# Patient Record
Sex: MALE | Race: WHITE | NOT HISPANIC OR LATINO
[De-identification: names, ages, dates, MRNs, and addresses within clinical notes are randomized per-mention and may not be internally consistent; named-entity substitution may affect disease eponyms.]

---

## 2017-01-08 ENCOUNTER — RX RENEWAL (OUTPATIENT)
Age: 77
End: 2017-01-08

## 2017-01-23 ENCOUNTER — MEDICATION RENEWAL (OUTPATIENT)
Age: 77
End: 2017-01-23

## 2017-02-18 ENCOUNTER — RX RENEWAL (OUTPATIENT)
Age: 77
End: 2017-02-18

## 2017-02-22 ENCOUNTER — RX RENEWAL (OUTPATIENT)
Age: 77
End: 2017-02-22

## 2017-03-17 ENCOUNTER — RX RENEWAL (OUTPATIENT)
Age: 77
End: 2017-03-17

## 2017-03-19 ENCOUNTER — RX RENEWAL (OUTPATIENT)
Age: 77
End: 2017-03-19

## 2017-03-25 ENCOUNTER — RX RENEWAL (OUTPATIENT)
Age: 77
End: 2017-03-25

## 2017-04-17 ENCOUNTER — RX RENEWAL (OUTPATIENT)
Age: 77
End: 2017-04-17

## 2017-04-24 ENCOUNTER — RX RENEWAL (OUTPATIENT)
Age: 77
End: 2017-04-24

## 2017-05-15 ENCOUNTER — RX RENEWAL (OUTPATIENT)
Age: 77
End: 2017-05-15

## 2017-05-25 ENCOUNTER — RX RENEWAL (OUTPATIENT)
Age: 77
End: 2017-05-25

## 2017-05-30 ENCOUNTER — APPOINTMENT (OUTPATIENT)
Dept: INTERNAL MEDICINE | Facility: CLINIC | Age: 77
End: 2017-05-30

## 2017-05-30 VITALS
DIASTOLIC BLOOD PRESSURE: 78 MMHG | RESPIRATION RATE: 14 BRPM | BODY MASS INDEX: 20.81 KG/M2 | TEMPERATURE: 98 F | OXYGEN SATURATION: 95 % | HEIGHT: 73 IN | WEIGHT: 157 LBS | SYSTOLIC BLOOD PRESSURE: 130 MMHG | HEART RATE: 87 BPM

## 2017-05-30 DIAGNOSIS — S09.90XA UNSPECIFIED INJURY OF HEAD, INITIAL ENCOUNTER: ICD-10-CM

## 2017-05-30 RX ORDER — AZITHROMYCIN 250 MG/1
250 TABLET, FILM COATED ORAL
Qty: 6 | Refills: 0 | Status: DISCONTINUED | COMMUNITY
Start: 2017-01-12

## 2017-06-09 ENCOUNTER — APPOINTMENT (OUTPATIENT)
Dept: UROLOGY | Facility: CLINIC | Age: 77
End: 2017-06-09

## 2017-06-09 VITALS
HEIGHT: 73 IN | SYSTOLIC BLOOD PRESSURE: 129 MMHG | HEART RATE: 84 BPM | WEIGHT: 255 LBS | DIASTOLIC BLOOD PRESSURE: 70 MMHG | BODY MASS INDEX: 33.8 KG/M2 | OXYGEN SATURATION: 91 % | TEMPERATURE: 97.4 F

## 2017-06-13 ENCOUNTER — APPOINTMENT (OUTPATIENT)
Dept: INTERNAL MEDICINE | Facility: CLINIC | Age: 77
End: 2017-06-13

## 2017-06-13 VITALS
BODY MASS INDEX: 34.46 KG/M2 | SYSTOLIC BLOOD PRESSURE: 130 MMHG | WEIGHT: 260 LBS | HEIGHT: 73 IN | OXYGEN SATURATION: 97 % | TEMPERATURE: 97.9 F | RESPIRATION RATE: 14 BRPM | DIASTOLIC BLOOD PRESSURE: 70 MMHG | HEART RATE: 81 BPM

## 2017-06-16 ENCOUNTER — RX RENEWAL (OUTPATIENT)
Age: 77
End: 2017-06-16

## 2017-06-17 LAB
ANION GAP SERPL CALC-SCNC: 20 MMOL/L
APPEARANCE: CLEAR
ASO AB SER LA-ACNC: 144 IU/ML
BACTERIA UR CULT: NORMAL
BACTERIA: NEGATIVE
BILIRUBIN URINE: NEGATIVE
BLOOD URINE: NEGATIVE
BUN SERPL-MCNC: 16 MG/DL
CALCIUM SERPL-MCNC: 9.8 MG/DL
CHLORIDE SERPL-SCNC: 101 MMOL/L
CO2 SERPL-SCNC: 24 MMOL/L
COLOR: YELLOW
CORE LAB FLUID CYTOLOGY: NORMAL
CREAT SERPL-MCNC: 1.23 MG/DL
GLUCOSE QUALITATIVE U: NORMAL MG/DL
GLUCOSE SERPL-MCNC: 156 MG/DL
HYALINE CASTS: 1 /LPF
KETONES URINE: NEGATIVE
LEUKOCYTE ESTERASE URINE: NEGATIVE
MICROSCOPIC-UA: NORMAL
NITRITE URINE: NEGATIVE
PH URINE: 6.5
POTASSIUM SERPL-SCNC: 3.9 MMOL/L
PROTEIN URINE: NEGATIVE MG/DL
PSA SERPL-MCNC: 3.41 NG/ML
RED BLOOD CELLS URINE: 5 /HPF
SODIUM SERPL-SCNC: 145 MMOL/L
SPECIFIC GRAVITY URINE: 1.02
SQUAMOUS EPITHELIAL CELLS: 1 /HPF
UROBILINOGEN URINE: NORMAL MG/DL
WHITE BLOOD CELLS URINE: 2 /HPF

## 2017-06-25 ENCOUNTER — RX RENEWAL (OUTPATIENT)
Age: 77
End: 2017-06-25

## 2017-06-27 ENCOUNTER — APPOINTMENT (OUTPATIENT)
Dept: UROLOGY | Facility: CLINIC | Age: 77
End: 2017-06-27

## 2017-07-14 ENCOUNTER — RX RENEWAL (OUTPATIENT)
Age: 77
End: 2017-07-14

## 2017-07-17 ENCOUNTER — RX RENEWAL (OUTPATIENT)
Age: 77
End: 2017-07-17

## 2017-07-20 ENCOUNTER — OTHER (OUTPATIENT)
Age: 77
End: 2017-07-20

## 2017-07-20 ENCOUNTER — APPOINTMENT (OUTPATIENT)
Dept: UROLOGY | Facility: HOSPITAL | Age: 77
End: 2017-07-20

## 2017-07-24 ENCOUNTER — RX RENEWAL (OUTPATIENT)
Age: 77
End: 2017-07-24

## 2017-07-27 ENCOUNTER — APPOINTMENT (OUTPATIENT)
Dept: INTERNAL MEDICINE | Facility: CLINIC | Age: 77
End: 2017-07-27
Payer: MEDICARE

## 2017-07-27 VITALS
HEART RATE: 76 BPM | DIASTOLIC BLOOD PRESSURE: 78 MMHG | OXYGEN SATURATION: 92 % | HEIGHT: 73 IN | TEMPERATURE: 98 F | BODY MASS INDEX: 33.8 KG/M2 | SYSTOLIC BLOOD PRESSURE: 140 MMHG | RESPIRATION RATE: 14 BRPM | WEIGHT: 255 LBS

## 2017-07-27 VITALS — SYSTOLIC BLOOD PRESSURE: 120 MMHG | DIASTOLIC BLOOD PRESSURE: 60 MMHG

## 2017-07-27 PROCEDURE — 99214 OFFICE O/P EST MOD 30 MIN: CPT | Mod: 25

## 2017-07-28 ENCOUNTER — APPOINTMENT (OUTPATIENT)
Dept: UROLOGY | Facility: CLINIC | Age: 77
End: 2017-07-28

## 2017-07-28 ENCOUNTER — RX RENEWAL (OUTPATIENT)
Age: 77
End: 2017-07-28

## 2017-08-16 ENCOUNTER — RX RENEWAL (OUTPATIENT)
Age: 77
End: 2017-08-16

## 2017-08-26 ENCOUNTER — RX RENEWAL (OUTPATIENT)
Age: 77
End: 2017-08-26

## 2017-08-30 ENCOUNTER — EMERGENCY (EMERGENCY)
Facility: HOSPITAL | Age: 77
LOS: 0 days | Discharge: HOME | End: 2017-08-31
Attending: EMERGENCY MEDICINE | Admitting: EMERGENCY MEDICINE
Payer: MEDICARE

## 2017-08-30 VITALS
TEMPERATURE: 98 F | HEART RATE: 85 BPM | HEIGHT: 73 IN | DIASTOLIC BLOOD PRESSURE: 100 MMHG | WEIGHT: 255.07 LBS | RESPIRATION RATE: 20 BRPM | OXYGEN SATURATION: 94 % | SYSTOLIC BLOOD PRESSURE: 168 MMHG

## 2017-08-30 DIAGNOSIS — Z98.890 OTHER SPECIFIED POSTPROCEDURAL STATES: Chronic | ICD-10-CM

## 2017-08-30 DIAGNOSIS — R31.9 HEMATURIA, UNSPECIFIED: ICD-10-CM

## 2017-08-30 DIAGNOSIS — R33.9 RETENTION OF URINE, UNSPECIFIED: ICD-10-CM

## 2017-08-30 DIAGNOSIS — C67.9 MALIGNANT NEOPLASM OF BLADDER, UNSPECIFIED: ICD-10-CM

## 2017-08-30 LAB
ALBUMIN SERPL ELPH-MCNC: 4.1 G/DL — SIGNIFICANT CHANGE UP (ref 3.3–5)
ALP SERPL-CCNC: 75 U/L — SIGNIFICANT CHANGE UP (ref 40–120)
ALT FLD-CCNC: 41 U/L — SIGNIFICANT CHANGE UP (ref 12–78)
ANION GAP SERPL CALC-SCNC: 11 MMOL/L — SIGNIFICANT CHANGE UP (ref 5–17)
ANISOCYTOSIS BLD QL: SLIGHT — SIGNIFICANT CHANGE UP
APPEARANCE UR: (no result)
AST SERPL-CCNC: 27 U/L — SIGNIFICANT CHANGE UP (ref 15–37)
BASO STIPL BLD QL SMEAR: PRESENT — SIGNIFICANT CHANGE UP
BASOPHILS # BLD AUTO: 0.1 K/UL — SIGNIFICANT CHANGE UP (ref 0–0.2)
BASOPHILS NFR BLD AUTO: 1.1 % — SIGNIFICANT CHANGE UP (ref 0–2)
BILIRUB SERPL-MCNC: 0.9 MG/DL — SIGNIFICANT CHANGE UP (ref 0.2–1.2)
BILIRUB UR-MCNC: NEGATIVE — SIGNIFICANT CHANGE UP
BUN SERPL-MCNC: 18 MG/DL — SIGNIFICANT CHANGE UP (ref 7–23)
CALCIUM SERPL-MCNC: 9.4 MG/DL — SIGNIFICANT CHANGE UP (ref 8.5–10.1)
CHLORIDE SERPL-SCNC: 97 MMOL/L — SIGNIFICANT CHANGE UP (ref 96–108)
CO2 SERPL-SCNC: 26 MMOL/L — SIGNIFICANT CHANGE UP (ref 22–31)
COLOR SPEC: (no result)
CREAT SERPL-MCNC: 1.29 MG/DL — SIGNIFICANT CHANGE UP (ref 0.5–1.3)
DACRYOCYTES BLD QL SMEAR: SLIGHT — SIGNIFICANT CHANGE UP
DIFF PNL FLD: (no result)
ELLIPTOCYTES BLD QL SMEAR: SLIGHT — SIGNIFICANT CHANGE UP
EOSINOPHIL # BLD AUTO: 0.1 K/UL — SIGNIFICANT CHANGE UP (ref 0–0.5)
EOSINOPHIL NFR BLD AUTO: 1.6 % — SIGNIFICANT CHANGE UP (ref 0–6)
GLUCOSE SERPL-MCNC: 161 MG/DL — HIGH (ref 70–99)
GLUCOSE UR QL: NEGATIVE MG/DL — SIGNIFICANT CHANGE UP
HCT VFR BLD CALC: 40.7 % — SIGNIFICANT CHANGE UP (ref 39–50)
HGB BLD-MCNC: 13.2 G/DL — SIGNIFICANT CHANGE UP (ref 13–17)
HYPOCHROMIA BLD QL: SLIGHT — SIGNIFICANT CHANGE UP
INR BLD: 1.08 RATIO — SIGNIFICANT CHANGE UP (ref 0.88–1.16)
KETONES UR-MCNC: NEGATIVE — SIGNIFICANT CHANGE UP
LEUKOCYTE ESTERASE UR-ACNC: NEGATIVE — SIGNIFICANT CHANGE UP
LYMPHOCYTES # BLD AUTO: 2.3 K/UL — SIGNIFICANT CHANGE UP (ref 1–3.3)
LYMPHOCYTES # BLD AUTO: 25.1 % — SIGNIFICANT CHANGE UP (ref 13–44)
MCHC RBC-ENTMCNC: 20.3 PG — LOW (ref 27–34)
MCHC RBC-ENTMCNC: 32.4 GM/DL — SIGNIFICANT CHANGE UP (ref 32–36)
MCV RBC AUTO: 62.7 FL — LOW (ref 80–100)
MICROCYTES BLD QL: SIGNIFICANT CHANGE UP
MONOCYTES # BLD AUTO: 1 K/UL — HIGH (ref 0–0.9)
MONOCYTES NFR BLD AUTO: 10.6 % — SIGNIFICANT CHANGE UP (ref 2–14)
NEUTROPHILS # BLD AUTO: 5.7 K/UL — SIGNIFICANT CHANGE UP (ref 1.8–7.4)
NEUTROPHILS NFR BLD AUTO: 61.7 % — SIGNIFICANT CHANGE UP (ref 43–77)
NITRITE UR-MCNC: NEGATIVE — SIGNIFICANT CHANGE UP
PH UR: 7 — SIGNIFICANT CHANGE UP (ref 5–8)
PLAT MORPH BLD: NORMAL — SIGNIFICANT CHANGE UP
PLATELET # BLD AUTO: 272 K/UL — SIGNIFICANT CHANGE UP (ref 150–400)
POIKILOCYTOSIS BLD QL AUTO: SLIGHT — SIGNIFICANT CHANGE UP
POLYCHROMASIA BLD QL SMEAR: SLIGHT — SIGNIFICANT CHANGE UP
POTASSIUM SERPL-MCNC: 3.2 MMOL/L — LOW (ref 3.5–5.3)
POTASSIUM SERPL-SCNC: 3.2 MMOL/L — LOW (ref 3.5–5.3)
PROT SERPL-MCNC: 7.3 GM/DL — SIGNIFICANT CHANGE UP (ref 6–8.3)
PROT UR-MCNC: 500 MG/DL
PROTHROM AB SERPL-ACNC: 11.7 SEC — SIGNIFICANT CHANGE UP (ref 9.8–12.7)
RBC # BLD: 6.49 M/UL — HIGH (ref 4.2–5.8)
RBC # FLD: 13.6 % — SIGNIFICANT CHANGE UP (ref 10.3–14.5)
RBC BLD AUTO: (no result)
RBC CASTS # UR COMP ASSIST: >50 /HPF (ref 0–4)
SCHISTOCYTES BLD QL AUTO: SLIGHT — SIGNIFICANT CHANGE UP
SODIUM SERPL-SCNC: 134 MMOL/L — LOW (ref 135–145)
SP GR SPEC: 1.01 — SIGNIFICANT CHANGE UP (ref 1.01–1.02)
UROBILINOGEN FLD QL: NEGATIVE MG/DL — SIGNIFICANT CHANGE UP
WBC # BLD: 9.2 K/UL — SIGNIFICANT CHANGE UP (ref 3.8–10.5)
WBC # FLD AUTO: 9.2 K/UL — SIGNIFICANT CHANGE UP (ref 3.8–10.5)
WBC UR QL: SIGNIFICANT CHANGE UP

## 2017-08-30 PROCEDURE — 99285 EMERGENCY DEPT VISIT HI MDM: CPT

## 2017-08-30 NOTE — ED PROVIDER NOTE - OBJECTIVE STATEMENT
75 yo M hx of bladder CA s/p surgery August 3 at [facility], HTN, presents with CC urinary retention.  Pt states he started having gross hematuria today, color or red wine, and some clots.  Tonight he started having difficulty urinating and came to ED for further evaluation. Denies use of blood thinners.  Denies fever, chills, dysuria, or any other symptoms.  No medications taken at home prior to arrival.

## 2017-08-30 NOTE — ED PROVIDER NOTE - PROGRESS NOTE DETAILS
Pt with normal Hgb, normal renal function.  Urine is clearing after 4 bags CBI, from dark red urine, to lighter red urine.  No signs of reaccumulation of clot or retention.  Will clamp, and observe for urinary retention or worsening hematuria. Pt with normal Hgb, normal renal function.  Urine is clearing after 4 bags CBI, from dark red urine, to lighter pink urine.  No signs of reaccumulation of clot or retention.  Will clamp, and observe for urinary retention or worsening hematuria. Following clamping, and observation, pt with darkening of urine back to smiley blood appearance.  Will give another bag CBI, and consulting Dr. Lugo, pt's urologist out of [place]. Spoke with Dr. Hampton covering for Dr. Lugo.  Recommends transfer to [facility] for further urologic evaluation.

## 2017-08-30 NOTE — ED ADULT TRIAGE NOTE - CHIEF COMPLAINT QUOTE
Patient comes to ED for urinary retention and hematuria. Pt had a bladder tumor removed august 3rd at [place].

## 2017-08-31 VITALS
SYSTOLIC BLOOD PRESSURE: 139 MMHG | OXYGEN SATURATION: 99 % | DIASTOLIC BLOOD PRESSURE: 84 MMHG | RESPIRATION RATE: 18 BRPM | TEMPERATURE: 98 F | HEART RATE: 78 BPM

## 2017-08-31 NOTE — ED ADULT NURSE REASSESSMENT NOTE - NS ED NURSE REASSESS COMMENT FT1
MD Llamas re-evaluated pt and pt output after 5 CBI bags were completed, pt output without CBI became dark red again, pt updated as to plan of care at this time and plan for urology consult. Pt verbalized understanding of current plan of care. Pt connected to 6th CBI bag at this time and aware as to rationale for additional bag. Call bell within reach, lights turned off at pt request, pt has no questions or complaints at this time. Will continue to monitor.

## 2017-08-31 NOTE — ED ADULT NURSE REASSESSMENT NOTE - NS ED NURSE REASSESS COMMENT FT1
Pt urine becoming clearer at this time, urine now light pink in color when initially dark red with clots. Pt no longer complains of pain at this time or the need to void. No additional clots noted in the harding bag, CBI continues to irrigate and drain at this time with no issues. Pt aware as to plan of care at this time. Pt has no questions at this time, call bell within reach. Pt given pillow for comfort and repositioned. Will continue to monitor.

## 2017-08-31 NOTE — ED ADULT NURSE REASSESSMENT NOTE - NS ED NURSE REASSESS COMMENT FT1
Pt asleep in stretcher, CBI continues to flow without clogging or issues, slight small clots still noted in urine. Urine pink in color. Awaiting completion of 5th CBI fluid bag at this time top d/c CBI as per MD Llamas to make sure pt harding will continue to drain. Call bell within reach, will continue to monitor.

## 2017-08-31 NOTE — ED ADULT NURSE NOTE - CHIEF COMPLAINT QUOTE
Patient comes to ED for urinary retention and hematuria. Pt had a bladder tumor removed august 3rd at [place].

## 2017-08-31 NOTE — ED ADULT NURSE REASSESSMENT NOTE - NS ED NURSE REASSESS COMMENT FT1
Report given to transfer center in regards to pt status and care. Pt aware as to need for transfer to [place] and has no questions regarding transfer. Pt CBI infusing at this time with no complications. Pt has no complaints at this time, call bell within reach. Awaiting transportation at this time, will continue to monitor.

## 2017-08-31 NOTE — ED ADULT NURSE NOTE - OBJECTIVE STATEMENT
Pt presents to the ED with complaints of severe urinary retention with drainage from the meatus of the penis that is bloody. Pt states he has been unable to urinate for hours. Pt states he has a h/o bladder CA and had tumors removed at [place] on august 3rd. Pt states he had a harding for 4 days after the sx and has had no issues up until this point.

## 2017-09-01 LAB
CULTURE RESULTS: NO GROWTH — SIGNIFICANT CHANGE UP
SPECIMEN SOURCE: SIGNIFICANT CHANGE UP

## 2017-09-19 ENCOUNTER — RX RENEWAL (OUTPATIENT)
Age: 77
End: 2017-09-19

## 2017-09-26 ENCOUNTER — RX RENEWAL (OUTPATIENT)
Age: 77
End: 2017-09-26

## 2017-10-20 ENCOUNTER — RX RENEWAL (OUTPATIENT)
Age: 77
End: 2017-10-20

## 2017-10-29 ENCOUNTER — RX RENEWAL (OUTPATIENT)
Age: 77
End: 2017-10-29

## 2017-11-18 ENCOUNTER — RX RENEWAL (OUTPATIENT)
Age: 77
End: 2017-11-18

## 2017-11-25 ENCOUNTER — RX RENEWAL (OUTPATIENT)
Age: 77
End: 2017-11-25

## 2017-12-18 ENCOUNTER — RX RENEWAL (OUTPATIENT)
Age: 77
End: 2017-12-18

## 2017-12-27 ENCOUNTER — RX RENEWAL (OUTPATIENT)
Age: 77
End: 2017-12-27

## 2018-01-08 ENCOUNTER — RX RENEWAL (OUTPATIENT)
Age: 78
End: 2018-01-08

## 2018-01-18 ENCOUNTER — RX RENEWAL (OUTPATIENT)
Age: 78
End: 2018-01-18

## 2018-01-22 ENCOUNTER — RX RENEWAL (OUTPATIENT)
Age: 78
End: 2018-01-22

## 2018-01-31 ENCOUNTER — MEDICATION RENEWAL (OUTPATIENT)
Age: 78
End: 2018-01-31

## 2018-02-20 ENCOUNTER — MEDICATION RENEWAL (OUTPATIENT)
Age: 78
End: 2018-02-20

## 2018-03-06 ENCOUNTER — LABORATORY RESULT (OUTPATIENT)
Age: 78
End: 2018-03-06

## 2018-03-06 ENCOUNTER — APPOINTMENT (OUTPATIENT)
Dept: INTERNAL MEDICINE | Facility: CLINIC | Age: 78
End: 2018-03-06
Payer: MEDICARE

## 2018-03-06 VITALS — DIASTOLIC BLOOD PRESSURE: 60 MMHG | SYSTOLIC BLOOD PRESSURE: 120 MMHG

## 2018-03-06 VITALS
WEIGHT: 260 LBS | DIASTOLIC BLOOD PRESSURE: 90 MMHG | OXYGEN SATURATION: 96 % | TEMPERATURE: 98 F | SYSTOLIC BLOOD PRESSURE: 140 MMHG | HEIGHT: 73 IN | RESPIRATION RATE: 14 BRPM | HEART RATE: 83 BPM | BODY MASS INDEX: 34.46 KG/M2

## 2018-03-06 DIAGNOSIS — J06.9 ACUTE UPPER RESPIRATORY INFECTION, UNSPECIFIED: ICD-10-CM

## 2018-03-06 PROCEDURE — 99214 OFFICE O/P EST MOD 30 MIN: CPT

## 2018-03-06 RX ORDER — GABAPENTIN 100 MG/1
100 CAPSULE ORAL
Qty: 90 | Refills: 0 | Status: DISCONTINUED | COMMUNITY
Start: 2017-11-01

## 2018-03-08 LAB
ANION GAP SERPL CALC-SCNC: 15 MMOL/L
BASOPHILS NFR BLD AUTO: 1 %
BUN SERPL-MCNC: 19 MG/DL
CALCIUM SERPL-MCNC: 9.8 MG/DL
CHLORIDE SERPL-SCNC: 98 MMOL/L
CHOLEST SERPL-MCNC: 130 MG/DL
CHOLEST/HDLC SERPL: 4.6 RATIO
CO2 SERPL-SCNC: 28 MMOL/L
CREAT SERPL-MCNC: 1.14 MG/DL
EOSINOPHIL NFR BLD AUTO: 3.8 %
GLUCOSE SERPL-MCNC: 108 MG/DL
HBA1C MFR BLD HPLC: 6.7 %
HCT VFR BLD CALC: 38.6 %
HDLC SERPL-MCNC: 28 MG/DL
HGB BLD-MCNC: 12.4 G/DL
LDLC SERPL CALC-MCNC: 61 MG/DL
LYMPHOCYTES # BLD AUTO: 1.13 K/UL
LYMPHOCYTES NFR BLD AUTO: 26 %
MAN DIFF?: NORMAL
MCHC RBC-ENTMCNC: 19.8 PG
MCHC RBC-ENTMCNC: 32.1 GM/DL
MCV RBC AUTO: 61.8 FL
MONOCYTES NFR BLD AUTO: 10.6 %
NEUTROPHILS # BLD AUTO: 2.29 K/UL
NEUTROPHILS NFR BLD AUTO: 51 %
PLATELET # BLD AUTO: 179 K/UL
POTASSIUM SERPL-SCNC: 3.5 MMOL/L
PSA SERPL-MCNC: 4.62 NG/ML
RBC # BLD: 6.25 M/UL
RBC # FLD: 16.5 %
SODIUM SERPL-SCNC: 141 MMOL/L
TRIGL SERPL-MCNC: 205 MG/DL
TSH SERPL-ACNC: 1.35 UIU/ML
TSH SERPL-ACNC: 1.37 UIU/ML
WBC # FLD AUTO: 4.33 K/UL

## 2018-03-18 ENCOUNTER — RX RENEWAL (OUTPATIENT)
Age: 78
End: 2018-03-18

## 2018-03-31 ENCOUNTER — RX RENEWAL (OUTPATIENT)
Age: 78
End: 2018-03-31

## 2018-04-02 ENCOUNTER — MEDICATION RENEWAL (OUTPATIENT)
Age: 78
End: 2018-04-02

## 2018-04-09 ENCOUNTER — APPOINTMENT (OUTPATIENT)
Dept: INTERNAL MEDICINE | Facility: CLINIC | Age: 78
End: 2018-04-09
Payer: MEDICARE

## 2018-04-09 VITALS
RESPIRATION RATE: 14 BRPM | TEMPERATURE: 98.6 F | BODY MASS INDEX: 34.85 KG/M2 | OXYGEN SATURATION: 98 % | DIASTOLIC BLOOD PRESSURE: 78 MMHG | HEART RATE: 84 BPM | HEIGHT: 73 IN | WEIGHT: 263 LBS | SYSTOLIC BLOOD PRESSURE: 132 MMHG

## 2018-04-09 LAB — GLUCOSE BLDC GLUCOMTR-MCNC: 135

## 2018-04-09 PROCEDURE — 99214 OFFICE O/P EST MOD 30 MIN: CPT | Mod: 25

## 2018-04-09 PROCEDURE — 82962 GLUCOSE BLOOD TEST: CPT

## 2018-04-09 RX ORDER — AZITHROMYCIN 250 MG/1
250 TABLET, FILM COATED ORAL
Qty: 1 | Refills: 0 | Status: DISCONTINUED | COMMUNITY
Start: 2018-03-06 | End: 2018-04-09

## 2018-04-09 RX ORDER — BLOOD-GLUCOSE METER
EACH MISCELLANEOUS
Qty: 1 | Refills: 0 | Status: ACTIVE | COMMUNITY
Start: 2018-04-09 | End: 1900-01-01

## 2018-04-20 ENCOUNTER — RX RENEWAL (OUTPATIENT)
Age: 78
End: 2018-04-20

## 2018-05-04 ENCOUNTER — APPOINTMENT (OUTPATIENT)
Dept: INTERNAL MEDICINE | Facility: CLINIC | Age: 78
End: 2018-05-04
Payer: MEDICARE

## 2018-05-04 VITALS
HEART RATE: 88 BPM | HEIGHT: 73 IN | TEMPERATURE: 97.8 F | BODY MASS INDEX: 34.19 KG/M2 | RESPIRATION RATE: 14 BRPM | SYSTOLIC BLOOD PRESSURE: 110 MMHG | WEIGHT: 258 LBS | DIASTOLIC BLOOD PRESSURE: 70 MMHG | OXYGEN SATURATION: 98 %

## 2018-05-04 PROCEDURE — 99214 OFFICE O/P EST MOD 30 MIN: CPT

## 2018-05-04 RX ORDER — BLOOD-GLUCOSE METER
W/DEVICE EACH MISCELLANEOUS
Qty: 1 | Refills: 0 | Status: ACTIVE | COMMUNITY
Start: 2018-04-09

## 2018-05-04 RX ORDER — LANCETS
EACH MISCELLANEOUS
Qty: 30 | Refills: 3 | Status: ACTIVE | COMMUNITY
Start: 2018-05-04 | End: 1900-01-01

## 2018-05-07 ENCOUNTER — RX RENEWAL (OUTPATIENT)
Age: 78
End: 2018-05-07

## 2018-06-18 ENCOUNTER — APPOINTMENT (OUTPATIENT)
Dept: INTERNAL MEDICINE | Facility: CLINIC | Age: 78
End: 2018-06-18
Payer: MEDICARE

## 2018-06-18 VITALS
RESPIRATION RATE: 14 BRPM | DIASTOLIC BLOOD PRESSURE: 80 MMHG | TEMPERATURE: 98 F | SYSTOLIC BLOOD PRESSURE: 120 MMHG | HEIGHT: 73 IN | OXYGEN SATURATION: 95 % | HEART RATE: 85 BPM | WEIGHT: 254 LBS | BODY MASS INDEX: 33.66 KG/M2

## 2018-06-18 DIAGNOSIS — R21 RASH AND OTHER NONSPECIFIC SKIN ERUPTION: ICD-10-CM

## 2018-06-18 PROCEDURE — 99214 OFFICE O/P EST MOD 30 MIN: CPT

## 2018-06-19 LAB
ESTIMATED AVERAGE GLUCOSE: 128 MG/DL
HBA1C MFR BLD HPLC: 6.1 %

## 2018-07-26 ENCOUNTER — RX RENEWAL (OUTPATIENT)
Age: 78
End: 2018-07-26

## 2018-08-09 ENCOUNTER — RX RENEWAL (OUTPATIENT)
Age: 78
End: 2018-08-09

## 2018-08-27 ENCOUNTER — RX RENEWAL (OUTPATIENT)
Age: 78
End: 2018-08-27

## 2018-09-26 ENCOUNTER — RX RENEWAL (OUTPATIENT)
Age: 78
End: 2018-09-26

## 2018-10-04 ENCOUNTER — RX RENEWAL (OUTPATIENT)
Age: 78
End: 2018-10-04

## 2018-10-25 ENCOUNTER — RX RENEWAL (OUTPATIENT)
Age: 78
End: 2018-10-25

## 2018-10-28 ENCOUNTER — RX RENEWAL (OUTPATIENT)
Age: 78
End: 2018-10-28

## 2019-01-22 ENCOUNTER — RX RENEWAL (OUTPATIENT)
Age: 79
End: 2019-01-22

## 2019-01-29 ENCOUNTER — RX RENEWAL (OUTPATIENT)
Age: 79
End: 2019-01-29

## 2019-01-29 ENCOUNTER — MEDICATION RENEWAL (OUTPATIENT)
Age: 79
End: 2019-01-29

## 2019-02-11 ENCOUNTER — LABORATORY RESULT (OUTPATIENT)
Age: 79
End: 2019-02-11

## 2019-02-11 ENCOUNTER — APPOINTMENT (OUTPATIENT)
Dept: INTERNAL MEDICINE | Facility: CLINIC | Age: 79
End: 2019-02-11
Payer: MEDICARE

## 2019-02-11 VITALS
DIASTOLIC BLOOD PRESSURE: 100 MMHG | BODY MASS INDEX: 32.98 KG/M2 | WEIGHT: 250 LBS | OXYGEN SATURATION: 95 % | HEART RATE: 68 BPM | RESPIRATION RATE: 14 BRPM | TEMPERATURE: 98 F | SYSTOLIC BLOOD PRESSURE: 140 MMHG

## 2019-02-11 VITALS — DIASTOLIC BLOOD PRESSURE: 80 MMHG | SYSTOLIC BLOOD PRESSURE: 140 MMHG

## 2019-02-11 PROCEDURE — 99214 OFFICE O/P EST MOD 30 MIN: CPT | Mod: 25

## 2019-02-11 PROCEDURE — 36415 COLL VENOUS BLD VENIPUNCTURE: CPT

## 2019-02-11 RX ORDER — METFORMIN HYDROCHLORIDE 1000 MG/1
1000 TABLET, EXTENDED RELEASE ORAL
Qty: 30 | Refills: 0 | Status: DISCONTINUED | COMMUNITY
Start: 2018-04-18 | End: 2019-02-11

## 2019-02-11 RX ORDER — TOPIRAMATE 50 MG/1
50 TABLET, FILM COATED ORAL TWICE DAILY
Qty: 60 | Refills: 0 | Status: DISCONTINUED | COMMUNITY
Start: 2018-03-27 | End: 2019-02-11

## 2019-02-11 NOTE — HISTORY OF PRESENT ILLNESS
[de-identified] : Here for follow-up regarding HTN, Diabetes, hyperlipidemia.\par 30 day fingerstick average 127

## 2019-02-20 LAB
ALBUMIN SERPL ELPH-MCNC: 4.7 G/DL
ALP BLD-CCNC: 67 U/L
ALT SERPL-CCNC: 27 U/L
ANION GAP SERPL CALC-SCNC: 14 MMOL/L
APPEARANCE: CLEAR
AST SERPL-CCNC: 25 U/L
BASOPHILS # BLD AUTO: 0.04 K/UL
BASOPHILS NFR BLD AUTO: 0.7 %
BILIRUB SERPL-MCNC: 1.1 MG/DL
BILIRUBIN URINE: NEGATIVE
BLOOD URINE: NEGATIVE
BUN SERPL-MCNC: 15 MG/DL
CALCIUM SERPL-MCNC: 9.8 MG/DL
CHLORIDE SERPL-SCNC: 94 MMOL/L
CHOLEST SERPL-MCNC: 157 MG/DL
CHOLEST/HDLC SERPL: 4 RATIO
CO2 SERPL-SCNC: 29 MMOL/L
COLOR: YELLOW
CREAT SERPL-MCNC: 0.96 MG/DL
EOSINOPHIL # BLD AUTO: 0.12 K/UL
EOSINOPHIL NFR BLD AUTO: 2.1 %
ESTIMATED AVERAGE GLUCOSE: 131 MG/DL
FOLATE SERPL-MCNC: >20 NG/ML
GLUCOSE QUALITATIVE U: NEGATIVE MG/DL
GLUCOSE SERPL-MCNC: 91 MG/DL
HBA1C MFR BLD HPLC: 6.2 %
HCT VFR BLD CALC: 40.4 %
HDLC SERPL-MCNC: 39 MG/DL
HGB BLD-MCNC: 12.5 G/DL
IMM GRANULOCYTES NFR BLD AUTO: 0.7 %
KETONES URINE: NEGATIVE
LDLC SERPL CALC-MCNC: 69 MG/DL
LEUKOCYTE ESTERASE URINE: NEGATIVE
LYMPHOCYTES # BLD AUTO: 1.69 K/UL
LYMPHOCYTES NFR BLD AUTO: 29.8 %
MAN DIFF?: NORMAL
MCHC RBC-ENTMCNC: 19.4 PG
MCHC RBC-ENTMCNC: 30.9 GM/DL
MCV RBC AUTO: 62.7 FL
MONOCYTES # BLD AUTO: 0.53 K/UL
MONOCYTES NFR BLD AUTO: 9.3 %
NEUTROPHILS # BLD AUTO: 3.25 K/UL
NEUTROPHILS NFR BLD AUTO: 57.4 %
NITRITE URINE: NEGATIVE
PH URINE: 6.5
PLATELET # BLD AUTO: 197 K/UL
POTASSIUM SERPL-SCNC: 3.5 MMOL/L
PROT SERPL-MCNC: 7.4 G/DL
PROTEIN URINE: NEGATIVE MG/DL
PSA SERPL-MCNC: 1.55 NG/ML
RBC # BLD: 6.44 M/UL
RBC # FLD: 16.6 %
SODIUM SERPL-SCNC: 137 MMOL/L
SPECIFIC GRAVITY URINE: 1.01
TRIGL SERPL-MCNC: 244 MG/DL
TSH SERPL-ACNC: 1.54 UIU/ML
UROBILINOGEN URINE: NEGATIVE MG/DL
VIT B12 SERPL-MCNC: 502 PG/ML
WBC # FLD AUTO: 5.67 K/UL

## 2019-02-27 LAB — HEMOCCULT STL QL IA: NEGATIVE

## 2019-03-19 ENCOUNTER — RX RENEWAL (OUTPATIENT)
Age: 79
End: 2019-03-19

## 2019-05-20 ENCOUNTER — RX RENEWAL (OUTPATIENT)
Age: 79
End: 2019-05-20

## 2019-06-13 ENCOUNTER — RX RENEWAL (OUTPATIENT)
Age: 79
End: 2019-06-13

## 2019-06-25 ENCOUNTER — RX RENEWAL (OUTPATIENT)
Age: 79
End: 2019-06-25

## 2019-08-14 ENCOUNTER — FORM ENCOUNTER (OUTPATIENT)
Age: 79
End: 2019-08-14

## 2019-08-15 ENCOUNTER — OUTPATIENT (OUTPATIENT)
Dept: OUTPATIENT SERVICES | Facility: HOSPITAL | Age: 79
LOS: 1 days | End: 2019-08-15
Payer: MEDICARE

## 2019-08-15 VITALS
HEART RATE: 82 BPM | OXYGEN SATURATION: 98 % | HEIGHT: 73 IN | WEIGHT: 248.02 LBS | SYSTOLIC BLOOD PRESSURE: 125 MMHG | DIASTOLIC BLOOD PRESSURE: 80 MMHG | TEMPERATURE: 98 F | RESPIRATION RATE: 20 BRPM

## 2019-08-15 DIAGNOSIS — Z01.818 ENCOUNTER FOR OTHER PREPROCEDURAL EXAMINATION: ICD-10-CM

## 2019-08-15 DIAGNOSIS — Z98.890 OTHER SPECIFIED POSTPROCEDURAL STATES: Chronic | ICD-10-CM

## 2019-08-15 DIAGNOSIS — Z29.9 ENCOUNTER FOR PROPHYLACTIC MEASURES, UNSPECIFIED: ICD-10-CM

## 2019-08-15 DIAGNOSIS — M48.062 SPINAL STENOSIS, LUMBAR REGION WITH NEUROGENIC CLAUDICATION: ICD-10-CM

## 2019-08-15 DIAGNOSIS — Z90.89 ACQUIRED ABSENCE OF OTHER ORGANS: Chronic | ICD-10-CM

## 2019-08-15 LAB
ALBUMIN SERPL ELPH-MCNC: 4.4 G/DL — SIGNIFICANT CHANGE UP (ref 3.3–5)
ALP SERPL-CCNC: 67 U/L — SIGNIFICANT CHANGE UP (ref 40–120)
ALT FLD-CCNC: 33 U/L — SIGNIFICANT CHANGE UP (ref 12–78)
ANION GAP SERPL CALC-SCNC: 7 MMOL/L — SIGNIFICANT CHANGE UP (ref 5–17)
APPEARANCE UR: CLEAR — SIGNIFICANT CHANGE UP
APTT BLD: 28.2 SEC — SIGNIFICANT CHANGE UP (ref 27.5–36.3)
AST SERPL-CCNC: 22 U/L — SIGNIFICANT CHANGE UP (ref 15–37)
BASOPHILS # BLD AUTO: 0 K/UL — SIGNIFICANT CHANGE UP (ref 0–0.2)
BASOPHILS NFR BLD AUTO: 0 % — SIGNIFICANT CHANGE UP (ref 0–2)
BILIRUB DIRECT SERPL-MCNC: 0.3 MG/DL — HIGH (ref 0–0.2)
BILIRUB SERPL-MCNC: 1.5 MG/DL — HIGH (ref 0.2–1.2)
BILIRUB UR-MCNC: NEGATIVE — SIGNIFICANT CHANGE UP
BUN SERPL-MCNC: 15 MG/DL — SIGNIFICANT CHANGE UP (ref 7–23)
CALCIUM SERPL-MCNC: 9.7 MG/DL — SIGNIFICANT CHANGE UP (ref 8.5–10.1)
CHLORIDE SERPL-SCNC: 100 MMOL/L — SIGNIFICANT CHANGE UP (ref 96–108)
CO2 SERPL-SCNC: 31 MMOL/L — SIGNIFICANT CHANGE UP (ref 22–31)
COLOR SPEC: YELLOW — SIGNIFICANT CHANGE UP
CREAT SERPL-MCNC: 1.25 MG/DL — SIGNIFICANT CHANGE UP (ref 0.5–1.3)
CRP SERPL-MCNC: 0.1 MG/DL — SIGNIFICANT CHANGE UP (ref 0–0.4)
DIFF PNL FLD: NEGATIVE — SIGNIFICANT CHANGE UP
EOSINOPHIL # BLD AUTO: 0.12 K/UL — SIGNIFICANT CHANGE UP (ref 0–0.5)
EOSINOPHIL NFR BLD AUTO: 2 % — SIGNIFICANT CHANGE UP (ref 0–6)
GLUCOSE SERPL-MCNC: 141 MG/DL — HIGH (ref 70–99)
GLUCOSE UR QL: NEGATIVE MG/DL — SIGNIFICANT CHANGE UP
HBA1C BLD-MCNC: 6.2 % — HIGH (ref 4–5.6)
HCT VFR BLD CALC: 42 % — SIGNIFICANT CHANGE UP (ref 39–50)
HGB BLD-MCNC: 13.3 G/DL — SIGNIFICANT CHANGE UP (ref 13–17)
INR BLD: 1.13 RATIO — SIGNIFICANT CHANGE UP (ref 0.88–1.16)
KETONES UR-MCNC: NEGATIVE — SIGNIFICANT CHANGE UP
LEUKOCYTE ESTERASE UR-ACNC: NEGATIVE — SIGNIFICANT CHANGE UP
LYMPHOCYTES # BLD AUTO: 0.86 K/UL — LOW (ref 1–3.3)
LYMPHOCYTES # BLD AUTO: 14 % — SIGNIFICANT CHANGE UP (ref 13–44)
MCHC RBC-ENTMCNC: 20.2 PG — LOW (ref 27–34)
MCHC RBC-ENTMCNC: 31.7 GM/DL — LOW (ref 32–36)
MCV RBC AUTO: 63.9 FL — LOW (ref 80–100)
MONOCYTES # BLD AUTO: 0.49 K/UL — SIGNIFICANT CHANGE UP (ref 0–0.9)
MONOCYTES NFR BLD AUTO: 8 % — SIGNIFICANT CHANGE UP (ref 2–14)
MRSA PCR RESULT.: SIGNIFICANT CHANGE UP
NEUTROPHILS # BLD AUTO: 4.32 K/UL — SIGNIFICANT CHANGE UP (ref 1.8–7.4)
NEUTROPHILS NFR BLD AUTO: 69 % — SIGNIFICANT CHANGE UP (ref 43–77)
NITRITE UR-MCNC: NEGATIVE — SIGNIFICANT CHANGE UP
NRBC # BLD: SIGNIFICANT CHANGE UP /100 WBCS (ref 0–0)
PH UR: 5 — SIGNIFICANT CHANGE UP (ref 5–8)
PLATELET # BLD AUTO: 189 K/UL — SIGNIFICANT CHANGE UP (ref 150–400)
POTASSIUM SERPL-MCNC: 3.2 MMOL/L — LOW (ref 3.5–5.3)
POTASSIUM SERPL-SCNC: 3.2 MMOL/L — LOW (ref 3.5–5.3)
PROT SERPL-MCNC: 7.5 GM/DL — SIGNIFICANT CHANGE UP (ref 6–8.3)
PROT UR-MCNC: NEGATIVE MG/DL — SIGNIFICANT CHANGE UP
PROTHROM AB SERPL-ACNC: 12.6 SEC — SIGNIFICANT CHANGE UP (ref 10–12.9)
RBC # BLD: 6.57 M/UL — HIGH (ref 4.2–5.8)
RBC # FLD: 18.1 % — HIGH (ref 10.3–14.5)
S AUREUS DNA NOSE QL NAA+PROBE: DETECTED
SODIUM SERPL-SCNC: 138 MMOL/L — SIGNIFICANT CHANGE UP (ref 135–145)
SP GR SPEC: 1.01 — SIGNIFICANT CHANGE UP (ref 1.01–1.02)
UROBILINOGEN FLD QL: NEGATIVE MG/DL — SIGNIFICANT CHANGE UP
WBC # BLD: 6.17 K/UL — SIGNIFICANT CHANGE UP (ref 3.8–10.5)
WBC # FLD AUTO: 6.17 K/UL — SIGNIFICANT CHANGE UP (ref 3.8–10.5)

## 2019-08-15 PROCEDURE — 86900 BLOOD TYPING SEROLOGIC ABO: CPT

## 2019-08-15 PROCEDURE — 86901 BLOOD TYPING SEROLOGIC RH(D): CPT

## 2019-08-15 PROCEDURE — 82248 BILIRUBIN DIRECT: CPT

## 2019-08-15 PROCEDURE — 83036 HEMOGLOBIN GLYCOSYLATED A1C: CPT

## 2019-08-15 PROCEDURE — 85730 THROMBOPLASTIN TIME PARTIAL: CPT

## 2019-08-15 PROCEDURE — 87086 URINE CULTURE/COLONY COUNT: CPT

## 2019-08-15 PROCEDURE — 36415 COLL VENOUS BLD VENIPUNCTURE: CPT

## 2019-08-15 PROCEDURE — 81003 URINALYSIS AUTO W/O SCOPE: CPT

## 2019-08-15 PROCEDURE — 71046 X-RAY EXAM CHEST 2 VIEWS: CPT | Mod: 26

## 2019-08-15 PROCEDURE — G0463: CPT | Mod: 25

## 2019-08-15 PROCEDURE — 86140 C-REACTIVE PROTEIN: CPT

## 2019-08-15 PROCEDURE — 85610 PROTHROMBIN TIME: CPT

## 2019-08-15 PROCEDURE — 80053 COMPREHEN METABOLIC PANEL: CPT

## 2019-08-15 PROCEDURE — 85025 COMPLETE CBC W/AUTO DIFF WBC: CPT

## 2019-08-15 PROCEDURE — 86850 RBC ANTIBODY SCREEN: CPT

## 2019-08-15 PROCEDURE — 87641 MR-STAPH DNA AMP PROBE: CPT

## 2019-08-15 PROCEDURE — 93010 ELECTROCARDIOGRAM REPORT: CPT

## 2019-08-15 PROCEDURE — 87640 STAPH A DNA AMP PROBE: CPT

## 2019-08-15 PROCEDURE — 71046 X-RAY EXAM CHEST 2 VIEWS: CPT

## 2019-08-15 PROCEDURE — 93005 ELECTROCARDIOGRAM TRACING: CPT

## 2019-08-15 NOTE — H&P PST ADULT - PAIN CHRONIC, PROFILE
I contacted Mrs. Zuluaga and let her know that I refilled her medications and should be able to pick them up from the [facility] pharmacy at [place].  She appreciated our help.   yes

## 2019-08-15 NOTE — H&P PST ADULT - NSICDXPASTMEDICALHX_GEN_ALL_CORE_FT
PAST MEDICAL HISTORY:  Bladder cancer     CA skin, basal cell     Depression     DM (diabetes mellitus)     Herniated nucleus pulposus, L5-S1     HTN (hypertension)     Hypercholesterolemia     Lumbar spinal stenosis     OA (osteoarthritis)     Uses hearing aid

## 2019-08-15 NOTE — H&P PST ADULT - ASSESSMENT
77 y/o male with lumbar spinal stenosis. Complain of lower back pain that is progressively getting worse, back pain radiates to right lower extremity with numbness and tingling. Scheduled for lumbar laminectomy and fusion.  Plan  1. Stop all NSAIDS, herbal supplements and vitamins for 7 days.  2. NPO at midnight.  3. Take the following medications ( Diltiazem, Losartan ) with small sips of water on the morning of your procedure/surgery.  4. Use EZ sponges as directed  5. Use mupirocin as directed  6. Labs, EKG, CXR as per surgeon  7. PMD/cardiologist visit for optimization prior to surgery as per surgeon.    CAPRINI SCORE [CLOT]  AGE RELATED RISK FACTORS                                                       MOBILITY RELATED FACTORS  [ ] Age 41-60 years                                            (1 Point)                  [ ] Bed rest                                                        (1 Point)  [ ] Age: 61-74 years                                           (2 Points)                 [ ] Plaster cast                                                   (2 Points)  [x ] Age= 75 years                                              (3 Points)                 [ ] Bed bound for more than 72 hours                 (2 Points)    DISEASE RELATED RISK FACTORS                                               GENDER SPECIFIC FACTORS  [ ] Edema in the lower extremities                       (1 Point)                  [ ] Pregnancy                                                     (1 Point)  [ ] Varicose veins                                               (1 Point)                  [ ] Post-partum < 6 weeks                                   (1 Point)             [ x ] BMI > 25 Kg/m2                                            (1 Point)                  [ ] Hormonal therapy  or oral contraception          (1 Point)                 [ ] Sepsis (in the previous month)                        (1 Point)                  [ ] History of pregnancy complications                 (1 point)  [ ] Pneumonia or serious lung disease                                               [ ] Unexplained or recurrent                     (1 Point)           (in the previous month)                               (1 Point)  [ ] Abnormal pulmonary function test                     (1 Point)                 SURGERY RELATED RISK FACTORS  [ ] Acute myocardial infarction                              (1 Point)                 [ ]  Section                                             (1 Point)  [ ] Congestive heart failure (in the previous month)  (1 Point)               [ ] Minor surgery                                                  (1 Point)   [ ] Inflammatory bowel disease                             (1 Point)                 [ ] Arthroscopic surgery                                        (2 Points)  [ ] Central venous access                                      (2 Points)                [ x ] General surgery lasting more than 45 minutes   (2 Points)       [ ] Stroke (in the previous month)                          (5 Points)               [ ] Elective arthroplasty                                         (5 Points)     ( x )  malignancy                                                             (2 points )                                                                                                                                      HEMATOLOGY RELATED FACTORS                                                 TRAUMA RELATED RISK FACTORS  [ ] Prior episodes of VTE                                     (3 Points)                 [ ] Fracture of the hip, pelvis, or leg                       (5 Points)  [ ] Positive family history for VTE                         (3 Points)                 [ ] Acute spinal cord injury (in the previous month)  (5 Points)  [ ] Prothrombin 51890 A                                     (3 Points)                 [ ] Paralysis  (less than 1 month)                             (5 Points)  [ ] Factor V Leiden                                             (3 Points)                  [ ] Multiple Trauma within 1 month                        (5 Points)  [ ] Lupus anticoagulants                                     (3 Points)                                                           [ ] Anticardiolipin antibodies                               (3 Points)                                                       [ ] High homocysteine in the blood                      (3 Points)                                             [ ] Other congenital or acquired thrombophilia      (3 Points)                                                [ ] Heparin induced thrombocytopenia                  (3 Points)    (  ) Malignancy                                        Total Score [      8    ]

## 2019-08-15 NOTE — H&P PST ADULT - NSICDXFAMILYHX_GEN_ALL_CORE_FT
FAMILY HISTORY:  Family history of diabetes mellitus (DM), mother  Family history of heart disease, father

## 2019-08-15 NOTE — H&P PST ADULT - NSANTHOSAYNRD_GEN_A_CORE
No. HUMPHREY screening performed.  STOP BANG Legend: 0-2 = LOW Risk; 3-4 = INTERMEDIATE Risk; 5-8 = HIGH Risk

## 2019-08-15 NOTE — H&P PST ADULT - HISTORY OF PRESENT ILLNESS
77 y/o male with lumbar spinal stenosis. Complain of lower back pain that is progressively getting worse, back pain radiates to right lower extremity with numbness and tingling. Pt takes gabapentin and oxycodone with mild pain relief. Scheduled for lumbar laminectomy and fusion.

## 2019-08-15 NOTE — H&P PST ADULT - NSICDXPASTSURGICALHX_GEN_ALL_CORE_FT
PAST SURGICAL HISTORY:  H/O hernia repair     H/O local excision of skin lesion     History of bladder surgery TURB, cystoscopy    S/P tonsillectomy PAST SURGICAL HISTORY:  H/O hernia repair     H/O local excision of skin lesion     History of bladder surgery TURBT, cystoscopy    S/P tonsillectomy

## 2019-08-16 ENCOUNTER — APPOINTMENT (OUTPATIENT)
Dept: INTERNAL MEDICINE | Facility: CLINIC | Age: 79
End: 2019-08-16
Payer: MEDICARE

## 2019-08-16 VITALS
HEART RATE: 84 BPM | WEIGHT: 250 LBS | DIASTOLIC BLOOD PRESSURE: 82 MMHG | TEMPERATURE: 97.5 F | OXYGEN SATURATION: 95 % | RESPIRATION RATE: 14 BRPM | BODY MASS INDEX: 32.98 KG/M2 | SYSTOLIC BLOOD PRESSURE: 110 MMHG

## 2019-08-16 DIAGNOSIS — M48.062 SPINAL STENOSIS, LUMBAR REGION WITH NEUROGENIC CLAUDICATION: ICD-10-CM

## 2019-08-16 DIAGNOSIS — Z01.818 ENCOUNTER FOR OTHER PREPROCEDURAL EXAMINATION: ICD-10-CM

## 2019-08-16 DIAGNOSIS — M54.16 RADICULOPATHY, LUMBAR REGION: ICD-10-CM

## 2019-08-16 LAB
CULTURE RESULTS: NO GROWTH — SIGNIFICANT CHANGE UP
SPECIMEN SOURCE: SIGNIFICANT CHANGE UP

## 2019-08-16 PROCEDURE — 99214 OFFICE O/P EST MOD 30 MIN: CPT

## 2019-08-16 NOTE — PLAN
[FreeTextEntry1] : I recommend routine perioperative hemodynamic monitoring.\par Recheck potassium level in the hospital.

## 2019-08-16 NOTE — PHYSICAL EXAM
[No Acute Distress] : no acute distress [Well Nourished] : well nourished [Well Developed] : well developed [Well-Appearing] : well-appearing [Normal Sclera/Conjunctiva] : normal sclera/conjunctiva [PERRL] : pupils equal round and reactive to light [EOMI] : extraocular movements intact [Normal Outer Ear/Nose] : the outer ears and nose were normal in appearance [Normal Oropharynx] : the oropharynx was normal [No JVD] : no jugular venous distention [No Lymphadenopathy] : no lymphadenopathy [Supple] : supple [Thyroid Normal, No Nodules] : the thyroid was normal and there were no nodules present [No Respiratory Distress] : no respiratory distress  [No Accessory Muscle Use] : no accessory muscle use [Clear to Auscultation] : lungs were clear to auscultation bilaterally [Normal Rate] : normal rate  [Regular Rhythm] : with a regular rhythm [Normal S1, S2] : normal S1 and S2 [No Murmur] : no murmur heard [No Carotid Bruits] : no carotid bruits [No Abdominal Bruit] : a ~M bruit was not heard ~T in the abdomen [No Varicosities] : no varicosities [Pedal Pulses Present] : the pedal pulses are present [No Edema] : there was no peripheral edema [No Palpable Aorta] : no palpable aorta [No Extremity Clubbing/Cyanosis] : no extremity clubbing/cyanosis [Soft] : abdomen soft [Non Tender] : non-tender [Non-distended] : non-distended [No Masses] : no abdominal mass palpated [No HSM] : no HSM [Normal Bowel Sounds] : normal bowel sounds [Normal Posterior Cervical Nodes] : no posterior cervical lymphadenopathy [Normal Anterior Cervical Nodes] : no anterior cervical lymphadenopathy [No CVA Tenderness] : no CVA  tenderness [No Spinal Tenderness] : no spinal tenderness [No Joint Swelling] : no joint swelling [Grossly Normal Strength/Tone] : grossly normal strength/tone [No Rash] : no rash [Coordination Grossly Intact] : coordination grossly intact [No Focal Deficits] : no focal deficits [Normal Gait] : normal gait [Normal Affect] : the affect was normal [Deep Tendon Reflexes (DTR)] : deep tendon reflexes were 2+ and symmetric [Normal Insight/Judgement] : insight and judgment were intact

## 2019-08-16 NOTE — ASSESSMENT
[FreeTextEntry4] : EKG SR first degree AVB , within acceptable limits.\par Potassium mildly decreased at 3.2. start potassium chloride 10 meq QD.\par Labs otherwise within acceptable limits\par There are no medical contraindications to proceeding with the planned surgery.\par

## 2019-08-16 NOTE — HISTORY OF PRESENT ILLNESS
[No Pertinent Cardiac History] : no history of aortic stenosis, atrial fibrillation, coronary artery disease, recent myocardial infarction, or implantable device/pacemaker [No Pertinent Pulmonary History] : no history of asthma, COPD, sleep apnea, or smoking [No Adverse Anesthesia Reaction] : no adverse anesthesia reaction in self or family member [Diabetes] : diabetes [Chronic Anticoagulation] : no chronic anticoagulation [Chronic Kidney Disease] : no chronic kidney disease [FreeTextEntry1] : Laminectomy L5-S1 with fusion [FreeTextEntry4] : UMA CUI,  44, IS HERE FOR PRESURGICAL EVALUATION PRIOR TO SPINAL SURGERY ON 19.\par Pt is overall feeling well other than his back pain.\par Pt denies chest pain, dyspnea, lightheadedness, palpitations, fever, chills, sweats. [FreeTextEntry3] : Dr. Ogden [FreeTextEntry2] : 8/22/19

## 2019-08-22 ENCOUNTER — RESULT REVIEW (OUTPATIENT)
Age: 79
End: 2019-08-22

## 2019-08-22 ENCOUNTER — INPATIENT (INPATIENT)
Facility: HOSPITAL | Age: 79
LOS: 3 days | Discharge: HOME HEALTH | DRG: 460 | End: 2019-08-26
Attending: ORTHOPAEDIC SURGERY | Admitting: ORTHOPAEDIC SURGERY
Payer: MEDICARE

## 2019-08-22 VITALS
SYSTOLIC BLOOD PRESSURE: 139 MMHG | HEIGHT: 73 IN | TEMPERATURE: 98 F | RESPIRATION RATE: 16 BRPM | HEART RATE: 79 BPM | WEIGHT: 250 LBS | DIASTOLIC BLOOD PRESSURE: 90 MMHG | OXYGEN SATURATION: 98 %

## 2019-08-22 DIAGNOSIS — M48.062 SPINAL STENOSIS, LUMBAR REGION WITH NEUROGENIC CLAUDICATION: ICD-10-CM

## 2019-08-22 DIAGNOSIS — Z98.890 OTHER SPECIFIED POSTPROCEDURAL STATES: Chronic | ICD-10-CM

## 2019-08-22 DIAGNOSIS — M51.27 OTHER INTERVERTEBRAL DISC DISPLACEMENT, LUMBOSACRAL REGION: ICD-10-CM

## 2019-08-22 DIAGNOSIS — Z90.89 ACQUIRED ABSENCE OF OTHER ORGANS: Chronic | ICD-10-CM

## 2019-08-22 LAB
ANION GAP SERPL CALC-SCNC: 7 MMOL/L — SIGNIFICANT CHANGE UP (ref 5–17)
BASOPHILS # BLD AUTO: 0.03 K/UL — SIGNIFICANT CHANGE UP (ref 0–0.2)
BASOPHILS NFR BLD AUTO: 0.4 % — SIGNIFICANT CHANGE UP (ref 0–2)
BUN SERPL-MCNC: 20 MG/DL — SIGNIFICANT CHANGE UP (ref 7–23)
CALCIUM SERPL-MCNC: 9.2 MG/DL — SIGNIFICANT CHANGE UP (ref 8.5–10.1)
CHLORIDE SERPL-SCNC: 104 MMOL/L — SIGNIFICANT CHANGE UP (ref 96–108)
CO2 SERPL-SCNC: 32 MMOL/L — HIGH (ref 22–31)
CREAT SERPL-MCNC: 1.15 MG/DL — SIGNIFICANT CHANGE UP (ref 0.5–1.3)
EOSINOPHIL # BLD AUTO: 0.07 K/UL — SIGNIFICANT CHANGE UP (ref 0–0.5)
EOSINOPHIL NFR BLD AUTO: 0.9 % — SIGNIFICANT CHANGE UP (ref 0–6)
GLUCOSE SERPL-MCNC: 91 MG/DL — SIGNIFICANT CHANGE UP (ref 70–99)
HCT VFR BLD CALC: 37.2 % — LOW (ref 39–50)
HGB BLD-MCNC: 11.5 G/DL — LOW (ref 13–17)
IMM GRANULOCYTES NFR BLD AUTO: 0.7 % — SIGNIFICANT CHANGE UP (ref 0–1.5)
LYMPHOCYTES # BLD AUTO: 1.31 K/UL — SIGNIFICANT CHANGE UP (ref 1–3.3)
LYMPHOCYTES # BLD AUTO: 16.4 % — SIGNIFICANT CHANGE UP (ref 13–44)
MCHC RBC-ENTMCNC: 20.2 PG — LOW (ref 27–34)
MCHC RBC-ENTMCNC: 30.9 GM/DL — LOW (ref 32–36)
MCV RBC AUTO: 65.3 FL — LOW (ref 80–100)
MONOCYTES # BLD AUTO: 0.68 K/UL — SIGNIFICANT CHANGE UP (ref 0–0.9)
MONOCYTES NFR BLD AUTO: 8.5 % — SIGNIFICANT CHANGE UP (ref 2–14)
NEUTROPHILS # BLD AUTO: 5.86 K/UL — SIGNIFICANT CHANGE UP (ref 1.8–7.4)
NEUTROPHILS NFR BLD AUTO: 73.1 % — SIGNIFICANT CHANGE UP (ref 43–77)
PLATELET # BLD AUTO: 157 K/UL — SIGNIFICANT CHANGE UP (ref 150–400)
POTASSIUM SERPL-MCNC: 3.3 MMOL/L — LOW (ref 3.5–5.3)
POTASSIUM SERPL-SCNC: 3.3 MMOL/L — LOW (ref 3.5–5.3)
RBC # BLD: 5.7 M/UL — SIGNIFICANT CHANGE UP (ref 4.2–5.8)
RBC # FLD: 16.1 % — HIGH (ref 10.3–14.5)
SODIUM SERPL-SCNC: 143 MMOL/L — SIGNIFICANT CHANGE UP (ref 135–145)
WBC # BLD: 8.01 K/UL — SIGNIFICANT CHANGE UP (ref 3.8–10.5)
WBC # FLD AUTO: 8.01 K/UL — SIGNIFICANT CHANGE UP (ref 3.8–10.5)

## 2019-08-22 PROCEDURE — 82962 GLUCOSE BLOOD TEST: CPT

## 2019-08-22 PROCEDURE — 85027 COMPLETE CBC AUTOMATED: CPT

## 2019-08-22 PROCEDURE — 36415 COLL VENOUS BLD VENIPUNCTURE: CPT

## 2019-08-22 PROCEDURE — 80048 BASIC METABOLIC PNL TOTAL CA: CPT

## 2019-08-22 PROCEDURE — 97162 PT EVAL MOD COMPLEX 30 MIN: CPT | Mod: GP

## 2019-08-22 PROCEDURE — 76000 FLUOROSCOPY <1 HR PHYS/QHP: CPT

## 2019-08-22 PROCEDURE — 85025 COMPLETE CBC W/AUTO DIFF WBC: CPT

## 2019-08-22 PROCEDURE — C9290: CPT

## 2019-08-22 PROCEDURE — 97530 THERAPEUTIC ACTIVITIES: CPT | Mod: GP

## 2019-08-22 PROCEDURE — 88304 TISSUE EXAM BY PATHOLOGIST: CPT | Mod: 26

## 2019-08-22 PROCEDURE — C1889: CPT

## 2019-08-22 PROCEDURE — 88304 TISSUE EXAM BY PATHOLOGIST: CPT

## 2019-08-22 PROCEDURE — 97116 GAIT TRAINING THERAPY: CPT | Mod: GP

## 2019-08-22 PROCEDURE — C1713: CPT

## 2019-08-22 RX ORDER — INSULIN LISPRO 100/ML
VIAL (ML) SUBCUTANEOUS AT BEDTIME
Refills: 0 | Status: DISCONTINUED | OUTPATIENT
Start: 2019-08-22 | End: 2019-08-26

## 2019-08-22 RX ORDER — MAGNESIUM HYDROXIDE 400 MG/1
30 TABLET, CHEWABLE ORAL EVERY 12 HOURS
Refills: 0 | Status: DISCONTINUED | OUTPATIENT
Start: 2019-08-22 | End: 2019-08-26

## 2019-08-22 RX ORDER — DIPHENHYDRAMINE HCL 50 MG
12.5 CAPSULE ORAL EVERY 4 HOURS
Refills: 0 | Status: DISCONTINUED | OUTPATIENT
Start: 2019-08-22 | End: 2019-08-26

## 2019-08-22 RX ORDER — ONDANSETRON 8 MG/1
4 TABLET, FILM COATED ORAL EVERY 6 HOURS
Refills: 0 | Status: DISCONTINUED | OUTPATIENT
Start: 2019-08-22 | End: 2019-08-26

## 2019-08-22 RX ORDER — DEXTROSE 50 % IN WATER 50 %
25 SYRINGE (ML) INTRAVENOUS ONCE
Refills: 0 | Status: DISCONTINUED | OUTPATIENT
Start: 2019-08-22 | End: 2019-08-26

## 2019-08-22 RX ORDER — PANTOPRAZOLE SODIUM 20 MG/1
40 TABLET, DELAYED RELEASE ORAL
Refills: 0 | Status: DISCONTINUED | OUTPATIENT
Start: 2019-08-22 | End: 2019-08-26

## 2019-08-22 RX ORDER — OXYCODONE HYDROCHLORIDE 5 MG/1
10 TABLET ORAL EVERY 4 HOURS
Refills: 0 | Status: DISCONTINUED | OUTPATIENT
Start: 2019-08-22 | End: 2019-08-26

## 2019-08-22 RX ORDER — INSULIN LISPRO 100/ML
VIAL (ML) SUBCUTANEOUS
Refills: 0 | Status: DISCONTINUED | OUTPATIENT
Start: 2019-08-22 | End: 2019-08-26

## 2019-08-22 RX ORDER — SENNA PLUS 8.6 MG/1
2 TABLET ORAL AT BEDTIME
Refills: 0 | Status: DISCONTINUED | OUTPATIENT
Start: 2019-08-22 | End: 2019-08-26

## 2019-08-22 RX ORDER — LOSARTAN POTASSIUM 100 MG/1
100 TABLET, FILM COATED ORAL DAILY
Refills: 0 | Status: DISCONTINUED | OUTPATIENT
Start: 2019-08-22 | End: 2019-08-26

## 2019-08-22 RX ORDER — DEXTROSE 50 % IN WATER 50 %
15 SYRINGE (ML) INTRAVENOUS ONCE
Refills: 0 | Status: DISCONTINUED | OUTPATIENT
Start: 2019-08-22 | End: 2019-08-26

## 2019-08-22 RX ORDER — SODIUM CHLORIDE 9 MG/ML
1000 INJECTION, SOLUTION INTRAVENOUS
Refills: 0 | Status: DISCONTINUED | OUTPATIENT
Start: 2019-08-22 | End: 2019-08-26

## 2019-08-22 RX ORDER — CEFAZOLIN SODIUM 1 G
2000 VIAL (EA) INJECTION EVERY 8 HOURS
Refills: 0 | Status: COMPLETED | OUTPATIENT
Start: 2019-08-22 | End: 2019-08-23

## 2019-08-22 RX ORDER — TAMSULOSIN HYDROCHLORIDE 0.4 MG/1
0.4 CAPSULE ORAL AT BEDTIME
Refills: 0 | Status: DISCONTINUED | OUTPATIENT
Start: 2019-08-22 | End: 2019-08-26

## 2019-08-22 RX ORDER — DEXTROSE MONOHYDRATE, SODIUM CHLORIDE, AND POTASSIUM CHLORIDE 50; .745; 4.5 G/1000ML; G/1000ML; G/1000ML
1000 INJECTION, SOLUTION INTRAVENOUS
Refills: 0 | Status: DISCONTINUED | OUTPATIENT
Start: 2019-08-22 | End: 2019-08-26

## 2019-08-22 RX ORDER — FENTANYL CITRATE 50 UG/ML
50 INJECTION INTRAVENOUS
Refills: 0 | Status: DISCONTINUED | OUTPATIENT
Start: 2019-08-22 | End: 2019-08-22

## 2019-08-22 RX ORDER — ATORVASTATIN CALCIUM 80 MG/1
10 TABLET, FILM COATED ORAL AT BEDTIME
Refills: 0 | Status: DISCONTINUED | OUTPATIENT
Start: 2019-08-22 | End: 2019-08-26

## 2019-08-22 RX ORDER — FINASTERIDE 5 MG/1
5 TABLET, FILM COATED ORAL DAILY
Refills: 0 | Status: DISCONTINUED | OUTPATIENT
Start: 2019-08-22 | End: 2019-08-26

## 2019-08-22 RX ORDER — GABAPENTIN 400 MG/1
600 CAPSULE ORAL AT BEDTIME
Refills: 0 | Status: DISCONTINUED | OUTPATIENT
Start: 2019-08-22 | End: 2019-08-26

## 2019-08-22 RX ORDER — DEXTROSE 50 % IN WATER 50 %
12.5 SYRINGE (ML) INTRAVENOUS ONCE
Refills: 0 | Status: DISCONTINUED | OUTPATIENT
Start: 2019-08-22 | End: 2019-08-26

## 2019-08-22 RX ORDER — ACETAMINOPHEN 500 MG
650 TABLET ORAL EVERY 6 HOURS
Refills: 0 | Status: DISCONTINUED | OUTPATIENT
Start: 2019-08-22 | End: 2019-08-26

## 2019-08-22 RX ORDER — DILTIAZEM HCL 120 MG
300 CAPSULE, EXT RELEASE 24 HR ORAL DAILY
Refills: 0 | Status: DISCONTINUED | OUTPATIENT
Start: 2019-08-22 | End: 2019-08-26

## 2019-08-22 RX ORDER — ESCITALOPRAM OXALATE 10 MG/1
10 TABLET, FILM COATED ORAL AT BEDTIME
Refills: 0 | Status: DISCONTINUED | OUTPATIENT
Start: 2019-08-22 | End: 2019-08-26

## 2019-08-22 RX ORDER — POTASSIUM CHLORIDE 20 MEQ
40 PACKET (EA) ORAL EVERY 4 HOURS
Refills: 0 | Status: COMPLETED | OUTPATIENT
Start: 2019-08-22 | End: 2019-08-23

## 2019-08-22 RX ORDER — HYDROMORPHONE HYDROCHLORIDE 2 MG/ML
1 INJECTION INTRAMUSCULAR; INTRAVENOUS; SUBCUTANEOUS
Refills: 0 | Status: DISCONTINUED | OUTPATIENT
Start: 2019-08-22 | End: 2019-08-26

## 2019-08-22 RX ORDER — SODIUM CHLORIDE 9 MG/ML
1000 INJECTION, SOLUTION INTRAVENOUS
Refills: 0 | Status: DISCONTINUED | OUTPATIENT
Start: 2019-08-22 | End: 2019-08-22

## 2019-08-22 RX ORDER — DOCUSATE SODIUM 100 MG
100 CAPSULE ORAL THREE TIMES A DAY
Refills: 0 | Status: DISCONTINUED | OUTPATIENT
Start: 2019-08-22 | End: 2019-08-26

## 2019-08-22 RX ORDER — OXYCODONE HYDROCHLORIDE 5 MG/1
10 TABLET ORAL ONCE
Refills: 0 | Status: DISCONTINUED | OUTPATIENT
Start: 2019-08-22 | End: 2019-08-22

## 2019-08-22 RX ORDER — OXYCODONE HYDROCHLORIDE 5 MG/1
5 TABLET ORAL EVERY 4 HOURS
Refills: 0 | Status: DISCONTINUED | OUTPATIENT
Start: 2019-08-22 | End: 2019-08-26

## 2019-08-22 RX ORDER — CYCLOBENZAPRINE HYDROCHLORIDE 10 MG/1
10 TABLET, FILM COATED ORAL EVERY 8 HOURS
Refills: 0 | Status: DISCONTINUED | OUTPATIENT
Start: 2019-08-22 | End: 2019-08-26

## 2019-08-22 RX ORDER — ONDANSETRON 8 MG/1
4 TABLET, FILM COATED ORAL ONCE
Refills: 0 | Status: DISCONTINUED | OUTPATIENT
Start: 2019-08-22 | End: 2019-08-22

## 2019-08-22 RX ORDER — HYDROCHLOROTHIAZIDE 25 MG
12.5 TABLET ORAL DAILY
Refills: 0 | Status: DISCONTINUED | OUTPATIENT
Start: 2019-08-22 | End: 2019-08-23

## 2019-08-22 RX ORDER — GLUCAGON INJECTION, SOLUTION 0.5 MG/.1ML
1 INJECTION, SOLUTION SUBCUTANEOUS ONCE
Refills: 0 | Status: DISCONTINUED | OUTPATIENT
Start: 2019-08-22 | End: 2019-08-26

## 2019-08-22 RX ADMIN — FENTANYL CITRATE 50 MICROGRAM(S): 50 INJECTION INTRAVENOUS at 16:18

## 2019-08-22 RX ADMIN — CYCLOBENZAPRINE HYDROCHLORIDE 10 MILLIGRAM(S): 10 TABLET, FILM COATED ORAL at 22:02

## 2019-08-22 RX ADMIN — OXYCODONE HYDROCHLORIDE 10 MILLIGRAM(S): 5 TABLET ORAL at 16:35

## 2019-08-22 RX ADMIN — TAMSULOSIN HYDROCHLORIDE 0.4 MILLIGRAM(S): 0.4 CAPSULE ORAL at 22:03

## 2019-08-22 RX ADMIN — OXYCODONE HYDROCHLORIDE 10 MILLIGRAM(S): 5 TABLET ORAL at 16:22

## 2019-08-22 RX ADMIN — OXYCODONE HYDROCHLORIDE 10 MILLIGRAM(S): 5 TABLET ORAL at 20:32

## 2019-08-22 RX ADMIN — DEXTROSE MONOHYDRATE, SODIUM CHLORIDE, AND POTASSIUM CHLORIDE 75 MILLILITER(S): 50; .745; 4.5 INJECTION, SOLUTION INTRAVENOUS at 19:42

## 2019-08-22 RX ADMIN — Medication 100 MILLIGRAM(S): at 22:01

## 2019-08-22 RX ADMIN — GABAPENTIN 600 MILLIGRAM(S): 400 CAPSULE ORAL at 22:02

## 2019-08-22 RX ADMIN — OXYCODONE HYDROCHLORIDE 10 MILLIGRAM(S): 5 TABLET ORAL at 19:52

## 2019-08-22 RX ADMIN — Medication 40 MILLIEQUIVALENT(S): at 22:02

## 2019-08-22 RX ADMIN — SODIUM CHLORIDE 75 MILLILITER(S): 9 INJECTION, SOLUTION INTRAVENOUS at 17:01

## 2019-08-22 RX ADMIN — FENTANYL CITRATE 50 MICROGRAM(S): 50 INJECTION INTRAVENOUS at 16:35

## 2019-08-23 LAB
ANION GAP SERPL CALC-SCNC: 6 MMOL/L — SIGNIFICANT CHANGE UP (ref 5–17)
BASOPHILS # BLD AUTO: 0.02 K/UL — SIGNIFICANT CHANGE UP (ref 0–0.2)
BASOPHILS NFR BLD AUTO: 0.2 % — SIGNIFICANT CHANGE UP (ref 0–2)
BUN SERPL-MCNC: 15 MG/DL — SIGNIFICANT CHANGE UP (ref 7–23)
CALCIUM SERPL-MCNC: 8.8 MG/DL — SIGNIFICANT CHANGE UP (ref 8.5–10.1)
CHLORIDE SERPL-SCNC: 99 MMOL/L — SIGNIFICANT CHANGE UP (ref 96–108)
CO2 SERPL-SCNC: 31 MMOL/L — SIGNIFICANT CHANGE UP (ref 22–31)
CREAT SERPL-MCNC: 1.17 MG/DL — SIGNIFICANT CHANGE UP (ref 0.5–1.3)
EOSINOPHIL # BLD AUTO: 0.05 K/UL — SIGNIFICANT CHANGE UP (ref 0–0.5)
EOSINOPHIL NFR BLD AUTO: 0.6 % — SIGNIFICANT CHANGE UP (ref 0–6)
GLUCOSE SERPL-MCNC: 133 MG/DL — HIGH (ref 70–99)
HCT VFR BLD CALC: 36 % — LOW (ref 39–50)
HGB BLD-MCNC: 11.3 G/DL — LOW (ref 13–17)
IMM GRANULOCYTES NFR BLD AUTO: 0.4 % — SIGNIFICANT CHANGE UP (ref 0–1.5)
LYMPHOCYTES # BLD AUTO: 0.76 K/UL — LOW (ref 1–3.3)
LYMPHOCYTES # BLD AUTO: 8.5 % — LOW (ref 13–44)
MCHC RBC-ENTMCNC: 20.3 PG — LOW (ref 27–34)
MCHC RBC-ENTMCNC: 31.4 GM/DL — LOW (ref 32–36)
MCV RBC AUTO: 64.6 FL — LOW (ref 80–100)
MONOCYTES # BLD AUTO: 0.91 K/UL — HIGH (ref 0–0.9)
MONOCYTES NFR BLD AUTO: 10.1 % — SIGNIFICANT CHANGE UP (ref 2–14)
NEUTROPHILS # BLD AUTO: 7.21 K/UL — SIGNIFICANT CHANGE UP (ref 1.8–7.4)
NEUTROPHILS NFR BLD AUTO: 80.2 % — HIGH (ref 43–77)
PLATELET # BLD AUTO: 148 K/UL — LOW (ref 150–400)
POTASSIUM SERPL-MCNC: 3.7 MMOL/L — SIGNIFICANT CHANGE UP (ref 3.5–5.3)
POTASSIUM SERPL-SCNC: 3.7 MMOL/L — SIGNIFICANT CHANGE UP (ref 3.5–5.3)
RBC # BLD: 5.57 M/UL — SIGNIFICANT CHANGE UP (ref 4.2–5.8)
RBC # FLD: 16.4 % — HIGH (ref 10.3–14.5)
SODIUM SERPL-SCNC: 136 MMOL/L — SIGNIFICANT CHANGE UP (ref 135–145)
WBC # BLD: 8.99 K/UL — SIGNIFICANT CHANGE UP (ref 3.8–10.5)
WBC # FLD AUTO: 8.99 K/UL — SIGNIFICANT CHANGE UP (ref 3.8–10.5)

## 2019-08-23 RX ADMIN — Medication 300 MILLIGRAM(S): at 05:44

## 2019-08-23 RX ADMIN — Medication 1 TABLET(S): at 10:35

## 2019-08-23 RX ADMIN — DEXTROSE MONOHYDRATE, SODIUM CHLORIDE, AND POTASSIUM CHLORIDE 75 MILLILITER(S): 50; .745; 4.5 INJECTION, SOLUTION INTRAVENOUS at 10:33

## 2019-08-23 RX ADMIN — OXYCODONE HYDROCHLORIDE 10 MILLIGRAM(S): 5 TABLET ORAL at 00:13

## 2019-08-23 RX ADMIN — OXYCODONE HYDROCHLORIDE 10 MILLIGRAM(S): 5 TABLET ORAL at 21:30

## 2019-08-23 RX ADMIN — OXYCODONE HYDROCHLORIDE 10 MILLIGRAM(S): 5 TABLET ORAL at 06:15

## 2019-08-23 RX ADMIN — Medication 100 MILLIGRAM(S): at 13:23

## 2019-08-23 RX ADMIN — GABAPENTIN 600 MILLIGRAM(S): 400 CAPSULE ORAL at 21:29

## 2019-08-23 RX ADMIN — OXYCODONE HYDROCHLORIDE 10 MILLIGRAM(S): 5 TABLET ORAL at 10:36

## 2019-08-23 RX ADMIN — Medication 1 TABLET(S): at 21:29

## 2019-08-23 RX ADMIN — OXYCODONE HYDROCHLORIDE 10 MILLIGRAM(S): 5 TABLET ORAL at 00:43

## 2019-08-23 RX ADMIN — Medication 1: at 16:22

## 2019-08-23 RX ADMIN — FINASTERIDE 5 MILLIGRAM(S): 5 TABLET, FILM COATED ORAL at 10:35

## 2019-08-23 RX ADMIN — ATORVASTATIN CALCIUM 10 MILLIGRAM(S): 80 TABLET, FILM COATED ORAL at 21:32

## 2019-08-23 RX ADMIN — Medication 12.5 MILLIGRAM(S): at 05:44

## 2019-08-23 RX ADMIN — OXYCODONE HYDROCHLORIDE 10 MILLIGRAM(S): 5 TABLET ORAL at 05:45

## 2019-08-23 RX ADMIN — Medication 40 MILLIEQUIVALENT(S): at 05:44

## 2019-08-23 RX ADMIN — ESCITALOPRAM OXALATE 10 MILLIGRAM(S): 10 TABLET, FILM COATED ORAL at 05:44

## 2019-08-23 RX ADMIN — CYCLOBENZAPRINE HYDROCHLORIDE 10 MILLIGRAM(S): 10 TABLET, FILM COATED ORAL at 05:44

## 2019-08-23 RX ADMIN — TAMSULOSIN HYDROCHLORIDE 0.4 MILLIGRAM(S): 0.4 CAPSULE ORAL at 21:31

## 2019-08-23 RX ADMIN — OXYCODONE HYDROCHLORIDE 10 MILLIGRAM(S): 5 TABLET ORAL at 22:10

## 2019-08-23 RX ADMIN — OXYCODONE HYDROCHLORIDE 10 MILLIGRAM(S): 5 TABLET ORAL at 11:15

## 2019-08-23 RX ADMIN — Medication 100 MILLIGRAM(S): at 05:44

## 2019-08-23 RX ADMIN — Medication 1 TABLET(S): at 13:23

## 2019-08-23 RX ADMIN — PANTOPRAZOLE SODIUM 40 MILLIGRAM(S): 20 TABLET, DELAYED RELEASE ORAL at 05:45

## 2019-08-23 RX ADMIN — LOSARTAN POTASSIUM 100 MILLIGRAM(S): 100 TABLET, FILM COATED ORAL at 05:44

## 2019-08-23 RX ADMIN — ESCITALOPRAM OXALATE 10 MILLIGRAM(S): 10 TABLET, FILM COATED ORAL at 21:32

## 2019-08-23 NOTE — PHYSICAL THERAPY INITIAL EVALUATION ADULT - GENERAL OBSERVATIONS, REHAB EVAL
The pt was pleasant and cooperative with PT but required much pt education and encouragement initially to get OOB. The pt was received on 2N, supine + 1 IV and bilateral SCDs.

## 2019-08-23 NOTE — PROGRESS NOTE ADULT - SUBJECTIVE AND OBJECTIVE BOX
Post-operative day: 1  Procedure: L5-S1 TLIF and posterior instruementation    Patient seen and examined at bedside. No acute events overnight. Pain controlled.    Vital Signs Last 24 Hrs  T(C): 37.1 (23 Aug 2019 04:18), Max: 37.1 (23 Aug 2019 04:18)  T(F): 98.8 (23 Aug 2019 04:18), Max: 98.8 (23 Aug 2019 04:18)  HR: 92 (23 Aug 2019 04:18) (60 - 92)  BP: 125/70 (23 Aug 2019 04:18) (110/70 - 145/77)  BP(mean): --  RR: 16 (23 Aug 2019 04:18) (12 - 17)  SpO2: 91% (23 Aug 2019 04:18) (91% - 100%)                          11.5   8.01  )-----------( 157      ( 22 Aug 2019 16:01 )             37.2       Physical Exam:  General: Not in acute distress, resting comfortably.     Spine:  Dressing is clean, dry, and intact.   Sensation intact to light touch in L2-S1 nerve distributions bilaterally  Dorsalis pedis/Posterior tibial pulses 2+  Compartments soft and compressible    Motor:  Right Lower Extremity: Hip flexion/adduction 5/5, Hip Extension 5/5, Knee Extension 5/5, Knee Flexion 5/5, Ankle dorsiflexion 5/5, Ankle plantarflexion 5/5, Toe dorsiflexion 5/5, Toe plantarflexion 5/5.     Left Lower Extremity: Hip flexion/adduction 5/5, Hip Extension 5/5, Knee Extension 5/5, Knee Flexion 5/5, Ankle dorsiflexion 5/5, Ankle plantarflexion 5/5, Toe dorsiflexion 5/5, Toe plantarflexion 5/5.         Assessment and Plan:    78y Male s/p L5-S1 TLIF  - Analgesia  - Incentive Spirometry  - Ambulate as Tolerated  - Physical Therapy, OOB-Chair  - No Chemical Prophylaxis, Mechanical DVT Prophylaxis Only (Sequential Compression Devices)  - Discharge Planning

## 2019-08-23 NOTE — CONSULT NOTE ADULT - ASSESSMENT
77 Y/O MALE WITH THE ABOVE EMD HX S/P LUMBAR SPINE SURGERY    *POSTPROCEDURAL STATE - POD# 1  PAIN CONTROL  PHYSICAL THERAPY  ENCOURAGED IS    8DM - HOLD METFORMIN, ISS  BGMS STABLE    *HTN - BP MEDS WITH PARAMETERS  BP STABLE  *HL - CONT STATIN  *ANEMIA - SECONDARY TO ACUTE BLOOD LOSS  H/H STABLE    8DVT PROPHY - VENODYNES ONLY IN THE SETTING OF RECENT SPINE SURGERY

## 2019-08-23 NOTE — PHYSICAL THERAPY INITIAL EVALUATION ADULT - IMPAIRMENTS CONTRIBUTING TO GAIT DEVIATIONS, PT EVAL
impaired postural control/The pt c/o pain over the right hip/ groin during initial standing and prolonged ambulation, the pt reported feeling much better after sitting OOB and in a chair at end of tx./pain/impaired coordination/decreased flexibility

## 2019-08-23 NOTE — PHYSICAL THERAPY INITIAL EVALUATION ADULT - PERTINENT HX OF CURRENT PROBLEM, REHAB EVAL
right sided L5-S1 foraminal stenosis and L5-S1 spondylolisthesis -- as per PST/ H&P: 77 y/o male with lumbar spinal stenosis. Complain of lower back pain that is progressively getting worse, back pain radiates to right lower extremity with numbness and tingling. Pt takes gabapentin and oxycodone with mild pain relief.

## 2019-08-23 NOTE — CONSULT NOTE ADULT - SUBJECTIVE AND OBJECTIVE BOX
HPI:  77 Y/O MALE WITH OA, DM, HTN, HL, LUMBAR DISC DISEASE S/P LUMBAR MICRODISCECTOMY AND FUSION.  MEDICINE CONSULT REQUESTED FOR POST OP MEDICAL MANAGEMENT  19; NO CP, SOB, N/V/F/C; BACK PAIN IS FAIRLY CONTROLLED      PAST MEDICAL & SURGICAL HISTORY:  Herniated nucleus pulposus, L5-S1  Lumbar spinal stenosis  OA (osteoarthritis)  Depression  CA skin, basal cell  DM (diabetes mellitus)  Hypercholesterolemia  HTN (hypertension)  Uses hearing aid  Bladder cancer  S/P tonsillectomy  H/O local excision of skin lesion  History of bladder surgery: TURBT, cystoscopy  H/O hernia repair      FAMILY HISTORY:     Family history of heart disease: father  Family history of diabetes mellitus (DM): mother      SOCIAL HISTORY:  POS CIGARS, POS  alcohol, no drugs    REVIEW OF SYSTEMS:   All 10 systems reviewed in detailed and found to be negative with the exception of what has already been described above    MEDICATIONS  (STANDING):  atorvastatin 10 milliGRAM(s) Oral at bedtime  calcium carbonate 1250 mG  + Vitamin D (OsCal 500 + D) 1 Tablet(s) Oral three times a day  dextrose 5%. 1000 milliLiter(s) (50 mL/Hr) IV Continuous <Continuous>  dextrose 50% Injectable 12.5 Gram(s) IV Push once  dextrose 50% Injectable 25 Gram(s) IV Push once  dextrose 50% Injectable 25 Gram(s) IV Push once  diltiazem    milliGRAM(s) Oral daily  docusate sodium 100 milliGRAM(s) Oral three times a day  escitalopram 10 milliGRAM(s) Oral at bedtime  finasteride 5 milliGRAM(s) Oral daily  gabapentin 600 milliGRAM(s) Oral at bedtime  insulin lispro (HumaLOG) corrective regimen sliding scale   SubCutaneous three times a day before meals  insulin lispro (HumaLOG) corrective regimen sliding scale   SubCutaneous at bedtime  lactated ringers. 1000 milliLiter(s) (75 mL/Hr) IV Continuous <Continuous>  losartan 100 milliGRAM(s) Oral daily  multivitamin 1 Tablet(s) Oral daily  pantoprazole    Tablet 40 milliGRAM(s) Oral before breakfast  senna 2 Tablet(s) Oral at bedtime  sodium chloride 0.45% with potassium chloride 20 mEq/L 1000 milliLiter(s) (75 mL/Hr) IV Continuous <Continuous>  tamsulosin 0.4 milliGRAM(s) Oral at bedtime    MEDICATIONS  (PRN):  acetaminophen   Tablet .. 650 milliGRAM(s) Oral every 6 hours PRN Temp greater or equal to 38C (100.4F)  cyclobenzaprine 10 milliGRAM(s) Oral every 8 hours PRN Muscle Spasm  dextrose 40% Gel 15 Gram(s) Oral once PRN Blood Glucose LESS THAN 70 milliGRAM(s)/deciliter  diphenhydrAMINE   Injectable 12.5 milliGRAM(s) IV Push every 4 hours PRN Itching  glucagon  Injectable 1 milliGRAM(s) IntraMuscular once PRN Glucose LESS THAN 70 milligrams/deciliter  HYDROmorphone  Injectable 1 milliGRAM(s) SubCutaneous every 3 hours PRN Severe Pain (7 - 10)  magnesium hydroxide Suspension 30 milliLiter(s) Oral every 12 hours PRN Constipation  ondansetron Injectable 4 milliGRAM(s) IV Push every 6 hours PRN Nausea  oxyCODONE    IR 5 milliGRAM(s) Oral every 4 hours PRN Mild Pain (1 - 3)  oxyCODONE    IR 10 milliGRAM(s) Oral every 4 hours PRN Moderate Pain (4 - 6)      Allergies    penicillin (Unknown)  sulfADIAZINE (Rash)    Intolerances          PHYSICAL EXAM:    Vital Signs Last 24 Hrs  T(C): 37.1 (23 Aug 2019 04:18), Max: 37.1 (23 Aug 2019 04:18)  T(F): 98.8 (23 Aug 2019 04:18), Max: 98.8 (23 Aug 2019 04:18)  HR: 92 (23 Aug 2019 04:18) (60 - 92)  BP: 125/70 (23 Aug 2019 04:18) (110/70 - 145/77)  BP(mean): --  RR: 16 (23 Aug 2019 04:18) (12 - 17)  SpO2: 91% (23 Aug 2019 04:18) (91% - 100%)    GEN: A and O, NAD,  mood stable  HEENT:   NC/AT, EOMI, no oropharyngeal lesions    NECK:   supple,    CV:  +S1, +S2, regular, no murmurs or rubs    RESP:   lungs clear to auscultation bilaterally, no wheezing, rales, rhonchi, good air entry bilaterally DECREASED BS DELILAH    GI:  abdomen soft, non-tender, non-distended, normal BS,  no abdominal masses, no palpable masses    BACK: POS DRESSING C/D/I    RECTAL:  not examined    :  POS BEE    MSK:   normal muscle tone, no atrophy, no rigidity, no contractions    EXT:   no clubbing, no cyanosis, no edema, no calf pain, swelling or erythema    VASCULAR:  pulses equal and symmetric in the upper and lower extremities    NEURO:  AAOX3, no focal neurological deficits, follows all commands, able to move extremities spontaneously    SKIN:  no ulcers, lesions or rashes    LABS/IMAGIN.3   8.99  )-----------( 148      ( 23 Aug 2019 07:53 )             36.0     08-23    136  |  99  |  15  ----------------------------<  133<H>  3.7   |  31  |  1.17    Ca    8.8      23 Aug 2019 07:53                                              EKG:     MARY JO@[id number]

## 2019-08-23 NOTE — PROGRESS NOTE ADULT - SUBJECTIVE AND OBJECTIVE BOX
Patient seen and examined  Chart reviewed    POD #1    Denies any of the pre-op sciatica pain on Right  Expected incisional pain    Dsg dry  Neuro  - no deficits motor or sens  Dillon in place  Abd soft  LE well perfused  No edema or tenderness     - OK post op   - Medical management - Diabetes, HTN   - Check K+ -    - Mobilize   - D/C Santana Ogden MD

## 2019-08-23 NOTE — PHYSICAL THERAPY INITIAL EVALUATION ADULT - ADDITIONAL COMMENTS
The pt reports that he lives at home with his wife and was independent with ambulation PTA, the pt reports that he has 6 steps to enter the home without rails and then 13 steps inside the home with unilateral rails up to the bedrooms. The pt denies having any falls in the past 12 months.

## 2019-08-24 LAB
ANION GAP SERPL CALC-SCNC: 5 MMOL/L — SIGNIFICANT CHANGE UP (ref 5–17)
BASOPHILS # BLD AUTO: 0.03 K/UL — SIGNIFICANT CHANGE UP (ref 0–0.2)
BASOPHILS NFR BLD AUTO: 0.4 % — SIGNIFICANT CHANGE UP (ref 0–2)
BUN SERPL-MCNC: 17 MG/DL — SIGNIFICANT CHANGE UP (ref 7–23)
CALCIUM SERPL-MCNC: 9 MG/DL — SIGNIFICANT CHANGE UP (ref 8.5–10.1)
CHLORIDE SERPL-SCNC: 96 MMOL/L — SIGNIFICANT CHANGE UP (ref 96–108)
CO2 SERPL-SCNC: 32 MMOL/L — HIGH (ref 22–31)
CREAT SERPL-MCNC: 1.25 MG/DL — SIGNIFICANT CHANGE UP (ref 0.5–1.3)
EOSINOPHIL # BLD AUTO: 0.06 K/UL — SIGNIFICANT CHANGE UP (ref 0–0.5)
EOSINOPHIL NFR BLD AUTO: 0.8 % — SIGNIFICANT CHANGE UP (ref 0–6)
GLUCOSE SERPL-MCNC: 120 MG/DL — HIGH (ref 70–99)
HCT VFR BLD CALC: 33.3 % — LOW (ref 39–50)
HGB BLD-MCNC: 10.2 G/DL — LOW (ref 13–17)
IMM GRANULOCYTES NFR BLD AUTO: 0.5 % — SIGNIFICANT CHANGE UP (ref 0–1.5)
LYMPHOCYTES # BLD AUTO: 1.14 K/UL — SIGNIFICANT CHANGE UP (ref 1–3.3)
LYMPHOCYTES # BLD AUTO: 14.8 % — SIGNIFICANT CHANGE UP (ref 13–44)
MCHC RBC-ENTMCNC: 19.8 PG — LOW (ref 27–34)
MCHC RBC-ENTMCNC: 30.6 GM/DL — LOW (ref 32–36)
MCV RBC AUTO: 64.8 FL — LOW (ref 80–100)
MONOCYTES # BLD AUTO: 0.91 K/UL — HIGH (ref 0–0.9)
MONOCYTES NFR BLD AUTO: 11.8 % — SIGNIFICANT CHANGE UP (ref 2–14)
NEUTROPHILS # BLD AUTO: 5.5 K/UL — SIGNIFICANT CHANGE UP (ref 1.8–7.4)
NEUTROPHILS NFR BLD AUTO: 71.7 % — SIGNIFICANT CHANGE UP (ref 43–77)
PLATELET # BLD AUTO: 130 K/UL — LOW (ref 150–400)
POTASSIUM SERPL-MCNC: 3.3 MMOL/L — LOW (ref 3.5–5.3)
POTASSIUM SERPL-SCNC: 3.3 MMOL/L — LOW (ref 3.5–5.3)
RBC # BLD: 5.14 M/UL — SIGNIFICANT CHANGE UP (ref 4.2–5.8)
RBC # FLD: 15.6 % — HIGH (ref 10.3–14.5)
SODIUM SERPL-SCNC: 133 MMOL/L — LOW (ref 135–145)
WBC # BLD: 7.68 K/UL — SIGNIFICANT CHANGE UP (ref 3.8–10.5)
WBC # FLD AUTO: 7.68 K/UL — SIGNIFICANT CHANGE UP (ref 3.8–10.5)

## 2019-08-24 RX ORDER — POTASSIUM CHLORIDE 20 MEQ
40 PACKET (EA) ORAL EVERY 4 HOURS
Refills: 0 | Status: COMPLETED | OUTPATIENT
Start: 2019-08-24 | End: 2019-08-24

## 2019-08-24 RX ADMIN — GABAPENTIN 600 MILLIGRAM(S): 400 CAPSULE ORAL at 21:32

## 2019-08-24 RX ADMIN — TAMSULOSIN HYDROCHLORIDE 0.4 MILLIGRAM(S): 0.4 CAPSULE ORAL at 21:34

## 2019-08-24 RX ADMIN — LOSARTAN POTASSIUM 100 MILLIGRAM(S): 100 TABLET, FILM COATED ORAL at 06:16

## 2019-08-24 RX ADMIN — Medication 40 MILLIEQUIVALENT(S): at 21:32

## 2019-08-24 RX ADMIN — Medication 1: at 11:52

## 2019-08-24 RX ADMIN — OXYCODONE HYDROCHLORIDE 10 MILLIGRAM(S): 5 TABLET ORAL at 14:55

## 2019-08-24 RX ADMIN — CYCLOBENZAPRINE HYDROCHLORIDE 10 MILLIGRAM(S): 10 TABLET, FILM COATED ORAL at 13:34

## 2019-08-24 RX ADMIN — Medication 300 MILLIGRAM(S): at 06:16

## 2019-08-24 RX ADMIN — Medication 1 TABLET(S): at 21:32

## 2019-08-24 RX ADMIN — Medication 1 TABLET(S): at 06:16

## 2019-08-24 RX ADMIN — ESCITALOPRAM OXALATE 10 MILLIGRAM(S): 10 TABLET, FILM COATED ORAL at 21:34

## 2019-08-24 RX ADMIN — FINASTERIDE 5 MILLIGRAM(S): 5 TABLET, FILM COATED ORAL at 11:36

## 2019-08-24 RX ADMIN — ATORVASTATIN CALCIUM 10 MILLIGRAM(S): 80 TABLET, FILM COATED ORAL at 21:33

## 2019-08-24 RX ADMIN — Medication 100 MILLIGRAM(S): at 06:16

## 2019-08-24 RX ADMIN — Medication 1 TABLET(S): at 13:33

## 2019-08-24 RX ADMIN — Medication 1 TABLET(S): at 11:35

## 2019-08-24 RX ADMIN — DEXTROSE MONOHYDRATE, SODIUM CHLORIDE, AND POTASSIUM CHLORIDE 75 MILLILITER(S): 50; .745; 4.5 INJECTION, SOLUTION INTRAVENOUS at 14:25

## 2019-08-24 RX ADMIN — Medication 40 MILLIEQUIVALENT(S): at 17:08

## 2019-08-24 RX ADMIN — PANTOPRAZOLE SODIUM 40 MILLIGRAM(S): 20 TABLET, DELAYED RELEASE ORAL at 06:16

## 2019-08-24 RX ADMIN — OXYCODONE HYDROCHLORIDE 10 MILLIGRAM(S): 5 TABLET ORAL at 14:25

## 2019-08-24 NOTE — PROGRESS NOTE ADULT - ASSESSMENT
77 Y/O MALE WITH THE ABOVE EMD HX S/P LUMBAR SPINE SURGERY    *POSTPROCEDURAL STATE - POD# 2  PAIN CONTROL  PHYSICAL THERAPY  ENCOURAGED IS    *DM - HOLD METFORMIN, ISS  BGMS STABLE    *HYPOKALEMIA - REPLETE  *HYPONATREMIA - LIKELY VOLUME CONTRACTED, MONITOR  *HTN - BP MEDS WITH PARAMETERS  BP STABLE  *HL - CONT STATIN  *ANEMIA - SECONDARY TO ACUTE BLOOD LOSS  H/H STABLE    8DVT PROPHY - VENODYNES ONLY IN THE SETTING OF RECENT SPINE SURGERY; START SQ HEPARIN ONCE OK WITH SPINE

## 2019-08-24 NOTE — PROGRESS NOTE ADULT - SUBJECTIVE AND OBJECTIVE BOX
Ortho Spine Attending Note    Pt seen and examined.  Some low back discomfort.  Continued improvement of preop RLE radiculopathy  Able to ambulate to bathroom today    AFVSS  Motor exam - 5/5 throughout b/l LE  Sensation grossly intact light touch throughout b/l LE    WBC 7.68  Hgb 10.2  K 3.2

## 2019-08-24 NOTE — PROGRESS NOTE ADULT - SUBJECTIVE AND OBJECTIVE BOX
Patient seen and examined at bedside. No acute events overnight. Pain controlled.    Vital Signs Last 24 Hrs  T(C): 36.9 (24 Aug 2019 04:56), Max: 36.9 (23 Aug 2019 10:44)  T(F): 98.5 (24 Aug 2019 04:56), Max: 98.5 (23 Aug 2019 10:44)  HR: 97 (24 Aug 2019 04:56) (75 - 99)  BP: 114/62 (24 Aug 2019 04:56) (100/55 - 119/65)  BP(mean): 73 (24 Aug 2019 04:56) (66 - 73)  RR: 18 (24 Aug 2019 04:56) (16 - 18)  SpO2: 94% (24 Aug 2019 04:56) (90% - 94%)    Physical Exam:  General: Not in acute distress, resting comfortably.     Spine:  Dressing is clean, dry, and intact.   Sensation intact to light touch in L2-S1 nerve distributions bilaterally  Dorsalis pedis/Posterior tibial pulses 2+  Compartments soft and compressible    Motor:  Right Lower Extremity: Hip flexion/adduction 5/5, Hip Extension 5/5, Knee Extension 5/5, Knee Flexion 5/5, Ankle dorsiflexion 5/5, Ankle plantarflexion 5/5, Toe dorsiflexion 5/5, Toe plantarflexion 5/5.     Left Lower Extremity: Hip flexion/adduction 5/5, Hip Extension 5/5, Knee Extension 5/5, Knee Flexion 5/5, Ankle dorsiflexion 5/5, Ankle plantarflexion 5/5, Toe dorsiflexion 5/5, Toe plantarflexion 5/5.         Assessment and Plan:    78y Male s/p L5-S1 TLIF  - Analgesia  - Incentive Spirometry  - Ambulate as Tolerated  - Physical Therapy, OOB-Chair  - No Chemical Prophylaxis, Mechanical DVT Prophylaxis Only (Sequential Compression Devices)  - Discharge Planning

## 2019-08-24 NOTE — PROGRESS NOTE ADULT - ASSESSMENT
81 yo M s/p L5-S1 R sided MAS-TLIF  - Mobilize with PT  - PO pain meds  - Appreciate hospitalist consult  - K repleted.  Follow.  [facility]  - Dispo: home vs. rehab.     Ovidio Royal MD  [facility],

## 2019-08-24 NOTE — PROGRESS NOTE ADULT - SUBJECTIVE AND OBJECTIVE BOX
HPI:  79 Y/O MALE WITH OA, DM, HTN, HL, LUMBAR DISC DISEASE S/P LUMBAR MICRODISCECTOMY AND FUSION.  MEDICINE CONSULT REQUESTED FOR POST OP MEDICAL MANAGEMENT  8/23/19; NO CP, SOB, N/V/F/C; BACK PAIN IS FAIRLY CONTROLLED  8/24/19: No CP, SOB, n/v/f/c; back pain is fairly controlled; harding removed and voiding fine      REVIEW OF SYSTEMS:   All 10 systems reviewed in detailed and found to be negative with the exception of what has already been described above    PHYSICAL EXAM:    Vital Signs Last 24 Hrs  T(C): 36.9 (24 Aug 2019 04:56), Max: 36.9 (24 Aug 2019 04:56)  T(F): 98.5 (24 Aug 2019 04:56), Max: 98.5 (24 Aug 2019 04:56)  HR: 97 (24 Aug 2019 04:56) (96 - 99)  BP: 114/62 (24 Aug 2019 04:56) (100/55 - 119/65)  BP(mean): 73 (24 Aug 2019 04:56) (66 - 73)  RR: 18 (24 Aug 2019 04:56) (16 - 18)  SpO2: 94% (24 Aug 2019 04:56) (90% - 94%)    GEN: A and O, NAD,  mood stable  HEENT:   NC/AT, EOMI, no oropharyngeal lesions    NECK:   supple,    CV:  +S1, +S2, regular, no murmurs or rubs    RESP:   lungs clear to auscultation bilaterally, no wheezing, rales, rhonchi, good air entry bilaterally DECREASED BS DELILAH    GI:  abdomen soft, non-tender, non-distended, normal BS,  no abdominal masses, no palpable masses    BACK: POS DRESSING C/D/I    RECTAL:  not examined    :  HARDING OUT    MSK:   normal muscle tone, no atrophy, no rigidity, no contractions    EXT:   no clubbing, no cyanosis, no edema, no calf pain, swelling or erythema    VASCULAR:  pulses equal and symmetric in the upper and lower extremities    NEURO:  AAOX3, no focal neurological deficits, follows all commands, able to move extremities spontaneously    SKIN:  no ulcers, lesions or rashes    LABS/IMAGING:                              10.2   7.68  )-----------( 130      ( 24 Aug 2019 08:20 )             33.3     08-24    133<L>  |  96  |  17  ----------------------------<  120<H>  3.3<L>   |  32<H>  |  1.25    Ca    9.0      24 Aug 2019 08:20        MEDICATIONS  (STANDING):  atorvastatin 10 milliGRAM(s) Oral at bedtime  calcium carbonate 1250 mG  + Vitamin D (OsCal 500 + D) 1 Tablet(s) Oral three times a day  dextrose 5%. 1000 milliLiter(s) (50 mL/Hr) IV Continuous <Continuous>  dextrose 50% Injectable 12.5 Gram(s) IV Push once  dextrose 50% Injectable 25 Gram(s) IV Push once  dextrose 50% Injectable 25 Gram(s) IV Push once  diltiazem    milliGRAM(s) Oral daily  docusate sodium 100 milliGRAM(s) Oral three times a day  escitalopram 10 milliGRAM(s) Oral at bedtime  finasteride 5 milliGRAM(s) Oral daily  gabapentin 600 milliGRAM(s) Oral at bedtime  insulin lispro (HumaLOG) corrective regimen sliding scale   SubCutaneous three times a day before meals  insulin lispro (HumaLOG) corrective regimen sliding scale   SubCutaneous at bedtime  lactated ringers. 1000 milliLiter(s) (75 mL/Hr) IV Continuous <Continuous>  losartan 100 milliGRAM(s) Oral daily  multivitamin 1 Tablet(s) Oral daily  pantoprazole    Tablet 40 milliGRAM(s) Oral before breakfast  senna 2 Tablet(s) Oral at bedtime  sodium chloride 0.45% with potassium chloride 20 mEq/L 1000 milliLiter(s) (75 mL/Hr) IV Continuous <Continuous>  tamsulosin 0.4 milliGRAM(s) Oral at bedtime    MEDICATIONS  (PRN):  acetaminophen   Tablet .. 650 milliGRAM(s) Oral every 6 hours PRN Temp greater or equal to 38C (100.4F)  cyclobenzaprine 10 milliGRAM(s) Oral every 8 hours PRN Muscle Spasm  dextrose 40% Gel 15 Gram(s) Oral once PRN Blood Glucose LESS THAN 70 milliGRAM(s)/deciliter  diphenhydrAMINE   Injectable 12.5 milliGRAM(s) IV Push every 4 hours PRN Itching  glucagon  Injectable 1 milliGRAM(s) IntraMuscular once PRN Glucose LESS THAN 70 milligrams/deciliter  HYDROmorphone  Injectable 1 milliGRAM(s) SubCutaneous every 3 hours PRN Severe Pain (7 - 10)  magnesium hydroxide Suspension 30 milliLiter(s) Oral every 12 hours PRN Constipation  ondansetron Injectable 4 milliGRAM(s) IV Push every 6 hours PRN Nausea  oxyCODONE    IR 5 milliGRAM(s) Oral every 4 hours PRN Mild Pain (1 - 3)  oxyCODONE    IR 10 milliGRAM(s) Oral every 4 hours PRN Moderate Pain (4 - 6)                                                                                              EKG:     NSR@77BPM

## 2019-08-25 LAB
ANION GAP SERPL CALC-SCNC: 8 MMOL/L — SIGNIFICANT CHANGE UP (ref 5–17)
BASOPHILS # BLD AUTO: 0.02 K/UL — SIGNIFICANT CHANGE UP (ref 0–0.2)
BASOPHILS NFR BLD AUTO: 0.2 % — SIGNIFICANT CHANGE UP (ref 0–2)
BUN SERPL-MCNC: 14 MG/DL — SIGNIFICANT CHANGE UP (ref 7–23)
CALCIUM SERPL-MCNC: 9.3 MG/DL — SIGNIFICANT CHANGE UP (ref 8.5–10.1)
CHLORIDE SERPL-SCNC: 96 MMOL/L — SIGNIFICANT CHANGE UP (ref 96–108)
CO2 SERPL-SCNC: 29 MMOL/L — SIGNIFICANT CHANGE UP (ref 22–31)
CREAT SERPL-MCNC: 1.09 MG/DL — SIGNIFICANT CHANGE UP (ref 0.5–1.3)
EOSINOPHIL # BLD AUTO: 0.16 K/UL — SIGNIFICANT CHANGE UP (ref 0–0.5)
EOSINOPHIL NFR BLD AUTO: 1.9 % — SIGNIFICANT CHANGE UP (ref 0–6)
GLUCOSE SERPL-MCNC: 127 MG/DL — HIGH (ref 70–99)
HCT VFR BLD CALC: 32.6 % — LOW (ref 39–50)
HGB BLD-MCNC: 10.3 G/DL — LOW (ref 13–17)
IMM GRANULOCYTES NFR BLD AUTO: 0.5 % — SIGNIFICANT CHANGE UP (ref 0–1.5)
LYMPHOCYTES # BLD AUTO: 1.25 K/UL — SIGNIFICANT CHANGE UP (ref 1–3.3)
LYMPHOCYTES # BLD AUTO: 15 % — SIGNIFICANT CHANGE UP (ref 13–44)
MCHC RBC-ENTMCNC: 20.3 PG — LOW (ref 27–34)
MCHC RBC-ENTMCNC: 31.6 GM/DL — LOW (ref 32–36)
MCV RBC AUTO: 64.2 FL — LOW (ref 80–100)
MONOCYTES # BLD AUTO: 0.72 K/UL — SIGNIFICANT CHANGE UP (ref 0–0.9)
MONOCYTES NFR BLD AUTO: 8.6 % — SIGNIFICANT CHANGE UP (ref 2–14)
NEUTROPHILS # BLD AUTO: 6.15 K/UL — SIGNIFICANT CHANGE UP (ref 1.8–7.4)
NEUTROPHILS NFR BLD AUTO: 73.8 % — SIGNIFICANT CHANGE UP (ref 43–77)
PLATELET # BLD AUTO: 157 K/UL — SIGNIFICANT CHANGE UP (ref 150–400)
POTASSIUM SERPL-MCNC: 3.1 MMOL/L — LOW (ref 3.5–5.3)
POTASSIUM SERPL-SCNC: 3.1 MMOL/L — LOW (ref 3.5–5.3)
RBC # BLD: 5.08 M/UL — SIGNIFICANT CHANGE UP (ref 4.2–5.8)
RBC # FLD: 15.6 % — HIGH (ref 10.3–14.5)
SODIUM SERPL-SCNC: 133 MMOL/L — LOW (ref 135–145)
WBC # BLD: 8.34 K/UL — SIGNIFICANT CHANGE UP (ref 3.8–10.5)
WBC # FLD AUTO: 8.34 K/UL — SIGNIFICANT CHANGE UP (ref 3.8–10.5)

## 2019-08-25 RX ORDER — POTASSIUM CHLORIDE 20 MEQ
40 PACKET (EA) ORAL ONCE
Refills: 0 | Status: COMPLETED | OUTPATIENT
Start: 2019-08-25 | End: 2019-08-25

## 2019-08-25 RX ADMIN — DEXTROSE MONOHYDRATE, SODIUM CHLORIDE, AND POTASSIUM CHLORIDE 75 MILLILITER(S): 50; .745; 4.5 INJECTION, SOLUTION INTRAVENOUS at 21:09

## 2019-08-25 RX ADMIN — Medication 1 TABLET(S): at 06:35

## 2019-08-25 RX ADMIN — PANTOPRAZOLE SODIUM 40 MILLIGRAM(S): 20 TABLET, DELAYED RELEASE ORAL at 06:36

## 2019-08-25 RX ADMIN — LOSARTAN POTASSIUM 100 MILLIGRAM(S): 100 TABLET, FILM COATED ORAL at 06:35

## 2019-08-25 RX ADMIN — GABAPENTIN 600 MILLIGRAM(S): 400 CAPSULE ORAL at 21:10

## 2019-08-25 RX ADMIN — Medication 1 TABLET(S): at 11:24

## 2019-08-25 RX ADMIN — FINASTERIDE 5 MILLIGRAM(S): 5 TABLET, FILM COATED ORAL at 11:23

## 2019-08-25 RX ADMIN — TAMSULOSIN HYDROCHLORIDE 0.4 MILLIGRAM(S): 0.4 CAPSULE ORAL at 21:10

## 2019-08-25 RX ADMIN — Medication 1 TABLET(S): at 13:06

## 2019-08-25 RX ADMIN — ESCITALOPRAM OXALATE 10 MILLIGRAM(S): 10 TABLET, FILM COATED ORAL at 21:10

## 2019-08-25 RX ADMIN — Medication 1 TABLET(S): at 21:11

## 2019-08-25 RX ADMIN — OXYCODONE HYDROCHLORIDE 10 MILLIGRAM(S): 5 TABLET ORAL at 21:09

## 2019-08-25 RX ADMIN — Medication 300 MILLIGRAM(S): at 06:35

## 2019-08-25 RX ADMIN — Medication 40 MILLIEQUIVALENT(S): at 11:23

## 2019-08-25 RX ADMIN — OXYCODONE HYDROCHLORIDE 10 MILLIGRAM(S): 5 TABLET ORAL at 22:00

## 2019-08-25 RX ADMIN — ATORVASTATIN CALCIUM 10 MILLIGRAM(S): 80 TABLET, FILM COATED ORAL at 21:11

## 2019-08-25 RX ADMIN — Medication 40 MILLIEQUIVALENT(S): at 18:29

## 2019-08-25 NOTE — PROGRESS NOTE ADULT - SUBJECTIVE AND OBJECTIVE BOX
HPI:  79 Y/O MALE WITH OA, DM, HTN, HL, LUMBAR DISC DISEASE S/P LUMBAR MICRODISCECTOMY AND FUSION.  MEDICINE CONSULT REQUESTED FOR POST OP MEDICAL MANAGEMENT  8/23/19; NO CP, SOB, N/V/F/C; BACK PAIN IS FAIRLY CONTROLLED  8/24/19: No CP, SOB, n/v/f/c; back pain is fairly controlled; harding removed and voiding fine  8/25/19; No cp, sob, n/v/f/c; back pain better; mobilized more today, did some stairs      REVIEW OF SYSTEMS:   All 10 systems reviewed in detailed and found to be negative with the exception of what has already been described above    PHYSICAL EXAM:    Vital Signs Last 24 Hrs  T(C): 36.6 (25 Aug 2019 11:19), Max: 36.7 (25 Aug 2019 05:16)  T(F): 97.9 (25 Aug 2019 11:19), Max: 98.1 (25 Aug 2019 05:16)  HR: 80 (25 Aug 2019 11:19) (80 - 94)  BP: 112/69 (25 Aug 2019 11:19) (100/46 - 125/66)  BP(mean): --  RR: 18 (25 Aug 2019 11:19) (18 - 18)  SpO2: 94% (25 Aug 2019 11:19) (94% - 97%)    GEN: A and O, NAD,  mood stable  HEENT:   NC/AT, EOMI, no oropharyngeal lesions    NECK:   supple,    CV:  +S1, +S2, regular, no murmurs or rubs    RESP:   lungs clear to auscultation bilaterally, no wheezing, rales, rhonchi, good air entry bilaterally DECREASED BS DELILAH    GI:  abdomen soft, non-tender, non-distended, normal BS,  no abdominal masses, no palpable masses    BACK: POS DRESSING C/D/I    RECTAL:  not examined    :  HARDING OUT    MSK:   normal muscle tone, no atrophy, no rigidity, no contractions    EXT:   no clubbing, no cyanosis, no edema, no calf pain, swelling or erythema    VASCULAR:  pulses equal and symmetric in the upper and lower extremities    NEURO:  AAOX3, no focal neurological deficits, follows all commands, able to move extremities spontaneously    SKIN:  no ulcers, lesions or rashes    LABS/IMAGING:                              10.3   8.34  )-----------( 157      ( 25 Aug 2019 06:38 )             32.6     08-25    133<L>  |  96  |  14  ----------------------------<  127<H>  3.1<L>   |  29  |  1.09    Ca    9.3      25 Aug 2019 06:38        MEDICATIONS  (STANDING):  atorvastatin 10 milliGRAM(s) Oral at bedtime  calcium carbonate 1250 mG  + Vitamin D (OsCal 500 + D) 1 Tablet(s) Oral three times a day  dextrose 5%. 1000 milliLiter(s) (50 mL/Hr) IV Continuous <Continuous>  dextrose 50% Injectable 12.5 Gram(s) IV Push once  dextrose 50% Injectable 25 Gram(s) IV Push once  dextrose 50% Injectable 25 Gram(s) IV Push once  diltiazem    milliGRAM(s) Oral daily  docusate sodium 100 milliGRAM(s) Oral three times a day  escitalopram 10 milliGRAM(s) Oral at bedtime  finasteride 5 milliGRAM(s) Oral daily  gabapentin 600 milliGRAM(s) Oral at bedtime  insulin lispro (HumaLOG) corrective regimen sliding scale   SubCutaneous three times a day before meals  insulin lispro (HumaLOG) corrective regimen sliding scale   SubCutaneous at bedtime  lactated ringers. 1000 milliLiter(s) (75 mL/Hr) IV Continuous <Continuous>  losartan 100 milliGRAM(s) Oral daily  multivitamin 1 Tablet(s) Oral daily  pantoprazole    Tablet 40 milliGRAM(s) Oral before breakfast  potassium chloride    Tablet ER 40 milliEquivalent(s) Oral once  senna 2 Tablet(s) Oral at bedtime  sodium chloride 0.45% with potassium chloride 20 mEq/L 1000 milliLiter(s) (75 mL/Hr) IV Continuous <Continuous>  tamsulosin 0.4 milliGRAM(s) Oral at bedtime    MEDICATIONS  (PRN):  acetaminophen   Tablet .. 650 milliGRAM(s) Oral every 6 hours PRN Temp greater or equal to 38C (100.4F)  cyclobenzaprine 10 milliGRAM(s) Oral every 8 hours PRN Muscle Spasm  dextrose 40% Gel 15 Gram(s) Oral once PRN Blood Glucose LESS THAN 70 milliGRAM(s)/deciliter  diphenhydrAMINE   Injectable 12.5 milliGRAM(s) IV Push every 4 hours PRN Itching  glucagon  Injectable 1 milliGRAM(s) IntraMuscular once PRN Glucose LESS THAN 70 milligrams/deciliter  HYDROmorphone  Injectable 1 milliGRAM(s) SubCutaneous every 3 hours PRN Severe Pain (7 - 10)  magnesium hydroxide Suspension 30 milliLiter(s) Oral every 12 hours PRN Constipation  ondansetron Injectable 4 milliGRAM(s) IV Push every 6 hours PRN Nausea  oxyCODONE    IR 5 milliGRAM(s) Oral every 4 hours PRN Mild Pain (1 - 3)  oxyCODONE    IR 10 milliGRAM(s) Oral every 4 hours PRN Moderate Pain (4 - 6)                                                                                                                                                             EKG:     NSR@77BPM

## 2019-08-25 NOTE — PROGRESS NOTE ADULT - ASSESSMENT
79 yo M s/p L5-S1 R sided MAS-TLIF  - Mobilize with PT  - PO pain meds  - Appreciate hospitalist consult  - K repleted again today.  Trend K tomorrow.   - Mech DVT ppx.  - Dispo: home vs. rehab.     Ovidio Royal MD  [facility],

## 2019-08-25 NOTE — PROGRESS NOTE ADULT - SUBJECTIVE AND OBJECTIVE BOX
POD#3. Pt seen resting in bed. Pt c/o mild incisional site pain tolerable with current medications. Pt has pain of the lateral aspect of right thigh. Pt denies numbness and weakness of b/l lower extremities. Pt voided on own without complications. Pt had a BM.     PE  Gen appearance: NAD  Motor strength: 5/5 of b/l lower ext (quads, HF, ant tib, gastrocs, EHL)  Sensation: intact to light touch bilaterally  No calf tenderness bilaterally  Incisional site: clean and dry. Serosang drainage (dry) on dressing removed and changed.    Plan  Afeb, VSS,  WBC: wnl, H/H: 10.3/32.6  Potassium:3.1, Sodium:133 eval and manage per Hospitalist. Replete potassium  Mobilize with physical therapy and encouraged incentive spirometer.   DC pending Hospitalist clearance.

## 2019-08-25 NOTE — PROGRESS NOTE ADULT - ASSESSMENT
79 Y/O MALE WITH THE ABOVE EMD HX S/P LUMBAR SPINE SURGERY    *POSTPROCEDURAL STATE - POD# 3  PAIN CONTROL  PHYSICAL THERAPY  ENCOURAGED IS    *DM - HOLD METFORMIN, ISS  BGMS STABLE    *HYPOKALEMIA - REPLETE again today, 2nd dose  *HYPONATREMIA - LIKELY VOLUME CONTRACTED, MONITOR, better  *HTN - BP MEDS WITH PARAMETERS  BP STABLE  *HL - CONT STATIN  *ANEMIA - SECONDARY TO ACUTE BLOOD LOSS  H/H STABLE    8DVT PROPHY - VENODYNES ONLY IN THE SETTING OF RECENT SPINE SURGERY; START SQ HEPARIN ONCE OK WITH SPINE

## 2019-08-25 NOTE — PROGRESS NOTE ADULT - SUBJECTIVE AND OBJECTIVE BOX
Ortho Spine Attending Note    Pt seen and examined.  Sitting in chair comfortably.   Continued improvement of preop RLE radiculopathy  Able to climb 3 stairs and ambulate well with PT today.     AFVSS  Motor exam - 5/5 throughout b/l LE  Sensation grossly intact light touch throughout b/l LE    WBC 8.34 Hgb 10.3  K 3.1

## 2019-08-25 NOTE — PROGRESS NOTE ADULT - SUBJECTIVE AND OBJECTIVE BOX
Orthopedics      Patient seen and examined at bedside. Feeling well. Pain controlled. No n/v. No acute events overnight.    Vital Signs Last 24 Hrs  T(C): 36.7 (08-25-19 @ 05:16), Max: 36.7 (08-25-19 @ 05:16)  T(F): 98.1 (08-25-19 @ 05:16), Max: 98.1 (08-25-19 @ 05:16)  HR: 92 (08-25-19 @ 05:16) (88 - 94)  BP: 125/66 (08-25-19 @ 05:16) (100/46 - 125/66)  BP(mean): --  RR: 18 (08-25-19 @ 05:16) (18 - 18)  SpO2: 97% (08-25-19 @ 05:16) (94% - 97%)                        10.3   8.34  )-----------( 157      ( 25 Aug 2019 06:38 )             32.6     25 Aug 2019 06:38    133    |  96     |  14     ----------------------------<  127    3.1     |  29     |  1.09     Ca    9.3        25 Aug 2019 06:38          Exam:  NAD AAOx3  Motor:                   C5                C6              C7               C8           T1   R            5/5                5/5            5/5             5/5          5/5  L             5/5               5/5             5/5             5/5          5/5                L2             L3             L4               L5            S1  R         5/5           5/5          5/5             5/5           5/5  L          5/5          5/5           5/5             5/5           5/5    Sensory:            C5         C6         C7      C8       T1        (0=absent, 1=impaired, 2=normal, NT=not testable)  R         2            2           2        2         2  L          2            2           2        2         2               L2          L3         L4      L5       S1         (0=absent, 1=impaired, 2=normal, NT=not testable)  R         2            2            2        2        2  L          2            2           2        2         2      78y Male s/p L5-S1 TLIF  - Analgesia  - Incentive Spirometry  - Ambulate as Tolerated  - Physical Therapy, OOB-Chair  - No Chemical Prophylaxis, Mechanical DVT Prophylaxis Only (Sequential Compression Devices)  - Discharge Planning

## 2019-08-26 ENCOUNTER — TRANSCRIPTION ENCOUNTER (OUTPATIENT)
Age: 79
End: 2019-08-26

## 2019-08-26 VITALS
HEART RATE: 68 BPM | DIASTOLIC BLOOD PRESSURE: 58 MMHG | RESPIRATION RATE: 18 BRPM | SYSTOLIC BLOOD PRESSURE: 105 MMHG | TEMPERATURE: 98 F | OXYGEN SATURATION: 95 %

## 2019-08-26 LAB
ANION GAP SERPL CALC-SCNC: 5 MMOL/L — SIGNIFICANT CHANGE UP (ref 5–17)
BUN SERPL-MCNC: 11 MG/DL — SIGNIFICANT CHANGE UP (ref 7–23)
CALCIUM SERPL-MCNC: 8.7 MG/DL — SIGNIFICANT CHANGE UP (ref 8.5–10.1)
CHLORIDE SERPL-SCNC: 101 MMOL/L — SIGNIFICANT CHANGE UP (ref 96–108)
CO2 SERPL-SCNC: 32 MMOL/L — HIGH (ref 22–31)
CREAT SERPL-MCNC: 0.91 MG/DL — SIGNIFICANT CHANGE UP (ref 0.5–1.3)
GLUCOSE SERPL-MCNC: 113 MG/DL — HIGH (ref 70–99)
HCT VFR BLD CALC: 29.9 % — LOW (ref 39–50)
HGB BLD-MCNC: 9.3 G/DL — LOW (ref 13–17)
MCHC RBC-ENTMCNC: 19.9 PG — LOW (ref 27–34)
MCHC RBC-ENTMCNC: 31.1 GM/DL — LOW (ref 32–36)
MCV RBC AUTO: 64 FL — LOW (ref 80–100)
PLATELET # BLD AUTO: 147 K/UL — LOW (ref 150–400)
POTASSIUM SERPL-MCNC: 3.5 MMOL/L — SIGNIFICANT CHANGE UP (ref 3.5–5.3)
POTASSIUM SERPL-SCNC: 3.5 MMOL/L — SIGNIFICANT CHANGE UP (ref 3.5–5.3)
RBC # BLD: 4.67 M/UL — SIGNIFICANT CHANGE UP (ref 4.2–5.8)
RBC # FLD: 15.4 % — HIGH (ref 10.3–14.5)
SODIUM SERPL-SCNC: 138 MMOL/L — SIGNIFICANT CHANGE UP (ref 135–145)
WBC # BLD: 4.7 K/UL — SIGNIFICANT CHANGE UP (ref 3.8–10.5)
WBC # FLD AUTO: 4.7 K/UL — SIGNIFICANT CHANGE UP (ref 3.8–10.5)

## 2019-08-26 RX ORDER — METFORMIN HYDROCHLORIDE 850 MG/1
1 TABLET ORAL
Qty: 0 | Refills: 0 | DISCHARGE

## 2019-08-26 RX ORDER — MUPIROCIN 20 MG/G
1 OINTMENT TOPICAL
Qty: 0 | Refills: 0 | DISCHARGE

## 2019-08-26 RX ADMIN — OXYCODONE HYDROCHLORIDE 5 MILLIGRAM(S): 5 TABLET ORAL at 11:31

## 2019-08-26 RX ADMIN — Medication 1 TABLET(S): at 09:58

## 2019-08-26 RX ADMIN — Medication 300 MILLIGRAM(S): at 06:25

## 2019-08-26 RX ADMIN — OXYCODONE HYDROCHLORIDE 5 MILLIGRAM(S): 5 TABLET ORAL at 12:01

## 2019-08-26 RX ADMIN — Medication 650 MILLIGRAM(S): at 06:25

## 2019-08-26 RX ADMIN — FINASTERIDE 5 MILLIGRAM(S): 5 TABLET, FILM COATED ORAL at 09:58

## 2019-08-26 RX ADMIN — PANTOPRAZOLE SODIUM 40 MILLIGRAM(S): 20 TABLET, DELAYED RELEASE ORAL at 06:26

## 2019-08-26 RX ADMIN — LOSARTAN POTASSIUM 100 MILLIGRAM(S): 100 TABLET, FILM COATED ORAL at 06:25

## 2019-08-26 RX ADMIN — Medication 1 TABLET(S): at 06:25

## 2019-08-26 NOTE — DISCHARGE NOTE PROVIDER - HOSPITAL COURSE
Pt underwent spine surgery w Dr. Ogden. This DC is on his behalf.        Orthopedic Summary        H&P:    Pt is a 78y Male PAST MEDICAL & SURGICAL HISTORY:    Herniated nucleus pulposus, L5-S1    Lumbar spinal stenosis    OA (osteoarthritis)    Depression    CA skin, basal cell    DM (diabetes mellitus)    Hypercholesterolemia    HTN (hypertension)    Uses hearing aid    Bladder cancer    S/P tonsillectomy    H/O local excision of skin lesion    History of bladder surgery: TURBT, cystoscopy    H/O hernia repair              Now s/p TLIF. Pt is afebrile with stable vital signs. Pain is controlled. Alert and Oriented. Dressing is clean and dry with a new bandage on.        Hospital Course:    Patient presented to [facility] after being medically cleared for the elective surgical procedure, having failed outpatient non-operative conservative management. Prophylactic antibiotics were started before the procedure and continued for 24 hours. They were admitted after surgery to the orthopedic floor.   There were no complications during the hospital stay.         Routine consults were obtained from Physical Therapy and from the Hospitalist for Medical Co-management. Pertinent home medications were continued.  Daily labs were followed.          POD 0 there were no overnight events. POD1 pt was transitioned to PO pain medication and pt was up OOB ambulating. On POD 1 or 2 the LONDON drain was removed and dressing was changed. Labs were checked daily.  The pt is ready today for DC to home with home. eRX were sent for pain medication. The orthopedic Attending is aware and agrees.

## 2019-08-26 NOTE — PROGRESS NOTE ADULT - REASON FOR ADMISSION
Back surgery
spine surgery
s/p lumbar fusion
s/p lumbar fusion
LUMBAR SPINE SURGERY
LUMBAR SPINE SURGERY

## 2019-08-26 NOTE — DISCHARGE NOTE PROVIDER - NSDCFUADDINST_GEN_ALL_CORE_FT
See Dr. Bradley in the office in about 7-10 days  keep bandage dry  sponeg bathe  keep moving  walk daily  no lifting over 5lbs

## 2019-08-26 NOTE — DISCHARGE NOTE PROVIDER - CARE PROVIDER_API CALL
Radha Ogden)  Orthopaedic Surgery  [address] [place] [address], [address] [place] [address]  Phone: [phone]  Fax: [phone]  Follow Up Time:

## 2019-08-26 NOTE — PROGRESS NOTE ADULT - SUBJECTIVE AND OBJECTIVE BOX
Post-operative day: 4  Procedure: L5-S1 TLIF and posterior instruementation    Patient seen and examined at bedside. No acute events overnight. Pain controlled.    Vital Signs Last 24 Hrs  T(C): 36.6 (26 Aug 2019 05:15), Max: 36.7 (25 Aug 2019 21:27)  T(F): 97.9 (26 Aug 2019 05:15), Max: 98 (25 Aug 2019 21:27)  HR: 84 (26 Aug 2019 05:15) (80 - 84)  BP: 141/86 (26 Aug 2019 05:15) (112/69 - 153/81)  BP(mean): --  RR: 18 (26 Aug 2019 05:15) (18 - 18)  SpO2: 95% (26 Aug 2019 05:15) (94% - 97%)    Physical Exam:  General: Not in acute distress, resting comfortably.     Spine:  Dressing is clean, dry, and intact.   Sensation intact to light touch in L2-S1 nerve distributions bilaterally  Dorsalis pedis/Posterior tibial pulses 2+  Compartments soft and compressible    Motor:  Right Lower Extremity: Hip flexion/adduction 5/5, Hip Extension 5/5, Knee Extension 5/5, Knee Flexion 5/5, Ankle dorsiflexion 5/5, Ankle plantarflexion 5/5, Toe dorsiflexion 5/5, Toe plantarflexion 5/5.     Left Lower Extremity: Hip flexion/adduction 5/5, Hip Extension 5/5, Knee Extension 5/5, Knee Flexion 5/5, Ankle dorsiflexion 5/5, Ankle plantarflexion 5/5, Toe dorsiflexion 5/5, Toe plantarflexion 5/5.         Assessment and Plan:    78y Male s/p L5-S1 TLIF    - Check potassium this am. Replete if low.   - Analgesia  - Incentive Spirometry  - Ambulate as Tolerated  - Physical Therapy, OOB-Chair  - No Chemical Prophylaxis, Mechanical DVT Prophylaxis Only (Sequential Compression Devices)  - PT recommends home with outpatient PT.

## 2019-08-26 NOTE — DISCHARGE NOTE NURSING/CASE MANAGEMENT/SOCIAL WORK - NSDCDPATPORTLINK_GEN_ALL_CORE
You can access the [email or web address], offered by [facility], by registering with the following website: [email or web address]

## 2019-08-26 NOTE — DISCHARGE NOTE PROVIDER - NSDCCPCAREPLAN_GEN_ALL_CORE_FT
PRINCIPAL DISCHARGE DIAGNOSIS  Diagnosis: Lumbar pain with radiation down leg  Assessment and Plan of Treatment:

## 2019-08-27 ENCOUNTER — RX RENEWAL (OUTPATIENT)
Age: 79
End: 2019-08-27

## 2019-08-29 DIAGNOSIS — E11.9 TYPE 2 DIABETES MELLITUS WITHOUT COMPLICATIONS: ICD-10-CM

## 2019-08-29 DIAGNOSIS — M51.17 INTERVERTEBRAL DISC DISORDERS WITH RADICULOPATHY, LUMBOSACRAL REGION: ICD-10-CM

## 2019-08-29 DIAGNOSIS — E78.5 HYPERLIPIDEMIA, UNSPECIFIED: ICD-10-CM

## 2019-08-29 DIAGNOSIS — E87.1 HYPO-OSMOLALITY AND HYPONATREMIA: ICD-10-CM

## 2019-08-29 DIAGNOSIS — I10 ESSENTIAL (PRIMARY) HYPERTENSION: ICD-10-CM

## 2019-08-29 DIAGNOSIS — E78.00 PURE HYPERCHOLESTEROLEMIA, UNSPECIFIED: ICD-10-CM

## 2019-08-29 DIAGNOSIS — M48.061 SPINAL STENOSIS, LUMBAR REGION WITHOUT NEUROGENIC CLAUDICATION: ICD-10-CM

## 2019-08-29 DIAGNOSIS — M19.91 PRIMARY OSTEOARTHRITIS, UNSPECIFIED SITE: ICD-10-CM

## 2019-08-29 DIAGNOSIS — M47.26 OTHER SPONDYLOSIS WITH RADICULOPATHY, LUMBAR REGION: ICD-10-CM

## 2019-08-29 DIAGNOSIS — M43.16 SPONDYLOLISTHESIS, LUMBAR REGION: ICD-10-CM

## 2019-08-29 DIAGNOSIS — F32.9 MAJOR DEPRESSIVE DISORDER, SINGLE EPISODE, UNSPECIFIED: ICD-10-CM

## 2019-08-29 DIAGNOSIS — E87.6 HYPOKALEMIA: ICD-10-CM

## 2019-08-29 DIAGNOSIS — D62 ACUTE POSTHEMORRHAGIC ANEMIA: ICD-10-CM

## 2019-09-02 ENCOUNTER — RX RENEWAL (OUTPATIENT)
Age: 79
End: 2019-09-02

## 2019-09-23 ENCOUNTER — RX RENEWAL (OUTPATIENT)
Age: 79
End: 2019-09-23

## 2019-10-10 ENCOUNTER — RX RENEWAL (OUTPATIENT)
Age: 79
End: 2019-10-10

## 2019-11-04 ENCOUNTER — RX RENEWAL (OUTPATIENT)
Age: 79
End: 2019-11-04

## 2019-12-30 ENCOUNTER — RX RENEWAL (OUTPATIENT)
Age: 79
End: 2019-12-30

## 2020-01-04 ENCOUNTER — RX RENEWAL (OUTPATIENT)
Age: 80
End: 2020-01-04

## 2020-01-23 ENCOUNTER — RX RENEWAL (OUTPATIENT)
Age: 80
End: 2020-01-23

## 2020-02-16 ENCOUNTER — RX RENEWAL (OUTPATIENT)
Age: 80
End: 2020-02-16

## 2020-02-28 ENCOUNTER — RX RENEWAL (OUTPATIENT)
Age: 80
End: 2020-02-28

## 2020-03-17 ENCOUNTER — RX RENEWAL (OUTPATIENT)
Age: 80
End: 2020-03-17

## 2020-06-14 ENCOUNTER — RX RENEWAL (OUTPATIENT)
Age: 80
End: 2020-06-14

## 2020-06-26 ENCOUNTER — RX RENEWAL (OUTPATIENT)
Age: 80
End: 2020-06-26

## 2020-07-21 ENCOUNTER — RX RENEWAL (OUTPATIENT)
Age: 80
End: 2020-07-21

## 2020-09-01 PROBLEM — E78.00 PURE HYPERCHOLESTEROLEMIA, UNSPECIFIED: Chronic | Status: ACTIVE | Noted: 2019-08-15

## 2020-09-01 PROBLEM — C44.91 BASAL CELL CARCINOMA OF SKIN, UNSPECIFIED: Chronic | Status: ACTIVE | Noted: 2019-08-15

## 2020-09-01 PROBLEM — Z97.4 PRESENCE OF EXTERNAL HEARING-AID: Chronic | Status: ACTIVE | Noted: 2019-08-15

## 2020-09-01 PROBLEM — I10 ESSENTIAL (PRIMARY) HYPERTENSION: Chronic | Status: ACTIVE | Noted: 2019-08-15

## 2020-09-01 PROBLEM — M51.27 OTHER INTERVERTEBRAL DISC DISPLACEMENT, LUMBOSACRAL REGION: Chronic | Status: ACTIVE | Noted: 2019-08-15

## 2020-09-01 PROBLEM — M19.90 UNSPECIFIED OSTEOARTHRITIS, UNSPECIFIED SITE: Chronic | Status: ACTIVE | Noted: 2019-08-15

## 2020-09-01 PROBLEM — M48.061 SPINAL STENOSIS, LUMBAR REGION WITHOUT NEUROGENIC CLAUDICATION: Chronic | Status: ACTIVE | Noted: 2019-08-15

## 2020-09-01 PROBLEM — F32.9 MAJOR DEPRESSIVE DISORDER, SINGLE EPISODE, UNSPECIFIED: Chronic | Status: ACTIVE | Noted: 2019-08-15

## 2020-09-03 ENCOUNTER — APPOINTMENT (OUTPATIENT)
Dept: OTOLARYNGOLOGY | Facility: CLINIC | Age: 80
End: 2020-09-03
Payer: MEDICARE

## 2020-09-03 VITALS — TEMPERATURE: 97.7 F | WEIGHT: 250 LBS | HEIGHT: 73 IN | BODY MASS INDEX: 33.13 KG/M2

## 2020-09-03 PROCEDURE — 99204 OFFICE O/P NEW MOD 45 MIN: CPT

## 2020-09-03 NOTE — REASON FOR VISIT
[Initial Consultation] : an initial consultation for [FreeTextEntry2] : ear- hearing aid- had them cleared

## 2020-09-03 NOTE — PHYSICAL EXAM
[Midline] : trachea located in midline position [Normal] : no rashes [de-identified] : fungal debris suctioned from right ear ; left normal

## 2020-09-03 NOTE — HISTORY OF PRESENT ILLNESS
[de-identified] : Patient does wear hearing aides.  Has trouble hearing.  New aides now.  Concerned about infection in ear.  Problem in right ear mostly.

## 2020-09-03 NOTE — ASSESSMENT
[FreeTextEntry1] : Patient with known snhl wearing hearing aides.  Having difficulty with right side - has fungal oe.  Recommended keep hearing aide out of right ear until return to office and strict dry ear precautions.  Also advised starting acetic acid drops.  Follow up in 2 weeks

## 2020-09-17 ENCOUNTER — APPOINTMENT (OUTPATIENT)
Dept: OTOLARYNGOLOGY | Facility: CLINIC | Age: 80
End: 2020-09-17
Payer: MEDICARE

## 2020-09-17 ENCOUNTER — APPOINTMENT (OUTPATIENT)
Dept: OTOLARYNGOLOGY | Facility: CLINIC | Age: 80
End: 2020-09-17

## 2020-09-17 VITALS
HEIGHT: 73 IN | HEART RATE: 93 BPM | BODY MASS INDEX: 33.13 KG/M2 | DIASTOLIC BLOOD PRESSURE: 88 MMHG | SYSTOLIC BLOOD PRESSURE: 139 MMHG | TEMPERATURE: 97.9 F | WEIGHT: 250 LBS

## 2020-09-17 DIAGNOSIS — B36.9 SUPERFICIAL MYCOSIS, UNSPECIFIED: ICD-10-CM

## 2020-09-17 DIAGNOSIS — H62.41 SUPERFICIAL MYCOSIS, UNSPECIFIED: ICD-10-CM

## 2020-09-17 PROCEDURE — 99214 OFFICE O/P EST MOD 30 MIN: CPT

## 2020-09-17 NOTE — HISTORY OF PRESENT ILLNESS
[de-identified] : Here for follow up of right OE and does wear hearing aides.   No further pain in right ear

## 2020-09-17 NOTE — ASSESSMENT
[FreeTextEntry1] : Patient with resolving right OE.  Ear debrided and BA instilled.  Would continue dry ear precautions and keep hearing aide out for several more weeks.  follow up 2-3 weeks.  After ear clear would have patient return to hearing aide dispenser.

## 2020-09-17 NOTE — PHYSICAL EXAM
[de-identified] : small amount of debris suctioned from right eac and ba instilled after; left normal [de-identified] : after debridement  [Midline] : trachea located in midline position [Normal] : no rashes

## 2020-09-19 ENCOUNTER — RX RENEWAL (OUTPATIENT)
Age: 80
End: 2020-09-19

## 2020-10-18 ENCOUNTER — RX RENEWAL (OUTPATIENT)
Age: 80
End: 2020-10-18

## 2020-10-25 ENCOUNTER — RX RENEWAL (OUTPATIENT)
Age: 80
End: 2020-10-25

## 2020-10-26 ENCOUNTER — RX RENEWAL (OUTPATIENT)
Age: 80
End: 2020-10-26

## 2020-11-04 ENCOUNTER — RX RENEWAL (OUTPATIENT)
Age: 80
End: 2020-11-04

## 2020-12-16 PROBLEM — J06.9 ACUTE UPPER RESPIRATORY INFECTION, UNSPECIFIED: Status: RESOLVED | Noted: 2018-03-06 | Resolved: 2020-12-16

## 2021-01-11 ENCOUNTER — RX RENEWAL (OUTPATIENT)
Age: 81
End: 2021-01-11

## 2021-02-08 ENCOUNTER — RX RENEWAL (OUTPATIENT)
Age: 81
End: 2021-02-08

## 2021-02-26 ENCOUNTER — RX RENEWAL (OUTPATIENT)
Age: 81
End: 2021-02-26

## 2021-03-11 ENCOUNTER — RX RENEWAL (OUTPATIENT)
Age: 81
End: 2021-03-11

## 2021-03-15 ENCOUNTER — APPOINTMENT (OUTPATIENT)
Dept: OTOLARYNGOLOGY | Facility: CLINIC | Age: 81
End: 2021-03-15
Payer: MEDICARE

## 2021-03-15 VITALS — HEIGHT: 73 IN | BODY MASS INDEX: 33.13 KG/M2 | TEMPERATURE: 97.3 F | WEIGHT: 250 LBS

## 2021-03-15 DIAGNOSIS — H92.02 OTALGIA, LEFT EAR: ICD-10-CM

## 2021-03-15 DIAGNOSIS — H60.311 DIFFUSE OTITIS EXTERNA, RIGHT EAR: ICD-10-CM

## 2021-03-15 DIAGNOSIS — H60.312 DIFFUSE OTITIS EXTERNA, LEFT EAR: ICD-10-CM

## 2021-03-15 PROCEDURE — 99213 OFFICE O/P EST LOW 20 MIN: CPT | Mod: 25

## 2021-03-15 PROCEDURE — 92504 EAR MICROSCOPY EXAMINATION: CPT

## 2021-03-15 NOTE — PROCEDURE
[Cerumen Impaction] : Cerumen Impaction [FreeTextEntry6] : Indications: cannot fully evaluate ear canal and tympanic membrane w otoscope\par \par The patient is positioned in exam chair and microscope utilized to examine each ear canal and tympanic membrane. \par Findings as follows:\par \par Microscopic exam right ear canal and tympanic membrane shows:\par \par Microscopic exam left ear canal and tympanic membrane shows: squamous debris and small amount pus cleared au  tm intact\par jennyfer powder applied\par dame ad\par

## 2021-03-15 NOTE — HISTORY OF PRESENT ILLNESS
[de-identified] : left ear plugging and pain\par hx cerumen\par sn loss auaids\par started gtts helping

## 2021-03-15 NOTE — PHYSICAL EXAM
[Normal] : mucosa is normal [Midline] : trachea located in midline position [de-identified] : squamous debris au

## 2021-04-04 ENCOUNTER — RX RENEWAL (OUTPATIENT)
Age: 81
End: 2021-04-04

## 2021-04-26 ENCOUNTER — RX RENEWAL (OUTPATIENT)
Age: 81
End: 2021-04-26

## 2021-05-22 ENCOUNTER — RX RENEWAL (OUTPATIENT)
Age: 81
End: 2021-05-22

## 2021-06-01 ENCOUNTER — RX RENEWAL (OUTPATIENT)
Age: 81
End: 2021-06-01

## 2021-06-10 ENCOUNTER — RX RENEWAL (OUTPATIENT)
Age: 81
End: 2021-06-10

## 2021-06-19 ENCOUNTER — RX RENEWAL (OUTPATIENT)
Age: 81
End: 2021-06-19

## 2021-06-21 ENCOUNTER — RX RENEWAL (OUTPATIENT)
Age: 81
End: 2021-06-21

## 2021-06-28 ENCOUNTER — RX RENEWAL (OUTPATIENT)
Age: 81
End: 2021-06-28

## 2021-07-01 ENCOUNTER — RX RENEWAL (OUTPATIENT)
Age: 81
End: 2021-07-01

## 2021-07-07 ENCOUNTER — RX RENEWAL (OUTPATIENT)
Age: 81
End: 2021-07-07

## 2021-07-10 ENCOUNTER — RX RENEWAL (OUTPATIENT)
Age: 81
End: 2021-07-10

## 2021-07-15 ENCOUNTER — RX RENEWAL (OUTPATIENT)
Age: 81
End: 2021-07-15

## 2021-07-19 ENCOUNTER — APPOINTMENT (OUTPATIENT)
Dept: OTOLARYNGOLOGY | Facility: CLINIC | Age: 81
End: 2021-07-19
Payer: MEDICARE

## 2021-07-19 VITALS — HEIGHT: 73 IN | TEMPERATURE: 97.8 F | WEIGHT: 250 LBS | BODY MASS INDEX: 33.13 KG/M2

## 2021-07-19 DIAGNOSIS — H90.3 SENSORINEURAL HEARING LOSS, BILATERAL: ICD-10-CM

## 2021-07-19 PROCEDURE — 99213 OFFICE O/P EST LOW 20 MIN: CPT

## 2021-07-22 ENCOUNTER — NON-APPOINTMENT (OUTPATIENT)
Age: 81
End: 2021-07-22

## 2021-07-22 ENCOUNTER — APPOINTMENT (OUTPATIENT)
Dept: INTERNAL MEDICINE | Facility: CLINIC | Age: 81
End: 2021-07-22
Payer: MEDICARE

## 2021-07-22 ENCOUNTER — LABORATORY RESULT (OUTPATIENT)
Age: 81
End: 2021-07-22

## 2021-07-22 VITALS — SYSTOLIC BLOOD PRESSURE: 150 MMHG | DIASTOLIC BLOOD PRESSURE: 90 MMHG

## 2021-07-22 VITALS
OXYGEN SATURATION: 95 % | SYSTOLIC BLOOD PRESSURE: 140 MMHG | RESPIRATION RATE: 14 BRPM | BODY MASS INDEX: 33.8 KG/M2 | DIASTOLIC BLOOD PRESSURE: 90 MMHG | HEIGHT: 73 IN | TEMPERATURE: 97.6 F | WEIGHT: 255 LBS | HEART RATE: 86 BPM

## 2021-07-22 DIAGNOSIS — Z00.00 ENCOUNTER FOR GENERAL ADULT MEDICAL EXAMINATION W/OUT ABNORMAL FINDINGS: ICD-10-CM

## 2021-07-22 DIAGNOSIS — F41.8 OTHER SPECIFIED ANXIETY DISORDERS: ICD-10-CM

## 2021-07-22 PROCEDURE — G0439: CPT

## 2021-07-22 PROCEDURE — 93000 ELECTROCARDIOGRAM COMPLETE: CPT

## 2021-07-22 RX ORDER — ACETIC ACID 20 MG/ML
2 SOLUTION AURICULAR (OTIC)
Qty: 1 | Refills: 2 | Status: DISCONTINUED | COMMUNITY
Start: 2020-09-03 | End: 2021-07-22

## 2021-07-22 RX ORDER — FLUTICASONE PROPIONATE 50 UG/1
50 SPRAY, METERED NASAL DAILY
Qty: 1 | Refills: 0 | Status: DISCONTINUED | COMMUNITY
Start: 2018-03-06 | End: 2021-07-22

## 2021-07-22 RX ORDER — POTASSIUM CHLORIDE 750 MG/1
10 TABLET, FILM COATED, EXTENDED RELEASE ORAL DAILY
Qty: 30 | Refills: 1 | Status: DISCONTINUED | COMMUNITY
Start: 2019-08-16 | End: 2021-07-22

## 2021-07-22 NOTE — HISTORY OF PRESENT ILLNESS
[FreeTextEntry1] : Here for CPE\par Pt would like to increase escitalopram to 20mg QD.\par Pt has received two doses of Moderna COVID vaccine [de-identified] : Doesn't smoke. Social alcohol.\par Doesn't exercise much\par

## 2021-07-22 NOTE — PLAN
[FreeTextEntry1] : Pt will start walking daily for exercise\par Decrease sdalt intake.\par Lose weight\par RTO 1 month\par Follow up with cardiology

## 2021-07-22 NOTE — HEALTH RISK ASSESSMENT
[Good] : ~his/her~ current health as good [Fair] :  ~his/her~ mood as fair [Yes] : Yes [] : No [de-identified] : occasional [de-identified] : Doesn't exercise much

## 2021-07-29 LAB
ALBUMIN SERPL ELPH-MCNC: 4.7 G/DL
ALP BLD-CCNC: 77 U/L
ALT SERPL-CCNC: 23 U/L
ANION GAP SERPL CALC-SCNC: 13 MMOL/L
APPEARANCE: CLEAR
AST SERPL-CCNC: 18 U/L
BASOPHILS # BLD AUTO: 0.03 K/UL
BASOPHILS NFR BLD AUTO: 0.5 %
BILIRUB SERPL-MCNC: 0.9 MG/DL
BILIRUBIN URINE: NEGATIVE
BLOOD URINE: NEGATIVE
BUN SERPL-MCNC: 15 MG/DL
CALCIUM SERPL-MCNC: 10 MG/DL
CHLORIDE SERPL-SCNC: 97 MMOL/L
CHOLEST SERPL-MCNC: 164 MG/DL
CO2 SERPL-SCNC: 28 MMOL/L
COLOR: YELLOW
CREAT SERPL-MCNC: 0.98 MG/DL
EOSINOPHIL # BLD AUTO: 0.13 K/UL
EOSINOPHIL NFR BLD AUTO: 2.2 %
ESTIMATED AVERAGE GLUCOSE: 131 MG/DL
FOLATE SERPL-MCNC: 10.9 NG/ML
GLUCOSE QUALITATIVE U: NEGATIVE
GLUCOSE SERPL-MCNC: 94 MG/DL
HBA1C MFR BLD HPLC: 6.2 %
HCT VFR BLD CALC: 42.1 %
HDLC SERPL-MCNC: 42 MG/DL
HEMOCCULT STL QL IA: NEGATIVE
HGB BLD-MCNC: 12.9 G/DL
IMM GRANULOCYTES NFR BLD AUTO: 0.5 %
KETONES URINE: NEGATIVE
LDLC SERPL CALC-MCNC: 74 MG/DL
LEUKOCYTE ESTERASE URINE: ABNORMAL
LYMPHOCYTES # BLD AUTO: 1.25 K/UL
LYMPHOCYTES NFR BLD AUTO: 21.6 %
MAN DIFF?: NORMAL
MCHC RBC-ENTMCNC: 20 PG
MCHC RBC-ENTMCNC: 30.6 GM/DL
MCV RBC AUTO: 65.4 FL
MONOCYTES # BLD AUTO: 0.55 K/UL
MONOCYTES NFR BLD AUTO: 9.5 %
NEUTROPHILS # BLD AUTO: 3.81 K/UL
NEUTROPHILS NFR BLD AUTO: 65.7 %
NITRITE URINE: NEGATIVE
NONHDLC SERPL-MCNC: 122 MG/DL
PH URINE: 6.5
PLATELET # BLD AUTO: 197 K/UL
POTASSIUM SERPL-SCNC: 3.5 MMOL/L
PROT SERPL-MCNC: 7.1 G/DL
PROTEIN URINE: NORMAL
PSA SERPL-MCNC: 1.1 NG/ML
RBC # BLD: 6.44 M/UL
RBC # FLD: 18.3 %
SODIUM SERPL-SCNC: 138 MMOL/L
SPECIFIC GRAVITY URINE: 1.02
TRIGL SERPL-MCNC: 240 MG/DL
TSH SERPL-ACNC: 1.27 UIU/ML
UROBILINOGEN URINE: NORMAL
VIT B12 SERPL-MCNC: 470 PG/ML
WBC # FLD AUTO: 5.8 K/UL

## 2021-08-10 ENCOUNTER — RX RENEWAL (OUTPATIENT)
Age: 81
End: 2021-08-10

## 2021-09-07 ENCOUNTER — APPOINTMENT (OUTPATIENT)
Dept: OTOLARYNGOLOGY | Facility: CLINIC | Age: 81
End: 2021-09-07

## 2021-11-15 ENCOUNTER — RX RENEWAL (OUTPATIENT)
Age: 81
End: 2021-11-15

## 2022-01-18 NOTE — PROGRESS NOTE ADULT - NSHPATTENDINGPLANDISCUSS_GEN_ALL_CORE
Bard Funes called requesting a refill of the below medication which has been pended for you:     Requested Prescriptions     Pending Prescriptions Disp Refills    estradiol (ESTRACE) 1 MG tablet [Pharmacy Med Name: ESTRADIOL 1MG TABLETS] 90 tablet 3     Sig: TAKE 1 TABLET BY MOUTH DAILY       Last Appointment Date: 12/27/2021  Next Appointment Date: 6/20/2022    Allergies   Allergen Reactions    Azithromycin Other (See Comments)     Chest pain/irruglar heat beat        Lortab [Hydrocodone-Acetaminophen]     Meperidine Other (See Comments)     unsure    Morphine Hives
PT,RN
PT,RN

## 2022-01-23 ENCOUNTER — RX RENEWAL (OUTPATIENT)
Age: 82
End: 2022-01-23

## 2022-02-14 ENCOUNTER — EMERGENCY (EMERGENCY)
Facility: HOSPITAL | Age: 82
LOS: 0 days | Discharge: HOME | End: 2022-02-14
Attending: EMERGENCY MEDICINE
Payer: MEDICARE

## 2022-02-14 VITALS
RESPIRATION RATE: 18 BRPM | TEMPERATURE: 98 F | SYSTOLIC BLOOD PRESSURE: 171 MMHG | WEIGHT: 250 LBS | DIASTOLIC BLOOD PRESSURE: 92 MMHG | HEIGHT: 73 IN | OXYGEN SATURATION: 96 % | HEART RATE: 98 BPM

## 2022-02-14 DIAGNOSIS — I10 ESSENTIAL (PRIMARY) HYPERTENSION: ICD-10-CM

## 2022-02-14 DIAGNOSIS — Z88.0 ALLERGY STATUS TO PENICILLIN: ICD-10-CM

## 2022-02-14 DIAGNOSIS — F32.A DEPRESSION, UNSPECIFIED: ICD-10-CM

## 2022-02-14 DIAGNOSIS — Z88.2 ALLERGY STATUS TO SULFONAMIDES: ICD-10-CM

## 2022-02-14 DIAGNOSIS — Z98.890 OTHER SPECIFIED POSTPROCEDURAL STATES: Chronic | ICD-10-CM

## 2022-02-14 DIAGNOSIS — N23 UNSPECIFIED RENAL COLIC: ICD-10-CM

## 2022-02-14 DIAGNOSIS — E11.9 TYPE 2 DIABETES MELLITUS WITHOUT COMPLICATIONS: ICD-10-CM

## 2022-02-14 DIAGNOSIS — Z90.89 ACQUIRED ABSENCE OF OTHER ORGANS: Chronic | ICD-10-CM

## 2022-02-14 DIAGNOSIS — R10.9 UNSPECIFIED ABDOMINAL PAIN: ICD-10-CM

## 2022-02-14 LAB
ALBUMIN SERPL ELPH-MCNC: 3.6 G/DL — SIGNIFICANT CHANGE UP (ref 3.3–5)
ALP SERPL-CCNC: 73 U/L — SIGNIFICANT CHANGE UP (ref 40–120)
ALT FLD-CCNC: 46 U/L — SIGNIFICANT CHANGE UP (ref 12–78)
ANION GAP SERPL CALC-SCNC: 5 MMOL/L — SIGNIFICANT CHANGE UP (ref 5–17)
APPEARANCE UR: CLEAR — SIGNIFICANT CHANGE UP
AST SERPL-CCNC: 20 U/L — SIGNIFICANT CHANGE UP (ref 15–37)
BASOPHILS # BLD AUTO: 0.03 K/UL — SIGNIFICANT CHANGE UP (ref 0–0.2)
BASOPHILS NFR BLD AUTO: 0.3 % — SIGNIFICANT CHANGE UP (ref 0–2)
BILIRUB SERPL-MCNC: 0.9 MG/DL — SIGNIFICANT CHANGE UP (ref 0.2–1.2)
BILIRUB UR-MCNC: NEGATIVE — SIGNIFICANT CHANGE UP
BUN SERPL-MCNC: 17 MG/DL — SIGNIFICANT CHANGE UP (ref 7–23)
CALCIUM SERPL-MCNC: 9.4 MG/DL — SIGNIFICANT CHANGE UP (ref 8.5–10.1)
CHLORIDE SERPL-SCNC: 100 MMOL/L — SIGNIFICANT CHANGE UP (ref 96–108)
CO2 SERPL-SCNC: 31 MMOL/L — SIGNIFICANT CHANGE UP (ref 22–31)
COLOR SPEC: YELLOW — SIGNIFICANT CHANGE UP
CREAT SERPL-MCNC: 1.29 MG/DL — SIGNIFICANT CHANGE UP (ref 0.5–1.3)
DIFF PNL FLD: ABNORMAL
EOSINOPHIL # BLD AUTO: 0.05 K/UL — SIGNIFICANT CHANGE UP (ref 0–0.5)
EOSINOPHIL NFR BLD AUTO: 0.5 % — SIGNIFICANT CHANGE UP (ref 0–6)
GLUCOSE SERPL-MCNC: 166 MG/DL — HIGH (ref 70–99)
GLUCOSE UR QL: NEGATIVE — SIGNIFICANT CHANGE UP
HCT VFR BLD CALC: 41.8 % — SIGNIFICANT CHANGE UP (ref 39–50)
HGB BLD-MCNC: 12.9 G/DL — LOW (ref 13–17)
IMM GRANULOCYTES NFR BLD AUTO: 0.9 % — SIGNIFICANT CHANGE UP (ref 0–1.5)
KETONES UR-MCNC: NEGATIVE — SIGNIFICANT CHANGE UP
LEUKOCYTE ESTERASE UR-ACNC: NEGATIVE — SIGNIFICANT CHANGE UP
LIDOCAIN IGE QN: 195 U/L — SIGNIFICANT CHANGE UP (ref 73–393)
LYMPHOCYTES # BLD AUTO: 1.75 K/UL — SIGNIFICANT CHANGE UP (ref 1–3.3)
LYMPHOCYTES # BLD AUTO: 19.1 % — SIGNIFICANT CHANGE UP (ref 13–44)
MCHC RBC-ENTMCNC: 19.3 PG — LOW (ref 27–34)
MCHC RBC-ENTMCNC: 30.9 GM/DL — LOW (ref 32–36)
MCV RBC AUTO: 62.7 FL — LOW (ref 80–100)
MONOCYTES # BLD AUTO: 0.83 K/UL — SIGNIFICANT CHANGE UP (ref 0–0.9)
MONOCYTES NFR BLD AUTO: 9.1 % — SIGNIFICANT CHANGE UP (ref 2–14)
NEUTROPHILS # BLD AUTO: 6.43 K/UL — SIGNIFICANT CHANGE UP (ref 1.8–7.4)
NEUTROPHILS NFR BLD AUTO: 70.1 % — SIGNIFICANT CHANGE UP (ref 43–77)
NITRITE UR-MCNC: NEGATIVE — SIGNIFICANT CHANGE UP
PH UR: 6 — SIGNIFICANT CHANGE UP (ref 5–8)
PLATELET # BLD AUTO: 220 K/UL — SIGNIFICANT CHANGE UP (ref 150–400)
POTASSIUM SERPL-MCNC: 3.2 MMOL/L — LOW (ref 3.5–5.3)
POTASSIUM SERPL-SCNC: 3.2 MMOL/L — LOW (ref 3.5–5.3)
PROT SERPL-MCNC: 7 GM/DL — SIGNIFICANT CHANGE UP (ref 6–8.3)
PROT UR-MCNC: 15
RBC # BLD: 6.67 M/UL — HIGH (ref 4.2–5.8)
RBC # FLD: 17.2 % — HIGH (ref 10.3–14.5)
SODIUM SERPL-SCNC: 136 MMOL/L — SIGNIFICANT CHANGE UP (ref 135–145)
SP GR SPEC: 1.01 — SIGNIFICANT CHANGE UP (ref 1.01–1.02)
UROBILINOGEN FLD QL: NEGATIVE — SIGNIFICANT CHANGE UP
WBC # BLD: 9.17 K/UL — SIGNIFICANT CHANGE UP (ref 3.8–10.5)
WBC # FLD AUTO: 9.17 K/UL — SIGNIFICANT CHANGE UP (ref 3.8–10.5)

## 2022-02-14 PROCEDURE — 87086 URINE CULTURE/COLONY COUNT: CPT

## 2022-02-14 PROCEDURE — 80053 COMPREHEN METABOLIC PANEL: CPT

## 2022-02-14 PROCEDURE — 99285 EMERGENCY DEPT VISIT HI MDM: CPT | Mod: 25

## 2022-02-14 PROCEDURE — 83690 ASSAY OF LIPASE: CPT

## 2022-02-14 PROCEDURE — 74176 CT ABD & PELVIS W/O CONTRAST: CPT | Mod: 26,ME

## 2022-02-14 PROCEDURE — 85025 COMPLETE CBC W/AUTO DIFF WBC: CPT

## 2022-02-14 PROCEDURE — 71045 X-RAY EXAM CHEST 1 VIEW: CPT | Mod: 26

## 2022-02-14 PROCEDURE — 87186 SC STD MICRODIL/AGAR DIL: CPT

## 2022-02-14 PROCEDURE — 81001 URINALYSIS AUTO W/SCOPE: CPT

## 2022-02-14 PROCEDURE — G1004: CPT

## 2022-02-14 PROCEDURE — 96375 TX/PRO/DX INJ NEW DRUG ADDON: CPT

## 2022-02-14 PROCEDURE — 71045 X-RAY EXAM CHEST 1 VIEW: CPT

## 2022-02-14 PROCEDURE — U0003: CPT

## 2022-02-14 PROCEDURE — 74176 CT ABD & PELVIS W/O CONTRAST: CPT | Mod: ME

## 2022-02-14 PROCEDURE — 99285 EMERGENCY DEPT VISIT HI MDM: CPT

## 2022-02-14 PROCEDURE — 93010 ELECTROCARDIOGRAM REPORT: CPT

## 2022-02-14 PROCEDURE — 96374 THER/PROPH/DIAG INJ IV PUSH: CPT

## 2022-02-14 PROCEDURE — 93005 ELECTROCARDIOGRAM TRACING: CPT

## 2022-02-14 PROCEDURE — U0005: CPT

## 2022-02-14 PROCEDURE — 36415 COLL VENOUS BLD VENIPUNCTURE: CPT

## 2022-02-14 RX ORDER — TAMSULOSIN HYDROCHLORIDE 0.4 MG/1
1 CAPSULE ORAL
Qty: 30 | Refills: 0
Start: 2022-02-14 | End: 2022-03-15

## 2022-02-14 RX ORDER — KETOROLAC TROMETHAMINE 30 MG/ML
15 SYRINGE (ML) INJECTION ONCE
Refills: 0 | Status: DISCONTINUED | OUTPATIENT
Start: 2022-02-14 | End: 2022-02-14

## 2022-02-14 RX ORDER — SODIUM CHLORIDE 9 MG/ML
1000 INJECTION INTRAMUSCULAR; INTRAVENOUS; SUBCUTANEOUS ONCE
Refills: 0 | Status: COMPLETED | OUTPATIENT
Start: 2022-02-14 | End: 2022-02-14

## 2022-02-14 RX ORDER — MORPHINE SULFATE 50 MG/1
4 CAPSULE, EXTENDED RELEASE ORAL ONCE
Refills: 0 | Status: DISCONTINUED | OUTPATIENT
Start: 2022-02-14 | End: 2022-02-14

## 2022-02-14 RX ORDER — OXYCODONE HYDROCHLORIDE 5 MG/1
1 TABLET ORAL
Qty: 8 | Refills: 0
Start: 2022-02-14 | End: 2022-02-15

## 2022-02-14 RX ADMIN — MORPHINE SULFATE 4 MILLIGRAM(S): 50 CAPSULE, EXTENDED RELEASE ORAL at 14:31

## 2022-02-14 RX ADMIN — Medication 15 MILLIGRAM(S): at 15:07

## 2022-02-14 RX ADMIN — SODIUM CHLORIDE 2000 MILLILITER(S): 9 INJECTION INTRAMUSCULAR; INTRAVENOUS; SUBCUTANEOUS at 14:33

## 2022-02-14 NOTE — ED PROVIDER NOTE - NSICDXPASTSURGICALHX_GEN_ALL_CORE_FT
PAST SURGICAL HISTORY:  H/O hernia repair     H/O local excision of skin lesion     History of bladder surgery TURBT, cystoscopy    S/P tonsillectomy

## 2022-02-14 NOTE — ED ADULT NURSE NOTE - OBJECTIVE STATEMENT
biba to ed from home for c/o  left flank pain that began today. PT also reports having an episode of n&v today. Denies urinary symptoms, fever and chills. Denies chest pain. Denies fall and injury. PT ambulatory, VSS. Call bell in reach. Lab specimens pending, will monitor and reassess.

## 2022-02-14 NOTE — ED PROVIDER NOTE - OBJECTIVE STATEMENT
80 y/o male with a PMHx of bladder cancer, depression, DM, HTN, herniated nucleus pulposus, hypercholesterolemia, lumbar spinal stenosis, OA presents to the ED BIBA from home c/o constant flank pain since last night. +n/v. No meds PTA. Allergies: Penicillin. Occasional smoker. No EtOH use.  No other complaints at this time.

## 2022-02-14 NOTE — ED PROVIDER NOTE - PATIENT PORTAL LINK FT
You can access the FollowMyHealth Patient Portal offered by [facility] by registering at the following website: [email or web address]. By joining [facility] FollowMyHealth portal, you will also be able to view your health information using other applications (apps) compatible with our system.

## 2022-02-14 NOTE — ED PROVIDER NOTE - NSFOLLOWUPINSTRUCTIONS_ED_ALL_ED_FT
Please take tylenol for pain. PLease take oxycodone 5mg every 6 hours for breakthough pain. Please take flomax daily. Please move slowly from a seated to standing position while taking flomax.   Renal Colic       Renal colic is pain that is caused by passing a kidney stone. The pain can be sharp and severe. It may be felt in the back, abdomen, side (flank), or groin. It can cause nausea. Renal colic can come and go.      Follow these instructions at home:    Watch your condition for any changes. The following actions may help to lessen any discomfort that you are feeling:    Medicines     •Take over-the-counter and prescription medicines only as told by your health care provider.      • Do not drive or use heavy machinery while taking prescription pain medicine.        Eating and drinking      •Drink enough fluid to keep your urine pale yellow. You may be instructed to drink at least 8–10 glasses of water each day. Follow instructions from your health care provider.    •If directed, change your diet. This may include:  •Limiting how much sodium you eat. You may need to eat less than 2 grams (2,000 mg) per day.      •Eating more fruits and vegetables.      •Limiting how much animal protein, such as red meat, poultry, fish, and eggs, you eat.      •Avoiding foods such as spinach, rhubarb, sweet potatoes, and nuts. These make kidney stones more likely to form.        •Follow instructions from your health care provider about eating or drinking restrictions.      General instructions     •Keep all follow-up visits as told by your health care provider. This is important.    •Collect urine samples as told by your health care provider. You may need to collect a urine sample:  •24 hours after you pass the stone.      •8–12 weeks after passing the kidney stone, and every 6–12 months after that.        •Strain your urine every time you urinate, for as long as directed. Use the strainer that your health care provider recommends.      • Do not throw out the kidney stone after passing it. Keep the stone so it can be tested by your health care provider. Testing the makeup of your kidney stone may help understand how to prevent you from getting kidney stones in the future.        Contact a health care provider if:    •You have a fever or chills.      •Your urine smells bad or looks cloudy.      •You have pain or burning when you pass urine.        Get help right away if:    •Your flank pain or groin pain suddenly worsens.      •You become confused or disoriented or you lose consciousness.        Summary    •Renal colic is pain that is caused by passing a kidney stone.      •Take over-the-counter and prescription medicines only as told by your health care provider.      •Drink enough fluid to keep your urine pale yellow. You may be instructed to drink at least 8–10 glasses of water each day. Follow instructions from your health care provider.      •Strain your urine every time you urinate, for as long as directed. Use the strainer that your health care provider recommends.      • Do not throw out the kidney stone after passing it. Keep the stone so it can be tested by your health care provider.      This information is not intended to replace advice given to you by your health care provider. Make sure you discuss any questions you have with your health care provider.      Document Revised: 01/15/2019 Document Reviewed: 01/15/2019    [employer] Patient Education © 2021 [employer]

## 2022-02-14 NOTE — ED ADULT TRIAGE NOTE - CHIEF COMPLAINT QUOTE
pt presents to ed via ems from home for evaluation of left flank pain since last night, pt reports no hx of kidney stones. denies change in bowel/bladder. a&ox4

## 2022-02-18 NOTE — ED POST DISCHARGE NOTE - DETAILS
LMTCB need to send script for antibiotics to Rx for + urine culture. Patricia HEAD Attempted x 2 to reach patient without ability to leave message.  Letter sent.  Kellen CHRISTOPHER

## 2022-03-09 ENCOUNTER — RX RENEWAL (OUTPATIENT)
Age: 82
End: 2022-03-09

## 2022-04-16 ENCOUNTER — INPATIENT (INPATIENT)
Facility: HOSPITAL | Age: 82
LOS: 4 days | Discharge: HOME | DRG: 659 | End: 2022-04-21
Attending: INTERNAL MEDICINE | Admitting: INTERNAL MEDICINE
Payer: MEDICARE

## 2022-04-16 VITALS
SYSTOLIC BLOOD PRESSURE: 170 MMHG | DIASTOLIC BLOOD PRESSURE: 93 MMHG | TEMPERATURE: 98 F | WEIGHT: 250 LBS | HEART RATE: 88 BPM | OXYGEN SATURATION: 95 % | HEIGHT: 73 IN | RESPIRATION RATE: 18 BRPM

## 2022-04-16 DIAGNOSIS — Z98.890 OTHER SPECIFIED POSTPROCEDURAL STATES: Chronic | ICD-10-CM

## 2022-04-16 DIAGNOSIS — Z90.89 ACQUIRED ABSENCE OF OTHER ORGANS: Chronic | ICD-10-CM

## 2022-04-16 LAB
ANION GAP SERPL CALC-SCNC: 6 MMOL/L — SIGNIFICANT CHANGE UP (ref 5–17)
APPEARANCE UR: CLEAR — SIGNIFICANT CHANGE UP
BASOPHILS # BLD AUTO: 0.04 K/UL — SIGNIFICANT CHANGE UP (ref 0–0.2)
BASOPHILS NFR BLD AUTO: 0.5 % — SIGNIFICANT CHANGE UP (ref 0–2)
BILIRUB UR-MCNC: NEGATIVE — SIGNIFICANT CHANGE UP
BUN SERPL-MCNC: 16 MG/DL — SIGNIFICANT CHANGE UP (ref 7–23)
CALCIUM SERPL-MCNC: 10.1 MG/DL — SIGNIFICANT CHANGE UP (ref 8.5–10.1)
CHLORIDE SERPL-SCNC: 99 MMOL/L — SIGNIFICANT CHANGE UP (ref 96–108)
CO2 SERPL-SCNC: 31 MMOL/L — SIGNIFICANT CHANGE UP (ref 22–31)
COLOR SPEC: YELLOW — SIGNIFICANT CHANGE UP
CREAT SERPL-MCNC: 1.2 MG/DL — SIGNIFICANT CHANGE UP (ref 0.5–1.3)
DIFF PNL FLD: ABNORMAL
EGFR: 62 ML/MIN/1.73M2 — SIGNIFICANT CHANGE UP
EOSINOPHIL # BLD AUTO: 0.05 K/UL — SIGNIFICANT CHANGE UP (ref 0–0.5)
EOSINOPHIL NFR BLD AUTO: 0.6 % — SIGNIFICANT CHANGE UP (ref 0–6)
GLUCOSE SERPL-MCNC: 100 MG/DL — HIGH (ref 70–99)
GLUCOSE UR QL: NEGATIVE — SIGNIFICANT CHANGE UP
HCT VFR BLD CALC: 38.5 % — LOW (ref 39–50)
HGB BLD-MCNC: 11.9 G/DL — LOW (ref 13–17)
IMM GRANULOCYTES NFR BLD AUTO: 0.4 % — SIGNIFICANT CHANGE UP (ref 0–1.5)
KETONES UR-MCNC: ABNORMAL
LEUKOCYTE ESTERASE UR-ACNC: ABNORMAL
LIDOCAIN IGE QN: 177 U/L — SIGNIFICANT CHANGE UP (ref 73–393)
LYMPHOCYTES # BLD AUTO: 1.24 K/UL — SIGNIFICANT CHANGE UP (ref 1–3.3)
LYMPHOCYTES # BLD AUTO: 15.7 % — SIGNIFICANT CHANGE UP (ref 13–44)
MCHC RBC-ENTMCNC: 20.2 PG — LOW (ref 27–34)
MCHC RBC-ENTMCNC: 30.9 GM/DL — LOW (ref 32–36)
MCV RBC AUTO: 65.3 FL — LOW (ref 80–100)
MONOCYTES # BLD AUTO: 0.79 K/UL — SIGNIFICANT CHANGE UP (ref 0–0.9)
MONOCYTES NFR BLD AUTO: 10 % — SIGNIFICANT CHANGE UP (ref 2–14)
NEUTROPHILS # BLD AUTO: 5.75 K/UL — SIGNIFICANT CHANGE UP (ref 1.8–7.4)
NEUTROPHILS NFR BLD AUTO: 72.8 % — SIGNIFICANT CHANGE UP (ref 43–77)
NITRITE UR-MCNC: NEGATIVE — SIGNIFICANT CHANGE UP
PH UR: 6 — SIGNIFICANT CHANGE UP (ref 5–8)
PLATELET # BLD AUTO: 208 K/UL — SIGNIFICANT CHANGE UP (ref 150–400)
POTASSIUM SERPL-MCNC: 3.4 MMOL/L — LOW (ref 3.5–5.3)
POTASSIUM SERPL-SCNC: 3.4 MMOL/L — LOW (ref 3.5–5.3)
PROT UR-MCNC: NEGATIVE — SIGNIFICANT CHANGE UP
RBC # BLD: 5.9 M/UL — HIGH (ref 4.2–5.8)
RBC # FLD: 18.3 % — HIGH (ref 10.3–14.5)
SODIUM SERPL-SCNC: 136 MMOL/L — SIGNIFICANT CHANGE UP (ref 135–145)
SP GR SPEC: 1.02 — SIGNIFICANT CHANGE UP (ref 1.01–1.02)
UROBILINOGEN FLD QL: NEGATIVE — SIGNIFICANT CHANGE UP
WBC # BLD: 7.9 K/UL — SIGNIFICANT CHANGE UP (ref 3.8–10.5)
WBC # FLD AUTO: 7.9 K/UL — SIGNIFICANT CHANGE UP (ref 3.8–10.5)

## 2022-04-16 PROCEDURE — 74176 CT ABD & PELVIS W/O CONTRAST: CPT | Mod: 26,MA

## 2022-04-16 PROCEDURE — 99285 EMERGENCY DEPT VISIT HI MDM: CPT | Mod: FS

## 2022-04-16 RX ORDER — KETOROLAC TROMETHAMINE 30 MG/ML
30 SYRINGE (ML) INJECTION ONCE
Refills: 0 | Status: DISCONTINUED | OUTPATIENT
Start: 2022-04-16 | End: 2022-04-16

## 2022-04-16 RX ORDER — SODIUM CHLORIDE 9 MG/ML
1000 INJECTION INTRAMUSCULAR; INTRAVENOUS; SUBCUTANEOUS ONCE
Refills: 0 | Status: COMPLETED | OUTPATIENT
Start: 2022-04-16 | End: 2022-04-16

## 2022-04-16 RX ORDER — ONDANSETRON 8 MG/1
4 TABLET, FILM COATED ORAL ONCE
Refills: 0 | Status: COMPLETED | OUTPATIENT
Start: 2022-04-16 | End: 2022-04-16

## 2022-04-16 RX ADMIN — ONDANSETRON 4 MILLIGRAM(S): 8 TABLET, FILM COATED ORAL at 21:32

## 2022-04-16 RX ADMIN — Medication 30 MILLIGRAM(S): at 21:32

## 2022-04-16 RX ADMIN — SODIUM CHLORIDE 1000 MILLILITER(S): 9 INJECTION INTRAMUSCULAR; INTRAVENOUS; SUBCUTANEOUS at 21:32

## 2022-04-16 NOTE — ED STATDOCS - OBJECTIVE STATEMENT
81 M hx bladder cancer, depression, DM, HTN, herniated nucleus pulposus, hypercholesterolemia, lumbar spinal stenosis, OA here c/o left flank pain that started earlier today. pt reports he has a history of kidney stones and had them 1 month ago. denies urinary symptoms. no fever. Urologist Dr. Valenzuela.

## 2022-04-16 NOTE — ED STATDOCS - MUSCULOSKELETAL, MLM
range of motion is not limited and there is no muscle tenderness. [Back Pain] : back pain [Negative] : Heme/Lymph [FreeTextEntry4] : noise in left ear [FreeTextEntry9] : pain right side of neck

## 2022-04-16 NOTE — ED STATDOCS - NS ED ATTENDING STATEMENT MOD
This was a shared visit with the VIVIEN. I reviewed and verified the documentation and independently performed the documented:

## 2022-04-16 NOTE — ED ADULT NURSE NOTE - NSIMPLEMENTINTERV_GEN_ALL_ED
Implemented All Universal Safety Interventions:  [place] to call system. Call bell, personal items and telephone within reach. Instruct patient to call for assistance. Room bathroom lighting operational. Non-slip footwear when patient is off stretcher. Physically safe environment: no spills, clutter or unnecessary equipment. Stretcher in lowest position, wheels locked, appropriate side rails in place.

## 2022-04-16 NOTE — ED ADULT NURSE NOTE - OBJECTIVE STATEMENT
presents to ed with bilateral flank pain, left side being worse than right side. patient states has history of kidney stone and pain feels similar. c/o nausea and dry heaving. denies fevers or diarrhea

## 2022-04-16 NOTE — ED STATDOCS - ATTENDING CONTRIBUTION TO CARE
Dr. Roberts: I performed a face to face bedside interview with patient regarding history of present illness, review of symptoms and past medical history. I completed an independent physical exam.  I have discussed patient's plan of care with PA.   I agree with note as stated above, having amended the EMR as needed to reflect my findings.   This includes HISTORY OF PRESENT ILLNESS, HIV, PAST MEDICAL/SURGICAL/FAMILY/SOCIAL HISTORY, ALLERGIES AND HOME MEDICATIONS, REVIEW OF SYSTEMS, PHYSICAL EXAM, and any PROGRESS NOTES during the time I functioned as the attending physician for this patient.

## 2022-04-16 NOTE — ED STATDOCS - PROGRESS NOTE DETAILS
80 y/o Male presents to ED c/o left flank pain that started today.  Pt with h/o Kidney stones.  Will F/U Labs/ UA and CT scan.  Sarahi Salguero PA-C On re-eval, pt waiting for pain meds and CT.  Sarahi Salguero PA-C On re-eval, pt waiting for pain meds and CT.  Pt reports having 5mm left side kidney stone 2 months ago.  Saw Urology and had US, but they didn't see the stone after hospital dc.  ? passed stone, but pt reports not seeing it.  Today, pain started in left flank.  Associated with nausea, dry heaves and chills.  Neg vomiting, fevers, dysuria, hematuria.  On exam, Neg CVA tenderness to perc.  Abd: Round, Active BS x4, soft, (+) LLQ tenderness to palp.  WBC not elevated.  Kidney fxn WNL.  U/A with small blood, leuk esterase.  Will endorse to PA Pascual to f/u CT.   Sarahi Salguero PA-C pt reeval still has left sided flank pain and ct shows he has a 5mm renal stone at the UVJ still in the same position from 2/14/22. paged urologist on call will admit for further management, pt agrees with plan. -Lorena Pascual PA-C spoke withurologist Dr. Sheehan for ocshirley Roberts DO

## 2022-04-16 NOTE — ED ADULT TRIAGE NOTE - CHIEF COMPLAINT QUOTE
Pt c/o b/l flank pain starting this morning. HX of kidney stones. Denies urinary symptoms and fevers.

## 2022-04-16 NOTE — ED STATDOCS - CROS ED GI ALL NEG
Chronic, stable. Last A1c 7.3 (1/6/22). On Metformin 500 mg BID at home.    - BG goal 140-180  - Diabetic diet  - MDSSI  - Accuchecks achs     - - -

## 2022-04-17 DIAGNOSIS — Z98.890 OTHER SPECIFIED POSTPROCEDURAL STATES: Chronic | ICD-10-CM

## 2022-04-17 DIAGNOSIS — N20.0 CALCULUS OF KIDNEY: ICD-10-CM

## 2022-04-17 DIAGNOSIS — N20.1 CALCULUS OF URETER: ICD-10-CM

## 2022-04-17 LAB
A1C WITH ESTIMATED AVERAGE GLUCOSE RESULT: 6.4 % — HIGH (ref 4–5.6)
ALBUMIN SERPL ELPH-MCNC: 3.3 G/DL — SIGNIFICANT CHANGE UP (ref 3.3–5)
ALP SERPL-CCNC: 73 U/L — SIGNIFICANT CHANGE UP (ref 40–120)
ALT FLD-CCNC: 31 U/L — SIGNIFICANT CHANGE UP (ref 12–78)
ANION GAP SERPL CALC-SCNC: 6 MMOL/L — SIGNIFICANT CHANGE UP (ref 5–17)
AST SERPL-CCNC: 20 U/L — SIGNIFICANT CHANGE UP (ref 15–37)
BASOPHILS # BLD AUTO: 0.03 K/UL — SIGNIFICANT CHANGE UP (ref 0–0.2)
BASOPHILS NFR BLD AUTO: 0.4 % — SIGNIFICANT CHANGE UP (ref 0–2)
BILIRUB SERPL-MCNC: 1.2 MG/DL — SIGNIFICANT CHANGE UP (ref 0.2–1.2)
BUN SERPL-MCNC: 13 MG/DL — SIGNIFICANT CHANGE UP (ref 7–23)
CALCIUM SERPL-MCNC: 9.4 MG/DL — SIGNIFICANT CHANGE UP (ref 8.5–10.1)
CHLORIDE SERPL-SCNC: 101 MMOL/L — SIGNIFICANT CHANGE UP (ref 96–108)
CO2 SERPL-SCNC: 31 MMOL/L — SIGNIFICANT CHANGE UP (ref 22–31)
CREAT SERPL-MCNC: 1.43 MG/DL — HIGH (ref 0.5–1.3)
EGFR: 50 ML/MIN/1.73M2 — LOW
EOSINOPHIL # BLD AUTO: 0.06 K/UL — SIGNIFICANT CHANGE UP (ref 0–0.5)
EOSINOPHIL NFR BLD AUTO: 0.8 % — SIGNIFICANT CHANGE UP (ref 0–6)
ESTIMATED AVERAGE GLUCOSE: 137 MG/DL — HIGH (ref 68–114)
GLUCOSE SERPL-MCNC: 152 MG/DL — HIGH (ref 70–99)
HCT VFR BLD CALC: 36.2 % — LOW (ref 39–50)
HGB BLD-MCNC: 11.1 G/DL — LOW (ref 13–17)
IMM GRANULOCYTES NFR BLD AUTO: 0.5 % — SIGNIFICANT CHANGE UP (ref 0–1.5)
LYMPHOCYTES # BLD AUTO: 0.84 K/UL — LOW (ref 1–3.3)
LYMPHOCYTES # BLD AUTO: 10.6 % — LOW (ref 13–44)
MCHC RBC-ENTMCNC: 20.3 PG — LOW (ref 27–34)
MCHC RBC-ENTMCNC: 30.7 GM/DL — LOW (ref 32–36)
MCV RBC AUTO: 66.1 FL — LOW (ref 80–100)
MONOCYTES # BLD AUTO: 0.66 K/UL — SIGNIFICANT CHANGE UP (ref 0–0.9)
MONOCYTES NFR BLD AUTO: 8.3 % — SIGNIFICANT CHANGE UP (ref 2–14)
NEUTROPHILS # BLD AUTO: 6.28 K/UL — SIGNIFICANT CHANGE UP (ref 1.8–7.4)
NEUTROPHILS NFR BLD AUTO: 79.4 % — HIGH (ref 43–77)
PLATELET # BLD AUTO: 185 K/UL — SIGNIFICANT CHANGE UP (ref 150–400)
POTASSIUM SERPL-MCNC: 3.5 MMOL/L — SIGNIFICANT CHANGE UP (ref 3.5–5.3)
POTASSIUM SERPL-SCNC: 3.5 MMOL/L — SIGNIFICANT CHANGE UP (ref 3.5–5.3)
PROT SERPL-MCNC: 6.3 GM/DL — SIGNIFICANT CHANGE UP (ref 6–8.3)
RBC # BLD: 5.48 M/UL — SIGNIFICANT CHANGE UP (ref 4.2–5.8)
RBC # FLD: 18.3 % — HIGH (ref 10.3–14.5)
SARS-COV-2 RNA SPEC QL NAA+PROBE: SIGNIFICANT CHANGE UP
SODIUM SERPL-SCNC: 138 MMOL/L — SIGNIFICANT CHANGE UP (ref 135–145)
WBC # BLD: 7.91 K/UL — SIGNIFICANT CHANGE UP (ref 3.8–10.5)
WBC # FLD AUTO: 7.91 K/UL — SIGNIFICANT CHANGE UP (ref 3.8–10.5)

## 2022-04-17 PROCEDURE — 93306 TTE W/DOPPLER COMPLETE: CPT

## 2022-04-17 PROCEDURE — 82962 GLUCOSE BLOOD TEST: CPT

## 2022-04-17 PROCEDURE — 76000 FLUOROSCOPY <1 HR PHYS/QHP: CPT

## 2022-04-17 PROCEDURE — 84484 ASSAY OF TROPONIN QUANT: CPT

## 2022-04-17 PROCEDURE — 83036 HEMOGLOBIN GLYCOSYLATED A1C: CPT

## 2022-04-17 PROCEDURE — 83735 ASSAY OF MAGNESIUM: CPT

## 2022-04-17 PROCEDURE — 84100 ASSAY OF PHOSPHORUS: CPT

## 2022-04-17 PROCEDURE — 83880 ASSAY OF NATRIURETIC PEPTIDE: CPT

## 2022-04-17 PROCEDURE — 71045 X-RAY EXAM CHEST 1 VIEW: CPT

## 2022-04-17 PROCEDURE — 85027 COMPLETE CBC AUTOMATED: CPT

## 2022-04-17 PROCEDURE — 0225U NFCT DS DNA&RNA 21 SARSCOV2: CPT

## 2022-04-17 PROCEDURE — C2617: CPT

## 2022-04-17 PROCEDURE — 76770 US EXAM ABDO BACK WALL COMP: CPT

## 2022-04-17 PROCEDURE — 94640 AIRWAY INHALATION TREATMENT: CPT

## 2022-04-17 PROCEDURE — 71250 CT THORAX DX C-: CPT

## 2022-04-17 PROCEDURE — C1769: CPT

## 2022-04-17 PROCEDURE — 99221 1ST HOSP IP/OBS SF/LOW 40: CPT

## 2022-04-17 PROCEDURE — 80048 BASIC METABOLIC PNL TOTAL CA: CPT

## 2022-04-17 PROCEDURE — 87635 SARS-COV-2 COVID-19 AMP PRB: CPT

## 2022-04-17 PROCEDURE — 80053 COMPREHEN METABOLIC PANEL: CPT

## 2022-04-17 PROCEDURE — A9567: CPT

## 2022-04-17 PROCEDURE — 99223 1ST HOSP IP/OBS HIGH 75: CPT

## 2022-04-17 PROCEDURE — 36415 COLL VENOUS BLD VENIPUNCTURE: CPT

## 2022-04-17 PROCEDURE — 78582 LUNG VENTILAT&PERFUS IMAGING: CPT

## 2022-04-17 PROCEDURE — A9540: CPT

## 2022-04-17 PROCEDURE — 84145 PROCALCITONIN (PCT): CPT

## 2022-04-17 PROCEDURE — 85025 COMPLETE CBC W/AUTO DIFF WBC: CPT

## 2022-04-17 PROCEDURE — 87040 BLOOD CULTURE FOR BACTERIA: CPT

## 2022-04-17 PROCEDURE — 85379 FIBRIN DEGRADATION QUANT: CPT

## 2022-04-17 RX ORDER — FINASTERIDE 5 MG/1
1 TABLET, FILM COATED ORAL
Qty: 0 | Refills: 0 | DISCHARGE

## 2022-04-17 RX ORDER — DEXTROSE 50 % IN WATER 50 %
25 SYRINGE (ML) INTRAVENOUS ONCE
Refills: 0 | Status: DISCONTINUED | OUTPATIENT
Start: 2022-04-17 | End: 2022-04-21

## 2022-04-17 RX ORDER — SODIUM CHLORIDE 9 MG/ML
1000 INJECTION, SOLUTION INTRAVENOUS
Refills: 0 | Status: COMPLETED | OUTPATIENT
Start: 2022-04-17 | End: 2022-04-17

## 2022-04-17 RX ORDER — INSULIN LISPRO 100/ML
VIAL (ML) SUBCUTANEOUS AT BEDTIME
Refills: 0 | Status: DISCONTINUED | OUTPATIENT
Start: 2022-04-17 | End: 2022-04-21

## 2022-04-17 RX ORDER — CEFTRIAXONE 500 MG/1
1000 INJECTION, POWDER, FOR SOLUTION INTRAMUSCULAR; INTRAVENOUS EVERY 24 HOURS
Refills: 0 | Status: DISCONTINUED | OUTPATIENT
Start: 2022-04-17 | End: 2022-04-18

## 2022-04-17 RX ORDER — DILTIAZEM HCL 120 MG
300 CAPSULE, EXT RELEASE 24 HR ORAL DAILY
Refills: 0 | Status: DISCONTINUED | OUTPATIENT
Start: 2022-04-17 | End: 2022-04-21

## 2022-04-17 RX ORDER — GABAPENTIN 400 MG/1
2 CAPSULE ORAL
Qty: 0 | Refills: 0 | DISCHARGE

## 2022-04-17 RX ORDER — TAMSULOSIN HYDROCHLORIDE 0.4 MG/1
0.4 CAPSULE ORAL AT BEDTIME
Refills: 0 | Status: DISCONTINUED | OUTPATIENT
Start: 2022-04-17 | End: 2022-04-17

## 2022-04-17 RX ORDER — CEFTRIAXONE 500 MG/1
1000 INJECTION, POWDER, FOR SOLUTION INTRAMUSCULAR; INTRAVENOUS EVERY 24 HOURS
Refills: 0 | Status: DISCONTINUED | OUTPATIENT
Start: 2022-04-17 | End: 2022-04-17

## 2022-04-17 RX ORDER — MORPHINE SULFATE 50 MG/1
2 CAPSULE, EXTENDED RELEASE ORAL EVERY 4 HOURS
Refills: 0 | Status: DISCONTINUED | OUTPATIENT
Start: 2022-04-17 | End: 2022-04-21

## 2022-04-17 RX ORDER — GLUCAGON INJECTION, SOLUTION 0.5 MG/.1ML
1 INJECTION, SOLUTION SUBCUTANEOUS ONCE
Refills: 0 | Status: DISCONTINUED | OUTPATIENT
Start: 2022-04-17 | End: 2022-04-21

## 2022-04-17 RX ORDER — DEXTROSE 50 % IN WATER 50 %
15 SYRINGE (ML) INTRAVENOUS ONCE
Refills: 0 | Status: DISCONTINUED | OUTPATIENT
Start: 2022-04-17 | End: 2022-04-21

## 2022-04-17 RX ORDER — SODIUM CHLORIDE 9 MG/ML
1000 INJECTION, SOLUTION INTRAVENOUS
Refills: 0 | Status: DISCONTINUED | OUTPATIENT
Start: 2022-04-17 | End: 2022-04-21

## 2022-04-17 RX ORDER — LANOLIN ALCOHOL/MO/W.PET/CERES
3 CREAM (GRAM) TOPICAL AT BEDTIME
Refills: 0 | Status: DISCONTINUED | OUTPATIENT
Start: 2022-04-17 | End: 2022-04-21

## 2022-04-17 RX ORDER — TAMSULOSIN HYDROCHLORIDE 0.4 MG/1
0.4 CAPSULE ORAL AT BEDTIME
Refills: 0 | Status: DISCONTINUED | OUTPATIENT
Start: 2022-04-17 | End: 2022-04-21

## 2022-04-17 RX ORDER — HYDROCODONE BITARTRATE AND ACETAMINOPHEN 7.5; 325 MG/15ML; MG/15ML
0 SOLUTION ORAL
Qty: 0 | Refills: 0 | DISCHARGE

## 2022-04-17 RX ORDER — POTASSIUM CHLORIDE 20 MEQ
40 PACKET (EA) ORAL ONCE
Refills: 0 | Status: COMPLETED | OUTPATIENT
Start: 2022-04-17 | End: 2022-04-17

## 2022-04-17 RX ORDER — ESCITALOPRAM OXALATE 10 MG/1
1 TABLET, FILM COATED ORAL
Qty: 0 | Refills: 0 | DISCHARGE

## 2022-04-17 RX ORDER — ACETAMINOPHEN 500 MG
650 TABLET ORAL EVERY 6 HOURS
Refills: 0 | Status: DISCONTINUED | OUTPATIENT
Start: 2022-04-17 | End: 2022-04-21

## 2022-04-17 RX ORDER — ONDANSETRON 8 MG/1
4 TABLET, FILM COATED ORAL EVERY 8 HOURS
Refills: 0 | Status: DISCONTINUED | OUTPATIENT
Start: 2022-04-17 | End: 2022-04-21

## 2022-04-17 RX ORDER — ASCORBIC ACID 60 MG
1 TABLET,CHEWABLE ORAL
Qty: 0 | Refills: 0 | DISCHARGE

## 2022-04-17 RX ORDER — DEXTROSE 50 % IN WATER 50 %
12.5 SYRINGE (ML) INTRAVENOUS ONCE
Refills: 0 | Status: DISCONTINUED | OUTPATIENT
Start: 2022-04-17 | End: 2022-04-21

## 2022-04-17 RX ORDER — INSULIN LISPRO 100/ML
VIAL (ML) SUBCUTANEOUS
Refills: 0 | Status: DISCONTINUED | OUTPATIENT
Start: 2022-04-17 | End: 2022-04-21

## 2022-04-17 RX ORDER — ESCITALOPRAM OXALATE 10 MG/1
20 TABLET, FILM COATED ORAL DAILY
Refills: 0 | Status: DISCONTINUED | OUTPATIENT
Start: 2022-04-17 | End: 2022-04-21

## 2022-04-17 RX ADMIN — SODIUM CHLORIDE 75 MILLILITER(S): 9 INJECTION, SOLUTION INTRAVENOUS at 05:27

## 2022-04-17 RX ADMIN — CEFTRIAXONE 100 MILLIGRAM(S): 500 INJECTION, POWDER, FOR SOLUTION INTRAMUSCULAR; INTRAVENOUS at 05:26

## 2022-04-17 RX ADMIN — Medication 300 MILLIGRAM(S): at 10:02

## 2022-04-17 RX ADMIN — ESCITALOPRAM OXALATE 20 MILLIGRAM(S): 10 TABLET, FILM COATED ORAL at 10:02

## 2022-04-17 RX ADMIN — MORPHINE SULFATE 2 MILLIGRAM(S): 50 CAPSULE, EXTENDED RELEASE ORAL at 09:11

## 2022-04-17 RX ADMIN — MORPHINE SULFATE 2 MILLIGRAM(S): 50 CAPSULE, EXTENDED RELEASE ORAL at 08:56

## 2022-04-17 RX ADMIN — TAMSULOSIN HYDROCHLORIDE 0.4 MILLIGRAM(S): 0.4 CAPSULE ORAL at 21:29

## 2022-04-17 RX ADMIN — Medication 40 MILLIEQUIVALENT(S): at 01:52

## 2022-04-17 NOTE — H&P ADULT - NSICDXPASTMEDICALHX_GEN_ALL_CORE_FT
PAST MEDICAL HISTORY:  Bladder cancer     CA skin, basal cell     Depression     DM (diabetes mellitus)     Herniated nucleus pulposus, L5-S1     HTN (hypertension)     Hypercholesterolemia     Lumbar spinal stenosis     OA (osteoarthritis)     Uses hearing aid      PAST MEDICAL HISTORY:  Bladder cancer     CA skin, basal cell     Depression     DM (diabetes mellitus) Type 2    Herniated nucleus pulposus, L5-S1     HTN (hypertension)     Hypercholesterolemia     Lumbar spinal stenosis     OA (osteoarthritis)     Uses hearing aid

## 2022-04-17 NOTE — H&P ADULT - ASSESSMENT
82 y/o M PMHx significant for Bladder cancer, Depression, Hypertension, s/p back surgery (TLIF), hypercholesterolemia, and osteoarthritis presents to  for further evaluation and management of c/o severe left flank pain which began upon awakening on 4/16/2022. The patient states that he was recently diagnosed with left sided ureterolithiasis and reports having 5mm calculi on imaging. The patient reportedly followed up with his Urology who performed an outpatient Ultrasound (KUB). The calculi in question were not seen and was though at that time that the patient may have passed  the stone. The patient states that upon awakening he had associated nausea, chills, and dry heaving although denies vomiting. In the ED repreat CT imaging revealed signs of a left sided obstructive uropathy.    Labs => Hgb/Hct 11.9/38.5, MCV/MCH 65.3/20.2, K 3.4, UA (+). CT ABD/Pelvis => Mild left hydroureteronephrosis secondary to 5 mm stone in the left proximal ureter distal to the ureterovesicular junction, similar in position compared to prior study. Extensive left perinephric stranding. Prominent prostate with nodular density near the apex, cause mass effect and protrusion of the bladder base. Correlate with PSA and consider further nonemergent workup to exclude prostatic pregnancy. Mild bladder wall thickening, difficult to assess secondary to inadequate distention. This may related to chronic bladder outlet obstruction from enlarged prostate. Correlate with urinalysis and lab values to assess for cystitis and/or ascending urinary tract infection. In the ED the patient was given Ondansetron 4mg IVP x 1, Ketorolac 30mg IVP x 1, Potassium chloride 40mEq po x 1, and NS x 1L. 80 y/o M PMHx significant for Bladder cancer, Depression, Hypertension, s/p back surgery (TLIF), hypercholesterolemia, and osteoarthritis presents to  for further evaluation and management of c/o severe left flank pain which began upon awakening on 4/16/2022. The patient states that he was recently diagnosed with left sided ureterolithiasis and reports having 5mm calculi on imaging. The patient reportedly followed up with his Urology who performed an outpatient Ultrasound (KUB). The calculi in question were not seen and was though at that time that the patient may have passed  the stone. The patient states that upon awakening he had associated nausea, chills, and dry heaving although denies vomiting. In the ED repreat CT imaging revealed signs of a left sided obstructive uropathy.    Labs => Hgb/Hct 11.9/38.5, MCV/MCH 65.3/20.2, K 3.4, UA (+). CT ABD/Pelvis => Mild left hydroureteronephrosis secondary to 5 mm stone in the left proximal ureter distal to the ureterovesicular junction, similar in position compared to prior study. Extensive left perinephric stranding. Prominent prostate with nodular density near the apex, cause mass effect and protrusion of the bladder base. Correlate with PSA and consider further nonemergent workup to exclude prostatic pregnancy. Mild bladder wall thickening, difficult to assess secondary to inadequate distention. This may related to chronic bladder outlet obstruction from enlarged prostate. Correlate with urinalysis and lab values to assess for cystitis and/or ascending urinary tract infection. In the ED the patient was given Ondansetron 4mg IVP x 1, Ketorolac 30mg IVP x 1, Potassium chloride 40mEq po x 1, and NS x 1L.    #Acute Obstructive Uropathy due to a 5mm calculi in the left proximal ureter with resultant Left Hydroureteronephrosis complicated by;  #Acute Left Pyelonephritis  ~admit to Medicine  ~f/u PAN C+S  ~cont. IV hydration  ~strict I/Os  ~cont. IV abx as notable signs of Pyelonephritis   ~NPO for now  ~f/u w/ Urology in the am for possible ureteral stent placement  ~cont. Tamsulosin 0.4mg po qhs as medical expulsive therapy     #Depression  ~cont. Escitalopram 20mg po daily    #Hyperlipidemia  ~patient takes Pravastatin 40mg po tis (non-formulary)    #Diabetes Mellitus type 2  ~FS q6h while NPO  ~cont. ISS per protocol    #Hypertension  ~cont. Diltiazem ER 300mg po daily  ~patient takes Losartan 100mg po daily (will hold pending formal Urological plan)  ~patient tales Chlorthalidone 25mg po daily (will hold pending formal Urological plan)    #Vte ppx  ~IMPROVE Vte Risk Score is 1  ~cont. SCDs for now                   82 y/o M PMHx significant for Bladder cancer, Depression, Hypertension, s/p back surgery (TLIF), hypercholesterolemia, and osteoarthritis presents to  for further evaluation and management of c/o severe left flank pain which began upon awakening on 4/16/2022. The patient states that he was recently diagnosed with left sided ureterolithiasis and reports having 5mm calculi on imaging. The patient reportedly followed up with his Urology who performed an outpatient Ultrasound (KUB). The calculi in question were not seen and was though at that time that the patient may have passed  the stone. The patient states that upon awakening he had associated nausea, chills, and dry heaving although denies vomiting. In the ED repreat CT imaging revealed signs of a left sided obstructive uropathy.    Labs => Hgb/Hct 11.9/38.5, MCV/MCH 65.3/20.2, K 3.4, UA (+). CT ABD/Pelvis => Mild left hydroureteronephrosis secondary to 5 mm stone in the left proximal ureter distal to the ureterovesicular junction, similar in position compared to prior study. Extensive left perinephric stranding. Prominent prostate with nodular density near the apex, cause mass effect and protrusion of the bladder base. Correlate with PSA and consider further nonemergent workup to exclude prostatic pregnancy. Mild bladder wall thickening, difficult to assess secondary to inadequate distention. This may related to chronic bladder outlet obstruction from enlarged prostate. Correlate with urinalysis and lab values to assess for cystitis and/or ascending urinary tract infection. In the ED the patient was given Ondansetron 4mg IVP x 1, Ketorolac 30mg IVP x 1, Potassium chloride 40mEq po x 1, and NS x 1L.    #Acute Obstructive Uropathy due to a 5mm calculi in the left proximal ureter with resultant Left Hydroureteronephrosis complicated by;  #Acute Left Pyelonephritis  ~admit to Medicine  ~f/u PAN C+S  ~cont. IV hydration  ~strict I/Os  ~cont. IV abx as notable signs of Pyelonephritis   ~NPO for now  ~f/u w/ Urology in the am for possible ureteral stent placement  ~cont. Tamsulosin 0.4mg po qhs as medical expulsive therapy     #Depression  ~cont. Escitalopram 20mg po daily    #Hyperlipidemia  ~patient takes Pravastatin 40mg po tis (non-formulary)    #Diabetes Mellitus type 2  ~FS q6h while NPO  ~cont. ISS per protocol    #Hypertension  ~cont. Diltiazem ER 300mg po daily  ~patient takes Losartan 100mg po daily (will hold pending formal Urological plan and concern for renal dysfunction)  ~patient tales Chlorthalidone 25mg po daily (will hold pending formal Urological plan and concern for renal dysfunction)    #Vte ppx  ~IMPROVE Vte Risk Score is 1  ~cont. SCDs for now                   80 y/o M PMHx significant for Bladder cancer, Depression, Hypertension, s/p back surgery (TLIF), hypercholesterolemia, and osteoarthritis presents to  for further evaluation and management of c/o severe left flank pain which began upon awakening on 4/16/2022. The patient states that he was recently diagnosed with left sided ureterolithiasis and reports having 5mm calculi on imaging. The patient reportedly followed up with his Urology who performed an outpatient Ultrasound (KUB). The calculi in question were not seen and was though at that time that the patient may have passed  the stone. The patient states that upon awakening he had associated nausea, chills, and dry heaving although denies vomiting. In the ED repreat CT imaging revealed signs of a left sided obstructive uropathy.    Labs => Hgb/Hct 11.9/38.5, MCV/MCH 65.3/20.2, K 3.4, UA (+). CT ABD/Pelvis => Mild left hydroureteronephrosis secondary to 5 mm stone in the left proximal ureter distal to the ureterovesicular junction, similar in position compared to prior study. Extensive left perinephric stranding. Prominent prostate with nodular density near the apex, cause mass effect and protrusion of the bladder base. Correlate with PSA and consider further nonemergent workup to exclude prostatic pregnancy. Mild bladder wall thickening, difficult to assess secondary to inadequate distention. This may related to chronic bladder outlet obstruction from enlarged prostate. Correlate with urinalysis and lab values to assess for cystitis and/or ascending urinary tract infection. In the ED the patient was given Ondansetron 4mg IVP x 1, Ketorolac 30mg IVP x 1, Potassium chloride 40mEq po x 1, and NS x 1L.    #Acute Obstructive Uropathy due to a 5mm calculi in the left proximal ureter with resultant Left Hydroureteronephrosis complicated by;  #Acute Left Pyelonephritis  ~admit to Medicine  ~f/u PAN C+S  ~cont. IV hydration  ~strict I/Os  ~cont. IV abx as notable signs of Pyelonephritis   ~upon review of CT ABD/Pelvis => patient's 5 mm stone located in the left proximal ureter distal to the ureterovesicular junction which is similar in position compared to prior study.  ~NPO for now  ~f/u w/ Urology in the am for possible ureteral stent placement  ~cont. Tamsulosin 0.4mg po qhs as medical expulsive therapy     #Depression  ~cont. Escitalopram 20mg po daily    #Hyperlipidemia  ~patient takes Pravastatin 40mg po tis (non-formulary)    #Diabetes Mellitus type 2  ~FS q6h while NPO  ~cont. ISS per protocol    #Hypertension  ~cont. Diltiazem ER 300mg po daily  ~patient takes Losartan 100mg po daily (will hold pending formal Urological plan and concern for renal dysfunction)  ~patient tales Chlorthalidone 25mg po daily (will hold pending formal Urological plan and concern for renal dysfunction)    #Vte ppx  ~IMPROVE Vte Risk Score is 1  ~cont. SCDs for now

## 2022-04-17 NOTE — PROGRESS NOTE ADULT - ASSESSMENT
80 y/o M PMHx significant for Bladder cancer, Depression, Hypertension, s/p back surgery (TLIF), hypercholesterolemia, and osteoarthritis presents to  for further evaluation and management of c/o severe left flank pain which began upon awakening on 4/16/2022.       #Acute Obstructive Uropathy due to a 5mm calculi in the left proximal ureter with resultant Left Hydroureteronephrosis complicated by;  #Acute Left Pyelonephritis  ~f/u PAN C+S  ~cont. IV hydration  ~cont. rocephin for Pyelonephritis   ~upon review of CT ABD/Pelvis => patient's 5 mm stone located in the left proximal ureter distal to the ureterovesicular junction which is similar in position compared to prior study.  ~ Urology states not intervention at this time  ~cont. Tamsulosin 0.4mg po qhs     #Depression  ~cont. Escitalopram 20mg po daily    #Hyperlipidemia  ~patient takes Pravastatin 40mg po (non-formulary)    #Diabetes Mellitus type 2  ~FS q6h while NPO  ~cont. ISS per protocol    #Hypertension  ~cont. Diltiazem ER 300mg po daily  ~Was on Losartan and Chlorthalidone, will hold for СВЕТЛАНА  ~ Monitor BP    #Vte ppx  ~IMPROVE Vte Risk Score is 1  ~cont. SCDs for now

## 2022-04-17 NOTE — CONSULT NOTE ADULT - SUBJECTIVE AND OBJECTIVE BOX
Patient is a 81y old  Male who presents with a chief complaint of Left Flank Pain (2022 04:06)      HPI:  82 y/o M PMHx significant for Bladder cancer, Depression, Hypertension, s/p back surgery (TLIF), hypercholesterolemia, and osteoarthritis presents to  for further evaluation and management of c/o severe left flank pain which began upon awakening on 2022. The patient states that he was recently diagnosed with left sided ureterolithiasis and reports having 5mm calculi on imaging. The patient reportedly followed up with his Urology who performed an outpatient Ultrasound (KUB). The calculi in question were not seen and was though at that time that the patient may have passed  the stone. The patient states that upon awakening he had associated nausea, chills, and dry heaving although denies vomiting. In the ED repreat CT imaging revealed signs of a left sided obstructive uropathy.    Labs => Hgb/Hct 11.9/38.5, MCV/MCH 65.3/20.2, K 3.4, UA (+). CT ABD/Pelvis => Mild left hydroureteronephrosis secondary to 5 mm stone in the left proximal ureter distal to the ureterovesicular junction, similar in position compared to prior study. Extensive left perinephric stranding. Prominent prostate with nodular density near the apex, cause mass effect and protrusion of the bladder base. Correlate with PSA and consider further nonemergent workup to exclude prostatic pregnancy. Mild bladder wall thickening, difficult to assess secondary to inadequate distention. This may related to chronic bladder outlet obstruction from enlarged prostate. Correlate with urinalysis and lab values to assess for cystitis and/or ascending urinary tract infection. In the ED the patient was given Ondansetron 4mg IVP x 1, Ketorolac 30mg IVP x 1, Potassium chloride 40mEq po x 1, and NS x 1L. (2022 04:06)    Pt seen/examined at bedside. His pain is controlled now. Denies any fever, chills, dysuria or hematuria.     PAST MEDICAL & SURGICAL HISTORY:  Bladder cancer    Uses hearing aid    HTN (hypertension)    Hypercholesterolemia    DM (diabetes mellitus)  Type 2    CA skin, basal cell    Depression    OA (osteoarthritis)    Lumbar spinal stenosis    Herniated nucleus pulposus, L5-S1    H/O hernia repair    History of bladder surgery  TURBT, cystoscopy    H/O local excision of skin lesion    S/P tonsillectomy    History of back surgery  TLIF        REVIEW OF SYSTEMS:    CONSTITUTIONAL:  fevers or chills  HEENT: No visual changes  ENDO: No sweating  NECK: No pain or stiffness  MUSCULOSKELETAL: No back pain, no joint pain  RESPIRATORY: No shortness of breath  CARDIOVASCULAR: No chest pain  GASTROINTESTINAL: No abdominal or epigastric pain. No nausea, vomiting,  No diarrhea or constipation.   : No dysuria, frequency, urgency, urethra/vaginal discharge  NEUROLOGICAL: No mental status changes  PSYCH: No depression, no mood changes  SKIN: No itching      MEDICATIONS  (STANDING):  cefTRIAXone   IVPB 1000 milliGRAM(s) IV Intermittent every 24 hours  dextrose 5%. 1000 milliLiter(s) (50 mL/Hr) IV Continuous <Continuous>  dextrose 5%. 1000 milliLiter(s) (100 mL/Hr) IV Continuous <Continuous>  dextrose 50% Injectable 25 Gram(s) IV Push once  dextrose 50% Injectable 12.5 Gram(s) IV Push once  dextrose 50% Injectable 25 Gram(s) IV Push once  diltiazem    milliGRAM(s) Oral daily  escitalopram 20 milliGRAM(s) Oral daily  glucagon  Injectable 1 milliGRAM(s) IntraMuscular once  insulin lispro (ADMELOG) corrective regimen sliding scale   SubCutaneous three times a day before meals  insulin lispro (ADMELOG) corrective regimen sliding scale   SubCutaneous at bedtime  tamsulosin 0.4 milliGRAM(s) Oral at bedtime    MEDICATIONS  (PRN):  acetaminophen     Tablet .. 650 milliGRAM(s) Oral every 6 hours PRN Temp greater or equal to 38C (100.4F), Mild Pain (1 - 3)  aluminum hydroxide/magnesium hydroxide/simethicone Suspension 30 milliLiter(s) Oral every 4 hours PRN Dyspepsia  dextrose Oral Gel 15 Gram(s) Oral once PRN Blood Glucose LESS THAN 70 milliGRAM(s)/deciliter  melatonin 3 milliGRAM(s) Oral at bedtime PRN Insomnia  morphine  - Injectable 2 milliGRAM(s) IV Push every 4 hours PRN Severe Pain (7 - 10)  ondansetron Injectable 4 milliGRAM(s) IV Push every 8 hours PRN Nausea and/or Vomiting      Allergies    penicillin (Unknown)  sulfADIAZINE (Rash)    Intolerances        SOCIAL HISTORY: No illicit drug use // smoking  : [ ] yes [ ] no //  : [ ] yes [ ] no // ETOH :  [ ] yes [ ] no    FAMILY HISTORY:  Family history of diabetes mellitus (DM)  mother    Family history of heart disease  father        Vital Signs Last 24 Hrs  T(C): 36.3 (2022 02:59), Max: 36.6 (2022 20:00)  T(F): 97.3 (2022 02:59), Max: 97.9 (2022 20:00)  HR: 71 (2022 02:59) (70 - 88)  BP: 154/86 (2022 02:59) (144/83 - 170/93)  BP(mean): --  RR: 19 (2022 02:59) (16 - 19)  SpO2: 91% (2022 02:59) (91% - 95%)   [ ] yes [ ] no  PHYSICAL EXAM:    Constitutional: NAD, well-developed  HEENT: EOMI  Neck: no pain  Respiratory: CTA bilaterally,  No accessory respiratory muscle use  CV: Chest symmetric, equal expansion  Back: No CVA tenderness  Abd: Soft, NT/ND  no organomegally,   no hernia  : No hydrocele, epididymitis, cremasteric reflex present, testicles desended or scrotal edema  TORO:   Extremities: no edema  Neurological: A/O x 3  Psychiatric: Normal mood, normal affect  Skin: No rashes    I&O's Summary      LABS:                        11.9   7.90  )-----------( 208      ( 2022 20:55 )             38.5     04-16    136  |  99  |  16  ----------------------------<  100<H>  3.4<L>   |  31  |  1.20    Ca    10.1      2022 20:55        Urinalysis Basic - ( 2022 20:55 )    Color: Yellow / Appearance: Clear / S.020 / pH: x  Gluc: x / Ketone: Trace  / Bili: Negative / Urobili: Negative   Blood: x / Protein: Negative / Nitrite: Negative   Leuk Esterase: Trace / RBC: 6-10 /HPF / WBC 3-5   Sq Epi: x / Non Sq Epi: Occasional / Bacteria: Occasional

## 2022-04-17 NOTE — PATIENT PROFILE ADULT - FALL HARM RISK - UNIVERSAL INTERVENTIONS
Bed in lowest position, wheels locked, appropriate side rails in place/Call bell, personal items and telephone in reach/Instruct patient to call for assistance before getting out of bed or chair/Non-slip footwear when patient is out of bed/[place] to call system/Physically safe environment - no spills, clutter or unnecessary equipment/Purposeful Proactive Rounding/Room/bathroom lighting operational, light cord in reach

## 2022-04-17 NOTE — H&P ADULT - NSHPPHYSICALEXAM_GEN_ALL_CORE
Vital Signs Last 24 Hrs  T(C): 36.3 (17 Apr 2022 02:59), Max: 36.6 (16 Apr 2022 20:00)  T(F): 97.3 (17 Apr 2022 02:59), Max: 97.9 (16 Apr 2022 20:00)  HR: 71 (17 Apr 2022 02:59) (70 - 88)  BP: 154/86 (17 Apr 2022 02:59) (144/83 - 170/93)  RR: 19 (17 Apr 2022 02:59) (16 - 19)  SpO2: 91% (17 Apr 2022 02:59) (91% - 95%)

## 2022-04-17 NOTE — H&P ADULT - NSICDXPASTSURGICALHX_GEN_ALL_CORE_FT
PAST SURGICAL HISTORY:  H/O hernia repair     H/O local excision of skin lesion     History of bladder surgery TURBT, cystoscopy    S/P tonsillectomy      PAST SURGICAL HISTORY:  H/O hernia repair     H/O local excision of skin lesion     History of back surgery TLIF    History of bladder surgery TURBT, cystoscopy    S/P tonsillectomy

## 2022-04-17 NOTE — H&P ADULT - HISTORY OF PRESENT ILLNESS
82 y/o M PMHx significant for Bladder cancer, Depression, Hypertension, s/p back surgery (TLIF), hypercholesterolemia, and osteoarthritis presents to  for further evaluation and management of c/o severe left flank pain which began upon awakening on 4/16/2022. The patient states that he was recently diagnosed with left sided ureterolithiasis and reports having 5mm calculi on imaging. The patient reportedly followed up with his Urology who performed an outpatient Ultrasound (KUB). The calculi in question were not seen and was though at that time that the patient may have passed  the stone. The patient states that upon awakening he had associated nausea, chills, and dry heaving although denies vomiting. In the ED repreat CT imaging revealed signs of a left sided obstructive uropathy.    Labs => Hgb/Hct 11.9/38.5, MCV/MCH 65.3/20.2, K 3.4, UA (+). CT ABD/Pelvis => Mild left hydroureteronephrosis secondary to 5 mm stone in the left proximal ureter distal to the ureterovesicular junction, similar in position compared to prior study. Extensive left perinephric stranding. Prominent prostate with nodular density near the apex, cause mass effect and protrusion of the bladder base. Correlate with PSA and consider further nonemergent workup to exclude prostatic pregnancy. Mild bladder wall thickening, difficult to assess secondary to inadequate distention. This may related to chronic bladder outlet obstruction from enlarged prostate. Correlate with urinalysis and lab values to assess for cystitis and/or ascending urinary tract infection. In the ED the patient was given Ondansetron 4mg IVP x 1, Ketorolac 30mg IVP x 1, Potassium chloride 40mEq po x 1, and NS x 1L.

## 2022-04-18 LAB
ANION GAP SERPL CALC-SCNC: 4 MMOL/L — LOW (ref 5–17)
BUN SERPL-MCNC: 14 MG/DL — SIGNIFICANT CHANGE UP (ref 7–23)
CALCIUM SERPL-MCNC: 9.1 MG/DL — SIGNIFICANT CHANGE UP (ref 8.5–10.1)
CHLORIDE SERPL-SCNC: 98 MMOL/L — SIGNIFICANT CHANGE UP (ref 96–108)
CO2 SERPL-SCNC: 32 MMOL/L — HIGH (ref 22–31)
CREAT SERPL-MCNC: 1.74 MG/DL — HIGH (ref 0.5–1.3)
D DIMER BLD IA.RAPID-MCNC: 291 NG/ML DDU — HIGH
EGFR: 39 ML/MIN/1.73M2 — LOW
GLUCOSE SERPL-MCNC: 160 MG/DL — HIGH (ref 70–99)
HCT VFR BLD CALC: 35.4 % — LOW (ref 39–50)
HGB BLD-MCNC: 10.9 G/DL — LOW (ref 13–17)
MCHC RBC-ENTMCNC: 20.1 PG — LOW (ref 27–34)
MCHC RBC-ENTMCNC: 30.8 GM/DL — LOW (ref 32–36)
MCV RBC AUTO: 65.2 FL — LOW (ref 80–100)
PLATELET # BLD AUTO: 163 K/UL — SIGNIFICANT CHANGE UP (ref 150–400)
POTASSIUM SERPL-MCNC: 3.6 MMOL/L — SIGNIFICANT CHANGE UP (ref 3.5–5.3)
POTASSIUM SERPL-SCNC: 3.6 MMOL/L — SIGNIFICANT CHANGE UP (ref 3.5–5.3)
RAPID RVP RESULT: SIGNIFICANT CHANGE UP
RBC # BLD: 5.43 M/UL — SIGNIFICANT CHANGE UP (ref 4.2–5.8)
RBC # FLD: 17.7 % — HIGH (ref 10.3–14.5)
SARS-COV-2 RNA SPEC QL NAA+PROBE: SIGNIFICANT CHANGE UP
SODIUM SERPL-SCNC: 134 MMOL/L — LOW (ref 135–145)
WBC # BLD: 8.49 K/UL — SIGNIFICANT CHANGE UP (ref 3.8–10.5)
WBC # FLD AUTO: 8.49 K/UL — SIGNIFICANT CHANGE UP (ref 3.8–10.5)

## 2022-04-18 PROCEDURE — 99233 SBSQ HOSP IP/OBS HIGH 50: CPT

## 2022-04-18 PROCEDURE — 78582 LUNG VENTILAT&PERFUS IMAGING: CPT | Mod: 26

## 2022-04-18 PROCEDURE — 52005 CYSTO W/URTRL CATHJ: CPT

## 2022-04-18 PROCEDURE — 76770 US EXAM ABDO BACK WALL COMP: CPT | Mod: 26

## 2022-04-18 PROCEDURE — 71045 X-RAY EXAM CHEST 1 VIEW: CPT | Mod: 26

## 2022-04-18 RX ORDER — ENOXAPARIN SODIUM 100 MG/ML
40 INJECTION SUBCUTANEOUS EVERY 24 HOURS
Refills: 0 | Status: DISCONTINUED | OUTPATIENT
Start: 2022-04-18 | End: 2022-04-21

## 2022-04-18 RX ORDER — CEFTRIAXONE 500 MG/1
2000 INJECTION, POWDER, FOR SOLUTION INTRAMUSCULAR; INTRAVENOUS EVERY 24 HOURS
Refills: 0 | Status: DISCONTINUED | OUTPATIENT
Start: 2022-04-19 | End: 2022-04-21

## 2022-04-18 RX ORDER — SODIUM CHLORIDE 9 MG/ML
1000 INJECTION INTRAMUSCULAR; INTRAVENOUS; SUBCUTANEOUS
Refills: 0 | Status: DISCONTINUED | OUTPATIENT
Start: 2022-04-18 | End: 2022-04-18

## 2022-04-18 RX ORDER — CEFTRIAXONE 500 MG/1
1000 INJECTION, POWDER, FOR SOLUTION INTRAMUSCULAR; INTRAVENOUS ONCE
Refills: 0 | Status: COMPLETED | OUTPATIENT
Start: 2022-04-18 | End: 2022-04-18

## 2022-04-18 RX ORDER — CEFTRIAXONE 500 MG/1
2 INJECTION, POWDER, FOR SOLUTION INTRAMUSCULAR; INTRAVENOUS EVERY 24 HOURS
Refills: 0 | Status: DISCONTINUED | OUTPATIENT
Start: 2022-04-18 | End: 2022-04-18

## 2022-04-18 RX ORDER — SODIUM CHLORIDE 9 MG/ML
1000 INJECTION INTRAMUSCULAR; INTRAVENOUS; SUBCUTANEOUS
Refills: 0 | Status: DISCONTINUED | OUTPATIENT
Start: 2022-04-18 | End: 2022-04-19

## 2022-04-18 RX ORDER — SODIUM CHLORIDE 9 MG/ML
1000 INJECTION, SOLUTION INTRAVENOUS
Refills: 0 | Status: DISCONTINUED | OUTPATIENT
Start: 2022-04-18 | End: 2022-04-19

## 2022-04-18 RX ORDER — OXYCODONE HYDROCHLORIDE 5 MG/1
10 TABLET ORAL ONCE
Refills: 0 | Status: DISCONTINUED | OUTPATIENT
Start: 2022-04-18 | End: 2022-04-18

## 2022-04-18 RX ORDER — ACETAMINOPHEN 500 MG
1000 TABLET ORAL ONCE
Refills: 0 | Status: COMPLETED | OUTPATIENT
Start: 2022-04-18 | End: 2022-04-18

## 2022-04-18 RX ORDER — FENTANYL CITRATE 50 UG/ML
50 INJECTION INTRAVENOUS
Refills: 0 | Status: DISCONTINUED | OUTPATIENT
Start: 2022-04-18 | End: 2022-04-18

## 2022-04-18 RX ORDER — SODIUM CHLORIDE 9 MG/ML
1000 INJECTION, SOLUTION INTRAVENOUS
Refills: 0 | Status: DISCONTINUED | OUTPATIENT
Start: 2022-04-18 | End: 2022-04-18

## 2022-04-18 RX ORDER — ONDANSETRON 8 MG/1
4 TABLET, FILM COATED ORAL ONCE
Refills: 0 | Status: DISCONTINUED | OUTPATIENT
Start: 2022-04-18 | End: 2022-04-18

## 2022-04-18 RX ORDER — BUDESONIDE AND FORMOTEROL FUMARATE DIHYDRATE 160; 4.5 UG/1; UG/1
2 AEROSOL RESPIRATORY (INHALATION)
Refills: 0 | Status: DISCONTINUED | OUTPATIENT
Start: 2022-04-18 | End: 2022-04-21

## 2022-04-18 RX ADMIN — MORPHINE SULFATE 2 MILLIGRAM(S): 50 CAPSULE, EXTENDED RELEASE ORAL at 19:46

## 2022-04-18 RX ADMIN — Medication 400 MILLIGRAM(S): at 20:10

## 2022-04-18 RX ADMIN — CEFTRIAXONE 100 MILLIGRAM(S): 500 INJECTION, POWDER, FOR SOLUTION INTRAMUSCULAR; INTRAVENOUS at 15:15

## 2022-04-18 RX ADMIN — ESCITALOPRAM OXALATE 20 MILLIGRAM(S): 10 TABLET, FILM COATED ORAL at 09:08

## 2022-04-18 RX ADMIN — TAMSULOSIN HYDROCHLORIDE 0.4 MILLIGRAM(S): 0.4 CAPSULE ORAL at 23:23

## 2022-04-18 RX ADMIN — SODIUM CHLORIDE 75 MILLILITER(S): 9 INJECTION INTRAMUSCULAR; INTRAVENOUS; SUBCUTANEOUS at 12:03

## 2022-04-18 RX ADMIN — Medication 650 MILLIGRAM(S): at 02:06

## 2022-04-18 RX ADMIN — SODIUM CHLORIDE 100 MILLILITER(S): 9 INJECTION INTRAMUSCULAR; INTRAVENOUS; SUBCUTANEOUS at 23:25

## 2022-04-18 RX ADMIN — SODIUM CHLORIDE 75 MILLILITER(S): 9 INJECTION, SOLUTION INTRAVENOUS at 13:35

## 2022-04-18 RX ADMIN — Medication 40 MILLIGRAM(S): at 23:23

## 2022-04-18 RX ADMIN — MORPHINE SULFATE 2 MILLIGRAM(S): 50 CAPSULE, EXTENDED RELEASE ORAL at 05:35

## 2022-04-18 RX ADMIN — MORPHINE SULFATE 2 MILLIGRAM(S): 50 CAPSULE, EXTENDED RELEASE ORAL at 11:05

## 2022-04-18 RX ADMIN — MORPHINE SULFATE 2 MILLIGRAM(S): 50 CAPSULE, EXTENDED RELEASE ORAL at 10:50

## 2022-04-18 RX ADMIN — ENOXAPARIN SODIUM 40 MILLIGRAM(S): 100 INJECTION SUBCUTANEOUS at 10:50

## 2022-04-18 RX ADMIN — CEFTRIAXONE 100 MILLIGRAM(S): 500 INJECTION, POWDER, FOR SOLUTION INTRAMUSCULAR; INTRAVENOUS at 05:11

## 2022-04-18 RX ADMIN — Medication 300 MILLIGRAM(S): at 09:08

## 2022-04-18 NOTE — BRIEF OPERATIVE NOTE - COMMENTS
Cystoscopy with left ureteral stent placement.   16Fr harding catheter inserted.   Harding can be removed if patient is afebrile 24 hours.

## 2022-04-18 NOTE — BRIEF OPERATIVE NOTE - NSICDXBRIEFPROCEDURE_GEN_ALL_CORE_FT
PROCEDURES:  Cystoscopy with insertion of ureteral stent in adult 18-Apr-2022 21:46:19  Alex Sheehan

## 2022-04-18 NOTE — PHARMACOTHERAPY INTERVENTION NOTE - COMMENTS
Medication history complete, reviewed medication with patient and confirmed with [facility].

## 2022-04-18 NOTE — PROGRESS NOTE ADULT - ASSESSMENT
82 yo M presents for left obstructing ureteral stone now febrile.   Discussed cystoscopy, ureteral stent placement with patient, risks, benefits and alternatives which include nephrostomy tube or conservative observation/ medical expulsive therapy. Patient would like to like to proceed with ureteral stent placement at this time.  Recommend  - Cystoscopy, left ureteral stent placement   - NPO until procedure   - Strain urine  - Flomax  - Pain control/ IVF  - F/U cultures and sensitivities and treat accordingly  - Patient will follow up outpatient for further stone and stent management     Case discussed with Dr. Sheehan

## 2022-04-18 NOTE — PROGRESS NOTE ADULT - ASSESSMENT
82 y/o M PMHx significant for Bladder cancer, Depression, Hypertension, s/p back surgery (TLIF), hypercholesterolemia, and osteoarthritis presents to  for further evaluation and management of c/o severe left flank pain which began upon awakening on 4/16/2022.       #Acute Obstructive Uropathy due to a 5mm calculi in the left proximal ureter with resultant Left Hydroureteronephrosis complicated by;  #Acute Left Pyelonephritis  ~f/u PAN C+S  ~on IV hydration  ~on 1 gm for Pyelonephritis   ~upon review of CT ABD/Pelvis => patient's 5 mm stone located in the left proximal ureter distal to the ureterovesicular junction which is similar in position compared to prior study.  ~ Urology states not intervention at this time  plan:  ~cont. Tamsulosin 0.4mg po qhs   due to fever: check us again increase rocephin to 2gm and check blood culture    Acute hypoxemic respiratory failure new: likely atelectasis due to pain.   will need to r/o PE  CHF unlikely bnp negative; CXR clear  attempted VQ: patient unable to tolerate  plan:  check dimer  start symbicort  spirometer        #Depression  ~cont. Escitalopram 20mg po daily    #Hyperlipidemia  ~patient takes Pravastatin 40mg po (non-formulary)    #Diabetes Mellitus type 2  ~FS q6h while NPO  ~cont. ISS per protocol    #Hypertension  ~cont. Diltiazem ER 300mg po daily  ~Was on Losartan and Chlorthalidone, will hold for СВЕТЛАНА  ~ Monitor BP    #Vte ppx  ~IMPROVE Vte Risk Score is 1  ~cont. SCDs for now   80 y/o M PMHx significant for Bladder cancer, Depression, Hypertension, s/p back surgery (TLIF), hypercholesterolemia, and osteoarthritis presents to  for further evaluation and management of c/o severe left flank pain which began upon awakening on 4/16/2022.       #Acute Obstructive Uropathy due to a 5mm calculi in the left proximal ureter with resultant Left Hydroureteronephrosis complicated by;  #Acute Left Pyelonephritis  ~f/u PAN C+S  ~on IV hydration  ~on 1 gm for Pyelonephritis   ~upon review of CT ABD/Pelvis => patient's 5 mm stone located in the left proximal ureter distal to the ureterovesicular junction which is similar in position compared to prior study.  ~ Urology states not intervention at this time  plan:  ~cont. Tamsulosin 0.4mg po qhs   due to fever: check us again increase rocephin to 2gm and check blood culture    Acute hypoxemic respiratory failure new: likely atelectasis due to pain.   will need to r/o PE  CHF unlikely bnp negative; CXR clear  attempted VQ: patient unable to tolerate  plan:  check dimer  start symbicort  spirometer    СВЕТЛАНА likely post renal obstructive uropathy  Cr now 1.72  plan: sono, IVF and may need renal followup      #Depression  ~cont. Escitalopram 20mg po daily    #Hyperlipidemia  ~patient takes Pravastatin 40mg po (non-formulary)    #Diabetes Mellitus type 2  ~FS q6h while NPO  ~cont. ISS per protocol    #Hypertension  ~cont. Diltiazem ER 300mg po daily  ~Was on Losartan and Chlorthalidone, will hold for СВЕТЛАНА  ~ Monitor BP    #Vte ppx  ~IMPROVE Vte Risk Score is 1  ~cont. SCDs for now

## 2022-04-19 LAB
ALBUMIN SERPL ELPH-MCNC: 3.2 G/DL — LOW (ref 3.3–5)
ALP SERPL-CCNC: 72 U/L — SIGNIFICANT CHANGE UP (ref 40–120)
ALT FLD-CCNC: 25 U/L — SIGNIFICANT CHANGE UP (ref 12–78)
ANION GAP SERPL CALC-SCNC: 4 MMOL/L — LOW (ref 5–17)
AST SERPL-CCNC: 14 U/L — LOW (ref 15–37)
BASOPHILS # BLD AUTO: 0 K/UL — SIGNIFICANT CHANGE UP (ref 0–0.2)
BASOPHILS NFR BLD AUTO: 0 % — SIGNIFICANT CHANGE UP (ref 0–2)
BILIRUB SERPL-MCNC: 0.6 MG/DL — SIGNIFICANT CHANGE UP (ref 0.2–1.2)
BUN SERPL-MCNC: 17 MG/DL — SIGNIFICANT CHANGE UP (ref 7–23)
CALCIUM SERPL-MCNC: 9.4 MG/DL — SIGNIFICANT CHANGE UP (ref 8.5–10.1)
CHLORIDE SERPL-SCNC: 100 MMOL/L — SIGNIFICANT CHANGE UP (ref 96–108)
CO2 SERPL-SCNC: 31 MMOL/L — SIGNIFICANT CHANGE UP (ref 22–31)
CREAT SERPL-MCNC: 1.36 MG/DL — HIGH (ref 0.5–1.3)
D DIMER BLD IA.RAPID-MCNC: 389 NG/ML DDU — HIGH
EGFR: 53 ML/MIN/1.73M2 — LOW
EOSINOPHIL # BLD AUTO: 0 K/UL — SIGNIFICANT CHANGE UP (ref 0–0.5)
EOSINOPHIL NFR BLD AUTO: 0 % — SIGNIFICANT CHANGE UP (ref 0–6)
GLUCOSE SERPL-MCNC: 221 MG/DL — HIGH (ref 70–99)
HCT VFR BLD CALC: 36.4 % — LOW (ref 39–50)
HGB BLD-MCNC: 11.3 G/DL — LOW (ref 13–17)
IMM GRANULOCYTES NFR BLD AUTO: 0.6 % — SIGNIFICANT CHANGE UP (ref 0–1.5)
LYMPHOCYTES # BLD AUTO: 0.58 K/UL — LOW (ref 1–3.3)
LYMPHOCYTES # BLD AUTO: 12 % — LOW (ref 13–44)
MAGNESIUM SERPL-MCNC: 2.3 MG/DL — SIGNIFICANT CHANGE UP (ref 1.6–2.6)
MCHC RBC-ENTMCNC: 20.2 PG — LOW (ref 27–34)
MCHC RBC-ENTMCNC: 31 GM/DL — LOW (ref 32–36)
MCV RBC AUTO: 65 FL — LOW (ref 80–100)
MONOCYTES # BLD AUTO: 0.11 K/UL — SIGNIFICANT CHANGE UP (ref 0–0.9)
MONOCYTES NFR BLD AUTO: 2.3 % — SIGNIFICANT CHANGE UP (ref 2–14)
NEUTROPHILS # BLD AUTO: 4.11 K/UL — SIGNIFICANT CHANGE UP (ref 1.8–7.4)
NEUTROPHILS NFR BLD AUTO: 85.1 % — HIGH (ref 43–77)
PHOSPHATE SERPL-MCNC: 2.6 MG/DL — SIGNIFICANT CHANGE UP (ref 2.5–4.5)
PLATELET # BLD AUTO: 166 K/UL — SIGNIFICANT CHANGE UP (ref 150–400)
POTASSIUM SERPL-MCNC: 4.3 MMOL/L — SIGNIFICANT CHANGE UP (ref 3.5–5.3)
POTASSIUM SERPL-SCNC: 4.3 MMOL/L — SIGNIFICANT CHANGE UP (ref 3.5–5.3)
PROCALCITONIN SERPL-MCNC: 0.11 NG/ML — HIGH (ref 0.02–0.1)
PROT SERPL-MCNC: 6.9 GM/DL — SIGNIFICANT CHANGE UP (ref 6–8.3)
RBC # BLD: 5.6 M/UL — SIGNIFICANT CHANGE UP (ref 4.2–5.8)
RBC # FLD: 18 % — HIGH (ref 10.3–14.5)
SODIUM SERPL-SCNC: 135 MMOL/L — SIGNIFICANT CHANGE UP (ref 135–145)
WBC # BLD: 4.83 K/UL — SIGNIFICANT CHANGE UP (ref 3.8–10.5)
WBC # FLD AUTO: 4.83 K/UL — SIGNIFICANT CHANGE UP (ref 3.8–10.5)

## 2022-04-19 PROCEDURE — 78582 LUNG VENTILAT&PERFUS IMAGING: CPT | Mod: 26

## 2022-04-19 PROCEDURE — 71250 CT THORAX DX C-: CPT | Mod: 26

## 2022-04-19 PROCEDURE — 93306 TTE W/DOPPLER COMPLETE: CPT | Mod: 26

## 2022-04-19 PROCEDURE — 99233 SBSQ HOSP IP/OBS HIGH 50: CPT

## 2022-04-19 RX ADMIN — Medication 100 MILLIGRAM(S): at 21:38

## 2022-04-19 RX ADMIN — Medication 100 MILLIGRAM(S): at 00:22

## 2022-04-19 RX ADMIN — Medication 1: at 17:49

## 2022-04-19 RX ADMIN — ENOXAPARIN SODIUM 40 MILLIGRAM(S): 100 INJECTION SUBCUTANEOUS at 09:18

## 2022-04-19 RX ADMIN — Medication 1: at 07:58

## 2022-04-19 RX ADMIN — CEFTRIAXONE 100 MILLIGRAM(S): 500 INJECTION, POWDER, FOR SOLUTION INTRAMUSCULAR; INTRAVENOUS at 13:27

## 2022-04-19 RX ADMIN — ESCITALOPRAM OXALATE 20 MILLIGRAM(S): 10 TABLET, FILM COATED ORAL at 09:17

## 2022-04-19 RX ADMIN — Medication 3: at 11:40

## 2022-04-19 RX ADMIN — BUDESONIDE AND FORMOTEROL FUMARATE DIHYDRATE 2 PUFF(S): 160; 4.5 AEROSOL RESPIRATORY (INHALATION) at 21:19

## 2022-04-19 RX ADMIN — Medication 300 MILLIGRAM(S): at 09:16

## 2022-04-19 RX ADMIN — Medication 100 MILLIGRAM(S): at 09:17

## 2022-04-19 RX ADMIN — TAMSULOSIN HYDROCHLORIDE 0.4 MILLIGRAM(S): 0.4 CAPSULE ORAL at 21:38

## 2022-04-19 RX ADMIN — SODIUM CHLORIDE 100 MILLILITER(S): 9 INJECTION INTRAMUSCULAR; INTRAVENOUS; SUBCUTANEOUS at 09:16

## 2022-04-19 NOTE — PROGRESS NOTE ADULT - ASSESSMENT
82 y/o M PMHx significant for Bladder cancer, Depression, Hypertension, s/p back surgery (TLIF), hypercholesterolemia, and osteoarthritis presents to  for further evaluation and management of c/o severe left flank pain which began upon awakening on 4/16/2022.       #Acute Obstructive Uropathy due to a 5mm calculi in the left proximal ureter with resultant Left Hydroureteronephrosis complicated by;  #Acute Left Pyelonephritis  ~f/u PAN C+S  ~on IV hydration  ~on 1 gm for Pyelonephritis   ~upon review of CT ABD/Pelvis => patient's 5 mm stone located in the left proximal ureter distal to the ureterovesicular junction which is similar in position compared to prior study.  ~ Urology states not intervention at this time  ~cont. Tamsulosin 0.4mg po qhs   ~ due to fever increased rocephin to two grams and s/p stent 4/18  plan:  monitor Fever curve  c/w abx  when afebrile for 24 hours, can attempt TOV: will  attempt at 730pm 4/19    Acute hypoxemic respiratory failure new: likely atelectasis due to pain.   + left sided Pna  CHF unlikely bnp negative; CXR clear  VQ negative  ddimer negative  Ct chest noted + left sided PNA  started symbicort and spirometer  added doxy  Patient NOW ON ROOM AIR and afebrile  plan:  dc planning with outpatient antibiotics    СВЕТЛАНА likely post renal obstructive uropathy  Cr plateued to 1.72 now s/p stent: 1.32  plan:  continue to monitor    #Depression  ~cont. Escitalopram 20mg po daily    #Hyperlipidemia  ~patient takes Pravastatin 40mg po (non-formulary)    #Diabetes Mellitus type 2  ~FS q6h while NPO  ~cont. ISS per protocol    #Hypertension  ~cont. Diltiazem ER 300mg po daily  ~Was on Losartan and Chlorthalidone, will hold for СВЕТЛАНА  ~ Monitor BP    #Vte ppx  on lovenox

## 2022-04-19 NOTE — PROGRESS NOTE ADULT - ASSESSMENT
80 yo M presents for left obstructing ureteral stone.    Pt is POD 1 s/p cytoscopy left ureteral stent placement. Doing well post op.  Recommend  - D/C harding once pt remains afebrile for 24 hours  - Strain urine  - D/C home with antibiotics, Flomax pyridium and pain meds PRN  - F/U cultures and sensitivities and treat accordingly  - Patient will follow up outpatient with Dr. Wang for further stone and stent management     Case discussed with Dr. Sheehan

## 2022-04-20 LAB
ANION GAP SERPL CALC-SCNC: 4 MMOL/L — LOW (ref 5–17)
BUN SERPL-MCNC: 23 MG/DL — SIGNIFICANT CHANGE UP (ref 7–23)
CALCIUM SERPL-MCNC: 9.3 MG/DL — SIGNIFICANT CHANGE UP (ref 8.5–10.1)
CHLORIDE SERPL-SCNC: 99 MMOL/L — SIGNIFICANT CHANGE UP (ref 96–108)
CO2 SERPL-SCNC: 33 MMOL/L — HIGH (ref 22–31)
CREAT SERPL-MCNC: 1.09 MG/DL — SIGNIFICANT CHANGE UP (ref 0.5–1.3)
EGFR: 69 ML/MIN/1.73M2 — SIGNIFICANT CHANGE UP
GLUCOSE SERPL-MCNC: 161 MG/DL — HIGH (ref 70–99)
POTASSIUM SERPL-MCNC: 3.6 MMOL/L — SIGNIFICANT CHANGE UP (ref 3.5–5.3)
POTASSIUM SERPL-SCNC: 3.6 MMOL/L — SIGNIFICANT CHANGE UP (ref 3.5–5.3)
SODIUM SERPL-SCNC: 136 MMOL/L — SIGNIFICANT CHANGE UP (ref 135–145)

## 2022-04-20 PROCEDURE — 99233 SBSQ HOSP IP/OBS HIGH 50: CPT

## 2022-04-20 RX ORDER — FUROSEMIDE 40 MG
40 TABLET ORAL
Refills: 0 | Status: DISCONTINUED | OUTPATIENT
Start: 2022-04-21 | End: 2022-04-21

## 2022-04-20 RX ORDER — FUROSEMIDE 40 MG
40 TABLET ORAL ONCE
Refills: 0 | Status: COMPLETED | OUTPATIENT
Start: 2022-04-20 | End: 2022-04-20

## 2022-04-20 RX ADMIN — CEFTRIAXONE 100 MILLIGRAM(S): 500 INJECTION, POWDER, FOR SOLUTION INTRAMUSCULAR; INTRAVENOUS at 13:18

## 2022-04-20 RX ADMIN — BUDESONIDE AND FORMOTEROL FUMARATE DIHYDRATE 2 PUFF(S): 160; 4.5 AEROSOL RESPIRATORY (INHALATION) at 21:22

## 2022-04-20 RX ADMIN — ESCITALOPRAM OXALATE 20 MILLIGRAM(S): 10 TABLET, FILM COATED ORAL at 10:25

## 2022-04-20 RX ADMIN — Medication 100 MILLIGRAM(S): at 10:26

## 2022-04-20 RX ADMIN — Medication 1: at 16:58

## 2022-04-20 RX ADMIN — Medication 300 MILLIGRAM(S): at 10:25

## 2022-04-20 RX ADMIN — BUDESONIDE AND FORMOTEROL FUMARATE DIHYDRATE 2 PUFF(S): 160; 4.5 AEROSOL RESPIRATORY (INHALATION) at 07:57

## 2022-04-20 RX ADMIN — Medication 1: at 08:15

## 2022-04-20 RX ADMIN — ENOXAPARIN SODIUM 40 MILLIGRAM(S): 100 INJECTION SUBCUTANEOUS at 13:18

## 2022-04-20 RX ADMIN — Medication 100 MILLIGRAM(S): at 22:04

## 2022-04-20 RX ADMIN — Medication 40 MILLIGRAM(S): at 14:41

## 2022-04-20 RX ADMIN — TAMSULOSIN HYDROCHLORIDE 0.4 MILLIGRAM(S): 0.4 CAPSULE ORAL at 22:04

## 2022-04-20 NOTE — PROGRESS NOTE ADULT - SUBJECTIVE AND OBJECTIVE BOX
Cheif complaints and Diagnosis: left ureter 5mm calculi/ left hydronephrosis/ left pyelo    Subjective: patient noted to be hypoxic 87% on room air  while laying flat.  noted to feel dizzzy at the moment.       REVIEW OF SYSTEMS:    CONSTITUTIONAL: No weakness, fevers or chills  EYES/ENT: No visual changes;  No vertigo or throat pain   NECK: No pain or stiffness  RESPIRATORY: No cough, wheezing, hemoptysis; No shortness of breath  CARDIOVASCULAR: No chest pain or palpitations  GASTROINTESTINAL: No abdominal or epigastric pain. No nausea, vomiting, or hematemesis; No diarrhea or constipation. No melena or hematochezia.  GENITOURINARY: No dysuria, frequency or hematuria  NEUROLOGICAL: No numbness or weakness  SKIN: No itching, burning, rashes, or lesions   All other review of systems is negative unless indicated above      Vital Signs Last 24 Hrs  T(C): 36 (20 Apr 2022 15:30), Max: 36.4 (19 Apr 2022 21:11)  T(F): 96.8 (20 Apr 2022 15:30), Max: 97.6 (19 Apr 2022 21:11)  HR: 89 (20 Apr 2022 15:30) (72 - 89)  BP: 131/87 (20 Apr 2022 15:30) (126/70 - 148/96)  BP(mean): --  RR: 18 (20 Apr 2022 15:30) (18 - 18)  SpO2: 96% (20 Apr 2022 15:30) (89% - 96%)    HEENT:   pupils equal and reactive, EOMI, no oropharyngeal lesions, erythema, exudates, oral thrush    NECK:   supple, no carotid bruits, no palpable lymph nodes, no thyromegaly    CV:  +S1, +S2, regular, no murmurs or rubs    RESP:   lungs clear to auscultation bilaterally, no wheezing, rales, rhonchi, good air entry bilaterally    BREAST:  not examined    GI:  abdomen soft, non-tender, non-distended, normal BS, no bruits, no abdominal masses, no palpable masses    RECTAL:  not examined    :  not examined    MSK:   normal muscle tone, no atrophy, no rigidity, no contractions    EXT:   no clubbing, no cyanosis, no edema, no calf pain, swelling or erythema    VASCULAR:  pulses equal and symmetric in the upper and lower extremities    NEURO:  AAOX3, no focal neurological deficits, follows all commands, able to move extremities spontaneously    SKIN:  no ulcers, lesions or rashes    MEDICATIONS  (STANDING):  budesonide 160 MICROgram(s)/formoterol 4.5 MICROgram(s) Inhaler 2 Puff(s) Inhalation two times a day  cefTRIAXone   IVPB 2000 milliGRAM(s) IV Intermittent every 24 hours  dextrose 5%. 1000 milliLiter(s) (50 mL/Hr) IV Continuous <Continuous>  dextrose 5%. 1000 milliLiter(s) (100 mL/Hr) IV Continuous <Continuous>  dextrose 50% Injectable 25 Gram(s) IV Push once  dextrose 50% Injectable 12.5 Gram(s) IV Push once  dextrose 50% Injectable 25 Gram(s) IV Push once  diltiazem    milliGRAM(s) Oral daily  doxycycline hyclate Capsule 100 milliGRAM(s) Oral every 12 hours  enoxaparin Injectable 40 milliGRAM(s) SubCutaneous every 24 hours  escitalopram 20 milliGRAM(s) Oral daily  glucagon  Injectable 1 milliGRAM(s) IntraMuscular once  insulin lispro (ADMELOG) corrective regimen sliding scale   SubCutaneous three times a day before meals  insulin lispro (ADMELOG) corrective regimen sliding scale   SubCutaneous at bedtime  tamsulosin 0.4 milliGRAM(s) Oral at bedtime    MEDICATIONS  (PRN):  acetaminophen     Tablet .. 650 milliGRAM(s) Oral every 6 hours PRN Temp greater or equal to 38C (100.4F), Mild Pain (1 - 3)  aluminum hydroxide/magnesium hydroxide/simethicone Suspension 30 milliLiter(s) Oral every 4 hours PRN Dyspepsia  dextrose Oral Gel 15 Gram(s) Oral once PRN Blood Glucose LESS THAN 70 milliGRAM(s)/deciliter  melatonin 3 milliGRAM(s) Oral at bedtime PRN Insomnia  morphine  - Injectable 2 milliGRAM(s) IV Push every 4 hours PRN Severe Pain (7 - 10)  ondansetron Injectable 4 milliGRAM(s) IV Push every 8 hours PRN Nausea and/or Vomiting      20 Apr 2022 11:17    136    |  99     |  23     ----------------------------<  161    3.6     |  33     |  1.09     Ca    9.3        20 Apr 2022 11:17  Phos  2.6       19 Apr 2022 06:28  Mg     2.3       19 Apr 2022 06:28    TPro  6.9    /  Alb  3.2    /  TBili  0.6    /  DBili  x      /  AST  14     /  ALT  25     /  AlkPhos  72     19 Apr 2022 06:28  LIVER FUNCTIONS - ( 19 Apr 2022 06:28 )  Alb: 3.2 g/dL / Pro: 6.9 gm/dL / ALK PHOS: 72 U/L / ALT: 25 U/L / AST: 14 U/L / GGT: x         CBC Full  -  ( 19 Apr 2022 06:28 )  WBC Count : 4.83 K/uL  Hemoglobin : 11.3 g/dL  Hematocrit : 36.4 %  Platelet Count - Automated : 166 K/uL  Mean Cell Volume : 65.0 fl  Mean Cell Hemoglobin : 20.2 pg  Mean Cell Hemoglobin Concentration : 31.0 gm/dL          Assessment and Plan:   	  80 y/o M PMHx significant for Bladder cancer, Depression, Hypertension, s/p back surgery (TLIF), hypercholesterolemia, and osteoarthritis presents to  for further evaluation and management of c/o severe left flank pain which began upon awakening on 4/16/2022.       #Acute Obstructive Uropathy due to a 5mm calculi in the left proximal ureter with resultant Left Hydroureteronephrosis complicated by;  #Acute Left Pyelonephritis  ~cw 2gm ceftriaxone  for Pyelonephritis   ~upon review of CT ABD/Pelvis => patient's 5 mm stone located in the left proximal ureter distal to the ureterovesicular junction which is similar in position compared to prior study.  -POD 2 cysto and stent placement 4/18  ~cont. Tamsulosin 0.4mg po qhs   -harding removed 4/19 , doing well with TOV  -no cultures sent in ED; will need to treat empircally         Acute hypoxemic respiratory failure likley secondary to chronic diastolic CHF  -noted to be 87% while laying flat and feeling dizzy  -echo with dilated ivc ; orthopnea on exam  -STAT IV lasix  -start iv lasix 40 bid  -patient has cardiolgoist he sees in community; he will follow with them at dfischarge. Dr. allen with [facility]      СВЕТЛАНА likely post renal obstructive uropathy  Cr plateued to 1.72 now s/p stent: 1.32  plan:  continue to monitor    #Depression  ~cont. Escitalopram 20mg po daily    #Hyperlipidemia  ~patient takes Pravastatin 40mg po (non-formulary)    #Diabetes Mellitus type 2  ~FS q6h while NPO  ~cont. ISS per protocol    #Hypertension  ~cont. Diltiazem ER 300mg po daily  ~Was on Losartan and Chlorthalidone, will hold for СВЕТЛАНА  ~ Monitor BP    #Vte ppx  on lovenox      
Patient seen at bedside. Pt is POD 1 s/p cytoscopy left ureteral stent placement. Pt reports he feels much better and his flank pain has improved. Denies any fevers, chills, n/v or abd pain. Dillon with tonya urine.    General: No distress, No anxiety  VITALS  T(C): 36.1 (04-19-22 @ 07:48), Max: 38.4 (04-18-22 @ 19:30)  HR: 81 (04-19-22 @ 07:48) (67 - 96)  BP: 145/85 (04-19-22 @ 07:48) (121/62 - 160/79)  RR: 18 (04-19-22 @ 07:48) (14 - 19)  SpO2: 97% (04-19-22 @ 07:48) (92% - 97%)            Skin     : No jaundice   HEENT: Normocephalic, no icterus , EOM full , No epistaxis  Lung    : No resp distress on NC  Abdo:   : Soft, Non tender, No guarding, No distension   Back    : No CVAT b/l  Extremity: No calf tenderness   Genitalia Male: Dillon with tonya colored urine  Neuro   : A&Ox3      LABS                        11.3   4.83  )-----------( 166      ( 19 Apr 2022 06:28 )             36.4   04-19    135  |  100  |  17  ----------------------------<  221<H>  4.3   |  31  |  1.36<H>    Ca    9.4      19 Apr 2022 06:28  Phos  2.6     04-19  Mg     2.3     04-19    TPro  6.9  /  Alb  3.2<L>  /  TBili  0.6  /  DBili  x   /  AST  14<L>  /  ALT  25  /  AlkPhos  72  04-19  
Patient seen and examined at bedside  overnight events noted patient continued to spike temp ,mirella worsened  02 requirements increased  patient underwent Vq negative for pe  cT chest + developing left sided pna  patient underwent stent placement by KIKI  this am: patient comfortable relaxed on ROOM AIR.reports pain is resolved  LAST FEVER 730pm    Review of Systems:  General:denies fever chills, headache, weakness  HEENT: denies blurry vision,diffculty swallowing, difficulty hearing, tinnitus  Cardiovascular: denies chest pain  ,palpitations  Pulmonary:denies shortness of breath, cough, wheezing, hemoptysis  Gastrointestinal: denies abdominal pain, constipation, diarrhea,nausea , vomiting, hematochezia  : denies hematuria, dysuria, or incontinence  Neurological: denies weakness, numbness , tingling, dizziness, tremors  MSK: denies muscle pain, difficulty ambulating, swelling, back pain  skin: denies skin rash, itching, burning, or  skin lesions  Psychiatrical: denies mood disturbances, anxierty, feeling depressed, depression , or difficulty sleeping    Objective:  Vitals  T(C): 36.1 (04-19-22 @ 07:48), Max: 38.4 (04-18-22 @ 19:30)  HR: 81 (04-19-22 @ 07:48) (67 - 96)  BP: 145/85 (04-19-22 @ 07:48) (121/62 - 160/79)  RR: 18 (04-19-22 @ 10:40) (14 - 19)  SpO2: 96% (04-19-22 @ 10:40) (92% - 97%)    Physical Exam:  General: comfortable, no acute distress, well nourished  HEENT: Atraumatic, no LAD, trachea midline, PERRLA  Cardiovascular: normal s1s2, no murmurs, gallops or fricition rubs  Pulmonary: clear to ausculation Bilaterally, no wheezing , rhonchi  Gastrointestinal: soft non tender non distended, no masses felt, no organomegally  Muscloskeletal: no lower extremity edema, intact bilateral lower extremity pulses  Neurological: CN II-12 intact. No focal weakness  Psychiatrical: normal mood, cooperative  SKIN: no rash, lesions or ulcers    Labs:                          11.3   4.83  )-----------( 166      ( 19 Apr 2022 06:28 )             36.4     04-19    135  |  100  |  17  ----------------------------<  221<H>  4.3   |  31  |  1.36<H>    Ca    9.4      19 Apr 2022 06:28  Phos  2.6     04-19  Mg     2.3     04-19    TPro  6.9  /  Alb  3.2<L>  /  TBili  0.6  /  DBili  x   /  AST  14<L>  /  ALT  25  /  AlkPhos  72  04-19    LIVER FUNCTIONS - ( 19 Apr 2022 06:28 )  Alb: 3.2 g/dL / Pro: 6.9 gm/dL / ALK PHOS: 72 U/L / ALT: 25 U/L / AST: 14 U/L / GGT: x                 Active Medications  MEDICATIONS  (STANDING):  budesonide 160 MICROgram(s)/formoterol 4.5 MICROgram(s) Inhaler 2 Puff(s) Inhalation two times a day  cefTRIAXone   IVPB 2000 milliGRAM(s) IV Intermittent every 24 hours  dextrose 5%. 1000 milliLiter(s) (50 mL/Hr) IV Continuous <Continuous>  dextrose 5%. 1000 milliLiter(s) (100 mL/Hr) IV Continuous <Continuous>  dextrose 50% Injectable 25 Gram(s) IV Push once  dextrose 50% Injectable 12.5 Gram(s) IV Push once  dextrose 50% Injectable 25 Gram(s) IV Push once  diltiazem    milliGRAM(s) Oral daily  doxycycline hyclate Capsule 100 milliGRAM(s) Oral every 12 hours  enoxaparin Injectable 40 milliGRAM(s) SubCutaneous every 24 hours  escitalopram 20 milliGRAM(s) Oral daily  glucagon  Injectable 1 milliGRAM(s) IntraMuscular once  insulin lispro (ADMELOG) corrective regimen sliding scale   SubCutaneous three times a day before meals  insulin lispro (ADMELOG) corrective regimen sliding scale   SubCutaneous at bedtime  tamsulosin 0.4 milliGRAM(s) Oral at bedtime    MEDICATIONS  (PRN):  acetaminophen     Tablet .. 650 milliGRAM(s) Oral every 6 hours PRN Temp greater or equal to 38C (100.4F), Mild Pain (1 - 3)  aluminum hydroxide/magnesium hydroxide/simethicone Suspension 30 milliLiter(s) Oral every 4 hours PRN Dyspepsia  dextrose Oral Gel 15 Gram(s) Oral once PRN Blood Glucose LESS THAN 70 milliGRAM(s)/deciliter  melatonin 3 milliGRAM(s) Oral at bedtime PRN Insomnia  morphine  - Injectable 2 milliGRAM(s) IV Push every 4 hours PRN Severe Pain (7 - 10)  ondansetron Injectable 4 milliGRAM(s) IV Push every 8 hours PRN Nausea and/or Vomiting    
Pt with left UPJ stone, seen by urology and on medical expulsive tx. Pt febrile overnight with concern for infection and therefor urology was called. Pt seen at bedside, reports he continues to have intermitted left flank pain with fever overnight. Denies n/v.    PE  General: No distress, No anxiety  VITALS  T(C): 36.4 (04-18-22 @ 08:48), Max: 38.2 (04-18-22 @ 01:53)  HR: 95 (04-18-22 @ 08:48) (75 - 102)  BP: 133/78 (04-18-22 @ 08:48) (123/63 - 151/93)  RR: 18 (04-18-22 @ 08:48) (17 - 18)  SpO2: 92% (04-18-22 @ 08:48) (88% - 93%)            Skin     : No jaundice  HEENT: Normocephalic, no icterus , EOM full , No epistaxis  Lung    : No resp distress  Abdo:   : Soft, Non tender, No guarding, No distension   Back    : No CVAT b/l  Genitalia Male: No Dillon  Neuro   : A&Ox3    LABS                        10.9   8.49  )-----------( 163      ( 18 Apr 2022 10:35 )             35.4   04-18    134<L>  |  98  |  14  ----------------------------<  160<H>  3.6   |  32<H>  |  1.74<H>    Ca    9.1      18 Apr 2022 10:35    TPro  6.3  /  Alb  3.3  /  TBili  1.2  /  DBili  x   /  AST  20  /  ALT  31  /  AlkPhos  73  04-17  
HOSPITALIST ATTENDING PROGRESS NOTE    Chart and meds reviewed.  Patient seen and examined.    CC: Flank pain    Subjective: Still with intermittent flank pain, no nausea/vomiting.     All other systems reviewed and found to be negative with the exception of what has been described above.    MEDICATIONS  (STANDING):  cefTRIAXone   IVPB 1000 milliGRAM(s) IV Intermittent every 24 hours  dextrose 5%. 1000 milliLiter(s) (50 mL/Hr) IV Continuous <Continuous>  dextrose 5%. 1000 milliLiter(s) (100 mL/Hr) IV Continuous <Continuous>  dextrose 50% Injectable 25 Gram(s) IV Push once  dextrose 50% Injectable 12.5 Gram(s) IV Push once  dextrose 50% Injectable 25 Gram(s) IV Push once  diltiazem    milliGRAM(s) Oral daily  escitalopram 20 milliGRAM(s) Oral daily  glucagon  Injectable 1 milliGRAM(s) IntraMuscular once  insulin lispro (ADMELOG) corrective regimen sliding scale   SubCutaneous three times a day before meals  insulin lispro (ADMELOG) corrective regimen sliding scale   SubCutaneous at bedtime  tamsulosin 0.4 milliGRAM(s) Oral at bedtime    MEDICATIONS  (PRN):  acetaminophen     Tablet .. 650 milliGRAM(s) Oral every 6 hours PRN Temp greater or equal to 38C (100.4F), Mild Pain (1 - 3)  aluminum hydroxide/magnesium hydroxide/simethicone Suspension 30 milliLiter(s) Oral every 4 hours PRN Dyspepsia  dextrose Oral Gel 15 Gram(s) Oral once PRN Blood Glucose LESS THAN 70 milliGRAM(s)/deciliter  melatonin 3 milliGRAM(s) Oral at bedtime PRN Insomnia  morphine  - Injectable 2 milliGRAM(s) IV Push every 4 hours PRN Severe Pain (7 - 10)  ondansetron Injectable 4 milliGRAM(s) IV Push every 8 hours PRN Nausea and/or Vomiting      VITALS:  T(F): 96.2 (22 @ 08:23), Max: 97.9 (22 @ 20:00)  HR: 85 (22 @ 09:55) (70 - 88)  BP: 154/72 (22 @ 09:55) (144/83 - 170/93)  RR: 18 (22 @ 09:55) (16 - 20)  SpO2: 92% (- @ 09:55) (88% - 95%)        CAPILLARY BLOOD GLUCOSE      POCT Blood Glucose.: 135 mg/dL (2022 11:36)  POCT Blood Glucose.: 110 mg/dL (2022 08:17)  POCT Blood Glucose.: 109 mg/dL (2022 05:29)      PHYSICAL EXAM:  GEN: NAD  HEENT:  pupils equal and reactive, EOMI, no oropharyngeal lesions, erythema, exudates, oral thrush  NECK:   supple, no carotid bruits, no palpable lymph nodes, no thyromegaly  CV:  +S1, +S2, regular, no murmurs or rubs  RESP:   lungs clear to auscultation bilaterally, no wheezing, rales, rhonchi, good air entry bilaterally  BREAST:  not examined  GI:  abdomen soft, non-tender, non-distended, normal BS, no bruits, no abdominal masses, no palpable masses  RECTAL:  not examined  :  not examined  MSK:   normal muscle tone, no atrophy, no rigidity, no contractions  EXT:  no clubbing, no cyanosis, no edema, no calf pain, swelling or erythema  VASCULAR:  pulses equal and symmetric in the upper and lower extremities  NEURO:  AAOX3, no focal neurological deficits, follows all commands, able to move extremities spontaneously  SKIN:  no ulcers, lesions or rashes    LABS:                            11.1   7.91  )-----------( 185      ( 2022 09:58 )             36.2         138  |  101  |  13  ----------------------------<  152<H>  3.5   |  31  |  1.43<H>    Ca    9.4      2022 09:58    TPro  6.3  /  Alb  3.3  /  TBili  1.2  /  DBili  x   /  AST  20  /  ALT  31  /  AlkPhos  73          LIVER FUNCTIONS - ( 2022 09:58 )  Alb: 3.3 g/dL / Pro: 6.3 gm/dL / ALK PHOS: 73 U/L / ALT: 31 U/L / AST: 20 U/L / GGT: x             Urinalysis Basic - ( 2022 20:55 )  Color: Yellow / Appearance: Clear / S.020 / pH: x  Gluc: x / Ketone: Trace  / Bili: Negative / Urobili: Negative   Blood: x / Protein: Negative / Nitrite: Negative   Leuk Esterase: Trace / RBC: 6-10 /HPF / WBC 3-5   Sq Epi: x / Non Sq Epi: Occasional / Bacteria: Occasional      < from: CT Abdomen and Pelvis No Cont (22 @ 22:48) >  IMPRESSION:    Mild left hydroureteronephrosis secondary to 5 mm stone in the left   proximal ureter distal to the ureterovesicular junction, similar in   position compared to prior study. Extensive left perinephric stranding.    Prominent prostate with nodular density near the apex, cause mass effect   and protrusion of the bladder base. Correlate with PSA and consider   further nonemergent workup to exclude prostatic pregnancy.    Mild bladder wall thickening, difficult to assess secondary to inadequate   distention. This may related to chronic bladder outlet obstruction from   enlarged prostate. Correlate with urinalysis and lab values to assess for   cystitis and/or ascending urinary tract infection.        --- End of Report ---      < end of copied text >      
Patient seen and examined at bedside  overnight event noted:  patient febrile to 100.6  patient noted persistent left flank pain, denies chill shortness of breah  patient noted on 2 liters overnight  vitals otherwise : hemodynamically stable  on IV rocephin      Review of Systems:  General:denies fever chills, headache, weakness  HEENT: denies blurry vision,diffculty swallowing, difficulty hearing, tinnitus  Cardiovascular: denies chest pain  ,palpitations  Pulmonary:denies shortness of breath, cough, wheezing, hemoptysis  Gastrointestinal: denies abdominal pain, constipation, diarrhea,nausea , vomiting, hematochezia  : denies hematuria, dysuria, or incontinence + LEFT FLANK PAIN  Neurological: denies weakness, numbness , tingling, dizziness, tremors  MSK: denies muscle pain, difficulty ambulating, swelling, back pain  skin: denies skin rash, itching, burning, or  skin lesions  Psychiatrical: denies mood disturbances, anxierty, feeling depressed, depression , or difficulty sleeping    Objective:  Vitals  T(C): 36.4 (04-18-22 @ 08:48), Max: 38.2 (04-18-22 @ 01:53)  HR: 95 (04-18-22 @ 08:48) (75 - 102)  BP: 133/78 (04-18-22 @ 08:48) (123/63 - 151/93)  RR: 18 (04-18-22 @ 08:48) (17 - 18)  SpO2: 92% (04-18-22 @ 08:48) (88% - 93%)    Physical Exam:  General: comfortable, no acute distress, well nourished  HEENT: Atraumatic, no LAD, trachea midline, PERRLA  Cardiovascular: normal s1s2, no murmurs, gallops or fricition rubs  Pulmonary: clear to ausculation Bilaterally, no wheezing , rhonchi  Gastrointestinal: soft non tender non distended, no masses felt, no organomegally  Muscloskeletal: no lower extremity edema, intact bilateral lower extremity pulses  Neurological: CN II-12 intact. No focal weakness  Psychiatrical: normal mood, cooperative  SKIN: no rash, lesions or ulcers    Labs:                          10.9   8.49  )-----------( 163      ( 18 Apr 2022 10:35 )             35.4     04-18    134<L>  |  98  |  14  ----------------------------<  160<H>  3.6   |  32<H>  |  1.74<H>    Ca    9.1      18 Apr 2022 10:35    TPro  6.3  /  Alb  3.3  /  TBili  1.2  /  DBili  x   /  AST  20  /  ALT  31  /  AlkPhos  73  04-17    LIVER FUNCTIONS - ( 17 Apr 2022 09:58 )  Alb: 3.3 g/dL / Pro: 6.3 gm/dL / ALK PHOS: 73 U/L / ALT: 31 U/L / AST: 20 U/L / GGT: x                 Active Medications  MEDICATIONS  (STANDING):  budesonide 160 MICROgram(s)/formoterol 4.5 MICROgram(s) Inhaler 2 Puff(s) Inhalation two times a day  cefTRIAXone   IVPB 1000 milliGRAM(s) IV Intermittent every 24 hours  dextrose 5%. 1000 milliLiter(s) (50 mL/Hr) IV Continuous <Continuous>  dextrose 5%. 1000 milliLiter(s) (100 mL/Hr) IV Continuous <Continuous>  dextrose 50% Injectable 25 Gram(s) IV Push once  dextrose 50% Injectable 12.5 Gram(s) IV Push once  dextrose 50% Injectable 25 Gram(s) IV Push once  diltiazem    milliGRAM(s) Oral daily  doxycycline hyclate Capsule 100 milliGRAM(s) Oral every 12 hours  enoxaparin Injectable 40 milliGRAM(s) SubCutaneous every 24 hours  escitalopram 20 milliGRAM(s) Oral daily  glucagon  Injectable 1 milliGRAM(s) IntraMuscular once  insulin lispro (ADMELOG) corrective regimen sliding scale   SubCutaneous three times a day before meals  insulin lispro (ADMELOG) corrective regimen sliding scale   SubCutaneous at bedtime  lactated ringers. 1000 milliLiter(s) (75 mL/Hr) IV Continuous <Continuous>  sodium chloride 0.9%. 1000 milliLiter(s) (75 mL/Hr) IV Continuous <Continuous>  tamsulosin 0.4 milliGRAM(s) Oral at bedtime    MEDICATIONS  (PRN):  acetaminophen     Tablet .. 650 milliGRAM(s) Oral every 6 hours PRN Temp greater or equal to 38C (100.4F), Mild Pain (1 - 3)  aluminum hydroxide/magnesium hydroxide/simethicone Suspension 30 milliLiter(s) Oral every 4 hours PRN Dyspepsia  dextrose Oral Gel 15 Gram(s) Oral once PRN Blood Glucose LESS THAN 70 milliGRAM(s)/deciliter  melatonin 3 milliGRAM(s) Oral at bedtime PRN Insomnia  morphine  - Injectable 2 milliGRAM(s) IV Push every 4 hours PRN Severe Pain (7 - 10)  ondansetron Injectable 4 milliGRAM(s) IV Push every 8 hours PRN Nausea and/or Vomiting

## 2022-04-20 NOTE — PROGRESS NOTE ADULT - REASON FOR ADMISSION
Severe Left Flank Pain

## 2022-04-21 ENCOUNTER — TRANSCRIPTION ENCOUNTER (OUTPATIENT)
Age: 82
End: 2022-04-21

## 2022-04-21 VITALS — OXYGEN SATURATION: 97 % | RESPIRATION RATE: 20 BRPM

## 2022-04-21 LAB
ANION GAP SERPL CALC-SCNC: 8 MMOL/L — SIGNIFICANT CHANGE UP (ref 5–17)
BUN SERPL-MCNC: 21 MG/DL — SIGNIFICANT CHANGE UP (ref 7–23)
CALCIUM SERPL-MCNC: 9.7 MG/DL — SIGNIFICANT CHANGE UP (ref 8.5–10.1)
CHLORIDE SERPL-SCNC: 97 MMOL/L — SIGNIFICANT CHANGE UP (ref 96–108)
CO2 SERPL-SCNC: 30 MMOL/L — SIGNIFICANT CHANGE UP (ref 22–31)
CREAT SERPL-MCNC: 1.24 MG/DL — SIGNIFICANT CHANGE UP (ref 0.5–1.3)
EGFR: 59 ML/MIN/1.73M2 — LOW
GLUCOSE SERPL-MCNC: 167 MG/DL — HIGH (ref 70–99)
POTASSIUM SERPL-MCNC: 3.3 MMOL/L — LOW (ref 3.5–5.3)
POTASSIUM SERPL-SCNC: 3.3 MMOL/L — LOW (ref 3.5–5.3)
SODIUM SERPL-SCNC: 135 MMOL/L — SIGNIFICANT CHANGE UP (ref 135–145)

## 2022-04-21 PROCEDURE — 99239 HOSP IP/OBS DSCHRG MGMT >30: CPT

## 2022-04-21 RX ORDER — CEFUROXIME AXETIL 250 MG
1 TABLET ORAL
Qty: 12 | Refills: 0
Start: 2022-04-21 | End: 2022-04-26

## 2022-04-21 RX ORDER — POTASSIUM CHLORIDE 20 MEQ
40 PACKET (EA) ORAL ONCE
Refills: 0 | Status: COMPLETED | OUTPATIENT
Start: 2022-04-21 | End: 2022-04-21

## 2022-04-21 RX ADMIN — ENOXAPARIN SODIUM 40 MILLIGRAM(S): 100 INJECTION SUBCUTANEOUS at 10:53

## 2022-04-21 RX ADMIN — Medication 40 MILLIEQUIVALENT(S): at 13:56

## 2022-04-21 RX ADMIN — ESCITALOPRAM OXALATE 20 MILLIGRAM(S): 10 TABLET, FILM COATED ORAL at 10:52

## 2022-04-21 RX ADMIN — Medication 100 MILLIGRAM(S): at 10:52

## 2022-04-21 RX ADMIN — BUDESONIDE AND FORMOTEROL FUMARATE DIHYDRATE 2 PUFF(S): 160; 4.5 AEROSOL RESPIRATORY (INHALATION) at 07:53

## 2022-04-21 RX ADMIN — Medication 300 MILLIGRAM(S): at 10:52

## 2022-04-21 RX ADMIN — CEFTRIAXONE 100 MILLIGRAM(S): 500 INJECTION, POWDER, FOR SOLUTION INTRAMUSCULAR; INTRAVENOUS at 13:56

## 2022-04-21 RX ADMIN — Medication 40 MILLIGRAM(S): at 10:53

## 2022-04-21 NOTE — DISCHARGE NOTE PROVIDER - HOSPITAL COURSE
Vital Signs Last 24 Hrs  T(C): 36.4 (20 Apr 2022 23:17), Max: 36.4 (20 Apr 2022 23:17)  T(F): 97.6 (20 Apr 2022 23:17), Max: 97.6 (20 Apr 2022 23:17)  HR: 83 (21 Apr 2022 10:50) (79 - 89)  BP: 155/75 (21 Apr 2022 10:50) (126/70 - 155/75)  BP(mean): --  RR: 20 (21 Apr 2022 11:00) (18 - 20)  SpO2: 97% (21 Apr 2022 11:00) (93% - 97%)    HEENT:   pupils equal and reactive, EOMI, no oropharyngeal lesions, erythema, exudates, oral thrush    NECK:   supple, no carotid bruits, no palpable lymph nodes, no thyromegaly    CV:  +S1, +S2, regular, no murmurs or rubs    RESP:   lungs clear to auscultation bilaterally, no wheezing, rales, rhonchi, good air entry bilaterally    BREAST:  not examined    GI:  abdomen soft, non-tender, non-distended, normal BS, no bruits, no abdominal masses, no palpable masses    RECTAL:  not examined    :  not examined    MSK:   normal muscle tone, no atrophy, no rigidity, no contractions    EXT:   no clubbing, no cyanosis, no edema, no calf pain, swelling or erythema    VASCULAR:  pulses equal and symmetric in the upper and lower extremities    NEURO:  AAOX3, no focal neurological deficits, follows all commands, able to move extremities spontaneously    SKIN:  no ulcers, lesions or rashes    21 Apr 2022 09:23    135    |  97     |  21     ----------------------------<  167    3.3     |  30     |  1.24     Ca    9.7        21 Apr 2022 09:23            Hospital Course:   	  82 y/o M PMHx significant for Bladder cancer, Depression, Hypertension, s/p back surgery (TLIF), hypercholesterolemia, and osteoarthritis presents to  for further evaluation and management of c/o severe left flank pain which began upon awakening on 4/16/2022.       #Acute Obstructive Uropathy due to a 5mm calculi in the left proximal ureter with resultant Left Hydroureteronephrosis complicated by;  #Acute Left Pyelonephritis  ~cw 2gm ceftriaxone  for Pyelonephritis   ~upon review of CT ABD/Pelvis => patient's 5 mm stone located in the left proximal ureter distal to the ureterovesicular junction which is similar in position compared to prior study.  -POD 2 cysto and stent placement 4/18  ~cont. Tamsulosin 0.4mg po qhs   -harding removed 4/19 , doing well with TOV  -no cultures sent in ED; will need to treat empircally   -DC home with oral ceftin for 6 more days.       Acute hypoxemic respiratory failure ramírez secondary to acute on chronic diastolic CHF  -noted to be 87% while laying flat and feeling dizzy  -echo with dilated ivc ; orthopnea on exam  -s/p iv lasix 40 bid >> now o2 sat laying down is 93%, upright it is 97%; patient feeling better.   -patient has cardiolgoist he sees in community; he will follow with them at discharge . Dr. allen with [facility]      СВЕТЛАНА likely post renal obstructive uropathy  Cr plateued to 1.72   -now normalized

## 2022-04-21 NOTE — DISCHARGE NOTE PROVIDER - NSDCMRMEDTOKEN_GEN_ALL_CORE_FT
cefuroxime 500 mg oral tablet: 1 tab(s) orally 2 times a day   chlorthalidone 25 mg oral tablet: 1 tab(s) orally once a day  dilTIAZem 300 mg/24 hours oral capsule, extended release: 1 cap(s) orally once a day  escitalopram 20 mg oral tablet: 1 tab(s) orally once a day  losartan 100 mg oral tablet: 1 tab(s) orally once a day  metFORMIN 500 mg oral tablet, extended release: 2 tab(s) orally once a day at dinner  Multiple Vitamins oral tablet: 1 tab(s) orally once a day  pravastatin 40 mg oral tablet: 1 tab(s) orally 3 times a week  Probiotic Formula oral capsule: 1 cap(s) orally once a day  tamsulosin 0.4 mg oral capsule: 1 cap(s) orally once a day

## 2022-04-21 NOTE — DISCHARGE NOTE NURSING/CASE MANAGEMENT/SOCIAL WORK - PATIENT PORTAL LINK FT
You can access the FollowMyHealth Patient Portal offered by [facility] by registering at the following website: [email or web address]. By joining [facility]’s FollowMyHealth portal, you will also be able to view your health information using other applications (apps) compatible with our system.

## 2022-04-21 NOTE — DISCHARGE NOTE PROVIDER - CARE PROVIDER_API CALL
Sergei Dorantes)  Internal Medicine  [address] [place] [address]  Phone: [phone]  Fax: [phone]  Follow Up Time:     Chad Wang)  Urology  [facility]  [address], [address] [place] [address]  Phone: [phone]  Fax: [phone]  Follow Up Time:

## 2022-04-21 NOTE — DISCHARGE NOTE PROVIDER - NSDCCPCAREPLAN_GEN_ALL_CORE_FT
PRINCIPAL DISCHARGE DIAGNOSIS  Diagnosis: Renal calculi  Assessment and Plan of Treatment:   *You had a left Ureteral stent placement  *follow up outpatient with Dr. Chad Wang for stent and stone management.      SECONDARY DISCHARGE DIAGNOSES  Diagnosis: Pyelonephritis  Assessment and Plan of Treatment:   *Continue with oral Ceftin 500mg twice a day for 6 more days.   *Follow up outpatietn with Dr. Chad Wang after completing your antibiotics for next steps to mangemetn of the stent and stone.    Diagnosis: Acute diastolic congestive heart failure  Assessment and Plan of Treatment: *You were diuresed while in the hospital with IV lasix .   *your oxygen before IV lasix was 87%  laying down with Orthopnea. This is has improved after IV lasix with subsequent o2 oxygen laying down of 93%.   *Please follow up with your cardiologist  with in one week to be evaluated for further treatment with lasix.  <br>  *Echo:  Summary:    The left ventricle is normal in size, wall thickness, wall motion and   contractility as seen in limited views.   Estimated left ventricular ejection fraction is 50 -55 %.   The aortic valve is well visualized, appears mildly sclerotic. Valve   opening seems to be normal.   Mild mitral annular calcification is present.   The mitral valve leaflets appear thickened with normal leaflet mobility.   EA reversal of the mitral inflow consistent with reduced compliance of  theleft ventricle.   Stage 1 diastolic dysfunction is noted.   Normal appearing tricuspid valve structure.   Trace tricuspid valve regurgitation is present.   No evidence of pulmonary hypertension.   The IVC is dilated with respiratory variation.   An interatrial septal aneurysm is noted.     PRINCIPAL DISCHARGE DIAGNOSIS  Diagnosis: Renal calculi  Assessment and Plan of Treatment:   *You had a left Ureteral stent placement  *follow up outpatient with Dr. Chad Wang for stent and stone management.      SECONDARY DISCHARGE DIAGNOSES  Diagnosis: Pyelonephritis  Assessment and Plan of Treatment: *Continue with oral Ceftin 500mg twice a day for 6 more days.  Reccomend to buy over the counter probiotics and take one capsule daily for 3 weeks.   *Follow up outpatietn with Dr. Chad Wang after completing your antibiotics for next steps to management of the stent and stone.    Diagnosis: Acute diastolic congestive heart failure  Assessment and Plan of Treatment: *You were diuresed while in the hospital with IV lasix .   *your oxygen before IV lasix was 87%  laying down with Orthopnea. This is has improved after IV lasix with subsequent o2 oxygen laying down of 93%.   *Please follow up with your cardiologist  with in one week to be evaluated for further treatment with lasix.  <br>  *Echo:  Summary:    The left ventricle is normal in size, wall thickness, wall motion and   contractility as seen in limited views.   Estimated left ventricular ejection fraction is 50 -55 %.   The aortic valve is well visualized, appears mildly sclerotic. Valve   opening seems to be normal.   Mild mitral annular calcification is present.   The mitral valve leaflets appear thickened with normal leaflet mobility.   EA reversal of the mitral inflow consistent with reduced compliance of  theleft ventricle.   Stage 1 diastolic dysfunction is noted.   Normal appearing tricuspid valve structure.   Trace tricuspid valve regurgitation is present.   No evidence of pulmonary hypertension.   The IVC is dilated with respiratory variation.   An interatrial septal aneurysm is noted.

## 2022-04-21 NOTE — SBIRT NOTE ADULT - NSSBIRTALCNOACTINTDET_GEN_A_CORE
Pt reports he drinks socially but has not been drinking alcohol in the past few months. Pt denies issues associated with his social etoh use. No resources/referrals given

## 2022-04-21 NOTE — DISCHARGE NOTE PROVIDER - CARE PROVIDERS DIRECT ADDRESSES
,[email or web address]

## 2022-04-23 ENCOUNTER — RX RENEWAL (OUTPATIENT)
Age: 82
End: 2022-04-23

## 2022-04-23 LAB
CULTURE RESULTS: SIGNIFICANT CHANGE UP
SPECIMEN SOURCE: SIGNIFICANT CHANGE UP

## 2022-04-26 ENCOUNTER — NON-APPOINTMENT (OUTPATIENT)
Age: 82
End: 2022-04-26

## 2022-04-27 DIAGNOSIS — J18.9 PNEUMONIA, UNSPECIFIED ORGANISM: ICD-10-CM

## 2022-04-27 DIAGNOSIS — M19.90 UNSPECIFIED OSTEOARTHRITIS, UNSPECIFIED SITE: ICD-10-CM

## 2022-04-27 DIAGNOSIS — N10 ACUTE PYELONEPHRITIS: ICD-10-CM

## 2022-04-27 DIAGNOSIS — E11.9 TYPE 2 DIABETES MELLITUS WITHOUT COMPLICATIONS: ICD-10-CM

## 2022-04-27 DIAGNOSIS — E78.5 HYPERLIPIDEMIA, UNSPECIFIED: ICD-10-CM

## 2022-04-27 DIAGNOSIS — Z79.84 LONG TERM (CURRENT) USE OF ORAL HYPOGLYCEMIC DRUGS: ICD-10-CM

## 2022-04-27 DIAGNOSIS — J96.01 ACUTE RESPIRATORY FAILURE WITH HYPOXIA: ICD-10-CM

## 2022-04-27 DIAGNOSIS — Z88.2 ALLERGY STATUS TO SULFONAMIDES: ICD-10-CM

## 2022-04-27 DIAGNOSIS — N13.6 PYONEPHROSIS: ICD-10-CM

## 2022-04-27 DIAGNOSIS — J98.11 ATELECTASIS: ICD-10-CM

## 2022-04-27 DIAGNOSIS — Z87.442 PERSONAL HISTORY OF URINARY CALCULI: ICD-10-CM

## 2022-04-27 DIAGNOSIS — N17.9 ACUTE KIDNEY FAILURE, UNSPECIFIED: ICD-10-CM

## 2022-04-27 DIAGNOSIS — I50.33 ACUTE ON CHRONIC DIASTOLIC (CONGESTIVE) HEART FAILURE: ICD-10-CM

## 2022-04-27 DIAGNOSIS — F32.A DEPRESSION, UNSPECIFIED: ICD-10-CM

## 2022-04-27 DIAGNOSIS — E78.00 PURE HYPERCHOLESTEROLEMIA, UNSPECIFIED: ICD-10-CM

## 2022-04-27 DIAGNOSIS — Z85.51 PERSONAL HISTORY OF MALIGNANT NEOPLASM OF BLADDER: ICD-10-CM

## 2022-04-27 DIAGNOSIS — Z88.0 ALLERGY STATUS TO PENICILLIN: ICD-10-CM

## 2022-04-27 DIAGNOSIS — I11.0 HYPERTENSIVE HEART DISEASE WITH HEART FAILURE: ICD-10-CM

## 2022-04-29 ENCOUNTER — APPOINTMENT (OUTPATIENT)
Dept: UROLOGY | Facility: CLINIC | Age: 82
End: 2022-04-29
Payer: MEDICARE

## 2022-04-29 ENCOUNTER — APPOINTMENT (OUTPATIENT)
Dept: RADIOLOGY | Facility: CLINIC | Age: 82
End: 2022-04-29
Payer: MEDICARE

## 2022-04-29 ENCOUNTER — OUTPATIENT (OUTPATIENT)
Dept: OUTPATIENT SERVICES | Facility: HOSPITAL | Age: 82
LOS: 1 days | End: 2022-04-29
Payer: MEDICARE

## 2022-04-29 VITALS
BODY MASS INDEX: 33.13 KG/M2 | OXYGEN SATURATION: 94 % | WEIGHT: 250 LBS | HEART RATE: 68 BPM | DIASTOLIC BLOOD PRESSURE: 75 MMHG | SYSTOLIC BLOOD PRESSURE: 133 MMHG | HEIGHT: 73 IN

## 2022-04-29 DIAGNOSIS — N20.1 CALCULUS OF URETER: ICD-10-CM

## 2022-04-29 DIAGNOSIS — Z98.890 OTHER SPECIFIED POSTPROCEDURAL STATES: Chronic | ICD-10-CM

## 2022-04-29 DIAGNOSIS — R31.9 HEMATURIA, UNSPECIFIED: ICD-10-CM

## 2022-04-29 DIAGNOSIS — Z90.89 ACQUIRED ABSENCE OF OTHER ORGANS: Chronic | ICD-10-CM

## 2022-04-29 PROCEDURE — 74018 RADEX ABDOMEN 1 VIEW: CPT | Mod: 26

## 2022-04-29 PROCEDURE — 99204 OFFICE O/P NEW MOD 45 MIN: CPT

## 2022-04-29 PROCEDURE — 74018 RADEX ABDOMEN 1 VIEW: CPT

## 2022-05-02 PROBLEM — R31.9 HEMATURIA: Status: ACTIVE | Noted: 2017-06-09

## 2022-05-02 NOTE — HISTORY OF PRESENT ILLNESS
[FreeTextEntry1] : 81M presents today for management of a ureteral stent. Pt had been hospitalized, and a stone was discovered proximal ureter left side. A stent had been placed, and he presents here for explanation.

## 2022-05-02 NOTE — END OF VISIT
[FreeTextEntry3] : I explained he needs urethroscopy and removal of the stone. I went over all possible complications with him. I went over stone prevention with him, and he will start with hydration from lemon-lime based beverages. A renal US will be done in 1 year to check on further stone formation.

## 2022-05-03 ENCOUNTER — NON-APPOINTMENT (OUTPATIENT)
Age: 82
End: 2022-05-03

## 2022-06-01 ENCOUNTER — OUTPATIENT (OUTPATIENT)
Dept: OUTPATIENT SERVICES | Facility: HOSPITAL | Age: 82
LOS: 1 days | End: 2022-06-01
Payer: MEDICARE

## 2022-06-01 ENCOUNTER — RESULT REVIEW (OUTPATIENT)
Age: 82
End: 2022-06-01

## 2022-06-01 VITALS
WEIGHT: 251.33 LBS | OXYGEN SATURATION: 94 % | RESPIRATION RATE: 16 BRPM | TEMPERATURE: 98 F | SYSTOLIC BLOOD PRESSURE: 124 MMHG | HEIGHT: 73 IN | DIASTOLIC BLOOD PRESSURE: 73 MMHG | HEART RATE: 73 BPM

## 2022-06-01 DIAGNOSIS — Z98.890 OTHER SPECIFIED POSTPROCEDURAL STATES: Chronic | ICD-10-CM

## 2022-06-01 DIAGNOSIS — Z01.818 ENCOUNTER FOR OTHER PREPROCEDURAL EXAMINATION: ICD-10-CM

## 2022-06-01 DIAGNOSIS — N20.1 CALCULUS OF URETER: ICD-10-CM

## 2022-06-01 DIAGNOSIS — Z90.89 ACQUIRED ABSENCE OF OTHER ORGANS: Chronic | ICD-10-CM

## 2022-06-01 LAB
A1C WITH ESTIMATED AVERAGE GLUCOSE RESULT: 6.6 % — HIGH (ref 4–5.6)
ANION GAP SERPL CALC-SCNC: 8 MMOL/L — SIGNIFICANT CHANGE UP (ref 5–17)
APPEARANCE UR: ABNORMAL
APTT BLD: 27.5 SEC — SIGNIFICANT CHANGE UP (ref 27.5–35.5)
BASOPHILS # BLD AUTO: 0.02 K/UL — SIGNIFICANT CHANGE UP (ref 0–0.2)
BASOPHILS NFR BLD AUTO: 0.3 % — SIGNIFICANT CHANGE UP (ref 0–2)
BILIRUB UR-MCNC: NEGATIVE — SIGNIFICANT CHANGE UP
BUN SERPL-MCNC: 19 MG/DL — SIGNIFICANT CHANGE UP (ref 7–23)
CALCIUM SERPL-MCNC: 9.6 MG/DL — SIGNIFICANT CHANGE UP (ref 8.5–10.1)
CHLORIDE SERPL-SCNC: 102 MMOL/L — SIGNIFICANT CHANGE UP (ref 96–108)
CO2 SERPL-SCNC: 28 MMOL/L — SIGNIFICANT CHANGE UP (ref 22–31)
COLOR SPEC: YELLOW — SIGNIFICANT CHANGE UP
CREAT SERPL-MCNC: 1.18 MG/DL — SIGNIFICANT CHANGE UP (ref 0.5–1.3)
DIFF PNL FLD: ABNORMAL
EGFR: 63 ML/MIN/1.73M2 — SIGNIFICANT CHANGE UP
EOSINOPHIL # BLD AUTO: 0.15 K/UL — SIGNIFICANT CHANGE UP (ref 0–0.5)
EOSINOPHIL NFR BLD AUTO: 2.6 % — SIGNIFICANT CHANGE UP (ref 0–6)
ESTIMATED AVERAGE GLUCOSE: 143 MG/DL — HIGH (ref 68–114)
GLUCOSE SERPL-MCNC: 182 MG/DL — HIGH (ref 70–99)
GLUCOSE UR QL: NEGATIVE — SIGNIFICANT CHANGE UP
HCT VFR BLD CALC: 34.9 % — LOW (ref 39–50)
HGB BLD-MCNC: 10.7 G/DL — LOW (ref 13–17)
IMM GRANULOCYTES NFR BLD AUTO: 0.9 % — SIGNIFICANT CHANGE UP (ref 0–1.5)
INR BLD: 1.06 RATIO — SIGNIFICANT CHANGE UP (ref 0.88–1.16)
KETONES UR-MCNC: NEGATIVE — SIGNIFICANT CHANGE UP
LEUKOCYTE ESTERASE UR-ACNC: ABNORMAL
LYMPHOCYTES # BLD AUTO: 1.22 K/UL — SIGNIFICANT CHANGE UP (ref 1–3.3)
LYMPHOCYTES # BLD AUTO: 20.9 % — SIGNIFICANT CHANGE UP (ref 13–44)
MCHC RBC-ENTMCNC: 19.7 PG — LOW (ref 27–34)
MCHC RBC-ENTMCNC: 30.7 GM/DL — LOW (ref 32–36)
MCV RBC AUTO: 64.4 FL — LOW (ref 80–100)
MONOCYTES # BLD AUTO: 0.43 K/UL — SIGNIFICANT CHANGE UP (ref 0–0.9)
MONOCYTES NFR BLD AUTO: 7.4 % — SIGNIFICANT CHANGE UP (ref 2–14)
NEUTROPHILS # BLD AUTO: 3.96 K/UL — SIGNIFICANT CHANGE UP (ref 1.8–7.4)
NEUTROPHILS NFR BLD AUTO: 67.9 % — SIGNIFICANT CHANGE UP (ref 43–77)
NITRITE UR-MCNC: NEGATIVE — SIGNIFICANT CHANGE UP
PH UR: 5 — SIGNIFICANT CHANGE UP (ref 5–8)
PLATELET # BLD AUTO: 241 K/UL — SIGNIFICANT CHANGE UP (ref 150–400)
POTASSIUM SERPL-MCNC: 3 MMOL/L — LOW (ref 3.5–5.3)
POTASSIUM SERPL-SCNC: 3 MMOL/L — LOW (ref 3.5–5.3)
PROT UR-MCNC: 30 MG/DL
PROTHROM AB SERPL-ACNC: 12.3 SEC — SIGNIFICANT CHANGE UP (ref 10.5–13.4)
RBC # BLD: 5.42 M/UL — SIGNIFICANT CHANGE UP (ref 4.2–5.8)
RBC # FLD: 17.4 % — HIGH (ref 10.3–14.5)
SODIUM SERPL-SCNC: 138 MMOL/L — SIGNIFICANT CHANGE UP (ref 135–145)
SP GR SPEC: 1.01 — SIGNIFICANT CHANGE UP (ref 1.01–1.02)
UROBILINOGEN FLD QL: NEGATIVE — SIGNIFICANT CHANGE UP
WBC # BLD: 5.83 K/UL — SIGNIFICANT CHANGE UP (ref 3.8–10.5)
WBC # FLD AUTO: 5.83 K/UL — SIGNIFICANT CHANGE UP (ref 3.8–10.5)

## 2022-06-01 PROCEDURE — 85610 PROTHROMBIN TIME: CPT

## 2022-06-01 PROCEDURE — 71046 X-RAY EXAM CHEST 2 VIEWS: CPT

## 2022-06-01 PROCEDURE — 93005 ELECTROCARDIOGRAM TRACING: CPT

## 2022-06-01 PROCEDURE — 71046 X-RAY EXAM CHEST 2 VIEWS: CPT | Mod: 26

## 2022-06-01 PROCEDURE — G0463: CPT | Mod: 25

## 2022-06-01 PROCEDURE — 86850 RBC ANTIBODY SCREEN: CPT

## 2022-06-01 PROCEDURE — 85025 COMPLETE CBC W/AUTO DIFF WBC: CPT

## 2022-06-01 PROCEDURE — 81001 URINALYSIS AUTO W/SCOPE: CPT

## 2022-06-01 PROCEDURE — 83036 HEMOGLOBIN GLYCOSYLATED A1C: CPT

## 2022-06-01 PROCEDURE — 93010 ELECTROCARDIOGRAM REPORT: CPT

## 2022-06-01 PROCEDURE — 80048 BASIC METABOLIC PNL TOTAL CA: CPT

## 2022-06-01 PROCEDURE — 85730 THROMBOPLASTIN TIME PARTIAL: CPT

## 2022-06-01 PROCEDURE — 86901 BLOOD TYPING SEROLOGIC RH(D): CPT

## 2022-06-01 PROCEDURE — 86900 BLOOD TYPING SEROLOGIC ABO: CPT

## 2022-06-01 PROCEDURE — 36415 COLL VENOUS BLD VENIPUNCTURE: CPT

## 2022-06-01 PROCEDURE — 87086 URINE CULTURE/COLONY COUNT: CPT

## 2022-06-01 PROCEDURE — 87186 SC STD MICRODIL/AGAR DIL: CPT

## 2022-06-01 RX ORDER — L.ACIDOPH/B.ANIMALIS/B.LONGUM 15B CELL
1 CAPSULE ORAL
Qty: 0 | Refills: 0 | DISCHARGE

## 2022-06-01 NOTE — H&P PST ADULT - NSICDXPASTMEDICALHX_GEN_ALL_CORE_FT
PAST MEDICAL HISTORY:  Bladder cancer 2014    CA skin, basal cell     Depression     DM (diabetes mellitus) Type 2    Herniated nucleus pulposus, L5-S1     HTN (hypertension)     Hypercholesterolemia     Kidney stones Left    Lumbar spinal stenosis     OA (osteoarthritis)     Uses hearing aid      PAST MEDICAL HISTORY:  Bladder cancer 2014    BPH (benign prostatic hyperplasia)     CA skin, basal cell     Depression     DM (diabetes mellitus) Type 2    Herniated nucleus pulposus, L5-S1     HTN (hypertension)     Hypercholesterolemia     Kidney stones Left    Lumbar spinal stenosis     OA (osteoarthritis)     Uses hearing aid

## 2022-06-01 NOTE — H&P PST ADULT - NSICDXPASTSURGICALHX_GEN_ALL_CORE_FT
PAST SURGICAL HISTORY:  H/O hernia repair left inguinal    History of back surgery TLIF    History of bladder surgery TURBT, cystoscopy--aprox 8 yrs ago, dx bladder cancer    History of cystoscopy Left kidney stones.  Left stent---4/20/22    S/P tonsillectomy

## 2022-06-01 NOTE — H&P PST ADULT - ASSESSMENT
81 y.o male scheduled for  Cystoscopy ,Left Ureteroscopy, Laser  Lithotripsy, Stone Extraction and Ureteral Stent Exchange   Plan  1. Stop all NSAIDS, herbal supplements and vitamins for 7 days.  2. NPO at midnight.  3. Take the following medications Losartan, Diltiazem, Escitalopram  with small sips of water on the morning of your procedure/surgery.  4. Labs, EKG, CXR as per surgeon  5. PMD TULIO Dorantes & EDGARDO Hollis visit for optimization prior to surgery as per surgeon  6. COVID swab appt: 6/10/2022

## 2022-06-01 NOTE — H&P PST ADULT - HISTORY OF PRESENT ILLNESS
81 y.o WD, WN male presents to PST with hx of left kidney stones. Patient presented to ER back in April with left flank pain, diagnostics revealing left kidney stones. He had a cystoscopy and left stent placed. Patient's hx significant for bladder cancer, HTN, and DM. He currently denies hematuria , dysuria or flank pain. Patient has followed with surgeon and now scheduled for Cystoscopy ,Left Ureteroscopy, Laser  Lithotripsy, Stone Extraction and Ureteral Stent Exchange

## 2022-06-02 ENCOUNTER — APPOINTMENT (OUTPATIENT)
Dept: INTERNAL MEDICINE | Facility: CLINIC | Age: 82
End: 2022-06-02
Payer: MEDICARE

## 2022-06-02 VITALS
HEIGHT: 73 IN | OXYGEN SATURATION: 96 % | SYSTOLIC BLOOD PRESSURE: 126 MMHG | WEIGHT: 245 LBS | HEART RATE: 95 BPM | RESPIRATION RATE: 16 BRPM | DIASTOLIC BLOOD PRESSURE: 72 MMHG | BODY MASS INDEX: 32.47 KG/M2 | TEMPERATURE: 97.3 F

## 2022-06-02 DIAGNOSIS — E87.6 HYPOKALEMIA: ICD-10-CM

## 2022-06-02 DIAGNOSIS — Z01.818 ENCOUNTER FOR OTHER PREPROCEDURAL EXAMINATION: ICD-10-CM

## 2022-06-02 DIAGNOSIS — N20.1 CALCULUS OF URETER: ICD-10-CM

## 2022-06-02 PROCEDURE — 99214 OFFICE O/P EST MOD 30 MIN: CPT

## 2022-06-06 PROBLEM — E87.6 HYPOKALEMIA: Status: ACTIVE | Noted: 2019-08-16

## 2022-06-06 NOTE — ASSESSMENT
[FreeTextEntry4] : EKG sinus bradycardia, sinus arrhythmia , first degree AVB, within acceptable limits.\par Potassium is decreased at 3.o.  Labs are otherwise within acceptable limits.\par I have increased the dose of potassium chloride, and will recheck potassium.\par There are no medical contraindications to proceeding with the planned surgery.\par

## 2022-06-06 NOTE — HISTORY OF PRESENT ILLNESS
[No Pertinent Cardiac History] : no history of aortic stenosis, atrial fibrillation, coronary artery disease, recent myocardial infarction, or implantable device/pacemaker [No Pertinent Pulmonary History] : no history of asthma, COPD, sleep apnea, or smoking [No Adverse Anesthesia Reaction] : no adverse anesthesia reaction in self or family member [Diabetes] : diabetes [(Patient denies any chest pain, claudication, dyspnea on exertion, orthopnea, palpitations or syncope)] : Patient denies any chest pain, claudication, dyspnea on exertion, orthopnea, palpitations or syncope [Chronic Anticoagulation] : no chronic anticoagulation [Chronic Kidney Disease] : no chronic kidney disease [FreeTextEntry1] : ureteroscopy and stone removal [FreeTextEntry2] : 6/13/22 [FreeTextEntry3] : Dr. Mittal [FreeTextEntry4] : [facility] CUI,  44, is here for presurgical evaluation.\par Overall feeling well. Sometimes feels dizzy since having ureteral stent placed..\par Pt denies chest pain, dyspnea, palpitations, fever, chills, or sweats

## 2022-06-10 LAB
ALBUMIN SERPL ELPH-MCNC: 4.5 G/DL
ALP BLD-CCNC: 99 U/L
ALT SERPL-CCNC: 17 U/L
ANION GAP SERPL CALC-SCNC: 16 MMOL/L
AST SERPL-CCNC: 19 U/L
BILIRUB SERPL-MCNC: 0.7 MG/DL
BUN SERPL-MCNC: 19 MG/DL
CALCIUM SERPL-MCNC: 10.3 MG/DL
CHLORIDE SERPL-SCNC: 96 MMOL/L
CO2 SERPL-SCNC: 28 MMOL/L
CREAT SERPL-MCNC: 1.2 MG/DL
EGFR: 62 ML/MIN/1.73M2
GLUCOSE SERPL-MCNC: 118 MG/DL
POTASSIUM SERPL-SCNC: 3.7 MMOL/L
PROT SERPL-MCNC: 7 G/DL
SODIUM SERPL-SCNC: 140 MMOL/L

## 2022-06-13 ENCOUNTER — APPOINTMENT (OUTPATIENT)
Dept: UROLOGY | Facility: HOSPITAL | Age: 82
End: 2022-06-13

## 2022-06-13 ENCOUNTER — OUTPATIENT (OUTPATIENT)
Dept: INPATIENT UNIT | Facility: HOSPITAL | Age: 82
LOS: 1 days | Discharge: HOME | End: 2022-06-13
Payer: MEDICARE

## 2022-06-13 ENCOUNTER — TRANSCRIPTION ENCOUNTER (OUTPATIENT)
Age: 82
End: 2022-06-13

## 2022-06-13 VITALS
HEART RATE: 67 BPM | SYSTOLIC BLOOD PRESSURE: 136 MMHG | OXYGEN SATURATION: 95 % | RESPIRATION RATE: 16 BRPM | TEMPERATURE: 98 F | DIASTOLIC BLOOD PRESSURE: 84 MMHG

## 2022-06-13 VITALS
SYSTOLIC BLOOD PRESSURE: 153 MMHG | DIASTOLIC BLOOD PRESSURE: 90 MMHG | OXYGEN SATURATION: 96 % | WEIGHT: 251.33 LBS | HEIGHT: 73 IN | RESPIRATION RATE: 16 BRPM | TEMPERATURE: 98 F | HEART RATE: 65 BPM

## 2022-06-13 DIAGNOSIS — N20.1 CALCULUS OF URETER: ICD-10-CM

## 2022-06-13 DIAGNOSIS — Z98.890 OTHER SPECIFIED POSTPROCEDURAL STATES: Chronic | ICD-10-CM

## 2022-06-13 DIAGNOSIS — Z90.89 ACQUIRED ABSENCE OF OTHER ORGANS: Chronic | ICD-10-CM

## 2022-06-13 LAB — SARS-COV-2 N GENE NPH QL NAA+PROBE: NOT DETECTED

## 2022-06-13 PROCEDURE — 52351 CYSTOURETERO & OR PYELOSCOPE: CPT | Mod: LT

## 2022-06-13 PROCEDURE — C1769: CPT

## 2022-06-13 PROCEDURE — C1889: CPT

## 2022-06-13 PROCEDURE — C2617: CPT

## 2022-06-13 PROCEDURE — 76000 FLUOROSCOPY <1 HR PHYS/QHP: CPT

## 2022-06-13 PROCEDURE — 87186 SC STD MICRODIL/AGAR DIL: CPT

## 2022-06-13 PROCEDURE — 87077 CULTURE AEROBIC IDENTIFY: CPT

## 2022-06-13 PROCEDURE — 87086 URINE CULTURE/COLONY COUNT: CPT

## 2022-06-13 RX ORDER — SODIUM CHLORIDE 9 MG/ML
1000 INJECTION, SOLUTION INTRAVENOUS
Refills: 0 | Status: DISCONTINUED | OUTPATIENT
Start: 2022-06-13 | End: 2022-06-13

## 2022-06-13 RX ORDER — OXYCODONE HYDROCHLORIDE 5 MG/1
5 TABLET ORAL ONCE
Refills: 0 | Status: DISCONTINUED | OUTPATIENT
Start: 2022-06-13 | End: 2022-06-13

## 2022-06-13 RX ORDER — ONDANSETRON 8 MG/1
4 TABLET, FILM COATED ORAL ONCE
Refills: 0 | Status: DISCONTINUED | OUTPATIENT
Start: 2022-06-13 | End: 2022-06-13

## 2022-06-13 RX ORDER — FENTANYL CITRATE 50 UG/ML
25 INJECTION INTRAVENOUS
Refills: 0 | Status: DISCONTINUED | OUTPATIENT
Start: 2022-06-13 | End: 2022-06-13

## 2022-06-13 RX ORDER — PHENAZOPYRIDINE HCL 100 MG
200 TABLET ORAL ONCE
Refills: 0 | Status: DISCONTINUED | OUTPATIENT
Start: 2022-06-13 | End: 2022-06-13

## 2022-06-13 RX ORDER — PHENAZOPYRIDINE HCL 100 MG
1 TABLET ORAL
Qty: 9 | Refills: 0
Start: 2022-06-13 | End: 2022-06-15

## 2022-06-13 RX ORDER — ACETAMINOPHEN 500 MG
1000 TABLET ORAL ONCE
Refills: 0 | Status: DISCONTINUED | OUTPATIENT
Start: 2022-06-13 | End: 2022-06-13

## 2022-06-13 NOTE — ASU DISCHARGE PLAN (ADULT/PEDIATRIC) - NS MD DC FALL RISK RISK
For information on Fall & Injury Prevention, visit: [email or web address] OR  [email or web address] OR  https://www.cdc.gov/steadi/patient.html

## 2022-06-13 NOTE — ASU DISCHARGE PLAN (ADULT/PEDIATRIC) - CLICK TO LAUNCH ORM
Health Maintenance Summary     Topic Due On Due Status Completed On    MAMMOGRAM - BREAST CANCER SCREENING Aug 16, 2019 Not Due Aug 16, 2017    Colorectal Cancer Screening - Colonoscopy Jul 2, 2020 Not Due Jul 2, 2010    Immunization - TDAP Pregnancy  Hidden     IMMUNIZATION - DTaP/Tdap/Td May 14, 2025 Not Due May 14, 2015    Immunization-Influenza  Completed Nov 13, 2017    Lung Cancer Screening Dec 30, 2010 Overdue           Patient is due for topics as listed above, she wishes to discuss with provider .            Chief Complaint   Patient presents with   • Itching     every area of body       ALLERGIES:   Allergen Reactions   • Dronedarone RASH   • Morphine Nausea & Vomiting     Morphine PATCH         Current Outpatient Prescriptions   Medication Sig Dispense Refill   • dilTIAZem (CARTIA XT) 180 MG 24 hr capsule Take 180 mg by mouth daily.     • apixaban (ELIQUIS) 5 MG Tab Take 5 mg by mouth every 12 hours.     • triamcinolone (ARISTOCORT) 0.1 % cream Apply topically 2 times daily as needed (itching). For 2 weeks. 30 g 2   • flecainide (TAMBOCOR) 50 MG tablet Take 1 tablet by mouth 2 times daily. 60 tablet 1   • diclofenac (VOLTAREN) 75 MG EC tablet Take 1 tablet by mouth 2 times daily. 60 tablet 1   • aspirin 325 MG tablet Take 1 tablet by mouth daily. 90 tablet 0   • DULoxetine (CYMBALTA) 20 MG capsule Take 1 capsule by mouth daily. 30 capsule 5   • atorvastatin (LIPITOR) 10 MG tablet Take 1 tablet by mouth daily. 90 tablet 3   • metoPROLOL (TOPROL-XL) 50 MG 24 hr tablet Take 1 tablet by mouth daily. 90 tablet 3   • albuterol 108 (90 BASE) MCG/ACT inhaler Inhale 2 puffs into the lungs every 4 hours as needed for Shortness of Breath or Wheezing. 1 Inhaler 11   • predniSONE (DELTASONE) 10 MG tablet Take 4 tabs daily x3 days, then 3 tabs daily x3 days, then 2 tabs daily x3 days, then 1 tab daily x3 days. 30 tablet 0   • amiodarone (PACERONE,CORDARONE) 200 MG tablet Take 200 mg by mouth daily.     •  amoxicillin-clavulanate (AUGMENTIN) 875-125 MG per tablet Take 1 tablet by mouth 2 times daily. 28 tablet 0     No current facility-administered medications for this visit.        Patient Active Problem List   Diagnosis   • Pyelonephritis   • COPD, mild (CMS/HCC)   • DJD (degenerative joint disease)   • Fibromyalgia   • Smoking   • GERD (gastroesophageal reflux disease)   • Eustachian tube disorder   • Atrial fibrillation (CMS/HCC)   • Hyperlipidemia   • Chronic back pain   • Neck pain   • Cervical radiculopathy   • Sprain of medial collateral ligament of left knee   • Sprain of left rotator cuff capsule   • Diverticulitis of intestine   • Tinea corporis   • CVA (cerebral vascular accident) (CMS/[facility])   • Thyroid nodule   • Atopic dermatitis       Past Medical History:   Diagnosis Date   • Atrial fibrillation (CMS/HCC)    • Cervical radiculopathy 7/25/2017   • Chronic back pain 7/25/2017   • CVA (cerebral vascular accident) (CMS/HCC) 12/16/2017   • Diverticulitis    • DJD (degenerative joint disease)    • Fibromyalgia    • Neck pain 7/25/2017   • Thyroid nodule 1/4/2018       Past Surgical History:   Procedure Laterality Date   • Ir lymph node/thyroid biopsy Bilateral 04/04/2018   • Joint replacement     • Total knee replacement      Right knee       Social History     Social History   • Marital status:      Spouse name: N/A   • Number of children: N/A   • Years of education: N/A     Occupational History   • Not on file.     Social History Main Topics   • Smoking status: Former Smoker     Types: Cigarettes     Start date: 1/1/1973     Quit date: 12/16/2017   • Smokeless tobacco: Never Used      Comment: 5 cigs/day   • Alcohol use 1.2 - 1.8 oz/week     2 - 3 Cans of beer per week   • Drug use: Unknown   • Sexual activity: Not on file     Other Topics Concern   • Not on file     Social History Narrative   • No narrative on file       Family history is unknown by patient.    HPI:  Interval Hx  Note 5/22/2018:   Patient is here for itching on every area of body. Started 2-3 weeks ago. Thought it might be related to the amiodarone and saw her cardiologist yesterday. He did not think it was due to the amiodarone though he is stopping the amiodarone and will be starting another medicine in a month. This will be in its place. She is over the trunk as well as in the antecubital fossa and behind the knees. The itch is quite intense and getting worse. She had used a new shampoo before this started but then stopped it as soon as she noticed itching from it. No other changes in soaps detergents or fabric softeners. There've been no change in the shortness of breath. Is sleeping okay other than the itch. She is faithfully taking all of her medicines.  Note 3/26/2018:   Here due to a diverticulitis flare up, started Friday and got much worse by Saturday, had to leave work Saturday, couldn't stay out of the bathroom.   Having mucousy stools but no blood yet.  Vomited with some cramps.  Eating ok but has been eating lots of salads.  Has been careful with her avoidances because of her hx of diverticulitis.  Note 1/9/2018: Here due to worsened rash. Spreading and more itchy. Coworker who has confirmed ring worm.  Was started on multaq before this started.  This was about 6-7 days ago.  The breast area is better but now more areas and itching on theabd.  The multaq was stopped yesterday due to too expensive.     Note 1/4/2017: Here due to:  1)  rash, it's under breasts and armpits, also had a red spot on upper lip, has a coworker who has ring worm.   2)  Had a large stroke December 16, 2017 (complete aphasia and complete L hemiparesis), went to [facility] and received tPA and 100% recovery, on a very strict diet, now see cardiologist . Back to work on 12/26/17  3)  Requesting biopsy on thyroid.  During recent hosp found large thyroid nodules and told to have them biopsied soon.   Will need the US and then arrange  Note 11/13/2017:  Here for 2 week follow up. Taking Diclofenac with great benefit. Requesting yeast infection meds.  The L knee is fine now.  The L arm is better but not resolved.  Still lots of pain before each dose of the diclofenac.  Also the pain in the shoulder is awakening her at night.  The pain radiates down the arm but no numbness or tingling.  The pain is much better 1-2 hours after taking the med  The MRI shows Moderate tendinosis and partial-thickness tearing of the supraspinatus tendon and some bursitis.  Note  10/30/2017:  Was climbing up into a truck yesterday.  Boyfriend help push her up but the L knee buckled and severe pain.  Using a cane but didn't use it to walk back to the exam room.  No previous injury to the L knee.  R with TKA.  L arm hurts worse than previous.  Initial pain was in the shoulder.  Previous xray looked that she had rotator cuff injury.  Now severe pain with abduction.    Note  07/25/2017:  Patient in clinic today for chronic lower back and  Lt arm.  See fibro below. Pharmacy required prior auth for the lyrica and needing something today if possible.  Printed norco while awaiting the prior auth of the lyrica.    Note 6/13/17: Here with left ear ache for 1 week. Started with plugging and now it just aches. Was dizzy and light headed yesterday with little headaches.  Note 4/6/17:  Doing not very well (see below) and need refill on the protonix.  Just saw cardiology yesterday  Note 10/13/16: Pt is here for 3 month f/u. Pt states that she is okay. Pt would like to discuss losing weight. Willing to consider weight loss surgery.  Note 7/7/16:  Feels she is 100% better.  Didn't get the CXR because was called into work yesterday.  Is having some popping and discomfort in the ears.  No fever or sweats.  No cp.  No nausea, vomiting or diarrhea.  Wants something for reflux as she did in the past because of the risk of aspiration having caused the pneumonia above.    Note 6/27/16:  Pt is here for f/u   Had  the CXR  Which showed pneumonia.  Is 50 % better and the cough is breaking up and productive.  Is definitely better.    Note 6/25/16: Pt's here today c/o bronchitis. Sx's include coughing, rib pain (from coughing), sob, wheezing and crackling. No cp.  Started on Tuesday.  Hands felt hot and some chills.  Cough just started coughing stuff up.  No fam members with similar illness.  Has taken albuterol but inhaler ran out.  Also took mucinex.  The inhaler helped.    Note 2/1/16:  Pt c/o sinus problems: sinus congestion, stuffy and runny nose, cough and Lt ear pain & popping. Cough is productive w/ greenish-yellowish phlegm. Sx's started wed w/ some nausea and vomiting as well.  No fever, sweats or chills. Denies exertional chest pain or pnd, but some orthopnea. No one else sick w/ similar sx's. Pt was in [place] when sx's started.  Note 11/16/15:  Feels her heart that is beating very hard and intermittently very fast.  Happening every day for the last couple of months and getting worse.  Under a lot of stress.  Sometimes the sensation is all day.  No cp but some sob and lightheadedness when happens.  Has not gone to the ER.  No PND or orthopnea.  Started smoking again a month ago.  Will return to chantix.    Note 7/16/15:   Pt is here for 1 month f/u. Quit smoking! Has colonoscopy scheduled July 29 th. Still is having some leakage. Also pt still thinks she has yeast in her. C/o of itching and face rash. No longer needed inhalers. Has stopped eating salt    Note 5/14/15:  Pt here for CPE. C/o vaginal odor and itch.  Pt states she has blood (BRB) in her stools, and has bowel leakage. Has hx of colon polyps.  Last colonoscopy 5 yrs ago.  LMP in her 20s when had complete hyster.    Note: 9/11/14:   patient here today c/o abdominal pain and frequent urination with burning that all started Saturday.  A bit of fever today and felt warm but no sweats.  A little low back pain yesterday afternoon.  No known kidney problems.  No  bladder infections in the last year.  Her previous was many years ago. Started drinking cranberry juice, but no relief.   Colonic polyps and fam hx of colon ca:  Note 10/13/16: never did get the colonoscopy a year ago.  Note 7/16/15:  Has colonoscopy scheduled   Note 5/14/15:  Last scope 2010 and needs repeat  Diverticulitis:     Note 3/26/2018:   Flare started this past Friday.  No fever today but did have a fever yesterday.  See above  Note 11/13/2017: was seen in the ER last week and started on levaquin and flagyl.  Is 50% better.  Now has a yeast infection.  Note 4/6/17:  No bouts  Note 10/13/16: no bouts  Note 5/14/15:  Last bout 2010 and told she has a stricture.  No constipation  Fibromyalgia:   Note  07/25/2017:   When this was first diagnosed this was severe low back pain and diagnosed as the fibro in 8/2001.  Has had little bouts of low back pain off and on since then.  In 2014 had another severe pain and went to the [facility] and received pain meds.  Saw Dr Akins who gave steroid injection in the back.  Better again until 2 weeks ago when bending to  something and has been flaring off and on and then the L arm/shoulder bothering again.  Won't seem to relieve.  The pain is unbearable.  No radiation of the pain.  Goes from the L to the R hips and into the groin,  Also in the lumbar region.  The L arm pain radiates from the shoulder to the fingertips of the index and middle fingers.  The shoulder is also very tender.  Injured the shoulder 18 months ago at work.  Note 4/6/17:  Mor arm pain recently.  Thinking about taking something.  Not just in the joints and is all over the arms - maxim the upper arms.  L>R but both are involved.  Has used an antidepressant in the past but doesn't recall the name  Note 10/13/16:  This is ok and tolerating the pain.  Note 7/16/15:  This is ok and tolerating the pain.  Note 5/14/15:  Doing ok w/o meds at present  DJD:    Note 5/14/15:  TKR R, L knee is bad  SMoking  behavior:  Note 1/4/2018:   Stopped smoking 12/16/17  Note 4/6/17:  >1/2 ppd.  Plans to quit and will start on Monday    Note 10/13/16:  Still about 1/2 ppd.    Note 6/25/16: smoking about 1/2 ppd but not since she became ill,  Note 2/1/16:  Smoking a little again but stopping in a week or so.  Note 7/16/15:  Quit 50 days ago!!!!  Still taking chantix but will be stopping it soon.  Note 5/14/15:  Wants chantix.  Very sob and wheezing.  COPD:   Note 1/4/2018:   Doing well.   Note 4/6/17:  Breathing good for the most part.  Note 10/13/16: breathing for the most part is good.  Not using the spiriva.  Note 7/16/15:  Not using the spiriva and doing well after quitting smoking.  Note 5/14/15:  Dx today and will try spiriva  GERD:    Note 4/6/17:  Doing well on the protonix.  Note 10/13/16: doing well on the protonix  Paroxysmal A-Fib:  Note 5/22/2018:  Continues on Eliquis.   Note 1/4/2018:   Stroke 12/16/17 and now on eliquis  Note 4/6/17:  Increased to carvdilol 1 month ago which helped but then it bothered her stomach.  Changed the med to toprol yesterday but hasn't started it yet.  Saw Dr Montgomery yesterday    Note 10/13/16: takes aspirin 325 mg daily for stroke prevention.    ^CHOL:   Note 1/4/2018:   conts on lipitor.    Note 4/6/17:  Will start lipitor.  Multinodular goiter:  Note 5/22/2018:  This was found at [facility] when she was hospitalized for a stroke. Fine-needle aspirations were performed in April 6 weeks ago and confirmed to colloid nodules. We will repeat the ultrasound in December 2018.    ROS:  See hpi  EXAM:  Vitals:    05/22/18 1209   BP: 142/90   Pulse: 56   Resp: 16   SpO2: 98%   Weight: 118.2 kg   Height: 5' 1\" (1.549 m)   Exam:  Alert, oriented x3/3 and in NAD (no acute distress).  Skin with mild erythema and some xerosis. No noted urticaria. No other macular papular changes.  Ext w/o edema    Most Recent Labs:  Hospital Outpatient Visit on 04/04/2018   Component Date Value Ref Range Status   • PATH  REPORT, FNA 2018    Final                    Value:Name: GABRIEL ROCK          MRN:     [id number]    :  1953                     Visit#:  [id number]                            Cytology Report        Client: [facility]                      Submitting Physician:Josh Marvin,        Additional Physician(s): Timothy Chery MD        Date Specimen Collected: 18            Accession #:  [id number]    Date Specimen Received:  18            Requisition #:[id number]    Date Reported:           2018 12:28     Location: [facility]            ______________________________________________________________________________    Cytologic Interpretation :        Right middle thyroid, fine needle aspiration:    - Findings consistent with nodular hyperplasia (colloid nodule) with cystic    change        Ramakrishna Ross MD        ** Electronic Signature (AS) 2018 12:28 **        ______________________________________________________________________________    Clinical Information                          :    Right middle pole thyroid        Specimen(s) Submitted:     RIGHT MIDDLE POLE THYROID FNA        Gross Description:    The specimen consists of twelve slides and 30 ml of red fluid, received in    CytoLyt.  Six of the smears are air-dried and stained with Diff-Quik stain.     The remaining slides are Papanicolaou stained.  The fluid is centrifuged and    one ThinPrep slide is prepared.  The entire specimen is used for processing.        EMN            Microscopic Description:    The sample demonstrates adequate cellularity for diagnosis, and consists of    follicular epithelial cells arranged predominantly in flat 2-dimensional    sheets with a minor component of microfollicular architecture.  There is    variable background colloid with cyst lining cells and cyst contents.  No    monomorphic Hurthle cell population is identified.  Definite features  of    papillary or medullary thyroid carcinoma are not identified.            AS/as 18            Fee Codes:     A: [id number],                           [id number]        Performing Lab Location (Unless otherwise specified):    [place] [id number]       • PATH REPORT, FNA 2018    Final                    Value:Name: GABRIEL ROCK          MRN:     [id number]    :  1953                     Visit#:  [id number]                            Cytology Report        Client: [facility]                      Submitting Physician:Josh Marvin,        Additional Physician(s): Timothy Chery MD        Date Specimen Collected: 18            Accession #:  [id number]    Date Specimen Received:  18            Requisition #:[id number]    Date Reported:           2018 12:52     Location: [facility] [place]            ______________________________________________________________________________    Cytologic Interpretation :        Left thyroid, fine needle aspiration:    - Findings consistent with nodular hyperplasia (colloid nodule)        Ramakrishna Ross MD        ** Electronic Signature (AS) 2018 12:52 **        ______________________________________________________________________________    Clinical Information:    Left Thyroid        Speci                          men(s) Submitted:     LEFT THYROID FNA        Gross Description:    The specimen consists of twelve slides and 30 ml of pink fluid, received in    CytoLyt.  Six of the smears are air-dried and stained with Diff-Quik stain.     The remaining slides are Papanicolaou stained.  The fluid is centrifuged and    one ThinPrep slide is prepared.  The entire specimen is used for processing.        EMN            Microscopic Description:    The sample demonstrates adequate cellularity for diagnosis, and consists of    follicular epithelial cells arranged  predominantly in flat 2-dimensional    sheets with a minor component of microfollicular architecture.  There is    moderate background colloid.  No monomorphic Hurthle cell population is    identified.  Definite features of papillary or medullary thyroid carcinoma are    not identified.            AS/as 04/05/18            Fee Codes:     A: [id number], [id number]        Performing Lab Location (Unless otherwise specified):    [place] [id number]           Diagnoses at this visit include:  1. Atopic dermatitis, unspecified type    2. Paroxysmal atrial fibrillation (CMS/HCC)    3. Laboratory examination ordered as part of a routine general medical examination    4. Thyroid nodule      Please see the diagnoses for this visit, medications ordered and continued, instructions, other orders and all planned follow up.   Medications prescribed and Orders from this visit:  Orders Placed This Encounter   • US Thyroid Only   • CBC & Auto Differential   • Lipid Panel with Reflex   • Vitamin D -25 Hydroxy   • Urinalysis with Micro & Culture if Indicated   • Comprehensive Metabolic Panel   • Thyroid Stimulating Hormone   • Free T4   • Free T3   • predniSONE (DELTASONE) 10 MG tablet         Patient Instructions   Take Prednisone 10mg four tablets daily x3 days, three tablets daily x3 days, two tablets daily x3 days then 1 tablet daily x3 days.     Take and over the counter Claritin one tablet daily.    Please return to my office for complete physical exam in 1 month.  Please have fasting labs and urine 2 days prior to appointment.   Do not eat any food or drink any beverages for 12 hours before having the testing done. You may have water only; NO candy, gum, mints, coffee, soda or juice.  Please take all medications as usual. Do not drink any alcoholic beverages for 24 hours prior to testing.          Follow Up:  Return in about 1 month (around 6/22/2018) for  CPE; fasting labs 2 days prior . .           .

## 2022-06-13 NOTE — ASU PATIENT PROFILE, ADULT - FALL HARM RISK - RISK INTERVENTIONS

## 2022-06-13 NOTE — ASU DISCHARGE PLAN (ADULT/PEDIATRIC) - NURSING INSTRUCTIONS
Refer to the multicolored fact sheet for any problems you experience. If you have difficulty urinating or unable to urinate in 8 hours after surgery, call your Dr., or return to  emergency room.  Notify your Dr. if your fever is 101 or greater. If the pain medicine your  recphilnds does not help you, or you have severe pain call your Dr.. If you cannot reach the doctor, call [facility] Emergency Department at [phone] or go to your local Emergency Department. A responsible adult should be with you for the rest of the day and night for your safety, and to help you. Apply ice to affected area 20min on and 20min off for the  first 24-48 hours. Do not apply directly to skin, place barrier between ice and skin.  Resume your medications as listed on the attached Medication Record.

## 2022-06-13 NOTE — ASU DISCHARGE PLAN (ADULT/PEDIATRIC) - CARE PROVIDER_API CALL
Darron Mittal)  Urology  [address], [address] [place] [address]  Phone: [phone]  Fax: [phone]  Scheduled Appointment: 06/17/2022 03:00 PM

## 2022-06-13 NOTE — ASU PATIENT PROFILE, ADULT - NSICDXPASTMEDICALHX_GEN_ALL_CORE_FT
PAST MEDICAL HISTORY:  Bladder cancer 2014    BPH (benign prostatic hyperplasia)     CA skin, basal cell     Depression     DM (diabetes mellitus) Type 2    Herniated nucleus pulposus, L5-S1     HTN (hypertension)     Hypercholesterolemia     Kidney stones Left    Lumbar spinal stenosis     OA (osteoarthritis)     Uses hearing aid

## 2022-06-13 NOTE — ASU PATIENT PROFILE, ADULT - PATIENT REPRESENTATIVE: ( YOU CAN CHOOSE ANY PERSON THAT CAN ASSIST YOU WITH YOUR HEALTH CARE PREFERENCES, DOES NOT HAVE TO BE A SPOUSE, IMMEDIATE FAMILY OR SIGNIFICANT OTHER/PARTNER)
Your current Orthopaedic problem we are working together to treat is:  Left hip pain.        PHYSICAL THERAPY/OCCUPATIONAL THERAPY  Physical Therapy  will help your recovery. Please call to schedule your therapy appointments at [facility], [phone]. It would be advisable to follow up with me upon completion of your therapy.       It is recommended you schedule a follow-up appointment with Helder Fischer MD  4 weeks.      Office hours are 8:00 am to 5:00 pm Monday through Friday.  If it is urgent that you speak with someone outside of these hours, our [facility] Call Center will be able to assist you.  You can reach the office by calling the:  [address]  [place]  [address] [phone]    We do highly recommend [employer], if you do not already have this.  You can request access via the internet or by simply talking with a  at any of the clinics.   [email or web address].    You may receive a survey in the mail from a company called [employer].  They mail and process patient satisfaction surveys for our clinic.  Should you receive a survey, please take a few minutes to rate your experience with your visit.  We value your opinions and insights.  Thank you in advance for your time and interest in responding.    Thank you for choosing [facility] as your Orthopaedic provider!   Declines

## 2022-06-16 DIAGNOSIS — I25.10 ATHEROSCLEROTIC HEART DISEASE OF NATIVE CORONARY ARTERY WITHOUT ANGINA PECTORIS: ICD-10-CM

## 2022-06-16 DIAGNOSIS — I10 ESSENTIAL (PRIMARY) HYPERTENSION: ICD-10-CM

## 2022-06-16 DIAGNOSIS — E78.00 PURE HYPERCHOLESTEROLEMIA, UNSPECIFIED: ICD-10-CM

## 2022-06-16 DIAGNOSIS — Z79.84 LONG TERM (CURRENT) USE OF ORAL HYPOGLYCEMIC DRUGS: ICD-10-CM

## 2022-06-16 DIAGNOSIS — Z87.442 PERSONAL HISTORY OF URINARY CALCULI: ICD-10-CM

## 2022-06-16 DIAGNOSIS — R01.1 CARDIAC MURMUR, UNSPECIFIED: ICD-10-CM

## 2022-06-16 DIAGNOSIS — M51.27 OTHER INTERVERTEBRAL DISC DISPLACEMENT, LUMBOSACRAL REGION: ICD-10-CM

## 2022-06-16 DIAGNOSIS — M19.90 UNSPECIFIED OSTEOARTHRITIS, UNSPECIFIED SITE: ICD-10-CM

## 2022-06-16 DIAGNOSIS — Z85.828 PERSONAL HISTORY OF OTHER MALIGNANT NEOPLASM OF SKIN: ICD-10-CM

## 2022-06-16 DIAGNOSIS — E11.9 TYPE 2 DIABETES MELLITUS WITHOUT COMPLICATIONS: ICD-10-CM

## 2022-06-16 DIAGNOSIS — N40.0 BENIGN PROSTATIC HYPERPLASIA WITHOUT LOWER URINARY TRACT SYMPTOMS: ICD-10-CM

## 2022-06-16 DIAGNOSIS — Z85.51 PERSONAL HISTORY OF MALIGNANT NEOPLASM OF BLADDER: ICD-10-CM

## 2022-06-16 DIAGNOSIS — E78.5 HYPERLIPIDEMIA, UNSPECIFIED: ICD-10-CM

## 2022-06-16 DIAGNOSIS — Z87.891 PERSONAL HISTORY OF NICOTINE DEPENDENCE: ICD-10-CM

## 2022-06-16 DIAGNOSIS — N20.1 CALCULUS OF URETER: ICD-10-CM

## 2022-06-16 DIAGNOSIS — E66.9 OBESITY, UNSPECIFIED: ICD-10-CM

## 2022-06-16 DIAGNOSIS — Z88.2 ALLERGY STATUS TO SULFONAMIDES: ICD-10-CM

## 2022-06-16 DIAGNOSIS — Q21.1 ATRIAL SEPTAL DEFECT: ICD-10-CM

## 2022-06-16 DIAGNOSIS — Z88.0 ALLERGY STATUS TO PENICILLIN: ICD-10-CM

## 2022-06-16 DIAGNOSIS — F32.A DEPRESSION, UNSPECIFIED: ICD-10-CM

## 2022-06-16 DIAGNOSIS — H91.90 UNSPECIFIED HEARING LOSS, UNSPECIFIED EAR: ICD-10-CM

## 2022-06-17 ENCOUNTER — APPOINTMENT (OUTPATIENT)
Dept: UROLOGY | Facility: CLINIC | Age: 82
End: 2022-06-17
Payer: MEDICARE

## 2022-06-17 VITALS
HEART RATE: 88 BPM | WEIGHT: 245 LBS | OXYGEN SATURATION: 95 % | HEIGHT: 73 IN | BODY MASS INDEX: 32.47 KG/M2 | SYSTOLIC BLOOD PRESSURE: 129 MMHG | DIASTOLIC BLOOD PRESSURE: 79 MMHG

## 2022-06-17 DIAGNOSIS — Z46.6 ENCOUNTER FOR FITTING AND ADJUSTMENT OF URINARY DEVICE: ICD-10-CM

## 2022-06-17 PROBLEM — N40.0 BENIGN PROSTATIC HYPERPLASIA WITHOUT LOWER URINARY TRACT SYMPTOMS: Chronic | Status: ACTIVE | Noted: 2022-06-01

## 2022-06-17 PROBLEM — C67.9 MALIGNANT NEOPLASM OF BLADDER, UNSPECIFIED: Chronic | Status: ACTIVE | Noted: 2017-08-31

## 2022-06-17 PROBLEM — N20.0 CALCULUS OF KIDNEY: Chronic | Status: ACTIVE | Noted: 2022-06-01

## 2022-06-17 PROCEDURE — 52310 CYSTOSCOPY AND TREATMENT: CPT

## 2022-06-20 PROBLEM — Z46.6 ENCOUNTER FOR REMOVAL OF URETERAL STENT: Status: RESOLVED | Noted: 2022-06-20 | Resolved: 2022-06-20

## 2022-06-20 PROBLEM — Z46.6 ENCOUNTER FOR REMOVAL OF URETERAL STENT: Status: ACTIVE | Noted: 2022-06-20

## 2022-06-20 LAB
APPEARANCE: ABNORMAL
BACTERIA UR CULT: NORMAL
BACTERIA: NEGATIVE
BILIRUBIN URINE: ABNORMAL
BLOOD URINE: ABNORMAL
COLOR: ABNORMAL
GLUCOSE QUALITATIVE U: NEGATIVE
HYALINE CASTS: 2 /LPF
KETONES URINE: NEGATIVE
LEUKOCYTE ESTERASE URINE: NEGATIVE
MICROSCOPIC-UA: NORMAL
NITRITE URINE: POSITIVE
PH URINE: 6.5
PROTEIN URINE: ABNORMAL
RED BLOOD CELLS URINE: 40 /HPF
SPECIFIC GRAVITY URINE: 1.01
SQUAMOUS EPITHELIAL CELLS: 1 /HPF
UROBILINOGEN URINE: ABNORMAL
WHITE BLOOD CELLS URINE: 3 /HPF

## 2022-07-03 ENCOUNTER — RX RENEWAL (OUTPATIENT)
Age: 82
End: 2022-07-03

## 2022-07-10 ENCOUNTER — RX RENEWAL (OUTPATIENT)
Age: 82
End: 2022-07-10

## 2022-07-27 ENCOUNTER — RX RENEWAL (OUTPATIENT)
Age: 82
End: 2022-07-27

## 2022-08-18 ENCOUNTER — APPOINTMENT (OUTPATIENT)
Dept: RADIOLOGY | Facility: CLINIC | Age: 82
End: 2022-08-18

## 2022-08-18 ENCOUNTER — OUTPATIENT (OUTPATIENT)
Dept: OUTPATIENT SERVICES | Facility: HOSPITAL | Age: 82
LOS: 1 days | End: 2022-08-18
Payer: MEDICARE

## 2022-08-18 ENCOUNTER — APPOINTMENT (OUTPATIENT)
Dept: ULTRASOUND IMAGING | Facility: CLINIC | Age: 82
End: 2022-08-18

## 2022-08-18 DIAGNOSIS — Z98.890 OTHER SPECIFIED POSTPROCEDURAL STATES: Chronic | ICD-10-CM

## 2022-08-18 DIAGNOSIS — Z90.89 ACQUIRED ABSENCE OF OTHER ORGANS: Chronic | ICD-10-CM

## 2022-08-18 DIAGNOSIS — N20.1 CALCULUS OF URETER: ICD-10-CM

## 2022-08-18 PROCEDURE — 76770 US EXAM ABDO BACK WALL COMP: CPT | Mod: 26

## 2022-08-18 PROCEDURE — 74018 RADEX ABDOMEN 1 VIEW: CPT

## 2022-08-18 PROCEDURE — 74018 RADEX ABDOMEN 1 VIEW: CPT | Mod: 26

## 2022-08-18 PROCEDURE — 76770 US EXAM ABDO BACK WALL COMP: CPT

## 2022-08-19 ENCOUNTER — APPOINTMENT (OUTPATIENT)
Dept: UROLOGY | Facility: CLINIC | Age: 82
End: 2022-08-19

## 2022-08-19 DIAGNOSIS — N52.9 MALE ERECTILE DYSFUNCTION, UNSPECIFIED: ICD-10-CM

## 2022-08-19 PROCEDURE — 99214 OFFICE O/P EST MOD 30 MIN: CPT

## 2022-08-22 NOTE — ASSESSMENT
[FreeTextEntry1] : Benign Prostatic Hyperplasia:\par Discussed treatment options. Will continue Flomax and Finasteride. \par \par Erectile dysfunction:\par Reviewed pathophysiology and differential diagnosis of erectile dysfunction with the patient. Discussed lifestyle changes. \par The patient was made aware that the current therapies for erectile dysfunction consisting of:\par Oral phosphodiesterase type 5 inhibitors like Viagra/Sildenafil and Cialis/Tadalafil. \par Vacuum erection devices. \par Intraurethral suppository. \par Intracavernous vasoactive drug injection.\par Penile prosthesis implantation.\par Risks and benefits of each were discussed. All questions were answered.\par Will try Sildenafil. \par Asked to try half tablet and then to full tablet as needed.\par Explained the common side effects of therapy including, but not limited to headache, flushing, upset stomach, nasal congestion, abnormal vision, back pain, and myalgia. Asked pt to stop taking the medicine if he develops chest pain, visual or auditory disturbance. Explained priapism- if erection does not go down after ejaculation or lasts more than 4 hrs to present to emergency room. \par \par Ureteral stone:\par No kidney or ureteral stone or hydronephrosis seen. \par \par Return to office in 6 months or sooner if any issues.

## 2022-08-22 NOTE — HISTORY OF PRESENT ILLNESS
[FreeTextEntry1] : 81 year old male presents for follow up. \par Taking Flomax and Finasteride. \par Has reasonable stream, daytime frequency every 3-4 hours, nocturia 1-2 x\par Denies dysuria, hematuria, lower abdominal or flank pain, nausea, vomiting, fever, chills or rigors. \par Rates erections 3-4/5. Has difficulty to attain, maintain and penetrate. \par Normal libido and decrease ejaculation. \par \par Status post left ureteroscopy and ureteral stent exchange on 6/13/22 at [facility]. No ureteral stone seen. \par

## 2022-08-25 ENCOUNTER — RX RENEWAL (OUTPATIENT)
Age: 82
End: 2022-08-25

## 2022-08-27 ENCOUNTER — RX RENEWAL (OUTPATIENT)
Age: 82
End: 2022-08-27

## 2022-08-30 NOTE — HISTORY OF PRESENT ILLNESS
[de-identified] : Here for follow-up . The 14 day fingerstick average is 132.\par Pt c/o non-itchy rash on both arms for 1 month and less so on the arms.
No

## 2022-09-03 ENCOUNTER — RX RENEWAL (OUTPATIENT)
Age: 82
End: 2022-09-03

## 2022-09-06 ENCOUNTER — APPOINTMENT (OUTPATIENT)
Dept: INTERNAL MEDICINE | Facility: CLINIC | Age: 82
End: 2022-09-06

## 2022-09-06 VITALS — DIASTOLIC BLOOD PRESSURE: 80 MMHG | SYSTOLIC BLOOD PRESSURE: 136 MMHG

## 2022-09-06 VITALS
TEMPERATURE: 97.3 F | OXYGEN SATURATION: 94 % | RESPIRATION RATE: 16 BRPM | HEIGHT: 73 IN | SYSTOLIC BLOOD PRESSURE: 132 MMHG | BODY MASS INDEX: 33.13 KG/M2 | DIASTOLIC BLOOD PRESSURE: 90 MMHG | WEIGHT: 250 LBS | HEART RATE: 75 BPM

## 2022-09-06 PROCEDURE — 99214 OFFICE O/P EST MOD 30 MIN: CPT

## 2022-10-05 ENCOUNTER — RX RENEWAL (OUTPATIENT)
Age: 82
End: 2022-10-05

## 2022-11-22 ENCOUNTER — RX RENEWAL (OUTPATIENT)
Age: 82
End: 2022-11-22

## 2022-12-23 ENCOUNTER — RX RENEWAL (OUTPATIENT)
Age: 82
End: 2022-12-23

## 2023-01-17 ENCOUNTER — RX RENEWAL (OUTPATIENT)
Age: 83
End: 2023-01-17

## 2023-01-22 ENCOUNTER — RX RENEWAL (OUTPATIENT)
Age: 83
End: 2023-01-22

## 2023-02-15 ENCOUNTER — APPOINTMENT (OUTPATIENT)
Dept: UROLOGY | Facility: CLINIC | Age: 83
End: 2023-02-15

## 2023-02-21 ENCOUNTER — RX RENEWAL (OUTPATIENT)
Age: 83
End: 2023-02-21

## 2023-03-02 ENCOUNTER — INPATIENT (INPATIENT)
Facility: HOSPITAL | Age: 83
LOS: 6 days | Discharge: HOME HEALTH | DRG: 853 | End: 2023-03-09
Attending: INTERNAL MEDICINE | Admitting: HOSPITALIST
Payer: MEDICARE

## 2023-03-02 VITALS
SYSTOLIC BLOOD PRESSURE: 173 MMHG | OXYGEN SATURATION: 91 % | WEIGHT: 250 LBS | TEMPERATURE: 98 F | HEIGHT: 73 IN | HEART RATE: 78 BPM | RESPIRATION RATE: 18 BRPM | DIASTOLIC BLOOD PRESSURE: 101 MMHG

## 2023-03-02 DIAGNOSIS — Z98.890 OTHER SPECIFIED POSTPROCEDURAL STATES: Chronic | ICD-10-CM

## 2023-03-02 DIAGNOSIS — Z90.89 ACQUIRED ABSENCE OF OTHER ORGANS: Chronic | ICD-10-CM

## 2023-03-02 DIAGNOSIS — N20.0 CALCULUS OF KIDNEY: ICD-10-CM

## 2023-03-02 DIAGNOSIS — R09.89 OTHER SPECIFIED SYMPTOMS AND SIGNS INVOLVING THE CIRCULATORY AND RESPIRATORY SYSTEMS: ICD-10-CM

## 2023-03-02 LAB
ADD ON TEST-SPECIMEN IN LAB: SIGNIFICANT CHANGE UP
ALBUMIN SERPL ELPH-MCNC: 3.5 G/DL — SIGNIFICANT CHANGE UP (ref 3.3–5)
ALBUMIN SERPL ELPH-MCNC: 3.7 G/DL — SIGNIFICANT CHANGE UP (ref 3.3–5)
ALP SERPL-CCNC: 77 U/L — SIGNIFICANT CHANGE UP (ref 40–120)
ALP SERPL-CCNC: 84 U/L — SIGNIFICANT CHANGE UP (ref 40–120)
ALT FLD-CCNC: 24 U/L — SIGNIFICANT CHANGE UP (ref 12–78)
ALT FLD-CCNC: 27 U/L — SIGNIFICANT CHANGE UP (ref 12–78)
ANION GAP SERPL CALC-SCNC: 5 MMOL/L — SIGNIFICANT CHANGE UP (ref 5–17)
ANION GAP SERPL CALC-SCNC: 7 MMOL/L — SIGNIFICANT CHANGE UP (ref 5–17)
ANISOCYTOSIS BLD QL: SLIGHT — SIGNIFICANT CHANGE UP
APPEARANCE UR: CLEAR — SIGNIFICANT CHANGE UP
AST SERPL-CCNC: 20 U/L — SIGNIFICANT CHANGE UP (ref 15–37)
AST SERPL-CCNC: 23 U/L — SIGNIFICANT CHANGE UP (ref 15–37)
BACTERIA # UR AUTO: ABNORMAL
BASE EXCESS BLDA CALC-SCNC: 2.4 MMOL/L — SIGNIFICANT CHANGE UP (ref -2–3)
BASO STIPL BLD QL SMEAR: PRESENT — SIGNIFICANT CHANGE UP
BASOPHILS # BLD AUTO: 0.01 K/UL — SIGNIFICANT CHANGE UP (ref 0–0.2)
BASOPHILS # BLD AUTO: 0.03 K/UL — SIGNIFICANT CHANGE UP (ref 0–0.2)
BASOPHILS NFR BLD AUTO: 0.1 % — SIGNIFICANT CHANGE UP (ref 0–2)
BASOPHILS NFR BLD AUTO: 0.4 % — SIGNIFICANT CHANGE UP (ref 0–2)
BILIRUB SERPL-MCNC: 0.5 MG/DL — SIGNIFICANT CHANGE UP (ref 0.2–1.2)
BILIRUB SERPL-MCNC: 1.1 MG/DL — SIGNIFICANT CHANGE UP (ref 0.2–1.2)
BILIRUB UR-MCNC: NEGATIVE — SIGNIFICANT CHANGE UP
BLOOD GAS COMMENTS ARTERIAL: SIGNIFICANT CHANGE UP
BUN SERPL-MCNC: 20 MG/DL — SIGNIFICANT CHANGE UP (ref 7–23)
BUN SERPL-MCNC: 23 MG/DL — SIGNIFICANT CHANGE UP (ref 7–23)
CALCIUM SERPL-MCNC: 8.8 MG/DL — SIGNIFICANT CHANGE UP (ref 8.5–10.1)
CALCIUM SERPL-MCNC: 9.2 MG/DL — SIGNIFICANT CHANGE UP (ref 8.5–10.1)
CHLORIDE SERPL-SCNC: 101 MMOL/L — SIGNIFICANT CHANGE UP (ref 96–108)
CHLORIDE SERPL-SCNC: 102 MMOL/L — SIGNIFICANT CHANGE UP (ref 96–108)
CO2 BLDA-SCNC: 28 MMOL/L — HIGH (ref 19–24)
CO2 SERPL-SCNC: 27 MMOL/L — SIGNIFICANT CHANGE UP (ref 22–31)
CO2 SERPL-SCNC: 30 MMOL/L — SIGNIFICANT CHANGE UP (ref 22–31)
COLOR SPEC: YELLOW — SIGNIFICANT CHANGE UP
CREAT SERPL-MCNC: 1.43 MG/DL — HIGH (ref 0.5–1.3)
CREAT SERPL-MCNC: 2.06 MG/DL — HIGH (ref 0.5–1.3)
D DIMER BLD IA.RAPID-MCNC: 245 NG/ML DDU — HIGH
DACRYOCYTES BLD QL SMEAR: SLIGHT — SIGNIFICANT CHANGE UP
DIFF PNL FLD: ABNORMAL
EGFR: 32 ML/MIN/1.73M2 — LOW
EGFR: 50 ML/MIN/1.73M2 — LOW
ELLIPTOCYTES BLD QL SMEAR: SLIGHT — SIGNIFICANT CHANGE UP
EOSINOPHIL # BLD AUTO: 0.01 K/UL — SIGNIFICANT CHANGE UP (ref 0–0.5)
EOSINOPHIL # BLD AUTO: 0.13 K/UL — SIGNIFICANT CHANGE UP (ref 0–0.5)
EOSINOPHIL NFR BLD AUTO: 0.1 % — SIGNIFICANT CHANGE UP (ref 0–6)
EOSINOPHIL NFR BLD AUTO: 1.6 % — SIGNIFICANT CHANGE UP (ref 0–6)
EPI CELLS # UR: SIGNIFICANT CHANGE UP
FLUAV AG NPH QL: SIGNIFICANT CHANGE UP
FLUBV AG NPH QL: SIGNIFICANT CHANGE UP
GLUCOSE BLDC GLUCOMTR-MCNC: 128 MG/DL — HIGH (ref 70–99)
GLUCOSE BLDC GLUCOMTR-MCNC: 152 MG/DL — HIGH (ref 70–99)
GLUCOSE SERPL-MCNC: 154 MG/DL — HIGH (ref 70–99)
GLUCOSE SERPL-MCNC: 192 MG/DL — HIGH (ref 70–99)
GLUCOSE UR QL: NEGATIVE — SIGNIFICANT CHANGE UP
HCO3 BLDA-SCNC: 27 MMOL/L — SIGNIFICANT CHANGE UP (ref 21–28)
HCT VFR BLD CALC: 38.3 % — LOW (ref 39–50)
HCT VFR BLD CALC: 39.3 % — SIGNIFICANT CHANGE UP (ref 39–50)
HGB BLD-MCNC: 12 G/DL — LOW (ref 13–17)
HGB BLD-MCNC: 12.3 G/DL — LOW (ref 13–17)
IMM GRANULOCYTES NFR BLD AUTO: 0.5 % — SIGNIFICANT CHANGE UP (ref 0–0.9)
IMM GRANULOCYTES NFR BLD AUTO: 0.6 % — SIGNIFICANT CHANGE UP (ref 0–0.9)
KETONES UR-MCNC: NEGATIVE — SIGNIFICANT CHANGE UP
LACTATE SERPL-SCNC: 3.1 MMOL/L — HIGH (ref 0.7–2)
LEUKOCYTE ESTERASE UR-ACNC: ABNORMAL
LG PLATELETS BLD QL AUTO: SLIGHT — SIGNIFICANT CHANGE UP
LIDOCAIN IGE QN: 203 U/L — SIGNIFICANT CHANGE UP (ref 73–393)
LYMPHOCYTES # BLD AUTO: 0.32 K/UL — LOW (ref 1–3.3)
LYMPHOCYTES # BLD AUTO: 1.32 K/UL — SIGNIFICANT CHANGE UP (ref 1–3.3)
LYMPHOCYTES # BLD AUTO: 16.2 % — SIGNIFICANT CHANGE UP (ref 13–44)
LYMPHOCYTES # BLD AUTO: 3.2 % — LOW (ref 13–44)
MAGNESIUM SERPL-MCNC: 1.6 MG/DL — SIGNIFICANT CHANGE UP (ref 1.6–2.6)
MANUAL SMEAR VERIFICATION: SIGNIFICANT CHANGE UP
MCHC RBC-ENTMCNC: 19.9 PG — LOW (ref 27–34)
MCHC RBC-ENTMCNC: 20.1 PG — LOW (ref 27–34)
MCHC RBC-ENTMCNC: 31.3 GM/DL — LOW (ref 32–36)
MCHC RBC-ENTMCNC: 31.3 GM/DL — LOW (ref 32–36)
MCV RBC AUTO: 63.4 FL — LOW (ref 80–100)
MCV RBC AUTO: 64.3 FL — LOW (ref 80–100)
MICROCYTES BLD QL: SIGNIFICANT CHANGE UP
MONOCYTES # BLD AUTO: 0.1 K/UL — SIGNIFICANT CHANGE UP (ref 0–0.9)
MONOCYTES # BLD AUTO: 0.7 K/UL — SIGNIFICANT CHANGE UP (ref 0–0.9)
MONOCYTES NFR BLD AUTO: 1 % — LOW (ref 2–14)
MONOCYTES NFR BLD AUTO: 8.6 % — SIGNIFICANT CHANGE UP (ref 2–14)
NEUTROPHILS # BLD AUTO: 5.91 K/UL — SIGNIFICANT CHANGE UP (ref 1.8–7.4)
NEUTROPHILS # BLD AUTO: 9.43 K/UL — HIGH (ref 1.8–7.4)
NEUTROPHILS NFR BLD AUTO: 72.6 % — SIGNIFICANT CHANGE UP (ref 43–77)
NEUTROPHILS NFR BLD AUTO: 95.1 % — HIGH (ref 43–77)
NITRITE UR-MCNC: NEGATIVE — SIGNIFICANT CHANGE UP
NT-PROBNP SERPL-SCNC: 294 PG/ML — SIGNIFICANT CHANGE UP (ref 0–450)
OVALOCYTES BLD QL SMEAR: SLIGHT — SIGNIFICANT CHANGE UP
PCO2 BLDA: 42 MMHG — SIGNIFICANT CHANGE UP (ref 35–48)
PH BLDA: 7.42 — SIGNIFICANT CHANGE UP (ref 7.35–7.45)
PH UR: 5 — SIGNIFICANT CHANGE UP (ref 5–8)
PLAT MORPH BLD: NORMAL — SIGNIFICANT CHANGE UP
PLATELET # BLD AUTO: 161 K/UL — SIGNIFICANT CHANGE UP (ref 150–400)
PLATELET # BLD AUTO: 192 K/UL — SIGNIFICANT CHANGE UP (ref 150–400)
PO2 BLDA: 62 MMHG — LOW (ref 83–108)
POIKILOCYTOSIS BLD QL AUTO: SLIGHT — SIGNIFICANT CHANGE UP
POLYCHROMASIA BLD QL SMEAR: SLIGHT — SIGNIFICANT CHANGE UP
POTASSIUM SERPL-MCNC: 3.2 MMOL/L — LOW (ref 3.5–5.3)
POTASSIUM SERPL-MCNC: 3.8 MMOL/L — SIGNIFICANT CHANGE UP (ref 3.5–5.3)
POTASSIUM SERPL-SCNC: 3.2 MMOL/L — LOW (ref 3.5–5.3)
POTASSIUM SERPL-SCNC: 3.8 MMOL/L — SIGNIFICANT CHANGE UP (ref 3.5–5.3)
PROT SERPL-MCNC: 6.8 GM/DL — SIGNIFICANT CHANGE UP (ref 6–8.3)
PROT SERPL-MCNC: 7.2 GM/DL — SIGNIFICANT CHANGE UP (ref 6–8.3)
PROT UR-MCNC: NEGATIVE — SIGNIFICANT CHANGE UP
RBC # BLD: 6.04 M/UL — HIGH (ref 4.2–5.8)
RBC # BLD: 6.11 M/UL — HIGH (ref 4.2–5.8)
RBC # FLD: 15.7 % — HIGH (ref 10.3–14.5)
RBC # FLD: 15.9 % — HIGH (ref 10.3–14.5)
RBC BLD AUTO: ABNORMAL
RBC CASTS # UR COMP ASSIST: ABNORMAL /HPF (ref 0–4)
RSV RNA NPH QL NAA+NON-PROBE: SIGNIFICANT CHANGE UP
SAO2 % BLDA: 93 % — LOW (ref 94–98)
SARS-COV-2 RNA SPEC QL NAA+PROBE: SIGNIFICANT CHANGE UP
SCHISTOCYTES BLD QL AUTO: SLIGHT — SIGNIFICANT CHANGE UP
SODIUM SERPL-SCNC: 135 MMOL/L — SIGNIFICANT CHANGE UP (ref 135–145)
SODIUM SERPL-SCNC: 137 MMOL/L — SIGNIFICANT CHANGE UP (ref 135–145)
SP GR SPEC: 1.02 — SIGNIFICANT CHANGE UP (ref 1.01–1.02)
TROPONIN I, HIGH SENSITIVITY RESULT: 5.36 NG/L — SIGNIFICANT CHANGE UP
UROBILINOGEN FLD QL: NEGATIVE — SIGNIFICANT CHANGE UP
WBC # BLD: 8.14 K/UL — SIGNIFICANT CHANGE UP (ref 3.8–10.5)
WBC # BLD: 9.92 K/UL — SIGNIFICANT CHANGE UP (ref 3.8–10.5)
WBC # FLD AUTO: 8.14 K/UL — SIGNIFICANT CHANGE UP (ref 3.8–10.5)
WBC # FLD AUTO: 9.92 K/UL — SIGNIFICANT CHANGE UP (ref 3.8–10.5)
WBC UR QL: ABNORMAL /HPF (ref 0–5)

## 2023-03-02 PROCEDURE — 71275 CT ANGIOGRAPHY CHEST: CPT | Mod: 26

## 2023-03-02 PROCEDURE — 84100 ASSAY OF PHOSPHORUS: CPT

## 2023-03-02 PROCEDURE — C1887: CPT

## 2023-03-02 PROCEDURE — 52001 CYSTO W/IRRG&EVAC MLT CLOTS: CPT

## 2023-03-02 PROCEDURE — 76770 US EXAM ABDO BACK WALL COMP: CPT

## 2023-03-02 PROCEDURE — 85610 PROTHROMBIN TIME: CPT

## 2023-03-02 PROCEDURE — 93005 ELECTROCARDIOGRAM TRACING: CPT

## 2023-03-02 PROCEDURE — 50432 PLMT NEPHROSTOMY CATHETER: CPT | Mod: RT

## 2023-03-02 PROCEDURE — 80053 COMPREHEN METABOLIC PANEL: CPT

## 2023-03-02 PROCEDURE — 87086 URINE CULTURE/COLONY COUNT: CPT

## 2023-03-02 PROCEDURE — 83735 ASSAY OF MAGNESIUM: CPT

## 2023-03-02 PROCEDURE — C1729: CPT

## 2023-03-02 PROCEDURE — 85027 COMPLETE CBC AUTOMATED: CPT

## 2023-03-02 PROCEDURE — 70450 CT HEAD/BRAIN W/O DYE: CPT

## 2023-03-02 PROCEDURE — 82803 BLOOD GASES ANY COMBINATION: CPT

## 2023-03-02 PROCEDURE — 94660 CPAP INITIATION&MGMT: CPT

## 2023-03-02 PROCEDURE — 71045 X-RAY EXAM CHEST 1 VIEW: CPT

## 2023-03-02 PROCEDURE — 74176 CT ABD & PELVIS W/O CONTRAST: CPT | Mod: 26,MA

## 2023-03-02 PROCEDURE — 85379 FIBRIN DEGRADATION QUANT: CPT

## 2023-03-02 PROCEDURE — 83036 HEMOGLOBIN GLYCOSYLATED A1C: CPT

## 2023-03-02 PROCEDURE — 99223 1ST HOSP IP/OBS HIGH 75: CPT

## 2023-03-02 PROCEDURE — 36415 COLL VENOUS BLD VENIPUNCTURE: CPT

## 2023-03-02 PROCEDURE — 87077 CULTURE AEROBIC IDENTIFY: CPT

## 2023-03-02 PROCEDURE — C2625: CPT

## 2023-03-02 PROCEDURE — 50434 CONVERT NEPHROSTOMY CATHETER: CPT | Mod: RT

## 2023-03-02 PROCEDURE — 83550 IRON BINDING TEST: CPT

## 2023-03-02 PROCEDURE — 83540 ASSAY OF IRON: CPT

## 2023-03-02 PROCEDURE — 82962 GLUCOSE BLOOD TEST: CPT

## 2023-03-02 PROCEDURE — 85730 THROMBOPLASTIN TIME PARTIAL: CPT

## 2023-03-02 PROCEDURE — 97530 THERAPEUTIC ACTIVITIES: CPT | Mod: GP

## 2023-03-02 PROCEDURE — 83880 ASSAY OF NATRIURETIC PEPTIDE: CPT

## 2023-03-02 PROCEDURE — 71275 CT ANGIOGRAPHY CHEST: CPT

## 2023-03-02 PROCEDURE — 83605 ASSAY OF LACTIC ACID: CPT

## 2023-03-02 PROCEDURE — 93306 TTE W/DOPPLER COMPLETE: CPT

## 2023-03-02 PROCEDURE — 87150 DNA/RNA AMPLIFIED PROBE: CPT

## 2023-03-02 PROCEDURE — 94640 AIRWAY INHALATION TREATMENT: CPT

## 2023-03-02 PROCEDURE — 87040 BLOOD CULTURE FOR BACTERIA: CPT

## 2023-03-02 PROCEDURE — 82728 ASSAY OF FERRITIN: CPT

## 2023-03-02 PROCEDURE — 97162 PT EVAL MOD COMPLEX 30 MIN: CPT | Mod: GP

## 2023-03-02 PROCEDURE — 0241U: CPT

## 2023-03-02 PROCEDURE — 36600 WITHDRAWAL OF ARTERIAL BLOOD: CPT

## 2023-03-02 PROCEDURE — 80048 BASIC METABOLIC PNL TOTAL CA: CPT

## 2023-03-02 PROCEDURE — 93010 ELECTROCARDIOGRAM REPORT: CPT | Mod: 76

## 2023-03-02 PROCEDURE — 87186 SC STD MICRODIL/AGAR DIL: CPT

## 2023-03-02 PROCEDURE — C1769: CPT

## 2023-03-02 PROCEDURE — 70450 CT HEAD/BRAIN W/O DYE: CPT | Mod: 26

## 2023-03-02 PROCEDURE — 97116 GAIT TRAINING THERAPY: CPT | Mod: GP

## 2023-03-02 PROCEDURE — 99285 EMERGENCY DEPT VISIT HI MDM: CPT

## 2023-03-02 PROCEDURE — 71045 X-RAY EXAM CHEST 1 VIEW: CPT | Mod: 26

## 2023-03-02 PROCEDURE — 85025 COMPLETE CBC W/AUTO DIFF WBC: CPT

## 2023-03-02 PROCEDURE — 80202 ASSAY OF VANCOMYCIN: CPT

## 2023-03-02 RX ORDER — HEPARIN SODIUM 5000 [USP'U]/ML
5000 INJECTION INTRAVENOUS; SUBCUTANEOUS EVERY 8 HOURS
Refills: 0 | Status: DISCONTINUED | OUTPATIENT
Start: 2023-03-02 | End: 2023-03-07

## 2023-03-02 RX ORDER — GLUCAGON INJECTION, SOLUTION 0.5 MG/.1ML
1 INJECTION, SOLUTION SUBCUTANEOUS ONCE
Refills: 0 | Status: DISCONTINUED | OUTPATIENT
Start: 2023-03-02 | End: 2023-03-04

## 2023-03-02 RX ORDER — HYDROMORPHONE HYDROCHLORIDE 2 MG/ML
0.5 INJECTION INTRAMUSCULAR; INTRAVENOUS; SUBCUTANEOUS ONCE
Refills: 0 | Status: DISCONTINUED | OUTPATIENT
Start: 2023-03-02 | End: 2023-03-02

## 2023-03-02 RX ORDER — SENNA PLUS 8.6 MG/1
2 TABLET ORAL AT BEDTIME
Refills: 0 | Status: DISCONTINUED | OUTPATIENT
Start: 2023-03-02 | End: 2023-03-09

## 2023-03-02 RX ORDER — DEXTROSE 50 % IN WATER 50 %
25 SYRINGE (ML) INTRAVENOUS ONCE
Refills: 0 | Status: DISCONTINUED | OUTPATIENT
Start: 2023-03-02 | End: 2023-03-04

## 2023-03-02 RX ORDER — SODIUM CHLORIDE 9 MG/ML
1000 INJECTION, SOLUTION INTRAVENOUS
Refills: 0 | Status: DISCONTINUED | OUTPATIENT
Start: 2023-03-02 | End: 2023-03-04

## 2023-03-02 RX ORDER — DEXTROSE 50 % IN WATER 50 %
15 SYRINGE (ML) INTRAVENOUS ONCE
Refills: 0 | Status: DISCONTINUED | OUTPATIENT
Start: 2023-03-02 | End: 2023-03-04

## 2023-03-02 RX ORDER — FINASTERIDE 5 MG/1
1 TABLET, FILM COATED ORAL
Qty: 0 | Refills: 0 | DISCHARGE

## 2023-03-02 RX ORDER — FINASTERIDE 5 MG/1
0 TABLET, FILM COATED ORAL
Qty: 0 | Refills: 0 | DISCHARGE

## 2023-03-02 RX ORDER — CEFTRIAXONE 500 MG/1
1000 INJECTION, POWDER, FOR SOLUTION INTRAMUSCULAR; INTRAVENOUS ONCE
Refills: 0 | Status: COMPLETED | OUTPATIENT
Start: 2023-03-02 | End: 2023-03-02

## 2023-03-02 RX ORDER — LOSARTAN POTASSIUM 100 MG/1
100 TABLET, FILM COATED ORAL DAILY
Refills: 0 | Status: DISCONTINUED | OUTPATIENT
Start: 2023-03-02 | End: 2023-03-03

## 2023-03-02 RX ORDER — SODIUM CHLORIDE 9 MG/ML
1000 INJECTION INTRAMUSCULAR; INTRAVENOUS; SUBCUTANEOUS ONCE
Refills: 0 | Status: COMPLETED | OUTPATIENT
Start: 2023-03-02 | End: 2023-03-02

## 2023-03-02 RX ORDER — POTASSIUM CHLORIDE 20 MEQ
1 PACKET (EA) ORAL
Qty: 0 | Refills: 0 | DISCHARGE

## 2023-03-02 RX ORDER — POTASSIUM CHLORIDE 20 MEQ
40 PACKET (EA) ORAL ONCE
Refills: 0 | Status: COMPLETED | OUTPATIENT
Start: 2023-03-02 | End: 2023-03-02

## 2023-03-02 RX ORDER — NALOXONE HYDROCHLORIDE 4 MG/.1ML
0.4 SPRAY NASAL ONCE
Refills: 0 | Status: DISCONTINUED | OUTPATIENT
Start: 2023-03-02 | End: 2023-03-09

## 2023-03-02 RX ORDER — POLYETHYLENE GLYCOL 3350 17 G/17G
17 POWDER, FOR SOLUTION ORAL DAILY
Refills: 0 | Status: DISCONTINUED | OUTPATIENT
Start: 2023-03-02 | End: 2023-03-09

## 2023-03-02 RX ORDER — SODIUM CHLORIDE 9 MG/ML
500 INJECTION INTRAMUSCULAR; INTRAVENOUS; SUBCUTANEOUS ONCE
Refills: 0 | Status: COMPLETED | OUTPATIENT
Start: 2023-03-02 | End: 2023-03-02

## 2023-03-02 RX ORDER — ACETAMINOPHEN 500 MG
1000 TABLET ORAL ONCE
Refills: 0 | Status: DISCONTINUED | OUTPATIENT
Start: 2023-03-02 | End: 2023-03-03

## 2023-03-02 RX ORDER — TAMSULOSIN HYDROCHLORIDE 0.4 MG/1
0.4 CAPSULE ORAL AT BEDTIME
Refills: 0 | Status: DISCONTINUED | OUTPATIENT
Start: 2023-03-02 | End: 2023-03-09

## 2023-03-02 RX ORDER — SODIUM CHLORIDE 9 MG/ML
250 INJECTION INTRAMUSCULAR; INTRAVENOUS; SUBCUTANEOUS ONCE
Refills: 0 | Status: COMPLETED | OUTPATIENT
Start: 2023-03-02 | End: 2023-03-02

## 2023-03-02 RX ORDER — SODIUM CHLORIDE 9 MG/ML
1000 INJECTION, SOLUTION INTRAVENOUS
Refills: 0 | Status: DISCONTINUED | OUTPATIENT
Start: 2023-03-02 | End: 2023-03-05

## 2023-03-02 RX ORDER — DILTIAZEM HCL 120 MG
300 CAPSULE, EXT RELEASE 24 HR ORAL DAILY
Refills: 0 | Status: DISCONTINUED | OUTPATIENT
Start: 2023-03-02 | End: 2023-03-09

## 2023-03-02 RX ORDER — FINASTERIDE 5 MG/1
5 TABLET, FILM COATED ORAL DAILY
Refills: 0 | Status: DISCONTINUED | OUTPATIENT
Start: 2023-03-02 | End: 2023-03-09

## 2023-03-02 RX ORDER — INSULIN LISPRO 100/ML
VIAL (ML) SUBCUTANEOUS
Refills: 0 | Status: DISCONTINUED | OUTPATIENT
Start: 2023-03-02 | End: 2023-03-04

## 2023-03-02 RX ORDER — ONDANSETRON 8 MG/1
4 TABLET, FILM COATED ORAL ONCE
Refills: 0 | Status: COMPLETED | OUTPATIENT
Start: 2023-03-02 | End: 2023-03-02

## 2023-03-02 RX ORDER — ESCITALOPRAM OXALATE 10 MG/1
20 TABLET, FILM COATED ORAL DAILY
Refills: 0 | Status: DISCONTINUED | OUTPATIENT
Start: 2023-03-02 | End: 2023-03-09

## 2023-03-02 RX ORDER — CEFTRIAXONE 500 MG/1
1000 INJECTION, POWDER, FOR SOLUTION INTRAMUSCULAR; INTRAVENOUS EVERY 24 HOURS
Refills: 0 | Status: DISCONTINUED | OUTPATIENT
Start: 2023-03-03 | End: 2023-03-03

## 2023-03-02 RX ORDER — SODIUM CHLORIDE 9 MG/ML
500 INJECTION, SOLUTION INTRAVENOUS ONCE
Refills: 0 | Status: COMPLETED | OUTPATIENT
Start: 2023-03-02 | End: 2023-03-02

## 2023-03-02 RX ORDER — LANOLIN ALCOHOL/MO/W.PET/CERES
3 CREAM (GRAM) TOPICAL AT BEDTIME
Refills: 0 | Status: DISCONTINUED | OUTPATIENT
Start: 2023-03-02 | End: 2023-03-09

## 2023-03-02 RX ORDER — FENTANYL CITRATE 50 UG/ML
50 INJECTION INTRAVENOUS
Refills: 0 | Status: DISCONTINUED | OUTPATIENT
Start: 2023-03-02 | End: 2023-03-03

## 2023-03-02 RX ORDER — ONDANSETRON 8 MG/1
4 TABLET, FILM COATED ORAL ONCE
Refills: 0 | Status: DISCONTINUED | OUTPATIENT
Start: 2023-03-02 | End: 2023-03-03

## 2023-03-02 RX ORDER — MAGNESIUM OXIDE 400 MG ORAL TABLET 241.3 MG
400 TABLET ORAL DAILY
Refills: 0 | Status: DISCONTINUED | OUTPATIENT
Start: 2023-03-02 | End: 2023-03-09

## 2023-03-02 RX ORDER — SODIUM CHLORIDE 9 MG/ML
1000 INJECTION, SOLUTION INTRAVENOUS
Refills: 0 | Status: DISCONTINUED | OUTPATIENT
Start: 2023-03-02 | End: 2023-03-03

## 2023-03-02 RX ORDER — CYCLOBENZAPRINE HYDROCHLORIDE 10 MG/1
5 TABLET, FILM COATED ORAL EVERY 8 HOURS
Refills: 0 | Status: DISCONTINUED | OUTPATIENT
Start: 2023-03-02 | End: 2023-03-09

## 2023-03-02 RX ORDER — MORPHINE SULFATE 50 MG/1
2 CAPSULE, EXTENDED RELEASE ORAL ONCE
Refills: 0 | Status: DISCONTINUED | OUTPATIENT
Start: 2023-03-02 | End: 2023-03-02

## 2023-03-02 RX ORDER — KETOROLAC TROMETHAMINE 30 MG/ML
30 SYRINGE (ML) INJECTION ONCE
Refills: 0 | Status: DISCONTINUED | OUTPATIENT
Start: 2023-03-02 | End: 2023-03-02

## 2023-03-02 RX ORDER — DEXTROSE 50 % IN WATER 50 %
12.5 SYRINGE (ML) INTRAVENOUS ONCE
Refills: 0 | Status: DISCONTINUED | OUTPATIENT
Start: 2023-03-02 | End: 2023-03-04

## 2023-03-02 RX ORDER — OXYCODONE HYDROCHLORIDE 5 MG/1
5 TABLET ORAL ONCE
Refills: 0 | Status: DISCONTINUED | OUTPATIENT
Start: 2023-03-02 | End: 2023-03-03

## 2023-03-02 RX ORDER — ONDANSETRON 8 MG/1
4 TABLET, FILM COATED ORAL EVERY 8 HOURS
Refills: 0 | Status: DISCONTINUED | OUTPATIENT
Start: 2023-03-02 | End: 2023-03-09

## 2023-03-02 RX ORDER — ACETAMINOPHEN 500 MG
650 TABLET ORAL EVERY 6 HOURS
Refills: 0 | Status: DISCONTINUED | OUTPATIENT
Start: 2023-03-02 | End: 2023-03-09

## 2023-03-02 RX ORDER — CHLORHEXIDINE GLUCONATE 213 G/1000ML
1 SOLUTION TOPICAL
Refills: 0 | Status: DISCONTINUED | OUTPATIENT
Start: 2023-03-02 | End: 2023-03-09

## 2023-03-02 RX ORDER — INSULIN LISPRO 100/ML
VIAL (ML) SUBCUTANEOUS AT BEDTIME
Refills: 0 | Status: DISCONTINUED | OUTPATIENT
Start: 2023-03-02 | End: 2023-03-04

## 2023-03-02 RX ORDER — CEFTRIAXONE 500 MG/1
INJECTION, POWDER, FOR SOLUTION INTRAMUSCULAR; INTRAVENOUS
Refills: 0 | Status: DISCONTINUED | OUTPATIENT
Start: 2023-03-02 | End: 2023-03-02

## 2023-03-02 RX ORDER — CEFTRIAXONE 500 MG/1
INJECTION, POWDER, FOR SOLUTION INTRAMUSCULAR; INTRAVENOUS
Refills: 0 | Status: DISCONTINUED | OUTPATIENT
Start: 2023-03-02 | End: 2023-03-03

## 2023-03-02 RX ORDER — MORPHINE SULFATE 50 MG/1
4 CAPSULE, EXTENDED RELEASE ORAL EVERY 6 HOURS
Refills: 0 | Status: DISCONTINUED | OUTPATIENT
Start: 2023-03-02 | End: 2023-03-02

## 2023-03-02 RX ADMIN — Medication 30 MILLIGRAM(S): at 06:15

## 2023-03-02 RX ADMIN — SODIUM CHLORIDE 75 MILLILITER(S): 9 INJECTION, SOLUTION INTRAVENOUS at 13:59

## 2023-03-02 RX ADMIN — SODIUM CHLORIDE 500 MILLILITER(S): 9 INJECTION, SOLUTION INTRAVENOUS at 20:17

## 2023-03-02 RX ADMIN — Medication 300 MILLIGRAM(S): at 16:52

## 2023-03-02 RX ADMIN — MORPHINE SULFATE 4 MILLIGRAM(S): 50 CAPSULE, EXTENDED RELEASE ORAL at 16:03

## 2023-03-02 RX ADMIN — LOSARTAN POTASSIUM 100 MILLIGRAM(S): 100 TABLET, FILM COATED ORAL at 14:22

## 2023-03-02 RX ADMIN — SODIUM CHLORIDE 75 MILLILITER(S): 9 INJECTION, SOLUTION INTRAVENOUS at 16:49

## 2023-03-02 RX ADMIN — Medication 40 MILLIEQUIVALENT(S): at 06:49

## 2023-03-02 RX ADMIN — Medication 1 TABLET(S): at 14:22

## 2023-03-02 RX ADMIN — SODIUM CHLORIDE 250 MILLILITER(S): 9 INJECTION INTRAMUSCULAR; INTRAVENOUS; SUBCUTANEOUS at 05:30

## 2023-03-02 RX ADMIN — HEPARIN SODIUM 5000 UNIT(S): 5000 INJECTION INTRAVENOUS; SUBCUTANEOUS at 14:01

## 2023-03-02 RX ADMIN — ONDANSETRON 4 MILLIGRAM(S): 8 TABLET, FILM COATED ORAL at 05:21

## 2023-03-02 RX ADMIN — FINASTERIDE 5 MILLIGRAM(S): 5 TABLET, FILM COATED ORAL at 14:21

## 2023-03-02 RX ADMIN — HYDROMORPHONE HYDROCHLORIDE 0.5 MILLIGRAM(S): 2 INJECTION INTRAMUSCULAR; INTRAVENOUS; SUBCUTANEOUS at 06:14

## 2023-03-02 RX ADMIN — SODIUM CHLORIDE 500 MILLILITER(S): 9 INJECTION INTRAMUSCULAR; INTRAVENOUS; SUBCUTANEOUS at 19:00

## 2023-03-02 RX ADMIN — SODIUM CHLORIDE 1000 MILLILITER(S): 9 INJECTION INTRAMUSCULAR; INTRAVENOUS; SUBCUTANEOUS at 06:18

## 2023-03-02 RX ADMIN — ESCITALOPRAM OXALATE 20 MILLIGRAM(S): 10 TABLET, FILM COATED ORAL at 16:52

## 2023-03-02 RX ADMIN — SODIUM CHLORIDE 75 MILLILITER(S): 9 INJECTION, SOLUTION INTRAVENOUS at 18:58

## 2023-03-02 RX ADMIN — MORPHINE SULFATE 2 MILLIGRAM(S): 50 CAPSULE, EXTENDED RELEASE ORAL at 05:20

## 2023-03-02 RX ADMIN — ONDANSETRON 4 MILLIGRAM(S): 8 TABLET, FILM COATED ORAL at 14:21

## 2023-03-02 RX ADMIN — MORPHINE SULFATE 4 MILLIGRAM(S): 50 CAPSULE, EXTENDED RELEASE ORAL at 14:21

## 2023-03-02 RX ADMIN — MAGNESIUM OXIDE 400 MG ORAL TABLET 400 MILLIGRAM(S): 241.3 TABLET ORAL at 16:50

## 2023-03-02 NOTE — BRIEF OPERATIVE NOTE - COMMENTS
Reviewed case with medical service, Dr. Berto Bowden.  Patient will go to ICU and will need to have nephrostomy when his condition permits.  They will discuss with intensivist.

## 2023-03-02 NOTE — ED PROVIDER NOTE - PROGRESS NOTE DETAILS
Patient the patient was signed out to me pending UA.  UA is borderline for UTI.  I reviewed the CT reports.  Patient with 2 mm stone with hemorrhagic products in the renal pelvis as well as perinephric  stranding and possible cystitis.   I spoke with the patient he is resting comfortably now he has no fevers no dysuria.  He states that he follows with Dr. Mittal.  He states that he had a kidney stone last year that required a stent.I spoke with the urology PA   She will evaluate the patient shortly. Jareth oK M.D., Attending Physician Urology PA saw the patient states patient to be admitted to medicine for persistent pain patient to have stent placement today or tomorrow.  We discussed the UA and the CT findings urology states no antibiotics at this time patient has no fever or urinary symptoms.  Case discussed with hospitalist accepts. Dr. Blunt. Jareth Ko M.D., Attending Physician

## 2023-03-02 NOTE — CONSULT NOTE ADULT - ASSESSMENT
Rapid response was called for tachycardia with hypoxia on SpO2 monitoring.     I arrived at bedside with Dr. ureña. Patient was awake and alert appearing mildly diaphoretic. Sinus tachycardia with hypoxia requiring supplemental oxygen.     Patient has cystitis with right uretal stone and hydronephrosis which appears to be the source of a sepsis picture.      Recommendations   - Saturations improved with non rebreather  - CT chest with PE study negative   - hemodynamics are stable   - antibiotic therapy already on board  - ABG ordered and noted hypoxia on 4L NC; maintain Non rebreather   - Rec calling urology for source control via addressing stone   - IVF for hydration and monitor I&O   - maintain normothermia   - Consult ICU if there is any change in hemodynamics or respiratory status     Case discussed with Dr. Ureña     I have spent 30 minutes directly managing this patient with sepsis secondary to UTI and hypoxia       I attest that >50% of this total time was spent counseling, and or coordinating care via review of relevant history, performing my own independent physical examination, personal review of images discussion with the multidisciplinary team and any consultants involved in this patient's care.  This management was performed directly at the patient's bedside or within the Inpatient Unit.

## 2023-03-02 NOTE — ED PROVIDER NOTE - CLINICAL SUMMARY MEDICAL DECISION MAKING FREE TEXT BOX
pt with right sided flank and pevic pain, has ho kidneys tones and bladder cancer will get labs, ua r/o infection, give pain meds, ct r/o obstructive uropathy, fluids

## 2023-03-02 NOTE — H&P ADULT - HISTORY OF PRESENT ILLNESS
83y/o M PMHx significant for Bladder cancer, Depression, Hypertension, s/p back surgery (TLIF), hypercholesterolemia, urolithiasis s/p sten in the past, BPH, HTN, HLD, DM, HFpEF and osteoarthritis presents with right flank pain. Started yesterday intermittent but more pronounced and constant this morning. Associated nausea with vomiting, lethargy, weakness, chills, subjective fevers. Denies chest pain, syncope, weight gain/loss.    In the ER Tmax 98.4, HR 78-83, /101, RR 18, SpO2 86-91% on RA? then 95% on 4-5L NC and then 91-94% on RA. Deemed as apnea. Dimer below age adjusted threshold, however on admission evaluation with conversation, O2 between 83-89%. CTA chest ordered. Otherwise initially admission called for urolithiasis with possible Urology intervetion. CBC unremarkable, K 3.2, Cr 1.43 (Baseline 1.1-1.3), Troponin negative. CT abd/pel with bilateral dependent atelectasis, splenomegaly, mild right hydronephrosis with 1-2mm right proximal ureteral stone and also incompletely characterized right upper pole kidney exophytic lesion (2.1x1.9cm), mild bladder wall thickening and enlarged prostate. 83y/o M PMHx significant for Bladder cancer, Depression, Hypertension, s/p back surgery (TLIF), hypercholesterolemia, [place] (uses bilateral hearing aides), urolithiasis s/p sten in the past, BPH, HTN, HLD, DM, HFpEF and osteoarthritis presents with right flank pain. Started yesterday intermittent but more pronounced and constant this morning. Associated nausea with vomiting, lethargy, weakness, chills, subjective fevers. Denies chest pain, syncope, weight gain/loss.    In the ER Tmax 98.4, HR 78-83, /101, RR 18, SpO2 86-91% on RA? then 95% on 4-5L NC and then 91-94% on RA. Deemed as apnea. Dimer below age adjusted threshold, however on admission evaluation with conversation, O2 between 83-89%. CTA chest ordered. Otherwise initially admission called for urolithiasis with possible Urology intervetion. CBC unremarkable, K 3.2, Cr 1.43 (Baseline 1.1-1.3), Troponin negative. CT abd/pel with bilateral dependent atelectasis, splenomegaly, mild right hydronephrosis with 1-2mm right proximal ureteral stone and also incompletely characterized right upper pole kidney exophytic lesion (2.1x1.9cm), mild bladder wall thickening and enlarged prostate.

## 2023-03-02 NOTE — BRIEF OPERATIVE NOTE - NSICDXBRIEFPROCEDURE_GEN_ALL_CORE_FT
PROCEDURES:  Cystoscopy, with bladder fulguration, for bleeding 02-Mar-2023 22:23:34  Moise Awan L

## 2023-03-02 NOTE — ED ADULT NURSE NOTE - OBJECTIVE STATEMENT
Pt presents to ED c/o r flank pain. Pain began in the middle of the night and radiates to his back. Pt states he has hx of kidney stones and was seen in the ED last year w/ stones and "it feels like last time." Pt endorsing nausea, no vomiting. Denies fevers. Pt a&ox4, ambulatory at home. Pt c/o severe 9/10 pain. Pt hypoxic to 86% on room air, placed on 4L, endorses mild SOB. Pt denies CP. Denies urinary s/s.

## 2023-03-02 NOTE — ED ADULT TRIAGE NOTE - CHIEF COMPLAINT QUOTE
pt presents to the ED with flank pain describing it as 'abdominal pain radiating to the back' - when asked to point pt pointed to flank region. pt reports associated nausea and vomiting but no diarrhea or constipation. no reported difficulty urinating and no fevers. pt A&Ox4 and resting comfortably in stretcher at this time

## 2023-03-02 NOTE — H&P ADULT - NSHPPHYSICALEXAM_GEN_ALL_CORE
PHYSICAL EXAM:    T(C): 36.8 (03-02-23 @ 13:45), Max: 36.9 (03-02-23 @ 07:25)  HR: 98 (03-02-23 @ 13:45) (78 - 98)  BP: 164/80 (03-02-23 @ 13:45) (160/88 - 173/101)  RR: 20 (03-02-23 @ 13:45) (18 - 20)  SpO2: 93% (03-02-23 @ 13:45) (86% - 95%)    General: AAOx3; NAD; Obese  Head: AT/NC  ENT: Moist Mucous Membranes; No Injury  Eyes: EOMI; PERRL  Neck: Non-tender; No JVD  CVS: RRR, S1&S2, No murmur, LE edema  Respiratory: Lungs CTA B/L; Normal Respiratory Effort  Abdomen/GI: Soft, non-tender, non-distended, no guarding, no rebound, normal bowel sounds  : No bladder distention, No [place]  Extremities: No cyanosis, No clubbing, LE edema  MSK: Mild right CVA tenderness, Normal ROM, No injury  Neuro: AAOx3, CNII-XII grossly intact except baseline [place], non-focal  Psych: Appropriate, Cooperative,   Skin: Clean, Dry and Intact

## 2023-03-02 NOTE — H&P ADULT - ASSESSMENT
MEDICATIONS  (STANDING):  cefTRIAXone   IVPB      dextrose 5%. 1000 milliLiter(s) (100 mL/Hr) IV Continuous <Continuous>  dextrose 5%. 1000 milliLiter(s) (50 mL/Hr) IV Continuous <Continuous>  dextrose 50% Injectable 25 Gram(s) IV Push once  dextrose 50% Injectable 12.5 Gram(s) IV Push once  dextrose 50% Injectable 25 Gram(s) IV Push once  diltiazem    milliGRAM(s) Oral daily  escitalopram 20 milliGRAM(s) Oral daily  finasteride 5 milliGRAM(s) Oral daily  glucagon  Injectable 1 milliGRAM(s) IntraMuscular once  heparin   Injectable 5000 Unit(s) SubCutaneous every 8 hours  insulin lispro (ADMELOG) corrective regimen sliding scale   SubCutaneous three times a day before meals  insulin lispro (ADMELOG) corrective regimen sliding scale   SubCutaneous at bedtime  lactated ringers. 1000 milliLiter(s) (75 mL/Hr) IV Continuous <Continuous>  losartan 100 milliGRAM(s) Oral daily  magnesium oxide 400 milliGRAM(s) Oral daily  multivitamin 1 Tablet(s) Oral daily  naloxone Injectable 0.4 milliGRAM(s) IV Push once  senna 2 Tablet(s) Oral at bedtime  tamsulosin 0.4 milliGRAM(s) Oral at bedtime    MEDICATIONS  (PRN):  acetaminophen     Tablet .. 650 milliGRAM(s) Oral every 6 hours PRN Temp greater or equal to 38C (100.4F), Mild Pain (1 - 3)  aluminum hydroxide/magnesium hydroxide/simethicone Suspension 30 milliLiter(s) Oral every 4 hours PRN Dyspepsia  cyclobenzaprine 5 milliGRAM(s) Oral every 8 hours PRN Spasm  dextrose Oral Gel 15 Gram(s) Oral once PRN Blood Glucose LESS THAN 70 milliGRAM(s)/deciliter  melatonin 3 milliGRAM(s) Oral at bedtime PRN Insomnia  morphine  - Injectable 4 milliGRAM(s) IV Push every 6 hours PRN Severe Pain (7 - 10)  ondansetron Injectable 4 milliGRAM(s) IV Push every 8 hours PRN Nausea and/or Vomiting  oxycodone    5 mG/acetaminophen 325 mG 1 Tablet(s) Oral every 4 hours PRN Moderate Pain (4 - 6)  polyethylene glycol 3350 17 Gram(s) Oral daily PRN Constipation      Complicated UTI/Cystis   Urolithiasis Right Proximal Ureter  Assocaited flank pain, nausea, vomiting due to above  -admit to medicine  -Empiric Abx given CT with bladder wall thickening and clinical presentation  -Urine MEDICATIONS  (STANDING):  cefTRIAXone   IVPB      dextrose 5%. 1000 milliLiter(s) (100 mL/Hr) IV Continuous <Continuous>  dextrose 5%. 1000 milliLiter(s) (50 mL/Hr) IV Continuous <Continuous>  dextrose 50% Injectable 25 Gram(s) IV Push once  dextrose 50% Injectable 12.5 Gram(s) IV Push once  dextrose 50% Injectable 25 Gram(s) IV Push once  diltiazem    milliGRAM(s) Oral daily  escitalopram 20 milliGRAM(s) Oral daily  finasteride 5 milliGRAM(s) Oral daily  glucagon  Injectable 1 milliGRAM(s) IntraMuscular once  heparin   Injectable 5000 Unit(s) SubCutaneous every 8 hours  insulin lispro (ADMELOG) corrective regimen sliding scale   SubCutaneous three times a day before meals  insulin lispro (ADMELOG) corrective regimen sliding scale   SubCutaneous at bedtime  lactated ringers. 1000 milliLiter(s) (75 mL/Hr) IV Continuous <Continuous>  losartan 100 milliGRAM(s) Oral daily  magnesium oxide 400 milliGRAM(s) Oral daily  multivitamin 1 Tablet(s) Oral daily  naloxone Injectable 0.4 milliGRAM(s) IV Push once  senna 2 Tablet(s) Oral at bedtime  tamsulosin 0.4 milliGRAM(s) Oral at bedtime    MEDICATIONS  (PRN):  acetaminophen     Tablet .. 650 milliGRAM(s) Oral every 6 hours PRN Temp greater or equal to 38C (100.4F), Mild Pain (1 - 3)  aluminum hydroxide/magnesium hydroxide/simethicone Suspension 30 milliLiter(s) Oral every 4 hours PRN Dyspepsia  cyclobenzaprine 5 milliGRAM(s) Oral every 8 hours PRN Spasm  dextrose Oral Gel 15 Gram(s) Oral once PRN Blood Glucose LESS THAN 70 milliGRAM(s)/deciliter  melatonin 3 milliGRAM(s) Oral at bedtime PRN Insomnia  morphine  - Injectable 4 milliGRAM(s) IV Push every 6 hours PRN Severe Pain (7 - 10)  ondansetron Injectable 4 milliGRAM(s) IV Push every 8 hours PRN Nausea and/or Vomiting  oxycodone    5 mG/acetaminophen 325 mG 1 Tablet(s) Oral every 4 hours PRN Moderate Pain (4 - 6)  polyethylene glycol 3350 17 Gram(s) Oral daily PRN Constipation      Complicated UTI/Cystis   Urolithiasis Right Proximal Ureter  Assocaited flank pain, nausea, vomiting due to above  -admit to medicine  -Empiric Abx given CT with bladder wall thickening and clinical presentation  -Urine culture ordered  -IVF as needed for fluid balance  -Urology consulted. +/- possible intervention as per Urology    Acute Respiratory Failure with Hypoxia.   Chronic HFpEF  -Unclear etiology  -Hypoxia when sleeping likely apnea but also with SpO2 83-90% with conversation.   -CXRAY negative  -Troponin negative  -Dimer below age adjusted threshold but given presentation of hypoxia with negative cxray, CTA ordered  -TTE 4/2022 with normal EF and grade one diastolic dysfunction    CKD IIIA  -Close to baseline  -Hold diuretic  -IVF as needed for fluid balance  -Continue to monitor Cr and electrolytes. Correct electrolytes accordingly.     HTN.  -Elevated on admission likely due to pain  -Home medications except diuretic  -Titrate accordingly    DM  -Hold metformin.  -BGM/ISS  -A1c ordered    DVT Prophylaxis: heparin subq MEDICATIONS  (STANDING):  cefTRIAXone   IVPB      dextrose 5%. 1000 milliLiter(s) (100 mL/Hr) IV Continuous <Continuous>  dextrose 5%. 1000 milliLiter(s) (50 mL/Hr) IV Continuous <Continuous>  dextrose 50% Injectable 25 Gram(s) IV Push once  dextrose 50% Injectable 12.5 Gram(s) IV Push once  dextrose 50% Injectable 25 Gram(s) IV Push once  diltiazem    milliGRAM(s) Oral daily  escitalopram 20 milliGRAM(s) Oral daily  finasteride 5 milliGRAM(s) Oral daily  glucagon  Injectable 1 milliGRAM(s) IntraMuscular once  heparin   Injectable 5000 Unit(s) SubCutaneous every 8 hours  insulin lispro (ADMELOG) corrective regimen sliding scale   SubCutaneous three times a day before meals  insulin lispro (ADMELOG) corrective regimen sliding scale   SubCutaneous at bedtime  lactated ringers. 1000 milliLiter(s) (75 mL/Hr) IV Continuous <Continuous>  losartan 100 milliGRAM(s) Oral daily  magnesium oxide 400 milliGRAM(s) Oral daily  multivitamin 1 Tablet(s) Oral daily  naloxone Injectable 0.4 milliGRAM(s) IV Push once  senna 2 Tablet(s) Oral at bedtime  tamsulosin 0.4 milliGRAM(s) Oral at bedtime    MEDICATIONS  (PRN):  acetaminophen     Tablet .. 650 milliGRAM(s) Oral every 6 hours PRN Temp greater or equal to 38C (100.4F), Mild Pain (1 - 3)  aluminum hydroxide/magnesium hydroxide/simethicone Suspension 30 milliLiter(s) Oral every 4 hours PRN Dyspepsia  cyclobenzaprine 5 milliGRAM(s) Oral every 8 hours PRN Spasm  dextrose Oral Gel 15 Gram(s) Oral once PRN Blood Glucose LESS THAN 70 milliGRAM(s)/deciliter  melatonin 3 milliGRAM(s) Oral at bedtime PRN Insomnia  morphine  - Injectable 4 milliGRAM(s) IV Push every 6 hours PRN Severe Pain (7 - 10)  ondansetron Injectable 4 milliGRAM(s) IV Push every 8 hours PRN Nausea and/or Vomiting  oxycodone    5 mG/acetaminophen 325 mG 1 Tablet(s) Oral every 4 hours PRN Moderate Pain (4 - 6)  polyethylene glycol 3350 17 Gram(s) Oral daily PRN Constipation      Complicated UTI/Cystis   Urolithiasis Right Proximal Ureter  Assocaited flank pain, nausea, vomiting due to above  -admit to medicine  -Empiric Abx given CT with bladder wall thickening and clinical presentation  -Urine culture ordered  -IVF as needed for fluid balance  -Urology consulted. +/- possible intervention as per Urology    Acute Respiratory Failure with Hypoxia.   Chronic HFpEF  -Unclear etiology  -Hypoxia when sleeping likely apnea but also with SpO2 83-90% with conversation.   -CXRAY negative  -Troponin negative  -Order ProBNP  -Dimer below age adjusted threshold but given presentation of hypoxia with negative cxray, CTA ordered  -TTE 4/2022 with normal EF and grade one diastolic dysfunction  -O2 supplementatin    CKD IIIA  BPH  -Close to baseline  -Hold diuretic  -IVF as needed for fluid balance  -Continue to monitor Cr and electrolytes. Correct electrolytes accordingly.   -Continue flomax/finasteride    HTN.  -Elevated on admission likely due to pain  -Home medications except diuretic  -Titrate accordingly    DM  -Hold metformin.  -BGM/ISS  -A1c ordered    DVT Prophylaxis: heparin subq

## 2023-03-02 NOTE — ED PROVIDER NOTE - PHYSICAL EXAMINATION
Gen:  Well appearing in NAD  Head:  NC/AT  HEENT: pupils perrl,no pharyngeal erythema, uvula midline  Cardiac: S1S2, RRR  Abd: Soft, non tender right cvat  Resp: No distress, CTA   musculoskeletal:: no deformities, no swelling, strength +5/+5  Skin: warm and dry as visualized, no rashes  Neuro: HARMON, aao x 4  Psych:alert, cooperative, appropriate mood and affect for situation

## 2023-03-02 NOTE — H&P ADULT - NSHPLABSRESULTS_GEN_ALL_CORE
12.3   8.14  )-----------( 192      ( 02 Mar 2023 05:14 )             39.3     03-02    137  |  102  |  20  ----------------------------<  192<H>  3.2<L>   |  30  |  1.43<H>    Ca    9.2      02 Mar 2023 05:14  Mg     1.6     03-02    TPro  7.2  /  Alb  3.7  /  TBili  0.5  /  DBili  x   /  AST  23  /  ALT  27  /  AlkPhos  84  03-02      CAPILLARY BLOOD GLUCOSE          D-Dimer Assay, Quantitative: 245: Flu With COVID-19 By LUCAS (03.02.23 @ 09:49)   [facility]2 [facility]: This Respiratory Panel uses polymerase chain reaction (PCR) to detect for   influenza A; influenza B; respiratory syncytial virus; and SARS-CoV-2.   This test was validated by [facility] and is in use under the FDA   Emergency Use Authorization (EUA) for clinical labs CLIA-certified to   perform high complexity testing. Test results should be correlated with   clinical presentation, patient history, and epidemiology.   Influenza A Result: [facility]   Influenza B Result: [facility]   Resp Syn Virus Result: [facility] Urine Microscopic-Add On (NC) (03.02.23 @ 07:51)   Bacteria: Few   Epithelial Cells: Few   Red Blood Cell - Urine: 11-25 /HPF   White Blood Cell - Urine: 11-25 /HPF Urinalysis (03.02.23 @ 07:51)   pH Urine: 5.0   Glucose Qualitative, Urine: Negative   Blood, Urine: Large   Color: Yellow   Urine Appearance: Clear   Bilirubin: Negative   Ketone - Urine: Negative   Specific Gravity: 1.020   Protein, Urine: Negative   Urobilinogen: Negative   Nitrite: Negative   Leukocyte Esterase Concentration: Trace Magnesium, Serum: 1.6 mg/dL (03.02.23 @ 05:14)     Lipase, Serum: 203 U/L (03.02.23 @ 05:14) Troponin I, High Sensitivity Result: 5.36:     RADIOLOGY:    < from: Xray Chest 1 View- PORTABLE-Urgent (Xray Chest 1 View- PORTABLE-Urgent .) (03.02.23 @ 11:47) >      ACC: [id number] EXAM:  XR CHEST PORTABLE URGENT 1V   ORDERED BY: AGNES CH     PROCEDURE DATE:  03/02/2023          INTERPRETATION:  AP chest on March 2, 2023 at 11:30 AM. Patient is   hypoxic. 2 images.    Heart magnified by technique.    Lungs are clear.    Chest is similar to June 1, 2022.    IMPRESSION: No acute finding or change.    < end of copied text >    < from: CT Abdomen and Pelvis No Cont (03.02.23 @ 05:45) >    IMPRESSION:    Mild right hydronephrosis likely secondary to faint 1-2mm right proximal   ureteralstone. Right perinephric stranding.    Vague hyperdensities identified within the proximal right renal pelvis,   concerning for hemorrhagic products. Consider nonemergent retrograde   evaluation to exclude underlying neoplasm. Incompletely characterized   right upper pole exophytic lesion measuring 2.1 x 1.9 cm. Consider   nonemergent correlation with renal ultrasound or MR for better   characterization.    Mild bladder wall thickening, difficult to assess secondary to inadequate   distention. Correlate with urinalysis and lab values to assess for   cystitis and/or ascending urinary tract infection.    Additional findings as mentioned above.  < end of copied text >    EKG ordered  Personally reviewed labs, imaging, orders and vitals.

## 2023-03-02 NOTE — CONSULT NOTE ADULT - ASSESSMENT
83 yo male with hx of kidney stones admitted with intractable right flank pain found to have 2 mm obstructing right proximal ureteral stone.  Discussed cystoscopy, ureteral stent placement with patient, risks, benefits and alternatives which include nephrostomy tube or conservative observation/ medical expulsive therapy. Patient would like to like to proceed with ureteral stent placement at this time.  Recommend  - Cystoscopy, RIGHT ureteral stent placement tomorrow with Dr. Mittal  - RUPESH [place]  - Strain urine  - Flomax  - Pain control/ IVF  - F/U cultures and sensitivities and treat accordingly  - Patient will follow up outpatient for further stone and stent management    Case discussed with Dr. Mittal

## 2023-03-02 NOTE — PROVIDER CONTACT NOTE (EICU) - ACTION/TREATMENT ORDERED:
ICU  for perc nephrostomy tube  Ceftriaxone  IR consult in am  monitor for SIRS  O2 as needed  Pulmonary consult

## 2023-03-02 NOTE — ED ADULT NURSE NOTE - NSIMPLEMENTINTERV_GEN_ALL_ED
Implemented All Fall Risk Interventions:  [place] to call system. Call bell, personal items and telephone within reach. Instruct patient to call for assistance. Room bathroom lighting operational. Non-slip footwear when patient is off stretcher. Physically safe environment: no spills, clutter or unnecessary equipment. Stretcher in lowest position, wheels locked, appropriate side rails in place. Provide visual cue, wrist band, yellow gown, etc. Monitor gait and stability. Monitor for mental status changes and reorient to person, place, and time. Review medications for side effects contributing to fall risk. Reinforce activity limits and safety measures with patient and family.

## 2023-03-02 NOTE — PATIENT PROFILE ADULT - FALL HARM RISK - PATIENT NEEDS ASSISTANCE
Pt would like to change her OB referral to Dr Lise Jama at [facility]. New patient appointment is scheduled for 3/17/22 with a 20 week ultrasound.    Will need a  referral. No assistance needed

## 2023-03-02 NOTE — PROVIDER CONTACT NOTE (EICU) - SITUATION
83y/o M PMHx significant for Bladder cancer, Depression, Hypertension, s/p back surgery (TLIF), hypercholesterolemia, [place] (uses bilateral hearing aides), urolithiasis s/p sten in the past, BPH, HTN, HLD, DM, HFpEF and osteoarthritis presents with right flank pain. Started yesterday intermittent but more pronounced and constant this morning. Associated nausea with vomiting, lethargy, weakness, chills, subjective fevers. Denies chest pain, syncope, weight gain/loss.  In the ER Tmax 98.4, HR 78-83, /101, RR 18, SpO2 86-91% on RA? then 95% on 4-5L NC and then 91-94% on RA. Deemed as apnea. Dimer below age adjusted threshold, however on admission evaluation with conversation, O2 between 83-89%.   Case discussed with the ICU PA

## 2023-03-02 NOTE — PATIENT PROFILE ADULT - FALL HARM RISK - UNIVERSAL INTERVENTIONS
Bed in lowest position, wheels locked, appropriate side rails in place/Call bell, personal items and telephone in reach/Instruct patient to call for assistance before getting out of bed or chair/Non-slip footwear when patient is out of bed/[place] to call system/Physically safe environment - no spills, clutter or unnecessary equipment/Purposeful Proactive Rounding/Room/bathroom lighting operational, light cord in reach

## 2023-03-03 DIAGNOSIS — R31.9 HEMATURIA, UNSPECIFIED: ICD-10-CM

## 2023-03-03 LAB
A1C WITH ESTIMATED AVERAGE GLUCOSE RESULT: 6.4 % — HIGH (ref 4–5.6)
ALBUMIN SERPL ELPH-MCNC: 3 G/DL — LOW (ref 3.3–5)
ALP SERPL-CCNC: 59 U/L — SIGNIFICANT CHANGE UP (ref 40–120)
ALT FLD-CCNC: 46 U/L — SIGNIFICANT CHANGE UP (ref 12–78)
ANION GAP SERPL CALC-SCNC: 10 MMOL/L — SIGNIFICANT CHANGE UP (ref 5–17)
ANION GAP SERPL CALC-SCNC: 6 MMOL/L — SIGNIFICANT CHANGE UP (ref 5–17)
ANION GAP SERPL CALC-SCNC: 8 MMOL/L — SIGNIFICANT CHANGE UP (ref 5–17)
APTT BLD: 26.3 SEC — LOW (ref 27.5–35.5)
AST SERPL-CCNC: 40 U/L — HIGH (ref 15–37)
BILIRUB SERPL-MCNC: 0.7 MG/DL — SIGNIFICANT CHANGE UP (ref 0.2–1.2)
BUN SERPL-MCNC: 28 MG/DL — HIGH (ref 7–23)
BUN SERPL-MCNC: 35 MG/DL — HIGH (ref 7–23)
BUN SERPL-MCNC: 39 MG/DL — HIGH (ref 7–23)
CALCIUM SERPL-MCNC: 8 MG/DL — LOW (ref 8.5–10.1)
CALCIUM SERPL-MCNC: 8.1 MG/DL — LOW (ref 8.5–10.1)
CALCIUM SERPL-MCNC: 8.2 MG/DL — LOW (ref 8.5–10.1)
CHLORIDE SERPL-SCNC: 102 MMOL/L — SIGNIFICANT CHANGE UP (ref 96–108)
CHLORIDE SERPL-SCNC: 104 MMOL/L — SIGNIFICANT CHANGE UP (ref 96–108)
CHLORIDE SERPL-SCNC: 104 MMOL/L — SIGNIFICANT CHANGE UP (ref 96–108)
CO2 SERPL-SCNC: 21 MMOL/L — LOW (ref 22–31)
CO2 SERPL-SCNC: 25 MMOL/L — SIGNIFICANT CHANGE UP (ref 22–31)
CO2 SERPL-SCNC: 26 MMOL/L — SIGNIFICANT CHANGE UP (ref 22–31)
CREAT SERPL-MCNC: 2.47 MG/DL — HIGH (ref 0.5–1.3)
CREAT SERPL-MCNC: 2.55 MG/DL — HIGH (ref 0.5–1.3)
CREAT SERPL-MCNC: 2.68 MG/DL — HIGH (ref 0.5–1.3)
E FAECALIS DNA BLD POS QL NAA+NON-PROBE: SIGNIFICANT CHANGE UP
EGFR: 23 ML/MIN/1.73M2 — LOW
EGFR: 25 ML/MIN/1.73M2 — LOW
EGFR: 26 ML/MIN/1.73M2 — LOW
ESTIMATED AVERAGE GLUCOSE: 137 MG/DL — HIGH (ref 68–114)
GLUCOSE BLDC GLUCOMTR-MCNC: 138 MG/DL — HIGH (ref 70–99)
GLUCOSE BLDC GLUCOMTR-MCNC: 179 MG/DL — HIGH (ref 70–99)
GLUCOSE BLDC GLUCOMTR-MCNC: 208 MG/DL — HIGH (ref 70–99)
GLUCOSE SERPL-MCNC: 139 MG/DL — HIGH (ref 70–99)
GLUCOSE SERPL-MCNC: 184 MG/DL — HIGH (ref 70–99)
GLUCOSE SERPL-MCNC: 201 MG/DL — HIGH (ref 70–99)
GRAM STN FLD: SIGNIFICANT CHANGE UP
HCT VFR BLD CALC: 33.8 % — LOW (ref 39–50)
HCT VFR BLD CALC: 35 % — LOW (ref 39–50)
HGB BLD-MCNC: 10.6 G/DL — LOW (ref 13–17)
HGB BLD-MCNC: 10.8 G/DL — LOW (ref 13–17)
INR BLD: 1.43 RATIO — HIGH (ref 0.88–1.16)
LACTATE SERPL-SCNC: 2.5 MMOL/L — HIGH (ref 0.7–2)
LACTATE SERPL-SCNC: 3.2 MMOL/L — HIGH (ref 0.7–2)
LACTATE SERPL-SCNC: 5.2 MMOL/L — CRITICAL HIGH (ref 0.7–2)
LACTATE SERPL-SCNC: 6.1 MMOL/L — CRITICAL HIGH (ref 0.7–2)
MAGNESIUM SERPL-MCNC: 1.3 MG/DL — LOW (ref 1.6–2.6)
MAGNESIUM SERPL-MCNC: 1.4 MG/DL — LOW (ref 1.6–2.6)
MCHC RBC-ENTMCNC: 19.8 PG — LOW (ref 27–34)
MCHC RBC-ENTMCNC: 20 PG — LOW (ref 27–34)
MCHC RBC-ENTMCNC: 30.9 GM/DL — LOW (ref 32–36)
MCHC RBC-ENTMCNC: 31.4 GM/DL — LOW (ref 32–36)
MCV RBC AUTO: 63.9 FL — LOW (ref 80–100)
MCV RBC AUTO: 64.2 FL — LOW (ref 80–100)
METHOD TYPE: SIGNIFICANT CHANGE UP
PHOSPHATE SERPL-MCNC: 3.1 MG/DL — SIGNIFICANT CHANGE UP (ref 2.5–4.5)
PHOSPHATE SERPL-MCNC: 3.9 MG/DL — SIGNIFICANT CHANGE UP (ref 2.5–4.5)
PLATELET # BLD AUTO: 124 K/UL — LOW (ref 150–400)
PLATELET # BLD AUTO: 142 K/UL — LOW (ref 150–400)
POTASSIUM SERPL-MCNC: 3.4 MMOL/L — LOW (ref 3.5–5.3)
POTASSIUM SERPL-MCNC: 3.7 MMOL/L — SIGNIFICANT CHANGE UP (ref 3.5–5.3)
POTASSIUM SERPL-MCNC: 3.8 MMOL/L — SIGNIFICANT CHANGE UP (ref 3.5–5.3)
POTASSIUM SERPL-SCNC: 3.4 MMOL/L — LOW (ref 3.5–5.3)
POTASSIUM SERPL-SCNC: 3.7 MMOL/L — SIGNIFICANT CHANGE UP (ref 3.5–5.3)
POTASSIUM SERPL-SCNC: 3.8 MMOL/L — SIGNIFICANT CHANGE UP (ref 3.5–5.3)
PROT SERPL-MCNC: 6.6 GM/DL — SIGNIFICANT CHANGE UP (ref 6–8.3)
PROTHROM AB SERPL-ACNC: 16.7 SEC — HIGH (ref 10.5–13.4)
RBC # BLD: 5.29 M/UL — SIGNIFICANT CHANGE UP (ref 4.2–5.8)
RBC # BLD: 5.45 M/UL — SIGNIFICANT CHANGE UP (ref 4.2–5.8)
RBC # FLD: 15.9 % — HIGH (ref 10.3–14.5)
RBC # FLD: 16.2 % — HIGH (ref 10.3–14.5)
SODIUM SERPL-SCNC: 135 MMOL/L — SIGNIFICANT CHANGE UP (ref 135–145)
SODIUM SERPL-SCNC: 135 MMOL/L — SIGNIFICANT CHANGE UP (ref 135–145)
SODIUM SERPL-SCNC: 136 MMOL/L — SIGNIFICANT CHANGE UP (ref 135–145)
SPECIMEN SOURCE: SIGNIFICANT CHANGE UP
SPECIMEN SOURCE: SIGNIFICANT CHANGE UP
WBC # BLD: 15.75 K/UL — HIGH (ref 3.8–10.5)
WBC # BLD: 21.82 K/UL — HIGH (ref 3.8–10.5)
WBC # FLD AUTO: 15.75 K/UL — HIGH (ref 3.8–10.5)
WBC # FLD AUTO: 21.82 K/UL — HIGH (ref 3.8–10.5)

## 2023-03-03 PROCEDURE — 50432 PLMT NEPHROSTOMY CATHETER: CPT | Mod: RT

## 2023-03-03 PROCEDURE — 76770 US EXAM ABDO BACK WALL COMP: CPT | Mod: 26

## 2023-03-03 PROCEDURE — 99292 CRITICAL CARE ADDL 30 MIN: CPT

## 2023-03-03 PROCEDURE — 99231 SBSQ HOSP IP/OBS SF/LOW 25: CPT

## 2023-03-03 PROCEDURE — 71045 X-RAY EXAM CHEST 1 VIEW: CPT | Mod: 26

## 2023-03-03 PROCEDURE — 99291 CRITICAL CARE FIRST HOUR: CPT

## 2023-03-03 RX ORDER — IPRATROPIUM/ALBUTEROL SULFATE 18-103MCG
3 AEROSOL WITH ADAPTER (GRAM) INHALATION EVERY 6 HOURS
Refills: 0 | Status: DISCONTINUED | OUTPATIENT
Start: 2023-03-03 | End: 2023-03-07

## 2023-03-03 RX ORDER — ACETAMINOPHEN 500 MG
1000 TABLET ORAL ONCE
Refills: 0 | Status: COMPLETED | OUTPATIENT
Start: 2023-03-03 | End: 2023-03-03

## 2023-03-03 RX ORDER — VANCOMYCIN HCL 1 G
1000 VIAL (EA) INTRAVENOUS ONCE
Refills: 0 | Status: COMPLETED | OUTPATIENT
Start: 2023-03-03 | End: 2023-03-03

## 2023-03-03 RX ORDER — CEFTRIAXONE 500 MG/1
INJECTION, POWDER, FOR SOLUTION INTRAMUSCULAR; INTRAVENOUS
Refills: 0 | Status: DISCONTINUED | OUTPATIENT
Start: 2023-03-03 | End: 2023-03-03

## 2023-03-03 RX ORDER — ALBUTEROL 90 UG/1
2.5 AEROSOL, METERED ORAL ONCE
Refills: 0 | Status: COMPLETED | OUTPATIENT
Start: 2023-03-03 | End: 2023-03-03

## 2023-03-03 RX ORDER — CEFTRIAXONE 500 MG/1
2000 INJECTION, POWDER, FOR SOLUTION INTRAMUSCULAR; INTRAVENOUS ONCE
Refills: 0 | Status: DISCONTINUED | OUTPATIENT
Start: 2023-03-03 | End: 2023-03-03

## 2023-03-03 RX ORDER — SODIUM CHLORIDE 9 MG/ML
1000 INJECTION, SOLUTION INTRAVENOUS
Refills: 0 | Status: DISCONTINUED | OUTPATIENT
Start: 2023-03-03 | End: 2023-03-05

## 2023-03-03 RX ORDER — POTASSIUM CHLORIDE 20 MEQ
40 PACKET (EA) ORAL ONCE
Refills: 0 | Status: COMPLETED | OUTPATIENT
Start: 2023-03-03 | End: 2023-03-03

## 2023-03-03 RX ORDER — SODIUM CHLORIDE 9 MG/ML
1000 INJECTION, SOLUTION INTRAVENOUS
Refills: 0 | Status: DISCONTINUED | OUTPATIENT
Start: 2023-03-03 | End: 2023-03-03

## 2023-03-03 RX ORDER — ALBUTEROL 90 UG/1
2 AEROSOL, METERED ORAL ONCE
Refills: 0 | Status: DISCONTINUED | OUTPATIENT
Start: 2023-03-03 | End: 2023-03-07

## 2023-03-03 RX ORDER — ALBUTEROL 90 UG/1
2 AEROSOL, METERED ORAL ONCE
Refills: 0 | Status: COMPLETED | OUTPATIENT
Start: 2023-03-03 | End: 2023-03-03

## 2023-03-03 RX ORDER — MAGNESIUM SULFATE 500 MG/ML
2 VIAL (ML) INJECTION ONCE
Refills: 0 | Status: COMPLETED | OUTPATIENT
Start: 2023-03-03 | End: 2023-03-03

## 2023-03-03 RX ADMIN — Medication 1: at 08:18

## 2023-03-03 RX ADMIN — HEPARIN SODIUM 5000 UNIT(S): 5000 INJECTION INTRAVENOUS; SUBCUTANEOUS at 17:04

## 2023-03-03 RX ADMIN — HEPARIN SODIUM 5000 UNIT(S): 5000 INJECTION INTRAVENOUS; SUBCUTANEOUS at 07:27

## 2023-03-03 RX ADMIN — Medication 3 MILLILITER(S): at 20:36

## 2023-03-03 RX ADMIN — SENNA PLUS 2 TABLET(S): 8.6 TABLET ORAL at 21:50

## 2023-03-03 RX ADMIN — Medication 25 GRAM(S): at 20:28

## 2023-03-03 RX ADMIN — HEPARIN SODIUM 5000 UNIT(S): 5000 INJECTION INTRAVENOUS; SUBCUTANEOUS at 21:51

## 2023-03-03 RX ADMIN — Medication 1000 MILLIGRAM(S): at 12:15

## 2023-03-03 RX ADMIN — Medication 25 GRAM(S): at 09:58

## 2023-03-03 RX ADMIN — TAMSULOSIN HYDROCHLORIDE 0.4 MILLIGRAM(S): 0.4 CAPSULE ORAL at 21:51

## 2023-03-03 RX ADMIN — ALBUTEROL 2.5 MILLIGRAM(S): 90 AEROSOL, METERED ORAL at 17:02

## 2023-03-03 RX ADMIN — Medication 1000 MILLIGRAM(S): at 20:58

## 2023-03-03 RX ADMIN — Medication 400 MILLIGRAM(S): at 11:45

## 2023-03-03 RX ADMIN — Medication 250 MILLIGRAM(S): at 18:35

## 2023-03-03 RX ADMIN — MAGNESIUM OXIDE 400 MG ORAL TABLET 400 MILLIGRAM(S): 241.3 TABLET ORAL at 09:59

## 2023-03-03 RX ADMIN — Medication 2 MILLIGRAM(S): at 11:45

## 2023-03-03 RX ADMIN — Medication 400 MILLIGRAM(S): at 20:28

## 2023-03-03 RX ADMIN — Medication 3 MILLILITER(S): at 11:55

## 2023-03-03 RX ADMIN — ESCITALOPRAM OXALATE 20 MILLIGRAM(S): 10 TABLET, FILM COATED ORAL at 09:59

## 2023-03-03 RX ADMIN — Medication 300 MILLIGRAM(S): at 10:00

## 2023-03-03 RX ADMIN — Medication 1 TABLET(S): at 09:59

## 2023-03-03 RX ADMIN — CHLORHEXIDINE GLUCONATE 1 APPLICATION(S): 213 SOLUTION TOPICAL at 01:30

## 2023-03-03 RX ADMIN — FINASTERIDE 5 MILLIGRAM(S): 5 TABLET, FILM COATED ORAL at 09:59

## 2023-03-03 NOTE — PROGRESS NOTE ADULT - ASSESSMENT
Patient with Obstructing kidney stone  with severe sepsis and rigors  also has developed ATN which good also be contributed by IV Contrast and obstruction  now Rigors in PACU    Plan    1. hydration bp ok  2. rigors triggered by nephrostomy  3. blood culture previously drawn,  awaiting urine cultures, on Rocephen  4. CBI for hematuria  5. СВЕТЛАНА - creatinine now 2.5 , supportive care, obstruction relieved, hydration, got contrast  6. hypoxia, related to sepsis, cta yesterday negative no infiltrate on CT

## 2023-03-03 NOTE — PROGRESS NOTE ADULT - ASSESSMENT
ASSESSMENT   83yo M PMHx significant for Bladder cancer, Depression, Hypertension, s/p back surgery (TLIF), hypercholesterolemia, [place] (uses bilateral hearing aides), urolithiasis s/p sten in the past, BPH, HTN, HLD, DM, HFpEF and osteoarthritis presents with right flank pain. Started yesterday intermittent but more pronounced and constant this morning. Associated nausea with vomiting, lethargy, weakness, chills, subjective fevers.     CTAP with bilateral dependent atelectasis, splenomegaly, mild right hydronephrosis with 1-2mm right proximal ureteral stone and also incompletely characterized right upper pole kidney exophytic lesion (2.1x1.9cm), mild bladder wall thickening and enlarged prostate.    Admitted for  1. Sepsis 2/2 obstructing R ureteral stone  2. UTI/ R Pyelonephritis  3. hematuria  4. СВЕТЛАНА 2/2 post renal obstruction  5. acute hypoxic respiratory failure    PLAN  - pain control prn. avoid nsaids  - BP soft. cont to monitor. Maintain MAP >65  - on NRB sating 93%. CTA chest yesterday neg for PE, infiltrates, volume overload. will check CXR today  - resume PO diet.   - Cr trending up today. suspect d/t obstruction and hypovolemia. cont to trend renal fxn. monitor electrolytes  - R perc nephrostomy placed today with IR. f/u cultures. harding in with CBI. f/u urology recs  - cont IV CTX for UTI/pyelonephritis. f/u blood and urine cx. trend WBC. monitor for fever  - hold chemical DVT ppx in setting of hematuria. cont SCDs    Dispo: SICU. IV abx.      ASSESSMENT   81yo M PMHx significant for Bladder cancer, Depression, Hypertension, s/p back surgery (TLIF), hypercholesterolemia, [place] (uses bilateral hearing aides), urolithiasis s/p sten in the past, BPH, HTN, HLD, DM, HFpEF and osteoarthritis presents with right flank pain. Started yesterday intermittent but more pronounced and constant this morning. Associated nausea with vomiting, lethargy, weakness, chills, subjective fevers.     CTAP with bilateral dependent atelectasis, splenomegaly, mild right hydronephrosis with 1-2mm right proximal ureteral stone and also incompletely characterized right upper pole kidney exophytic lesion (2.1x1.9cm), mild bladder wall thickening and enlarged prostate.    Admitted for  1. Sepsis 2/2 obstructing R ureteral stone  2. UTI/ R Pyelonephritis  3. hematuria  4. СВЕТЛАНА 2/2 post renal obstruction  5. acute hypoxic respiratory failure    PLAN  - pain control prn. avoid nsaids  - BP soft. cont to monitor. Maintain MAP >65. dcd losartan. hold anti-htn meds for now.   - on NRB sating 93%. CTA chest yesterday neg for PE, infiltrates, volume overload. will check CXR today  - resume PO diet.   - Cr trending up today. suspect d/t obstruction and hypovolemia. cont to trend renal fxn. monitor electrolytes. cont flomax and finasteride  - R perc nephrostomy placed today with IR. f/u cultures. harding in with CBI. f/u urology recs  - US kidney ordered to eval R kidney lesion  - cont IV CTX for UTI/pyelonephritis. f/u blood and urine cx. trend WBC. monitor for fever  - Hgb trending down. microcytic anemia. hold chemical DVT ppx in setting of hematuria. cont SCDs. will check iron levels.     Dispo: SICU. IV abx. supplemental O2. Trend Cr. f/u blood and urine cx    Will discuss with Dr. Moore   ASSESSMENT   83yo M PMHx significant for Bladder cancer, Depression, Hypertension, s/p back surgery (TLIF), hypercholesterolemia, [place] (uses bilateral hearing aides), urolithiasis s/p sten in the past, BPH, HTN, HLD, DM, HFpEF and osteoarthritis presents with right flank pain. Started yesterday intermittent but more pronounced and constant this morning. Associated nausea with vomiting, lethargy, weakness, chills, subjective fevers.     CTAP with bilateral dependent atelectasis, splenomegaly, mild right hydronephrosis with 1-2mm right proximal ureteral stone and also incompletely characterized right upper pole kidney exophytic lesion (2.1x1.9cm), mild bladder wall thickening and enlarged prostate.    Admitted for  1. Sepsis 2/2 obstructing R ureteral stone  2. UTI/ R Pyelonephritis  3. hematuria  4. СВЕТЛАНА 2/2 post renal obstruction  5. acute hypoxic respiratory failure    Code Sepsis called in PACU s/p R nephrectomy    PLAN  - pain control prn. avoid nsaids  - BP soft. cont to monitor. Maintain MAP >65. dcd losartan. hold anti-htn meds for now.   - on NRB sating 93%. CTA chest yesterday neg for PE, infiltrates, volume overload. will check CXR today  - resume PO diet.   - Cr trending up today. suspect d/t obstruction and hypovolemia. cont to trend renal fxn. monitor electrolytes. cont flomax and finasteride  - R perc nephrostomy placed today with IR. f/u cultures. harding in with CBI. f/u urology recs  - US kidney ordered to eval R kidney lesion  - cont IV CTX for UTI/pyelonephritis. f/u blood and urine cx. trend WBC. monitor for fever. lactate elevated again. recheck lactate tonight  - Hgb trending down. microcytic anemia. hold chemical DVT ppx in setting of hematuria. cont SCDs. will check iron levels.     Dispo: SICU. IV abx. supplemental O2. Trend Cr. f/u blood and urine cx    Will discuss with Dr. Moore

## 2023-03-03 NOTE — CONSULT NOTE ADULT - ASSESSMENT
82M with bladder cancer, HTN, s/p TILF, HLD, [place] 9bilateral hearing aides), urolithiasis s/p prior stent, BPH, DM, HFpEF, OA, depression presenting with acute onset R flank pain that started yesterday with associated vomiting, fever, chills, and lethargy. CT A&P on arrival significant for mild R hydronephrosis with 1-2mm R proximal ureteral stone and incompletely characterized R upper pole kidney exophytic lesion, mild bladder wall thickening, and enlarged prostate. RRT called yesterday afternoon iso tachycardia and hypoxia. CTA Chest negative for PE. Urology consulted for further source control of septic stone. Patient now s/p unsuccessful cystoscopy in PACU, MICU consulted for further management of septic stone. 82M with bladder cancer, HTN, s/p TILF, HLD, [place] 9bilateral hearing aides), urolithiasis s/p prior stent, BPH, DM, HFpEF, OA, depression presenting with acute onset R flank pain that started yesterday with associated vomiting, fever, chills, and lethargy. CT A&P on arrival significant for mild R hydronephrosis with 1-2mm R proximal ureteral stone and incompletely characterized R upper pole kidney exophytic lesion, mild bladder wall thickening, and enlarged prostate. RRT called yesterday afternoon iso tachycardia and hypoxia. CTA Chest negative for PE. Urology consulted for further source control of septic stone. Patient now s/p unsuccessful cystoscopy in PACU, MICU consulted for further management of septic stone.    Sepsis iso obstructing R ureteral stone  Acute Hypoxic Respiratory Failure  Lactic Acidosis  СВЕТЛАНА    Plan:  NEURO: Mentation intact. CTH negative on admission.  CARDIAC: Remains off vasopressors post-operatively. Will start levophed in event of acute decompensation and titrate to maintain goal MAP >65.   RESPIRATORY: CTA negative for PE, otherwise clear of signs of PNA.  GI: NPO.  :   ENDO: No active issues.  ID: Afebrile, WBC normal, lactate 3.1 at RRT. Continue empiric CTX iso septic obstructive stone. S/p unsuccessful cystoscopy for source control overnight. Given that patient remains stable off vasopressors, will consult IR come morning for nephrostomy tube placement for source control.   HEME: SQH for DVT ppx. SCDs.    Dispo: Admit to SICU for post-operative monitoring.     Case discussed with eICU Attending, Dr Diaz. 82M with bladder cancer, HTN, s/p TILF, HLD, [place] 9bilateral hearing aides), urolithiasis s/p prior stent, BPH, DM, HFpEF, OA, depression presenting with acute onset R flank pain that started yesterday with associated vomiting, fever, chills, and lethargy. CT A&P on arrival significant for mild R hydronephrosis with 1-2mm R proximal ureteral stone and incompletely characterized R upper pole kidney exophytic lesion, mild bladder wall thickening, and enlarged prostate. RRT called yesterday afternoon iso tachycardia and hypoxia. CTA Chest negative for PE. Urology consulted for further source control of septic stone. Patient now s/p unsuccessful cystoscopy in PACU, MICU consulted for further management of septic stone.    Sepsis iso obstructing R ureteral stone  Acute Hypoxic Respiratory Failure  Lactic Acidosis  СВЕТЛАНА    Plan:  NEURO: Mentation intact. CTH negative on admission.  CARDIAC: Remains off vasopressors post-operatively. Will start levophed in event of acute decompensation and titrate to maintain goal MAP >65.   RESPIRATORY: Remains on max NRB, will attempt to wean for goal SpO2 >92%. CTA negative for PE, otherwise clear of signs of consolidation or effusion. RVP negative. No obvious respiratory etiology of hypoxia at this time.  GI: NPO.  : СВЕТЛАНА iso septic obstructing stone. Maintenance IVF running. Continue to trend Cr and monitor I&Os.  ENDO: No active issues.  ID: Afebrile, WBC normal, lactate 3.1 at RRT, continue to trend. Continue empiric CTX iso septic obstructive stone. S/p unsuccessful cystoscopy for source control overnight. Given that patient remains stable off vasopressors, will require IR consult come morning for nephrostomy tube placement for source control. F/U pending pan cxs.  HEME: SQH for DVT ppx. SCDs.    Dispo: Admit to SICU for post-operative monitoring.     Case discussed with eICU Attending, Dr Diaz.

## 2023-03-03 NOTE — PROGRESS NOTE ADULT - NS ATTEND AMEND GEN_ALL_CORE FT
Severe sepsis from acute pyelonephritis   R ureteral stone with hydronephrosis   Acute hypoxic resp failure   СВЕТЛАНА from ATN + JHON   Lactic acidosis   E. faecalis bacteremia     S/p R PCN 3/3  Undergoing CBI via 3-way [place]     BP is stable   Resp status tenuous, on NRM, sats 95%, patient says he feels better c/w earlier   CTA chest w/p pulm edema or other lung pathology   Febrile, T 101.2 right now   Previous TTE with normal EF, grade 1 diastolic dysfn   Suspect wheezing and hypoxia due to sepsis, fever, will treat with BDs for now   He has been on ceftriaxone, switched to vancomycin after d/w ID   F/u TTE   LR at 75 ml/hr, monitor UO, renal fn   Repeat BMP, LA later tonight   DVT ppx with sc heparin     Patient critically ill

## 2023-03-03 NOTE — PROVIDER CONTACT NOTE (CRITICAL VALUE NOTIFICATION) - DATE AND TIME:
03-Mar-2023 17:45 03-Mar-2023 17:50 Ipledge Number (Optional): [id number] Ipledge Number (Optional): [id number]

## 2023-03-03 NOTE — PHARMACOTHERAPY INTERVENTION NOTE - COMMENTS
Medication history complete, reviewed medication with patient and confirmed with DrFirst.
As discussed, agreed to consult infectious diseases since the 3/2 blood culture grew Enterococcus faecalis.    Gordon Parrish, PharmD, BCIDP  Clinical Pharmacy Specialist, Infectious Diseases  Tele-Antimicrobial Stewardship Program (Tele-ASP)  Tele-ASP Phone: [phone]    
Recommended to initiate vancomycin dosed by level (eGFR = 23 mL/min/1.73 m2) since the 3/2 blood culture Enterococcus faecalis and patient has an allergy to penicillin (hives).    Gordon Parrish, PharmD, [facility]  Clinical Pharmacy Specialist, Infectious Diseases  Tele-Antimicrobial Stewardship Program (Tele-ASP)  Tele-ASP Phone: [phone]    
nonimmune

## 2023-03-03 NOTE — PROGRESS NOTE ADULT - ASSESSMENT
A/P: 82y Male s/p Cysto/ Bladder Fulgeration  Continue Ceftriaxone  Ck culture from Neph tube   Ck cultures  IV hydration  Monitor wbc and creat level  Continue CBI for now  Above discussed with Dr. Awan

## 2023-03-04 LAB
ALBUMIN SERPL ELPH-MCNC: 2.6 G/DL — LOW (ref 3.3–5)
ALP SERPL-CCNC: 56 U/L — SIGNIFICANT CHANGE UP (ref 40–120)
ALT FLD-CCNC: 39 U/L — SIGNIFICANT CHANGE UP (ref 12–78)
ANION GAP SERPL CALC-SCNC: 5 MMOL/L — SIGNIFICANT CHANGE UP (ref 5–17)
ANISOCYTOSIS BLD QL: SIGNIFICANT CHANGE UP
AST SERPL-CCNC: 32 U/L — SIGNIFICANT CHANGE UP (ref 15–37)
BASOPHILS # BLD AUTO: 0 K/UL — SIGNIFICANT CHANGE UP (ref 0–0.2)
BASOPHILS NFR BLD AUTO: 0 % — SIGNIFICANT CHANGE UP (ref 0–2)
BILIRUB SERPL-MCNC: 0.6 MG/DL — SIGNIFICANT CHANGE UP (ref 0.2–1.2)
BUN SERPL-MCNC: 39 MG/DL — HIGH (ref 7–23)
CALCIUM SERPL-MCNC: 8 MG/DL — LOW (ref 8.5–10.1)
CHLORIDE SERPL-SCNC: 103 MMOL/L — SIGNIFICANT CHANGE UP (ref 96–108)
CO2 SERPL-SCNC: 28 MMOL/L — SIGNIFICANT CHANGE UP (ref 22–31)
CREAT SERPL-MCNC: 2.2 MG/DL — HIGH (ref 0.5–1.3)
DACRYOCYTES BLD QL SMEAR: SLIGHT — SIGNIFICANT CHANGE UP
EGFR: 30 ML/MIN/1.73M2 — LOW
ELLIPTOCYTES BLD QL SMEAR: SLIGHT — SIGNIFICANT CHANGE UP
EOSINOPHIL # BLD AUTO: 0 K/UL — SIGNIFICANT CHANGE UP (ref 0–0.5)
EOSINOPHIL NFR BLD AUTO: 0 % — SIGNIFICANT CHANGE UP (ref 0–6)
FERRITIN SERPL-MCNC: 468 NG/ML — HIGH (ref 30–400)
GLUCOSE BLDC GLUCOMTR-MCNC: 139 MG/DL — HIGH (ref 70–99)
GLUCOSE BLDC GLUCOMTR-MCNC: 193 MG/DL — HIGH (ref 70–99)
GLUCOSE BLDC GLUCOMTR-MCNC: 224 MG/DL — HIGH (ref 70–99)
GLUCOSE SERPL-MCNC: 131 MG/DL — HIGH (ref 70–99)
HCT VFR BLD CALC: 30.2 % — LOW (ref 39–50)
HGB BLD-MCNC: 9.5 G/DL — LOW (ref 13–17)
HYPOCHROMIA BLD QL: SIGNIFICANT CHANGE UP
IRON SATN MFR SERPL: 4 % — LOW (ref 16–55)
IRON SATN MFR SERPL: 9 UG/DL — LOW (ref 45–165)
LACTATE SERPL-SCNC: 1.9 MMOL/L — SIGNIFICANT CHANGE UP (ref 0.7–2)
LYMPHOCYTES # BLD AUTO: 1.02 K/UL — SIGNIFICANT CHANGE UP (ref 1–3.3)
LYMPHOCYTES # BLD AUTO: 10 % — LOW (ref 13–44)
MAGNESIUM SERPL-MCNC: 2.5 MG/DL — SIGNIFICANT CHANGE UP (ref 1.6–2.6)
MANUAL SMEAR VERIFICATION: SIGNIFICANT CHANGE UP
MCHC RBC-ENTMCNC: 20.2 PG — LOW (ref 27–34)
MCHC RBC-ENTMCNC: 31.5 GM/DL — LOW (ref 32–36)
MCV RBC AUTO: 64.1 FL — LOW (ref 80–100)
METAMYELOCYTES # FLD: 1 % — HIGH (ref 0–0)
MICROCYTES BLD QL: SIGNIFICANT CHANGE UP
MONOCYTES # BLD AUTO: 0.31 K/UL — SIGNIFICANT CHANGE UP (ref 0–0.9)
MONOCYTES NFR BLD AUTO: 3 % — SIGNIFICANT CHANGE UP (ref 2–14)
NEUTROPHILS # BLD AUTO: 8.78 K/UL — HIGH (ref 1.8–7.4)
NEUTROPHILS NFR BLD AUTO: 69 % — SIGNIFICANT CHANGE UP (ref 43–77)
NEUTS BAND # BLD: 17 % — HIGH (ref 0–8)
NRBC # BLD: 1 /100 — HIGH (ref 0–0)
NRBC # BLD: SIGNIFICANT CHANGE UP /100 WBCS (ref 0–0)
PHOSPHATE SERPL-MCNC: 3.4 MG/DL — SIGNIFICANT CHANGE UP (ref 2.5–4.5)
PLAT MORPH BLD: NORMAL — SIGNIFICANT CHANGE UP
PLATELET # BLD AUTO: 118 K/UL — LOW (ref 150–400)
PLATELET COUNT - ESTIMATE: ABNORMAL
POIKILOCYTOSIS BLD QL AUTO: SIGNIFICANT CHANGE UP
POTASSIUM SERPL-MCNC: 3.4 MMOL/L — LOW (ref 3.5–5.3)
POTASSIUM SERPL-SCNC: 3.4 MMOL/L — LOW (ref 3.5–5.3)
PROT SERPL-MCNC: 5.9 GM/DL — LOW (ref 6–8.3)
RBC # BLD: 4.71 M/UL — SIGNIFICANT CHANGE UP (ref 4.2–5.8)
RBC # FLD: 15.9 % — HIGH (ref 10.3–14.5)
RBC BLD AUTO: ABNORMAL
SODIUM SERPL-SCNC: 136 MMOL/L — SIGNIFICANT CHANGE UP (ref 135–145)
TARGETS BLD QL SMEAR: SLIGHT — SIGNIFICANT CHANGE UP
TIBC SERPL-MCNC: 223 UG/DL — SIGNIFICANT CHANGE UP (ref 220–430)
UIBC SERPL-MCNC: 214 UG/DL — SIGNIFICANT CHANGE UP (ref 110–370)
VANCOMYCIN TROUGH SERPL-MCNC: 5 UG/ML — LOW (ref 10–20)
WBC # BLD: 10.21 K/UL — SIGNIFICANT CHANGE UP (ref 3.8–10.5)
WBC # FLD AUTO: 10.21 K/UL — SIGNIFICANT CHANGE UP (ref 3.8–10.5)

## 2023-03-04 PROCEDURE — 93306 TTE W/DOPPLER COMPLETE: CPT | Mod: 26

## 2023-03-04 PROCEDURE — 99291 CRITICAL CARE FIRST HOUR: CPT

## 2023-03-04 RX ORDER — INSULIN LISPRO 100/ML
VIAL (ML) SUBCUTANEOUS
Refills: 0 | Status: DISCONTINUED | OUTPATIENT
Start: 2023-03-04 | End: 2023-03-06

## 2023-03-04 RX ORDER — DEXTROSE 50 % IN WATER 50 %
25 SYRINGE (ML) INTRAVENOUS ONCE
Refills: 0 | Status: DISCONTINUED | OUTPATIENT
Start: 2023-03-04 | End: 2023-03-06

## 2023-03-04 RX ORDER — CIPROFLOXACIN LACTATE 400MG/40ML
200 VIAL (ML) INTRAVENOUS ONCE
Refills: 0 | Status: COMPLETED | OUTPATIENT
Start: 2023-03-04 | End: 2023-03-04

## 2023-03-04 RX ORDER — DEXTROSE 50 % IN WATER 50 %
15 SYRINGE (ML) INTRAVENOUS ONCE
Refills: 0 | Status: DISCONTINUED | OUTPATIENT
Start: 2023-03-04 | End: 2023-03-06

## 2023-03-04 RX ORDER — IRON SUCROSE 20 MG/ML
100 INJECTION, SOLUTION INTRAVENOUS EVERY 24 HOURS
Refills: 0 | Status: COMPLETED | OUTPATIENT
Start: 2023-03-04 | End: 2023-03-08

## 2023-03-04 RX ORDER — POTASSIUM CHLORIDE 20 MEQ
40 PACKET (EA) ORAL ONCE
Refills: 0 | Status: COMPLETED | OUTPATIENT
Start: 2023-03-04 | End: 2023-03-04

## 2023-03-04 RX ORDER — ACETAMINOPHEN 500 MG
1000 TABLET ORAL ONCE
Refills: 0 | Status: COMPLETED | OUTPATIENT
Start: 2023-03-04 | End: 2023-03-04

## 2023-03-04 RX ORDER — DEXTROSE 50 % IN WATER 50 %
12.5 SYRINGE (ML) INTRAVENOUS ONCE
Refills: 0 | Status: DISCONTINUED | OUTPATIENT
Start: 2023-03-04 | End: 2023-03-06

## 2023-03-04 RX ORDER — GLUCAGON INJECTION, SOLUTION 0.5 MG/.1ML
1 INJECTION, SOLUTION SUBCUTANEOUS ONCE
Refills: 0 | Status: DISCONTINUED | OUTPATIENT
Start: 2023-03-04 | End: 2023-03-06

## 2023-03-04 RX ORDER — SODIUM CHLORIDE 9 MG/ML
1000 INJECTION, SOLUTION INTRAVENOUS
Refills: 0 | Status: DISCONTINUED | OUTPATIENT
Start: 2023-03-04 | End: 2023-03-06

## 2023-03-04 RX ORDER — CIPROFLOXACIN LACTATE 400MG/40ML
VIAL (ML) INTRAVENOUS
Refills: 0 | Status: DISCONTINUED | OUTPATIENT
Start: 2023-03-04 | End: 2023-03-05

## 2023-03-04 RX ORDER — CIPROFLOXACIN LACTATE 400MG/40ML
200 VIAL (ML) INTRAVENOUS EVERY 12 HOURS
Refills: 0 | Status: DISCONTINUED | OUTPATIENT
Start: 2023-03-04 | End: 2023-03-05

## 2023-03-04 RX ADMIN — FINASTERIDE 5 MILLIGRAM(S): 5 TABLET, FILM COATED ORAL at 11:32

## 2023-03-04 RX ADMIN — Medication 1 TABLET(S): at 11:31

## 2023-03-04 RX ADMIN — Medication 100 MILLIGRAM(S): at 09:29

## 2023-03-04 RX ADMIN — SODIUM CHLORIDE 75 MILLILITER(S): 9 INJECTION, SOLUTION INTRAVENOUS at 14:45

## 2023-03-04 RX ADMIN — Medication 3 MILLIGRAM(S): at 21:09

## 2023-03-04 RX ADMIN — CHLORHEXIDINE GLUCONATE 1 APPLICATION(S): 213 SOLUTION TOPICAL at 05:28

## 2023-03-04 RX ADMIN — HEPARIN SODIUM 5000 UNIT(S): 5000 INJECTION INTRAVENOUS; SUBCUTANEOUS at 21:10

## 2023-03-04 RX ADMIN — CYCLOBENZAPRINE HYDROCHLORIDE 5 MILLIGRAM(S): 10 TABLET, FILM COATED ORAL at 21:09

## 2023-03-04 RX ADMIN — Medication 3 MILLILITER(S): at 01:43

## 2023-03-04 RX ADMIN — Medication 100 MILLIGRAM(S): at 21:12

## 2023-03-04 RX ADMIN — Medication 300 MILLIGRAM(S): at 11:31

## 2023-03-04 RX ADMIN — Medication 40 MILLIEQUIVALENT(S): at 11:31

## 2023-03-04 RX ADMIN — Medication 2: at 13:24

## 2023-03-04 RX ADMIN — Medication 40 MILLIEQUIVALENT(S): at 00:28

## 2023-03-04 RX ADMIN — HEPARIN SODIUM 5000 UNIT(S): 5000 INJECTION INTRAVENOUS; SUBCUTANEOUS at 14:45

## 2023-03-04 RX ADMIN — Medication 400 MILLIGRAM(S): at 16:41

## 2023-03-04 RX ADMIN — MAGNESIUM OXIDE 400 MG ORAL TABLET 400 MILLIGRAM(S): 241.3 TABLET ORAL at 11:31

## 2023-03-04 RX ADMIN — Medication 1000 MILLIGRAM(S): at 17:00

## 2023-03-04 RX ADMIN — TAMSULOSIN HYDROCHLORIDE 0.4 MILLIGRAM(S): 0.4 CAPSULE ORAL at 21:10

## 2023-03-04 RX ADMIN — Medication 3 MILLILITER(S): at 20:27

## 2023-03-04 RX ADMIN — ESCITALOPRAM OXALATE 20 MILLIGRAM(S): 10 TABLET, FILM COATED ORAL at 11:31

## 2023-03-04 RX ADMIN — HEPARIN SODIUM 5000 UNIT(S): 5000 INJECTION INTRAVENOUS; SUBCUTANEOUS at 05:04

## 2023-03-04 RX ADMIN — IRON SUCROSE 100 MILLIGRAM(S): 20 INJECTION, SOLUTION INTRAVENOUS at 21:10

## 2023-03-04 RX ADMIN — Medication 2: at 21:10

## 2023-03-04 NOTE — CONSULT NOTE ADULT - ASSESSMENT
81 y/o Male with h/o Bladder cancer, depression, hypertension, back surgery (TLIF), [place] (uses bilateral hearing aides), urolithiasis s/p stent, BPH, HTN, HLD, DM, HFpEF and osteoarthritis was admitted on 3/2 for right flank pain x one day. The right flank pain started on the day PTA, intermittent but more pronounced and constant later on. Associated nausea with vomiting, lethargy, weakness, chills, subjective fevers. He was reported with bacteremia. In ER he was noted febrile. On 3/3 PM he received vancomycin 1 gm IV x 1.     1. Febrile syndrome. Sepsis with EN spp. Pyuria. Likely UTI. Kidney stones. ARF on CRF stage 3. Allergy to PCN.   -cultures reviewed  -BC x 2 and urine c/s collected  -s/p vancomycin 1 gm IV x 1 last PM  -start cipro 200 mg IV q12h  -reason for abx use and side effects reviewed with patient; monitor BMP  -old chart reviewed to assess prior cultures  -repeat BC x 2 in AM  -monitor temps  -f/u CBC  -supportive care  2. Other issues:   -care per medicine

## 2023-03-04 NOTE — PROGRESS NOTE ADULT - ASSESSMENT
81 y/o male with:     Severe sepsis from acute pyelonephritis   R ureteral stone with hydronephrosis   Acute hypoxic resp failure - resolved   СВЕТЛАНА from ATN + JHON   Lactic acidosis   E. faecalis bacteremia     S/p R PCN 3/3  Undergoing CBI via 3-way [place]       Plan:     Improving   Mentation, resp status improved   BP stable   Started on Cipro per ID, WBC improved, afebrile   Vanc x 1 on 3/3   Cr improving, continue IVF today    f/u to stop CBI   Start diet  DVT ppx with sc heparin

## 2023-03-04 NOTE — CONSULT NOTE ADULT - SUBJECTIVE AND OBJECTIVE BOX
HPI:    81 y/o M PMHx of kidney stones, Bladder cancer, Depression, Hypertension, s/p back surgery (TLIF), hypercholesterolemia, and osteoarthritis presents to the hospital with severe right flank pain associated with nausea and NBNB vomiting. Patient reports he had sudden onset of right flank pain yesterday 10/10 at it's worst intermittent colicky. Reports he had a similar episode with his last stone requiring a stent placement. Patient denies any fevers, chills, sob, dysuria, hematuria or other urinary symptoms at this time.       PAST MEDICAL & SURGICAL HISTORY:  Bladder cancer  2014      Uses hearing aid      HTN (hypertension)      Hypercholesterolemia      DM (diabetes mellitus)  Type 2      CA skin, basal cell      Depression      OA (osteoarthritis)      Lumbar spinal stenosis      Herniated nucleus pulposus, L5-S1      Kidney stones  Left      BPH (benign prostatic hyperplasia)      H/O hernia repair  left inguinal      History of bladder surgery  TURBT, cystoscopy--aprox 8 yrs ago, dx bladder cancer      S/P tonsillectomy      History of back surgery  TLIF      History of cystoscopy  Left kidney stones.  Left stent---22          REVIEW OF SYSTEMS    All other review of systems neg, except as noted in HPI    MEDICATIONS  (STANDING):  tamsulosin 0.4 milliGRAM(s) Oral at bedtime    MEDICATIONS  (PRN):      Allergies    penicillin (Hives)  sulfADIAZINE (Rash)    Intolerances        SOCIAL HISTORY:    FAMILY HISTORY:  Family history of diabetes mellitus (DM)  mother    Family history of heart disease  father        Vital Signs Last 24 Hrs  T(C): 36.9 (02 Mar 2023 07:25), Max: 36.9 (02 Mar 2023 07:25)  T(F): 98.4 (02 Mar 2023 07:25), Max: 98.4 (02 Mar 2023 07:25)  HR: 83 (02 Mar 2023 07:25) (78 - 83)  BP: 164/97 (02 Mar 2023 07:25) (164/97 - 173/101)  BP(mean): 115 (02 Mar 2023 07:25) (115 - 115)  RR: 18 (02 Mar 2023 04:56) (18 - 18)  SpO2: 95% (02 Mar 2023 07:25) (86% - 95%)    Parameters below as of 02 Mar 2023 07:25  Patient On (Oxygen Delivery Method): nasal cannula  O2 Flow (L/min): 5      PHYSICAL EXAM:      General: No distress, No anxiety  VITALS  T(C): 36.9 (23 @ 07:25), Max: 36.9 (23 @ 07:25)  HR: 83 (23 @ 07:25) (78 - 83)  BP: 164/97 (23 @ 07:25) (164/97 - 173/101)  RR: 18 (23 @ 04:56) (18 - 18)  SpO2: 95% (23 @ 07:25) (86% - 95%)            Skin     : No jaundice   HEENT: Normocephalic, no icterus , EOM full , No epistaxis  Lung    : No resp distress  Abdo:   : Soft, Non tender, No guarding, No distension   Back    : No CVAT b/l  Extremity: No calf tenderness   Genitalia Male: No Dillon   Neuro   : A&Ox3        LABS:                        12.3   8.14  )-----------( 192      ( 02 Mar 2023 05:14 )             39.3         137  |  102  |  20  ----------------------------<  192<H>  3.2<L>   |  30  |  1.43<H>    Ca    9.2      02 Mar 2023 05:14    TPro  7.2  /  Alb  3.7  /  TBili  0.5  /  DBili  x   /  AST  23  /  ALT  27  /  AlkPhos  84  03-02      Urinalysis Basic - ( 02 Mar 2023 07:51 )    Color: Yellow / Appearance: Clear / S.020 / pH: x  Gluc: x / Ketone: Negative  / Bili: Negative / Urobili: Negative   Blood: x / Protein: Negative / Nitrite: Negative   Leuk Esterase: Trace / RBC: 11-25 /HPF / WBC 11-25 /HPF   Sq Epi: x / Non Sq Epi: Few / Bacteria: Few        RADIOLOGY & ADDITIONAL STUDIES:  < from: CT Abdomen and Pelvis No Cont (23 @ 05:45) >  ACC: [id number] EXAM:  CT ABDOMEN AND PELVIS   ORDERED BY: RICH SOTO     PROCEDURE DATE:  2023          INTERPRETATION:  CLINICAL INFORMATION: Flank pain.    COMPARISON: CT abdomen pelvis 2022.    PROCEDURE:  CT of the Abdomen and Pelvis was performed without intravenous contrast.  Intravenous contrast: None.  Oral contrast: None.  Sagittal and coronal reformats were performed.    FINDINGS: Absence of intravenous contrast limits evaluation of   vasculature and solid organs.    LOWER CHEST: Mild bibasilar dependent atelectasis and/or scarring.    LIVER: Within normal limits.  BILE DUCTS: Normal caliber.  GALLBLADDER: Cholelithiasis.  SPLEEN: Enlarged measuring 16 cm in length.  PANCREAS: Within normal limits.  ADRENALS: Within normal limits.  KIDNEYS/URETERS: Mild right hydronephrosis likely secondary to faint   1-2mm right proximal ureteral stone on image 55 of series 2. Vague   hyperdensities identified within the proximal right renal pelvis,   concerningfor hemorrhagic products. Consider nonemergent retrograde   evaluation to exclude underlying neoplasm. Incompletely characterize   right upper pole exophytic lesion measuring 2.1 x 1.9 cm. No left-sided   hydronephrosis.    BLADDER: Mild wall thickening, difficult to assess secondary to   inadequate distention.  REPRODUCTIVE ORGANS: Prominent prostate, cause mass effect and protrusion   at the bladder base.    BOWEL: No bowel obstruction. Diverticulosis coli. Nonvisualized appendix.  PERITONEUM: No ascites.  VESSELS:  Atherosclerotic changes.  RETROPERITONEUM: No lymphadenopathy.  ABDOMINAL WALL: Suggestion of left groin hernia repair with plug. Mildly   prominent bilateral groin lymph nodes.  BONES: Degenerative changes. Posterior metallic fusion of L5 and S1 with   intervertebral disc spacer, metallic streak artifact limits detailed   evaluation the spinal canal and adjacent structures.    IMPRESSION:    Mild right hydronephrosis likely secondary to faint 1-2mm right proximal   ureteralstone. Right perinephric stranding.    Vague hyperdensities identified within the proximal right renal pelvis,   concerning for hemorrhagic products. Consider nonemergent retrograde   evaluation to exclude underlying neoplasm. Incompletely characterized   right upper pole exophytic lesion measuring 2.1 x 1.9 cm. Consider   nonemergent correlation with renal ultrasound or MR for better   characterization.    Mild bladder wall thickening, difficult to assess secondary to inadequate   distention. Correlate with urinalysis and lab values to assess for   cystitis and/or ascending urinary tract infection.    Additional findings as mentioned above.            --- End of Report ---            MANN GRAVES MD; Attending Radiologist  This document has been electronically signed. Mar  2 2023  6:04AM    < end of copied text >  
Patient is a 82y old  Male who presents with a chief complaint of Urolithiasis     HPI:  83 y/o Male with h/o Bladder cancer, depression, hypertension, back surgery (TLIF), [place] (uses bilateral hearing aides), urolithiasis s/p stent, BPH, HTN, HLD, DM, HFpEF and osteoarthritis was admitted on 3/2 for right flank pain x one day. The right flank pain started on the day PTA, intermittent but more pronounced and constant later on. Associated nausea with vomiting, lethargy, weakness, chills, subjective fevers. He was reported with bacteremia. On 3/3 PM he received vancomycin 1 gm IV x 1.     PMH: as above  PSH: as above  Meds: per reconciliation sheet, noted below  MEDICATIONS  (STANDING):  albuterol    90 MICROgram(s) HFA Inhaler 2 Puff(s) Inhalation once  albuterol/ipratropium for Nebulization 3 milliLiter(s) Nebulizer every 6 hours  chlorhexidine 2% Cloths 1 Application(s) Topical <User Schedule>  ciprofloxacin   IVPB      ciprofloxacin   IVPB 200 milliGRAM(s) IV Intermittent every 12 hours  dextrose 5%. 1000 milliLiter(s) (100 mL/Hr) IV Continuous <Continuous>  dextrose 5%. 1000 milliLiter(s) (50 mL/Hr) IV Continuous <Continuous>  dextrose 50% Injectable 25 Gram(s) IV Push once  dextrose 50% Injectable 12.5 Gram(s) IV Push once  dextrose 50% Injectable 25 Gram(s) IV Push once  diltiazem    milliGRAM(s) Oral daily  escitalopram 20 milliGRAM(s) Oral daily  finasteride 5 milliGRAM(s) Oral daily  glucagon  Injectable 1 milliGRAM(s) IntraMuscular once  heparin   Injectable 5000 Unit(s) SubCutaneous every 8 hours  insulin lispro (ADMELOG) corrective regimen sliding scale   SubCutaneous three times a day before meals  insulin lispro (ADMELOG) corrective regimen sliding scale   SubCutaneous at bedtime  lactated ringers. 1000 milliLiter(s) (75 mL/Hr) IV Continuous <Continuous>  lactated ringers. 1000 milliLiter(s) (75 mL/Hr) IV Continuous <Continuous>  magnesium oxide 400 milliGRAM(s) Oral daily  multivitamin 1 Tablet(s) Oral daily  naloxone Injectable 0.4 milliGRAM(s) IV Push once  potassium chloride    Tablet ER 40 milliEquivalent(s) Oral once  senna 2 Tablet(s) Oral at bedtime  tamsulosin 0.4 milliGRAM(s) Oral at bedtime    MEDICATIONS  (PRN):  acetaminophen     Tablet .. 650 milliGRAM(s) Oral every 6 hours PRN Temp greater or equal to 38C (100.4F), Mild Pain (1 - 3)  aluminum hydroxide/magnesium hydroxide/simethicone Suspension 30 milliLiter(s) Oral every 4 hours PRN Dyspepsia  cyclobenzaprine 5 milliGRAM(s) Oral every 8 hours PRN Spasm  dextrose Oral Gel 15 Gram(s) Oral once PRN Blood Glucose LESS THAN 70 milliGRAM(s)/deciliter  melatonin 3 milliGRAM(s) Oral at bedtime PRN Insomnia  morphine  - Injectable 4 milliGRAM(s) IV Push every 6 hours PRN Severe Pain (7 - 10)  ondansetron Injectable 4 milliGRAM(s) IV Push every 8 hours PRN Nausea and/or Vomiting  oxycodone    5 mG/acetaminophen 325 mG 1 Tablet(s) Oral every 4 hours PRN Moderate Pain (4 - 6)  polyethylene glycol 3350 17 Gram(s) Oral daily PRN Constipation    Allergies    penicillin (Hives)  sulfADIAZINE (Rash)    Intolerances    Social: no smoking, no alcohol, no illegal drugs; no recent travel, no exposure to TB  FAMILY HISTORY:  Family history of diabetes mellitus (DM) mother  Family history of heart disease father    ROS: the patient denies HA, no seizures, no dizziness, no sore throat, no nasal congestion, no blurry vision, no CP, no palpitations, no SOB, no cough, no abdominal pain, no diarrhea, no N/V, has dysuria, no leg pain, no claudication, no rash, no joint aches, no rectal pain or bleeding, no night sweats  All other systems reviewed and are negative    Vital Signs Last 24 Hrs  T(C): 37.2 (04 Mar 2023 09:14), Max: 39.3 (03 Mar 2023 14:23)  T(F): 99 (04 Mar 2023 09:14), Max: 102.7 (03 Mar 2023 14:23)  HR: 91 (04 Mar 2023 09:00) (82 - 108)  BP: 112/68 (04 Mar 2023 09:00) (88/53 - 144/51)  BP(mean): 79 (04 Mar 2023 09:00) (59 - 81)  RR: 21 (04 Mar 2023 09:00) (18 - 37)  SpO2: 93% (04 Mar 2023 09:00) (90% - 99%)    Parameters below as of 04 Mar 2023 08:00  Patient On (Oxygen Delivery Method): mask, Venturi    O2 Concentration (%): 50    PE:    Constitutional:  No acute distress  HEENT: NC/AT, EOMI, PERRLA, conjunctivae clear; ears and nose atraumatic; pharynx benign  Neck: supple; thyroid not palpable  Back: no tenderness  Respiratory: respiratory effort normal; clear to auscultation  Cardiovascular: S1S2 regular, no murmurs  Abdomen: soft, not tender, not distended, positive BS; no liver or spleen organomegaly  Genitourinary: no suprapubic tenderness  Lymphatic: no LN palpable  Musculoskeletal: no muscle tenderness, no joint swelling or tenderness  Extremities: no pedal edema  Neurological/ Psychiatric: AxOx3, judgement and insight normal; moving all extremities  Skin: no rashes; no palpable lesions    Labs: all available labs reviewed                        9.5    10.21 )-----------( 118      ( 04 Mar 2023 05:37 )             30.2     03-04    136  |  103  |  39<H>  ----------------------------<  131<H>  3.4<L>   |  28  |  2.20<H>    Ca    8.0<L>      04 Mar 2023 05:37  Phos  3.4     03-04  Mg     2.5     03-04    TPro  5.9<L>  /  Alb  2.6<L>  /  TBili  0.6  /  DBili  x   /  AST  32  /  ALT  39  /  AlkPhos  56  03-04     LIVER FUNCTIONS - ( 04 Mar 2023 05:37 )  Alb: 2.6 g/dL / Pro: 5.9 gm/dL / ALK PHOS: 56 U/L / ALT: 39 U/L / AST: 32 U/L / GGT: x             Culture - Blood (collected 02 Mar 2023 18:44)  Source: .Blood None  Gram Stain (03 Mar 2023 15:52):    Growth in anaerobic bottle: Gram positive cocci in pairs    Growth in aerobic bottle: Gram positive cocci in pairs  Preliminary Report (03 Mar 2023 15:53):    Growth in anaerobic bottle: Gram positive cocci in pairs    Growth in aerobic bottle: Gram positive cocci in pairs    Culture - Blood (collected 02 Mar 2023 18:42)  Source: .Blood None  Gram Stain (03 Mar 2023 16:58):    Growth in anaerobic bottle: Gram positive cocci in pairs    Growth in aerobic bottle: Gram Positive Cocci in Pairs and Chains  Preliminary Report (03 Mar 2023 17:00):    Growth in anaerobic bottle: Gram positive cocci in pairs    Growth in aerobic bottle: Gram Positive Cocci in Pairs and Chains  Organism: Blood Culture PCR (03 Mar 2023 14:07)      -  Enterococcus faecalis: Detec      Method Type: PCR    Radiology: all available radiological tests reviewed    < from: Xray Chest 1 View- PORTABLE-Urgent (Xray Chest 1 View- PORTABLE-Urgent .) (03.03.23 @ 16:28) >  1. Linear atelectasis in the left lower lobe with no evidence of pneumonia.  2. Slightly prominent cardiac silhouette, at least partially magnified by   technique and while seen with some engorgement of central pulmonary veins, there is no pulmonary edema.  3. Old right posterior lateral rib fractures.  < end of copied text >    < from: US Kidney and Bladder (03.03.23 @ 09:36) >  Atypical sonolucency for hydronephrosis involving the upper right kidney   and indeterminate 1.8 cm exophytic lesion involving the upper pole of   right kidney. Consider a CT urogram or MR imaging for further evaluation.  Left kidney: 12.7 cm. No renal mass, hydronephrosis or calculi.  Urinary bladder: Dillon catheter.  < end of copied text >    Advanced directives addressed: full resuscitation
HPI:  81y/o M PMHx significant for Bladder cancer, Depression, Hypertension, s/p back surgery (TLIF), hypercholesterolemia, [place] (uses bilateral hearing aides), urolithiasis s/p sten in the past, BPH, HTN, HLD, DM, HFpEF and osteoarthritis presents with right flank pain. Started yesterday intermittent but more pronounced and constant this morning. Associated nausea with vomiting, lethargy, weakness, chills, subjective fevers. Denies chest pain, syncope, weight gain/loss.    In the ER Tmax 98.4, HR 78-83, /101, RR 18, SpO2 86-91% on RA? then 95% on 4-5L NC and then 91-94% on RA. Deemed as apnea. Dimer below age adjusted threshold, however on admission evaluation with conversation, O2 between 83-89%. CTA chest ordered. Otherwise initially admission called for urolithiasis with possible Urology intervetion. CBC unremarkable, K 3.2, Cr 1.43 (Baseline 1.1-1.3), Troponin negative. CT abd/pel with bilateral dependent atelectasis, splenomegaly, mild right hydronephrosis with 1-2mm right proximal ureteral stone and also incompletely characterized right upper pole kidney exophytic lesion (2.1x1.9cm), mild bladder wall thickening and enlarged prostate. (02 Mar 2023 15:46)      Past Medical History, Family History, Social History:  PAST MEDICAL & SURGICAL HISTORY:  Bladder cancer  2014      Uses hearing aid      HTN (hypertension)      Hypercholesterolemia      DM (diabetes mellitus)  Type 2      CA skin, basal cell      Depression      OA (osteoarthritis)      Lumbar spinal stenosis      Herniated nucleus pulposus, L5-S1      Kidney stones  Left      BPH (benign prostatic hyperplasia)      H/O hernia repair  left inguinal      History of bladder surgery  TURBT, cystoscopy--aprox 8 yrs ago, dx bladder cancer      S/P tonsillectomy      History of back surgery  TLIF      History of cystoscopy  Left kidney stones.  Left stent---4/20/22          FAMILY HISTORY:  Family history of diabetes mellitus (DM)  mother    Family history of heart disease  father        Social History:  Rare cigar use; Denies EtoH or illicit drug use. (02 Mar 2023 15:46)      Interval History: rapid response for hypoxia and tachycardia          Allergic / Immune: No active allergies unless otherwise specified in medical chart    All other systems reviewed and are negative    Vitals:  Vital Signs Last 24 Hrs  T(C): 37 (02 Mar 2023 17:04), Max: 37 (02 Mar 2023 17:04)  T(F): 98.6 (02 Mar 2023 17:04), Max: 98.6 (02 Mar 2023 17:04)  HR: 120 (02 Mar 2023 18:50) (78 - 120)  BP: 160/90 (02 Mar 2023 18:50) (128/79 - 173/101)  BP(mean): 93 (02 Mar 2023 13:45) (93 - 124)  RR: 20 (02 Mar 2023 18:50) (18 - 20)  SpO2: 90% (02 Mar 2023 18:50) (86% - 95%)        Labs & Radiology:                        12.3   8.14  )-----------( 192      ( 02 Mar 2023 05:14 )             39.3       03-02    137  |  102  |  20  ----------------------------<  192<H>  3.2<L>   |  30  |  1.43<H>    Ca    9.2      02 Mar 2023 05:14  Mg     1.6     03-02    TPro  7.2  /  Alb  3.7  /  TBili  0.5  /  DBili  x   /  AST  23  /  ALT  27  /  AlkPhos  84  03-02      LIVER FUNCTIONS - ( 02 Mar 2023 05:14 )  Alb: 3.7 g/dL / Pro: 7.2 gm/dL / ALK PHOS: 84 U/L / ALT: 27 U/L / AST: 23 U/L / GGT: x                           ABG - ( 02 Mar 2023 18:30 )  pH, Arterial: 7.42  pH, Blood: x     /  pCO2: 42    /  pO2: 62    / HCO3: 27    / Base Excess: 2.4   /  SaO2: 93                  CAPILLARY BLOOD GLUCOSE  POCT Blood Glucose.: 152 mg/dL (02 Mar 2023 18:09)  POCT Blood Glucose.: 128 mg/dL (02 Mar 2023 17:07)      < from: CT Angio Chest PE Protocol w/ IV Cont (03.02.23 @ 16:50) >  ACC: [id number] EXAM:  CT ANGIO CHEST PULM ART WAWI   ORDERED BY: CHATA MUKHERJEE     PROCEDURE DATE:  03/02/2023          INTERPRETATION:  Clinical information: Hypoxia, assess for PE    TECHNIQUE: A volumetric acquisition of the chest was obtained from the   thoracic inlet to the upper abdomen during dynamic administration of   intravenous contrast. 3D MIP images and sagittal and coronal multiplanar   reformations were provided.    CONTRAST/COMPLICATIONS:  IV Contrast: Omnipaque 350  90 cc administered   10 cc discarded  Oral Contrast: NONE  Complications: None reported at time of study completion    COMPARISON: 4/19/2022    FINDINGS:    CTA: The contrast bolus is satisfactory. The study is degraded by   mild-moderate respiratory motion. There are no filling defects in the   pulmonary artery or its branches. The cardiac chambers are normal in   size. There are coronary artery calcifications. There is no pericardial   effusion. The mid ascending aorta measures 3.8 cm.    Lungs/Airways/Pleura: Stable 5 mm left lower lobe nodule since 2016.   Dependent and linear atelectasis in the lower lobes. Central airways are   patent. No pleural effusion.    Mediastinum/Lymph nodes: No thoracic adenopathy.    Upper Abdomen: There is mild right hydronephrosis, partially imaged. Left   nephroureteral stent no longer visualized.    Bones and Soft Tissues: There is diffuse qualitative osteopenia.    IMPRESSION:    No pulmonary thromboembolism    --- End of Report ---            CAMERON JIMENEZ M.D., Attending Radiologist  This document has been electronically signed. Mar  2 2023  4:59PM    < end of copied text >  < from: CT Abdomen and Pelvis No Cont (03.02.23 @ 05:45) >  ACC: [id number] EXAM:  CT ABDOMEN AND PELVIS   ORDERED BY: RICH SOTO     PROCEDURE DATE:  03/02/2023          INTERPRETATION:  CLINICAL INFORMATION: Flank pain.    COMPARISON: CT abdomen pelvis 4/16/2022.    PROCEDURE:  CT of the Abdomen and Pelvis was performed without intravenous contrast.  Intravenous contrast: None.  Oral contrast: None.  Sagittal and coronal reformats were performed.    FINDINGS: Absence of intravenous contrast limits evaluation of   vasculature and solid organs.    LOWER CHEST: Mild bibasilar dependent atelectasis and/or scarring.    LIVER: Within normal limits.  BILE DUCTS: Normal caliber.  GALLBLADDER: Cholelithiasis.  SPLEEN: Enlarged measuring 16 cm in length.  PANCREAS: Within normal limits.  ADRENALS: Within normal limits.  KIDNEYS/URETERS: Mild right hydronephrosis likely secondary to faint   1-2mm right proximal ureteral stone on image 55 of series 2. Vague   hyperdensities identified within the proximal right renal pelvis,   concerningfor hemorrhagic products. Consider nonemergent retrograde   evaluation to exclude underlying neoplasm. Incompletely characterize   right upper pole exophytic lesion measuring 2.1 x 1.9 cm. No left-sided   hydronephrosis.    BLADDER: Mild wall thickening, difficult to assess secondary to   inadequate distention.  REPRODUCTIVE ORGANS: Prominent prostate, cause mass effect and protrusion   at the bladder base.    BOWEL: No bowel obstruction. Diverticulosis coli. Nonvisualized appendix.  PERITONEUM: No ascites.  VESSELS:  Atherosclerotic changes.  RETROPERITONEUM: No lymphadenopathy.  ABDOMINAL WALL: Suggestion of left groin hernia repair with plug. Mildly   prominent bilateral groin lymph nodes.  BONES: Degenerative changes. Posterior metallic fusion of L5 and S1 with   intervertebral disc spacer, metallic streak artifact limits detailed   evaluation the spinal canal and adjacent structures.    IMPRESSION:    Mild right hydronephrosis likely secondary to faint 1-2mm right proximal   ureteralstone. Right perinephric stranding.    Vague hyperdensities identified within the proximal right renal pelvis,   concerning for hemorrhagic products. Consider nonemergent retrograde   evaluation to exclude underlying neoplasm. Incompletely characterized   right upper pole exophytic lesion measuring 2.1 x 1.9 cm. Consider   nonemergent correlation with renal ultrasound or MR for better   characterization.    Mild bladder wall thickening, difficult to assess secondary to inadequate   distention. Correlate with urinalysis and lab values to assess for   cystitis and/or ascending urinary tract infection.    Additional findings as mentioned above.            --- End of Report ---            MANN GRAVES MD; Attending Radiologist  This document has been electronically signed. Mar  2 2023  6:04AM    < end of copied text >        Medications:  MEDICATIONS  (STANDING):  cefTRIAXone Injectable.      dextrose 5%. 1000 milliLiter(s) (100 mL/Hr) IV Continuous <Continuous>  dextrose 5%. 1000 milliLiter(s) (50 mL/Hr) IV Continuous <Continuous>  dextrose 50% Injectable 25 Gram(s) IV Push once  dextrose 50% Injectable 12.5 Gram(s) IV Push once  dextrose 50% Injectable 25 Gram(s) IV Push once  diltiazem    milliGRAM(s) Oral daily  escitalopram 20 milliGRAM(s) Oral daily  finasteride 5 milliGRAM(s) Oral daily  glucagon  Injectable 1 milliGRAM(s) IntraMuscular once  heparin   Injectable 5000 Unit(s) SubCutaneous every 8 hours  insulin lispro (ADMELOG) corrective regimen sliding scale   SubCutaneous three times a day before meals  insulin lispro (ADMELOG) corrective regimen sliding scale   SubCutaneous at bedtime  lactated ringers Bolus 500 milliLiter(s) IV Bolus once  lactated ringers. 1000 milliLiter(s) (75 mL/Hr) IV Continuous <Continuous>  losartan 100 milliGRAM(s) Oral daily  magnesium oxide 400 milliGRAM(s) Oral daily  multivitamin 1 Tablet(s) Oral daily  naloxone Injectable 0.4 milliGRAM(s) IV Push once  senna 2 Tablet(s) Oral at bedtime  tamsulosin 0.4 milliGRAM(s) Oral at bedtime    MEDICATIONS  (PRN):  acetaminophen     Tablet .. 650 milliGRAM(s) Oral every 6 hours PRN Temp greater or equal to 38C (100.4F), Mild Pain (1 - 3)  aluminum hydroxide/magnesium hydroxide/simethicone Suspension 30 milliLiter(s) Oral every 4 hours PRN Dyspepsia  cyclobenzaprine 5 milliGRAM(s) Oral every 8 hours PRN Spasm  dextrose Oral Gel 15 Gram(s) Oral once PRN Blood Glucose LESS THAN 70 milliGRAM(s)/deciliter  melatonin 3 milliGRAM(s) Oral at bedtime PRN Insomnia  morphine  - Injectable 4 milliGRAM(s) IV Push every 6 hours PRN Severe Pain (7 - 10)  ondansetron Injectable 4 milliGRAM(s) IV Push every 8 hours PRN Nausea and/or Vomiting  oxycodone    5 mG/acetaminophen 325 mG 1 Tablet(s) Oral every 4 hours PRN Moderate Pain (4 - 6)  polyethylene glycol 3350 17 Gram(s) Oral daily PRN Constipation              
Patient is a 82y old  Male who presents with a chief complaint of Urolithiasis (02 Mar 2023 18:57)      BRIEF HOSPITAL COURSE: 82M with bladder cancer, HTN, s/p TILF, HLD, [place] 9bilateral hearing aides), urolithiasis s/p prior stent, BPH, DM, HFpEF, OA, depression presenting with acute onset R flank pain that started yesterday with associated vomiting, fever, chills, and lethargy. CT A&P on arrival significant for mild R hydronephrosis with 1-2mm R proximal ureteral stone and incompletely characterized R upper pole kidney exophytic lesion, mild bladder wall thickening, and enlarged prostate. RRT called yesterday afternoon iso tachycardia and hypoxia. CTA Chest negative for PE. Urology consulted for further source control of septic stone. Patient now s/p unsuccessful cystoscopy in PACU, MICU consulted for further management of septic stone.      PAST MEDICAL & SURGICAL HISTORY:  Bladder cancer        Uses hearing aid      HTN (hypertension)      Hypercholesterolemia      DM (diabetes mellitus)  Type 2      CA skin, basal cell      Depression      OA (osteoarthritis)      Lumbar spinal stenosis      Herniated nucleus pulposus, L5-S1      Kidney stones  Left      BPH (benign prostatic hyperplasia)      H/O hernia repair  left inguinal      History of bladder surgery  TURBT, cystoscopy--aprox 8 yrs ago, dx bladder cancer      S/P tonsillectomy      History of back surgery  TLIF      History of cystoscopy  Left kidney stones.  Left stent---22          Review of Systems:  CONSTITUTIONAL: No fever, chills, or fatigue  EYES: No eye pain, visual disturbances, or discharge  ENMT:  No difficulty hearing, tinnitus, vertigo; No sinus or throat pain  NECK: No pain or stiffness  RESPIRATORY: No cough, wheezing, chills or hemoptysis; No shortness of breath  CARDIOVASCULAR: No chest pain, palpitations, dizziness, or leg swelling  GASTROINTESTINAL: No abdominal or epigastric pain. No nausea, vomiting, or hematemesis; No diarrhea or constipation. No melena or hematochezia.  GENITOURINARY: No dysuria, frequency, hematuria, or incontinence  NEUROLOGICAL: No headaches, memory loss, loss of strength, numbness, or tremors  SKIN: No itching, burning, rashes, or lesions   MUSCULOSKELETAL: No joint pain or swelling; No muscle, back, or extremity pain  PSYCHIATRIC: No depression, anxiety, mood swings, or difficulty sleeping      Medications:  cefTRIAXone Injectable.        diltiazem    milliGRAM(s) Oral daily  losartan 100 milliGRAM(s) Oral daily      acetaminophen     Tablet .. 650 milliGRAM(s) Oral every 6 hours PRN  acetaminophen   IVPB .. 1000 milliGRAM(s) IV Intermittent once  cyclobenzaprine 5 milliGRAM(s) Oral every 8 hours PRN  escitalopram 20 milliGRAM(s) Oral daily  fentaNYL    Injectable 50 MICROGram(s) IV Push every 10 minutes PRN  melatonin 3 milliGRAM(s) Oral at bedtime PRN  morphine  - Injectable 4 milliGRAM(s) IV Push every 6 hours PRN  ondansetron Injectable 4 milliGRAM(s) IV Push once PRN  ondansetron Injectable 4 milliGRAM(s) IV Push every 8 hours PRN  oxycodone    5 mG/acetaminophen 325 mG 1 Tablet(s) Oral every 4 hours PRN  oxyCODONE    IR 5 milliGRAM(s) Oral once PRN      heparin   Injectable 5000 Unit(s) SubCutaneous every 8 hours    aluminum hydroxide/magnesium hydroxide/simethicone Suspension 30 milliLiter(s) Oral every 4 hours PRN  polyethylene glycol 3350 17 Gram(s) Oral daily PRN  senna 2 Tablet(s) Oral at bedtime    tamsulosin 0.4 milliGRAM(s) Oral at bedtime    dextrose 50% Injectable 25 Gram(s) IV Push once  dextrose 50% Injectable 12.5 Gram(s) IV Push once  dextrose 50% Injectable 25 Gram(s) IV Push once  dextrose Oral Gel 15 Gram(s) Oral once PRN  finasteride 5 milliGRAM(s) Oral daily  glucagon  Injectable 1 milliGRAM(s) IntraMuscular once  insulin lispro (ADMELOG) corrective regimen sliding scale   SubCutaneous three times a day before meals  insulin lispro (ADMELOG) corrective regimen sliding scale   SubCutaneous at bedtime    dextrose 5%. 1000 milliLiter(s) IV Continuous <Continuous>  dextrose 5%. 1000 milliLiter(s) IV Continuous <Continuous>  lactated ringers. 1000 milliLiter(s) IV Continuous <Continuous>  lactated ringers. 1000 milliLiter(s) IV Continuous <Continuous>  magnesium oxide 400 milliGRAM(s) Oral daily  multivitamin 1 Tablet(s) Oral daily      chlorhexidine 2% Cloths 1 Application(s) Topical <User Schedule>    naloxone Injectable 0.4 milliGRAM(s) IV Push once          ICU Vital Signs Last 24 Hrs  T(C): 37.3 (02 Mar 2023 22:16), Max: 40.5 (02 Mar 2023 18:50)  T(F): 99.2 (02 Mar 2023 22:16), Max: 104.9 (02 Mar 2023 18:50)  HR: 85 (02 Mar 2023 23:15) (78 - 120)  BP: 116/56 (02 Mar 2023 23:15) (100/67 - 173/101)  BP(mean): 93 (02 Mar 2023 13:45) (93 - 124)  ABP: --  ABP(mean): --  RR: 23 (02 Mar 2023 23:15) (18 - 28)  SpO2: 98% (02 Mar 2023 23:15) (86% - 98%)    O2 Parameters below as of 02 Mar 2023 23:15  Patient On (Oxygen Delivery Method): mask, nonrebreather            ABG - ( 02 Mar 2023 18:30 )  pH, Arterial: 7.42  pH, Blood: x     /  pCO2: 42    /  pO2: 62    / HCO3: 27    / Base Excess: 2.4   /  SaO2: 93                  I&O's Detail    02 Mar 2023 07:01  -  03 Mar 2023 00:01  --------------------------------------------------------  IN:    Other (mL): 1400 mL  Total IN: 1400 mL    OUT:  Total OUT: 0 mL    Total NET: 1400 mL            LABS:                        12.0   9.92  )-----------( 161      ( 02 Mar 2023 18:42 )             38.3     03-02    135  |  101  |  23  ----------------------------<  154<H>  3.8   |  27  |  2.06<H>    Ca    8.8      02 Mar 2023 18:42  Mg     1.6     03-02    TPro  6.8  /  Alb  3.5  /  TBili  1.1  /  DBili  x   /  AST  20  /  ALT  24  /  AlkPhos  77  03-02          CAPILLARY BLOOD GLUCOSE      POCT Blood Glucose.: 152 mg/dL (02 Mar 2023 18:09)      Urinalysis Basic - ( 02 Mar 2023 07:51 )    Color: Yellow / Appearance: Clear / S.020 / pH: x  Gluc: x / Ketone: Negative  / Bili: Negative / Urobili: Negative   Blood: x / Protein: Negative / Nitrite: Negative   Leuk Esterase: Trace / RBC: 11-25 /HPF / WBC 11-25 /HPF   Sq Epi: x / Non Sq Epi: Few / Bacteria: Few      CULTURES:      Physical Examination:    General: No acute distress.  Alert, oriented, interactive, nonfocal, following simple commands and moving all extremities     HEENT: Pupils equal, reactive to light.  Symmetric.    PULM: Breathing comfortably on max NRB, good BS B/L with no Rales or Rhonchi, no significant sputum production    CVS: Mildly tachycardic. Regular rate and rhythm, no murmurs, rubs, or gallops    ABD: BS+ Soft, nondistended, nontender, no masses    EXT: No edema, nontender    SKIN: Warm and well perfused, no rashes noted.      RADIOLOGY:  < from: CT Head No Cont (23 @ 20:07) >  IMPRESSION:  Unremarkable head CT.   Ischemic changes are largely absent.   Diffuse brain volume loss typical for age.    < end of copied text >  < from: CT Angio Chest PE Protocol w/ IV Cont (23 @ 16:50) >  IMPRESSION:    No pulmonary thromboembolism      < end of copied text >  < from: CT Abdomen and Pelvis No Cont (23 @ 05:45) >  IMPRESSION:    Mild right hydronephrosis likely secondary to faint 1-2mm right proximal   ureteralstone. Right perinephric stranding.    Vague hyperdensities identified within the proximal right renal pelvis,   concerning for hemorrhagic products. Consider nonemergent retrograde   evaluation to exclude underlying neoplasm. Incompletely characterized   right upper pole exophytic lesion measuring 2.1 x 1.9 cm. Consider   nonemergent correlation with renal ultrasound or MR for better   characterization.    Mild bladder wall thickening, difficult to assess secondary to inadequate   distention. Correlate with urinalysis and lab values to assess for   cystitis and/or ascending urinary tract infection.    Additional findings as mentioned above.      < end of copied text >

## 2023-03-05 LAB
-  AMPICILLIN: SIGNIFICANT CHANGE UP
-  CIPROFLOXACIN: SIGNIFICANT CHANGE UP
-  GENTAMICIN SYNERGY: SIGNIFICANT CHANGE UP
-  LEVOFLOXACIN: SIGNIFICANT CHANGE UP
-  LEVOFLOXACIN: SIGNIFICANT CHANGE UP
-  NITROFURANTOIN: SIGNIFICANT CHANGE UP
-  NITROFURANTOIN: SIGNIFICANT CHANGE UP
-  STREPTOMYCIN SYNERGY: SIGNIFICANT CHANGE UP
-  TETRACYCLINE: SIGNIFICANT CHANGE UP
-  TETRACYCLINE: SIGNIFICANT CHANGE UP
-  VANCOMYCIN: SIGNIFICANT CHANGE UP
ANION GAP SERPL CALC-SCNC: 3 MMOL/L — LOW (ref 5–17)
BUN SERPL-MCNC: 25 MG/DL — HIGH (ref 7–23)
CALCIUM SERPL-MCNC: 9 MG/DL — SIGNIFICANT CHANGE UP (ref 8.5–10.1)
CHLORIDE SERPL-SCNC: 103 MMOL/L — SIGNIFICANT CHANGE UP (ref 96–108)
CO2 SERPL-SCNC: 30 MMOL/L — SIGNIFICANT CHANGE UP (ref 22–31)
CREAT SERPL-MCNC: 1.28 MG/DL — SIGNIFICANT CHANGE UP (ref 0.5–1.3)
CULTURE RESULTS: SIGNIFICANT CHANGE UP
EGFR: 57 ML/MIN/1.73M2 — LOW
GLUCOSE BLDC GLUCOMTR-MCNC: 129 MG/DL — HIGH (ref 70–99)
GLUCOSE BLDC GLUCOMTR-MCNC: 133 MG/DL — HIGH (ref 70–99)
GLUCOSE BLDC GLUCOMTR-MCNC: 136 MG/DL — HIGH (ref 70–99)
GLUCOSE BLDC GLUCOMTR-MCNC: 163 MG/DL — HIGH (ref 70–99)
GLUCOSE SERPL-MCNC: 153 MG/DL — HIGH (ref 70–99)
HCT VFR BLD CALC: 30.7 % — LOW (ref 39–50)
HGB BLD-MCNC: 9.9 G/DL — LOW (ref 13–17)
MAGNESIUM SERPL-MCNC: 2.3 MG/DL — SIGNIFICANT CHANGE UP (ref 1.6–2.6)
MCHC RBC-ENTMCNC: 19.8 PG — LOW (ref 27–34)
MCHC RBC-ENTMCNC: 32.2 GM/DL — SIGNIFICANT CHANGE UP (ref 32–36)
MCV RBC AUTO: 61.5 FL — LOW (ref 80–100)
METHOD TYPE: SIGNIFICANT CHANGE UP
ORGANISM # SPEC MICROSCOPIC CNT: SIGNIFICANT CHANGE UP
PHOSPHATE SERPL-MCNC: 2 MG/DL — LOW (ref 2.5–4.5)
PLATELET # BLD AUTO: 133 K/UL — LOW (ref 150–400)
POTASSIUM SERPL-MCNC: 3.6 MMOL/L — SIGNIFICANT CHANGE UP (ref 3.5–5.3)
POTASSIUM SERPL-SCNC: 3.6 MMOL/L — SIGNIFICANT CHANGE UP (ref 3.5–5.3)
RBC # BLD: 4.99 M/UL — SIGNIFICANT CHANGE UP (ref 4.2–5.8)
RBC # FLD: 15.7 % — HIGH (ref 10.3–14.5)
SODIUM SERPL-SCNC: 136 MMOL/L — SIGNIFICANT CHANGE UP (ref 135–145)
SPECIMEN SOURCE: SIGNIFICANT CHANGE UP
WBC # BLD: 8.71 K/UL — SIGNIFICANT CHANGE UP (ref 3.8–10.5)
WBC # FLD AUTO: 8.71 K/UL — SIGNIFICANT CHANGE UP (ref 3.8–10.5)

## 2023-03-05 PROCEDURE — 99233 SBSQ HOSP IP/OBS HIGH 50: CPT

## 2023-03-05 RX ORDER — POTASSIUM PHOSPHATE, MONOBASIC POTASSIUM PHOSPHATE, DIBASIC 236; 224 MG/ML; MG/ML
30 INJECTION, SOLUTION INTRAVENOUS ONCE
Refills: 0 | Status: COMPLETED | OUTPATIENT
Start: 2023-03-05 | End: 2023-03-05

## 2023-03-05 RX ORDER — CIPROFLOXACIN LACTATE 400MG/40ML
400 VIAL (ML) INTRAVENOUS EVERY 12 HOURS
Refills: 0 | Status: DISCONTINUED | OUTPATIENT
Start: 2023-03-05 | End: 2023-03-09

## 2023-03-05 RX ADMIN — MAGNESIUM OXIDE 400 MG ORAL TABLET 400 MILLIGRAM(S): 241.3 TABLET ORAL at 09:20

## 2023-03-05 RX ADMIN — Medication 300 MILLIGRAM(S): at 09:20

## 2023-03-05 RX ADMIN — TAMSULOSIN HYDROCHLORIDE 0.4 MILLIGRAM(S): 0.4 CAPSULE ORAL at 22:16

## 2023-03-05 RX ADMIN — POTASSIUM PHOSPHATE, MONOBASIC POTASSIUM PHOSPHATE, DIBASIC 83.33 MILLIMOLE(S): 236; 224 INJECTION, SOLUTION INTRAVENOUS at 09:12

## 2023-03-05 RX ADMIN — Medication 2: at 12:30

## 2023-03-05 RX ADMIN — HEPARIN SODIUM 5000 UNIT(S): 5000 INJECTION INTRAVENOUS; SUBCUTANEOUS at 05:37

## 2023-03-05 RX ADMIN — CHLORHEXIDINE GLUCONATE 1 APPLICATION(S): 213 SOLUTION TOPICAL at 05:32

## 2023-03-05 RX ADMIN — Medication 3 MILLILITER(S): at 09:47

## 2023-03-05 RX ADMIN — Medication 200 MILLIGRAM(S): at 22:16

## 2023-03-05 RX ADMIN — HEPARIN SODIUM 5000 UNIT(S): 5000 INJECTION INTRAVENOUS; SUBCUTANEOUS at 14:10

## 2023-03-05 RX ADMIN — Medication 1 TABLET(S): at 09:20

## 2023-03-05 RX ADMIN — FINASTERIDE 5 MILLIGRAM(S): 5 TABLET, FILM COATED ORAL at 09:20

## 2023-03-05 RX ADMIN — Medication 3 MILLILITER(S): at 14:37

## 2023-03-05 RX ADMIN — Medication 100 MILLIGRAM(S): at 05:37

## 2023-03-05 RX ADMIN — Medication 3 MILLILITER(S): at 20:17

## 2023-03-05 RX ADMIN — IRON SUCROSE 100 MILLIGRAM(S): 20 INJECTION, SOLUTION INTRAVENOUS at 22:16

## 2023-03-05 RX ADMIN — Medication 3 MILLILITER(S): at 01:41

## 2023-03-05 RX ADMIN — ESCITALOPRAM OXALATE 20 MILLIGRAM(S): 10 TABLET, FILM COATED ORAL at 09:20

## 2023-03-05 RX ADMIN — ONDANSETRON 4 MILLIGRAM(S): 8 TABLET, FILM COATED ORAL at 12:26

## 2023-03-05 RX ADMIN — HEPARIN SODIUM 5000 UNIT(S): 5000 INJECTION INTRAVENOUS; SUBCUTANEOUS at 22:15

## 2023-03-05 NOTE — PROGRESS NOTE ADULT - ASSESSMENT
83 y/o male with:     Severe sepsis from acute pyelonephritis   R ureteral stone with hydronephrosis   Acute hypoxic resp failure - resolved   СВЕТЛАНА from ATN + JHON   Lactic acidosis   E. faecalis bacteremia     S/p R PCN 3/3      Plan:     Clinically stable    E. faecalis sens to Cipro - continue, WBC improved, afebrile, ID following  Tolerating diet   Renal fn normal, off IVF   Off CBI, Dillon mx per    DVT ppx with sc heparin    Family updated at bedside     Stable for floor, sign out given to Dr. Johnson

## 2023-03-05 NOTE — PROGRESS NOTE ADULT - GENITOURINARY COMMENTS
R PCN with dark yellow colored urine, Dillon with ongoing CBI, yellow colored urine
R PCN with clear yellow urine, Dlilon with clear yellow urine, off CBI

## 2023-03-05 NOTE — PROGRESS NOTE ADULT - ASSESSMENT
83 y/o Male with h/o Bladder cancer, depression, hypertension, back surgery (TLIF), [place] (uses bilateral hearing aides), urolithiasis s/p stent, BPH, HTN, HLD, DM, HFpEF and osteoarthritis was admitted on 3/2 for right flank pain x one day. The right flank pain started on the day PTA, intermittent but more pronounced and constant later on. Associated nausea with vomiting, lethargy, weakness, chills, subjective fevers. He was reported with bacteremia. In ER he was noted febrile. On 3/3 PM he received vancomycin 1 gm IV x 1.     1. Febrile syndrome. Sepsis with ENFA. Pyuria. UTI with ENFA. Right nephrostomy. Kidney stones. ARF on CRF stage 3. Allergy to PCN.   -cultures reviewed  -BC x 2 and urine c/s collected  -s/p vancomycin 1 gm IV x 1 last PM  -on cipro 200 mg IV q12h # 2  -tolerating abx well so far; no side effects noted  -renal function is improving  -increase cipro to 400 mg IV q12h  -repeat BC x 2 collected  -continue abx coverage   -monitor temps  -f/u CBC  -supportive care  2. Other issues:   -care per medicine

## 2023-03-06 ENCOUNTER — TRANSCRIPTION ENCOUNTER (OUTPATIENT)
Age: 83
End: 2023-03-06

## 2023-03-06 LAB
ANION GAP SERPL CALC-SCNC: 2 MMOL/L — LOW (ref 5–17)
BUN SERPL-MCNC: 21 MG/DL — SIGNIFICANT CHANGE UP (ref 7–23)
CALCIUM SERPL-MCNC: 9.5 MG/DL — SIGNIFICANT CHANGE UP (ref 8.5–10.1)
CHLORIDE SERPL-SCNC: 103 MMOL/L — SIGNIFICANT CHANGE UP (ref 96–108)
CO2 SERPL-SCNC: 33 MMOL/L — HIGH (ref 22–31)
CREAT SERPL-MCNC: 1.18 MG/DL — SIGNIFICANT CHANGE UP (ref 0.5–1.3)
EGFR: 62 ML/MIN/1.73M2 — SIGNIFICANT CHANGE UP
GLUCOSE BLDC GLUCOMTR-MCNC: 139 MG/DL — HIGH (ref 70–99)
GLUCOSE SERPL-MCNC: 132 MG/DL — HIGH (ref 70–99)
HCT VFR BLD CALC: 31.5 % — LOW (ref 39–50)
HGB BLD-MCNC: 9.8 G/DL — LOW (ref 13–17)
MAGNESIUM SERPL-MCNC: 1.9 MG/DL — SIGNIFICANT CHANGE UP (ref 1.6–2.6)
MCHC RBC-ENTMCNC: 19.4 PG — LOW (ref 27–34)
MCHC RBC-ENTMCNC: 31.1 GM/DL — LOW (ref 32–36)
MCV RBC AUTO: 62.3 FL — LOW (ref 80–100)
PHOSPHATE SERPL-MCNC: 2.8 MG/DL — SIGNIFICANT CHANGE UP (ref 2.5–4.5)
PLATELET # BLD AUTO: 161 K/UL — SIGNIFICANT CHANGE UP (ref 150–400)
POTASSIUM SERPL-MCNC: 3.8 MMOL/L — SIGNIFICANT CHANGE UP (ref 3.5–5.3)
POTASSIUM SERPL-SCNC: 3.8 MMOL/L — SIGNIFICANT CHANGE UP (ref 3.5–5.3)
RBC # BLD: 5.06 M/UL — SIGNIFICANT CHANGE UP (ref 4.2–5.8)
RBC # FLD: 15.6 % — HIGH (ref 10.3–14.5)
SODIUM SERPL-SCNC: 138 MMOL/L — SIGNIFICANT CHANGE UP (ref 135–145)
WBC # BLD: 7.42 K/UL — SIGNIFICANT CHANGE UP (ref 3.8–10.5)
WBC # FLD AUTO: 7.42 K/UL — SIGNIFICANT CHANGE UP (ref 3.8–10.5)

## 2023-03-06 PROCEDURE — 99232 SBSQ HOSP IP/OBS MODERATE 35: CPT

## 2023-03-06 RX ADMIN — Medication 3 MILLILITER(S): at 21:24

## 2023-03-06 RX ADMIN — Medication 300 MILLIGRAM(S): at 11:22

## 2023-03-06 RX ADMIN — Medication 200 MILLIGRAM(S): at 21:17

## 2023-03-06 RX ADMIN — Medication 3 MILLILITER(S): at 13:56

## 2023-03-06 RX ADMIN — TAMSULOSIN HYDROCHLORIDE 0.4 MILLIGRAM(S): 0.4 CAPSULE ORAL at 21:18

## 2023-03-06 RX ADMIN — HEPARIN SODIUM 5000 UNIT(S): 5000 INJECTION INTRAVENOUS; SUBCUTANEOUS at 13:43

## 2023-03-06 RX ADMIN — Medication 1 TABLET(S): at 11:22

## 2023-03-06 RX ADMIN — MAGNESIUM OXIDE 400 MG ORAL TABLET 400 MILLIGRAM(S): 241.3 TABLET ORAL at 11:22

## 2023-03-06 RX ADMIN — Medication 200 MILLIGRAM(S): at 11:20

## 2023-03-06 RX ADMIN — Medication 3 MILLILITER(S): at 08:04

## 2023-03-06 RX ADMIN — ESCITALOPRAM OXALATE 20 MILLIGRAM(S): 10 TABLET, FILM COATED ORAL at 11:23

## 2023-03-06 RX ADMIN — HEPARIN SODIUM 5000 UNIT(S): 5000 INJECTION INTRAVENOUS; SUBCUTANEOUS at 21:18

## 2023-03-06 RX ADMIN — FINASTERIDE 5 MILLIGRAM(S): 5 TABLET, FILM COATED ORAL at 11:22

## 2023-03-06 RX ADMIN — Medication 3 MILLILITER(S): at 01:28

## 2023-03-06 RX ADMIN — HEPARIN SODIUM 5000 UNIT(S): 5000 INJECTION INTRAVENOUS; SUBCUTANEOUS at 05:39

## 2023-03-06 RX ADMIN — IRON SUCROSE 100 MILLIGRAM(S): 20 INJECTION, SOLUTION INTRAVENOUS at 21:18

## 2023-03-06 NOTE — PROGRESS NOTE ADULT - ASSESSMENT
81 y/o male with:     Severe sepsis from acute pyelonephritis   R ureteral stone with hydronephrosis   Acute hypoxic resp failure - resolved   СВЕТЛАНА from ATN + JHON   Lactic acidosis   E. faecalis bacteremia   S/p R PCN 3/3      Plan:   Clinically stable    E. faecalis sens to Cipro - continue, WBC improved, afebrile, will likley need IV abx for 2 more days  await ID recs  Tolerating diet   Renal fn normal, off IVF   Off CBI,   discussed with Urology and now for TTV - did void earlier in the day   dc NC and OOB - pt comfortable off O2   cont PCN, follow up with URO and IR for further mx as OP   DVT ppx with sc heparin    DC Home in 1-2 days

## 2023-03-06 NOTE — PHYSICAL THERAPY INITIAL EVALUATION ADULT - SKIN INTEGRITY
R PCN tube draining yellow urine, pruritic localized rash over L posterior hip (ITCHY) , scattered chronic appearing rash over R ulnar FA and dorsal hand base of thumb/purpura/rash/surgical incision

## 2023-03-06 NOTE — PHYSICAL THERAPY INITIAL EVALUATION ADULT - LIVES WITH, PROFILE
5 steps to enter front of home, no steps if entering thru rear of home ,has 1 flight of stairs to upstairs bedroom/spouse

## 2023-03-06 NOTE — PHYSICAL THERAPY INITIAL EVALUATION ADULT - LEVEL OF INDEPENDENCE, REHAB EVAL
was napping in L sidelying on my arrival ,likes to cuddle with several pillows/supervision/stand-by assist

## 2023-03-06 NOTE — DISCHARGE NOTE NURSING/CASE MANAGEMENT/SOCIAL WORK - PATIENT PORTAL LINK FT
You can access the FollowMyHealth Patient Portal offered by [facility] by registering at the following website: [email or web address]. By joining [employer]’s FollowMyHealth portal, you will also be able to view your health information using other applications (apps) compatible with our system.

## 2023-03-06 NOTE — PHYSICAL THERAPY INITIAL EVALUATION ADULT - PERTINENT HX OF CURRENT PROBLEM, REHAB EVAL
3y/o M PMHx significant for Bladder cancer, Depression, Hypertension, s/p back surgery (TLIF), hypercholesterolemia, [place] (uses bilateral hearing aides), urolithiasis s/p sten in the past, BPH, HTN, HLD, DM, HFpEF and osteoarthritis presents with right flank pain. Started yesterday intermittent but more pronounced and constant on morning of admission . Associated nausea with vomiting, lethargy, weakness, chills, subjective fevers. Denies chest pain, syncope, weight gain/loss.    In the ER Tmax 98.4, HR 78-83, /101, RR 18, SpO2 86-91% on RA? then 95% on 4-5L NC and then 91-94% on RA. Deemed as apnea. Dimer below age adjusted threshold, however on admission evaluation with conversation, O2 between 83-89%. CTA chest ordered. Otherwise initially admission called for urolithiasis with possible Urology intervention. CBC unremarkable, K 3.2, Cr 1.43 (Baseline 1.1-1.3), Troponin negative. CT abd/pel with bilateral dependent atelectasis, splenomegaly, mild right hydronephrosis with 1-2mm right proximal ureteral stone and also incompletely characterized right upper pole kidney exophytic lesion (2.1x1.9cm), mild bladder wall thickening and enlarged prostate. (02 Mar 2023 15:46) 83y/o M PMHx significant for Bladder cancer, Depression, Hypertension, s/p back surgery (TLIF), hypercholesterolemia, [place] (uses bilateral hearing aides), urolithiasis s/p sten in the past, BPH, HTN, HLD, DM, HFpEF and osteoarthritis presents with right flank pain. Started yesterday intermittent but more pronounced and constant on morning of admission . Associated nausea with vomiting, lethargy, weakness, chills, subjective fevers. Denies chest pain, syncope, weight gain/loss.    In the ER Tmax 98.4, HR 78-83, /101, RR 18, SpO2 86-91% on RA? then 95% on 4-5L NC and then 91-94% on RA. Deemed as apnea. Dimer below age adjusted threshold, however on admission evaluation with conversation, O2 between 83-89%. CTA chest ordered. Otherwise initially admission called for urolithiasis with possible Urology intervention. CBC unremarkable, K 3.2, Cr 1.43 (Baseline 1.1-1.3), Troponin negative. CT abd/pel with bilateral dependent atelectasis, splenomegaly, mild right hydronephrosis with 1-2mm right proximal ureteral stone and also incompletely characterized right upper pole kidney exophytic lesion (2.1x1.9cm), mild bladder wall thickening and enlarged prostate. (02 Mar 2023 15:46)

## 2023-03-06 NOTE — PHYSICAL THERAPY INITIAL EVALUATION ADULT - DIAGNOSIS, PT EVAL
R proximal ureterolithiasis ,obstructive septic stone , UTI (UCx +E.Faecalis from R nephrostomy tube), bladder CA febrile syndrome/sepsis ( Gram +cocci in pairs & chains ) R proximal ureterolithiasis ,R hydronephrosis ,+obstructive septic stone , UTI (UCx +E.Faecalis from R nephrostomy tube), bladder CA

## 2023-03-06 NOTE — PROGRESS NOTE ADULT - ASSESSMENT
81 y/o Male with h/o Bladder cancer, depression, hypertension, back surgery (TLIF), [place] (uses bilateral hearing aides), urolithiasis s/p stent, BPH, HTN, HLD, DM, HFpEF and osteoarthritis was admitted on 3/2 for right flank pain x one day. The right flank pain started on the day PTA, intermittent but more pronounced and constant later on. Associated nausea with vomiting, lethargy, weakness, chills, subjective fevers. He was reported with bacteremia. In ER he was noted febrile. On 3/3 PM he received vancomycin 1 gm IV x 1.     1. Febrile syndrome improving. Sepsis with ENFA. Pyuria. UTI with ENFA. Right nephrostomy. Kidney stones. ARF on CRF stage 3. Allergy to PCN.   -cultures reviewed  -BC x 2 and urine c/s collected  -s/p vancomycin 1 gm IV x 1 last PM  -on cipro 400 mg IV q12h # 3  -tolerating abx well so far; no side effects noted  -renal function is improving  -f/u cultures  -continue abx coverage   -monitor temps  -f/u CBC  -supportive care  2. Other issues:   -care per medicine

## 2023-03-06 NOTE — PHYSICAL THERAPY INITIAL EVALUATION ADULT - ACTIVE RANGE OF MOTION EXAMINATION, REHAB EVAL
dom. upper extremity Active ROM was WNL (within normal limits)/bilateral  lower extremity Active ROM was WFL (within functional limits)

## 2023-03-06 NOTE — DISCHARGE NOTE NURSING/CASE MANAGEMENT/SOCIAL WORK - NSDCPEFALRISK_GEN_ALL_CORE
For information on Fall & Injury Prevention, visit: [email or web address] OR  [email or web address] OR  https://www.cdc.gov/steadi/patient.html

## 2023-03-06 NOTE — PHYSICAL THERAPY INITIAL EVALUATION ADULT - GENERAL OBSERVATIONS, REHAB EVAL
resting in bed in L sidelying position hugging few pillows, R nephrostomy tube draining yellow urine/pinned to gown, A& Ox3, wears B hearing aids

## 2023-03-06 NOTE — PHYSICAL THERAPY INITIAL EVALUATION ADULT - DID THE PATIENT HAVE SURGERY?
cystoscopy and bladder fulgaration for bleeding, clots; subsequent placement of R nephrostomy tube at IR for ureterolithiasis (obstructive septic stone)/yes cystoscopy and bladder fulguration for clot hematuria/bleeding from bladder & prostate , subsequent placement of R nephrostomy tube at IR for ureterolithiasis (obstructive septic stone)/yes

## 2023-03-07 LAB
ANION GAP SERPL CALC-SCNC: 4 MMOL/L — LOW (ref 5–17)
BASOPHILS # BLD AUTO: 0 K/UL — SIGNIFICANT CHANGE UP (ref 0–0.2)
BASOPHILS NFR BLD AUTO: 0 % — SIGNIFICANT CHANGE UP (ref 0–2)
BUN SERPL-MCNC: 20 MG/DL — SIGNIFICANT CHANGE UP (ref 7–23)
CALCIUM SERPL-MCNC: 9.7 MG/DL — SIGNIFICANT CHANGE UP (ref 8.5–10.1)
CHLORIDE SERPL-SCNC: 102 MMOL/L — SIGNIFICANT CHANGE UP (ref 96–108)
CO2 SERPL-SCNC: 29 MMOL/L — SIGNIFICANT CHANGE UP (ref 22–31)
CREAT SERPL-MCNC: 1.21 MG/DL — SIGNIFICANT CHANGE UP (ref 0.5–1.3)
EGFR: 61 ML/MIN/1.73M2 — SIGNIFICANT CHANGE UP
EOSINOPHIL # BLD AUTO: 0 K/UL — SIGNIFICANT CHANGE UP (ref 0–0.5)
EOSINOPHIL NFR BLD AUTO: 0 % — SIGNIFICANT CHANGE UP (ref 0–6)
GLUCOSE SERPL-MCNC: 131 MG/DL — HIGH (ref 70–99)
HCT VFR BLD CALC: 34.3 % — LOW (ref 39–50)
HGB BLD-MCNC: 11.1 G/DL — LOW (ref 13–17)
LYMPHOCYTES # BLD AUTO: 0.87 K/UL — LOW (ref 1–3.3)
LYMPHOCYTES # BLD AUTO: 10 % — LOW (ref 13–44)
MAGNESIUM SERPL-MCNC: 1.6 MG/DL — SIGNIFICANT CHANGE UP (ref 1.6–2.6)
MANUAL SMEAR VERIFICATION: SIGNIFICANT CHANGE UP
MCHC RBC-ENTMCNC: 19.9 PG — LOW (ref 27–34)
MCHC RBC-ENTMCNC: 32.4 GM/DL — SIGNIFICANT CHANGE UP (ref 32–36)
MCV RBC AUTO: 61.5 FL — LOW (ref 80–100)
MICROCYTES BLD QL: SLIGHT — SIGNIFICANT CHANGE UP
MONOCYTES # BLD AUTO: 0.7 K/UL — SIGNIFICANT CHANGE UP (ref 0–0.9)
MONOCYTES NFR BLD AUTO: 8 % — SIGNIFICANT CHANGE UP (ref 2–14)
NEUTROPHILS # BLD AUTO: 7.06 K/UL — SIGNIFICANT CHANGE UP (ref 1.8–7.4)
NEUTROPHILS NFR BLD AUTO: 78 % — HIGH (ref 43–77)
NEUTS BAND # BLD: 3 % — SIGNIFICANT CHANGE UP (ref 0–8)
NRBC # BLD: 1 /100 — HIGH (ref 0–0)
NRBC # BLD: SIGNIFICANT CHANGE UP /100 WBCS (ref 0–0)
OVALOCYTES BLD QL SMEAR: SLIGHT — SIGNIFICANT CHANGE UP
PLAT MORPH BLD: ABNORMAL
PLATELET # BLD AUTO: 192 K/UL — SIGNIFICANT CHANGE UP (ref 150–400)
POIKILOCYTOSIS BLD QL AUTO: SLIGHT — SIGNIFICANT CHANGE UP
POLYCHROMASIA BLD QL SMEAR: SLIGHT — SIGNIFICANT CHANGE UP
POTASSIUM SERPL-MCNC: 3.6 MMOL/L — SIGNIFICANT CHANGE UP (ref 3.5–5.3)
POTASSIUM SERPL-SCNC: 3.6 MMOL/L — SIGNIFICANT CHANGE UP (ref 3.5–5.3)
RBC # BLD: 5.58 M/UL — SIGNIFICANT CHANGE UP (ref 4.2–5.8)
RBC # FLD: 15.4 % — HIGH (ref 10.3–14.5)
RBC BLD AUTO: ABNORMAL
SCHISTOCYTES BLD QL AUTO: SLIGHT — SIGNIFICANT CHANGE UP
SODIUM SERPL-SCNC: 135 MMOL/L — SIGNIFICANT CHANGE UP (ref 135–145)
SPHEROCYTES BLD QL SMEAR: SLIGHT — SIGNIFICANT CHANGE UP
VARIANT LYMPHS # BLD: 1 % — SIGNIFICANT CHANGE UP (ref 0–6)
WBC # BLD: 8.71 K/UL — SIGNIFICANT CHANGE UP (ref 3.8–10.5)
WBC # FLD AUTO: 8.71 K/UL — SIGNIFICANT CHANGE UP (ref 3.8–10.5)

## 2023-03-07 PROCEDURE — 99232 SBSQ HOSP IP/OBS MODERATE 35: CPT

## 2023-03-07 RX ORDER — ALBUTEROL 90 UG/1
1 AEROSOL, METERED ORAL EVERY 8 HOURS
Refills: 0 | Status: DISCONTINUED | OUTPATIENT
Start: 2023-03-07 | End: 2023-03-09

## 2023-03-07 RX ORDER — LACTOBACILLUS ACIDOPHILUS 100MM CELL
1 CAPSULE ORAL DAILY
Refills: 0 | Status: DISCONTINUED | OUTPATIENT
Start: 2023-03-07 | End: 2023-03-09

## 2023-03-07 RX ORDER — ALBUTEROL 90 UG/1
2.5 AEROSOL, METERED ORAL EVERY 6 HOURS
Refills: 0 | Status: DISCONTINUED | OUTPATIENT
Start: 2023-03-07 | End: 2023-03-09

## 2023-03-07 RX ADMIN — TAMSULOSIN HYDROCHLORIDE 0.4 MILLIGRAM(S): 0.4 CAPSULE ORAL at 22:35

## 2023-03-07 RX ADMIN — Medication 300 MILLIGRAM(S): at 10:41

## 2023-03-07 RX ADMIN — ESCITALOPRAM OXALATE 20 MILLIGRAM(S): 10 TABLET, FILM COATED ORAL at 10:40

## 2023-03-07 RX ADMIN — IRON SUCROSE 100 MILLIGRAM(S): 20 INJECTION, SOLUTION INTRAVENOUS at 22:35

## 2023-03-07 RX ADMIN — MAGNESIUM OXIDE 400 MG ORAL TABLET 400 MILLIGRAM(S): 241.3 TABLET ORAL at 10:43

## 2023-03-07 RX ADMIN — Medication 3 MILLILITER(S): at 09:33

## 2023-03-07 RX ADMIN — Medication 1 TABLET(S): at 10:48

## 2023-03-07 RX ADMIN — FINASTERIDE 5 MILLIGRAM(S): 5 TABLET, FILM COATED ORAL at 10:40

## 2023-03-07 RX ADMIN — Medication 200 MILLIGRAM(S): at 10:41

## 2023-03-07 RX ADMIN — Medication 200 MILLIGRAM(S): at 22:35

## 2023-03-07 RX ADMIN — HEPARIN SODIUM 5000 UNIT(S): 5000 INJECTION INTRAVENOUS; SUBCUTANEOUS at 05:49

## 2023-03-07 RX ADMIN — ONDANSETRON 4 MILLIGRAM(S): 8 TABLET, FILM COATED ORAL at 22:55

## 2023-03-07 RX ADMIN — Medication 1 TABLET(S): at 10:40

## 2023-03-07 NOTE — PROGRESS NOTE ADULT - ASSESSMENT
81 y/o male with:     Severe sepsis from acute pyelonephritis   R ureteral stone with hydronephrosis   Acute hypoxic resp failure - resolved   СВЕТЛАНА from ATN + JHON   Lactic acidosis   E. faecalis bacteremia   S/p R PCN 3/3      Plan:   Clinically stable    E. faecalis sens to Cipro - continue, WBC improved, afebrile, will likley need IV abx for 1 more day  await ID recs  Tolerating diet   Renal fn normal, off IVF   Off CBI,   discussed with Urology, off harding   dc NC and OOB - pt comfortable off O2   cont PCN, follow up with URO and IR for further mx - nephroureteral stent on this admission   DVT ppx with sc heparin - will hold for possible IR tmr and keep NPO p MN     DC Home in 1-2 days

## 2023-03-07 NOTE — PROGRESS NOTE ADULT - ASSESSMENT
83 yo Male s/p Cysto/ Bladder Fulguration and s/p right nephrostomy tube placement by IR. Dr. Awan spoke with pt's family yesterday regarding possibly internalizing right stent so when patient needs the ureteroscopy outpatient the orifice can be identified. Discussed this with pt at bedside as well and he would like to proceed with it.  Recommend  Continue antibiotics, adjust as per cultures/ sensitivities F/U ID recs   IR consult placed for nephroureteral stent on this admission   Follow up with urology outpatient for further management       Case discussed with Dr. Awan

## 2023-03-07 NOTE — CHART NOTE - NSCHARTNOTEFT_GEN_A_CORE
Called urology on call and discussed case, pt is septic and requires urgent intervention for R uretral stone. Discussed case with Dr. Awan, pt will be taken to OR tonight.
Discussed order for HUMPHREY Bipap with Dr Johnson. Pt has refused Bipap. MD aware and to DC order.

## 2023-03-08 ENCOUNTER — NON-APPOINTMENT (OUTPATIENT)
Age: 83
End: 2023-03-08

## 2023-03-08 LAB
FLUAV AG NPH QL: SIGNIFICANT CHANGE UP
FLUBV AG NPH QL: SIGNIFICANT CHANGE UP
GLUCOSE BLDC GLUCOMTR-MCNC: 122 MG/DL — HIGH (ref 70–99)
RSV RNA NPH QL NAA+NON-PROBE: SIGNIFICANT CHANGE UP
SARS-COV-2 RNA SPEC QL NAA+PROBE: SIGNIFICANT CHANGE UP

## 2023-03-08 PROCEDURE — 99232 SBSQ HOSP IP/OBS MODERATE 35: CPT

## 2023-03-08 PROCEDURE — 50434 CONVERT NEPHROSTOMY CATHETER: CPT | Mod: RT

## 2023-03-08 RX ORDER — FENTANYL CITRATE 50 UG/ML
25 INJECTION INTRAVENOUS
Refills: 0 | Status: DISCONTINUED | OUTPATIENT
Start: 2023-03-08 | End: 2023-03-08

## 2023-03-08 RX ORDER — SODIUM CHLORIDE 9 MG/ML
1000 INJECTION INTRAMUSCULAR; INTRAVENOUS; SUBCUTANEOUS
Refills: 0 | Status: DISCONTINUED | OUTPATIENT
Start: 2023-03-08 | End: 2023-03-08

## 2023-03-08 RX ORDER — ACETAMINOPHEN 500 MG
1000 TABLET ORAL ONCE
Refills: 0 | Status: DISCONTINUED | OUTPATIENT
Start: 2023-03-08 | End: 2023-03-08

## 2023-03-08 RX ORDER — ONDANSETRON 8 MG/1
4 TABLET, FILM COATED ORAL ONCE
Refills: 0 | Status: DISCONTINUED | OUTPATIENT
Start: 2023-03-08 | End: 2023-03-08

## 2023-03-08 RX ADMIN — Medication 3 MILLIGRAM(S): at 22:09

## 2023-03-08 RX ADMIN — FINASTERIDE 5 MILLIGRAM(S): 5 TABLET, FILM COATED ORAL at 10:16

## 2023-03-08 RX ADMIN — MAGNESIUM OXIDE 400 MG ORAL TABLET 400 MILLIGRAM(S): 241.3 TABLET ORAL at 10:17

## 2023-03-08 RX ADMIN — Medication 300 MILLIGRAM(S): at 10:16

## 2023-03-08 RX ADMIN — ONDANSETRON 4 MILLIGRAM(S): 8 TABLET, FILM COATED ORAL at 20:21

## 2023-03-08 RX ADMIN — SENNA PLUS 2 TABLET(S): 8.6 TABLET ORAL at 22:10

## 2023-03-08 RX ADMIN — ESCITALOPRAM OXALATE 20 MILLIGRAM(S): 10 TABLET, FILM COATED ORAL at 10:16

## 2023-03-08 RX ADMIN — CHLORHEXIDINE GLUCONATE 1 APPLICATION(S): 213 SOLUTION TOPICAL at 06:30

## 2023-03-08 RX ADMIN — TAMSULOSIN HYDROCHLORIDE 0.4 MILLIGRAM(S): 0.4 CAPSULE ORAL at 22:10

## 2023-03-08 RX ADMIN — Medication 1 TABLET(S): at 10:17

## 2023-03-08 RX ADMIN — IRON SUCROSE 100 MILLIGRAM(S): 20 INJECTION, SOLUTION INTRAVENOUS at 22:03

## 2023-03-08 RX ADMIN — CYCLOBENZAPRINE HYDROCHLORIDE 5 MILLIGRAM(S): 10 TABLET, FILM COATED ORAL at 22:09

## 2023-03-08 RX ADMIN — Medication 200 MILLIGRAM(S): at 10:15

## 2023-03-08 RX ADMIN — Medication 1 TABLET(S): at 10:16

## 2023-03-08 RX ADMIN — Medication 200 MILLIGRAM(S): at 21:59

## 2023-03-08 NOTE — PROGRESS NOTE ADULT - ASSESSMENT
83 y/o male with:     Severe sepsis from acute pyelonephritis   R ureteral stone with hydronephrosis   Acute hypoxic resp failure - resolved   СВЕТЛАНА from ATN + JHON   Lactic acidosis   E. faecalis bacteremia   S/p R PCN 3/3      Plan:   Clinically stable    E. faecalis sens to Cipro - continue, WBC improved, afebrile,  cont IV abx as he awaits IR intervention today   Plan for right antegrade nephrostogram today with possible conversion of nephrostomy to PCNU depending on the findings  discussed with URO   Off CBI, off Dillon   dc NC and OOB - pt comfortable off O2       DC Home in 1-2 days  CONT NPO and HOLD VTE PX

## 2023-03-08 NOTE — PRE PROCEDURE NOTE - PRE PROCEDURE EVALUATION
83yo M with right pyelonephritis s/p IR nephrostomy. Plan for right antegrade nephrostogram today with possible conversion of nephrostomy to PCNU depending on the findings

## 2023-03-08 NOTE — BRIEF OPERATIVE NOTE - OPERATION/FINDINGS
Bleeding from prostate and bladder, clots
10F PCN to 10F PCNU conversion. No obstruction or stenosis notes on antegrade nephrostogram

## 2023-03-08 NOTE — PROGRESS NOTE ADULT - ASSESSMENT
83 y/o Male with h/o Bladder cancer, depression, hypertension, back surgery (TLIF), [place] (uses bilateral hearing aides), urolithiasis s/p stent, BPH, HTN, HLD, DM, HFpEF and osteoarthritis was admitted on 3/2 for right flank pain x one day. The right flank pain started on the day PTA, intermittent but more pronounced and constant later on. Associated nausea with vomiting, lethargy, weakness, chills, subjective fevers. He was reported with bacteremia. In ER he was noted febrile. On 3/3 PM he received vancomycin 1 gm IV x 1.     1. Febrile syndrome improving. Sepsis with ENFA. Pyuria. UTI with ENFA. Right nephrostomy. Kidney stones. ARF on CRF stage 3. Allergy to PCN.   -cultures reviewed  -BC x 2 and urine c/s collected  -s/p vancomycin 1 gm IV x 1 last PM  -on cipro 400 mg IV q12h # 5  -tolerating abx well so far; no side effects noted  -renal function is improving  -plan for new ureteral stent  -f/u cultures  -continue abx coverage   -monitor temps  -f/u CBC  -supportive care  2. Other issues:   -care per medicine

## 2023-03-08 NOTE — PROGRESS NOTE ADULT - ASSESSMENT
A/P: 82y Male w/ Right hydronephrosis and right pyelonephritis  Keep NPO  Going to IR today for antegrade nephrostogram and possibly have a stent placed depending on the study.  Above discussed with Dr. Awan.

## 2023-03-09 ENCOUNTER — TRANSCRIPTION ENCOUNTER (OUTPATIENT)
Age: 83
End: 2023-03-09

## 2023-03-09 VITALS
TEMPERATURE: 98 F | OXYGEN SATURATION: 95 % | SYSTOLIC BLOOD PRESSURE: 122 MMHG | HEART RATE: 95 BPM | RESPIRATION RATE: 18 BRPM | DIASTOLIC BLOOD PRESSURE: 72 MMHG

## 2023-03-09 LAB
ANION GAP SERPL CALC-SCNC: 5 MMOL/L — SIGNIFICANT CHANGE UP (ref 5–17)
BUN SERPL-MCNC: 23 MG/DL — SIGNIFICANT CHANGE UP (ref 7–23)
CALCIUM SERPL-MCNC: 9 MG/DL — SIGNIFICANT CHANGE UP (ref 8.5–10.1)
CHLORIDE SERPL-SCNC: 103 MMOL/L — SIGNIFICANT CHANGE UP (ref 96–108)
CO2 SERPL-SCNC: 28 MMOL/L — SIGNIFICANT CHANGE UP (ref 22–31)
CREAT SERPL-MCNC: 1.29 MG/DL — SIGNIFICANT CHANGE UP (ref 0.5–1.3)
EGFR: 56 ML/MIN/1.73M2 — LOW
GLUCOSE SERPL-MCNC: 132 MG/DL — HIGH (ref 70–99)
HCT VFR BLD CALC: 34.3 % — LOW (ref 39–50)
HGB BLD-MCNC: 11 G/DL — LOW (ref 13–17)
MAGNESIUM SERPL-MCNC: 1.9 MG/DL — SIGNIFICANT CHANGE UP (ref 1.6–2.6)
MCHC RBC-ENTMCNC: 20 PG — LOW (ref 27–34)
MCHC RBC-ENTMCNC: 32.1 GM/DL — SIGNIFICANT CHANGE UP (ref 32–36)
MCV RBC AUTO: 62.4 FL — LOW (ref 80–100)
PLATELET # BLD AUTO: 218 K/UL — SIGNIFICANT CHANGE UP (ref 150–400)
POTASSIUM SERPL-MCNC: 3.4 MMOL/L — LOW (ref 3.5–5.3)
POTASSIUM SERPL-SCNC: 3.4 MMOL/L — LOW (ref 3.5–5.3)
RBC # BLD: 5.5 M/UL — SIGNIFICANT CHANGE UP (ref 4.2–5.8)
RBC # FLD: 15.5 % — HIGH (ref 10.3–14.5)
SODIUM SERPL-SCNC: 136 MMOL/L — SIGNIFICANT CHANGE UP (ref 135–145)
WBC # BLD: 8.46 K/UL — SIGNIFICANT CHANGE UP (ref 3.8–10.5)
WBC # FLD AUTO: 8.46 K/UL — SIGNIFICANT CHANGE UP (ref 3.8–10.5)

## 2023-03-09 PROCEDURE — 99239 HOSP IP/OBS DSCHRG MGMT >30: CPT

## 2023-03-09 RX ORDER — LACTOBACILLUS ACIDOPHILUS 100MM CELL
1 CAPSULE ORAL
Qty: 10 | Refills: 0
Start: 2023-03-09 | End: 2023-03-18

## 2023-03-09 RX ORDER — CIPROFLOXACIN LACTATE 400MG/40ML
1 VIAL (ML) INTRAVENOUS
Qty: 16 | Refills: 0
Start: 2023-03-09 | End: 2023-03-16

## 2023-03-09 RX ORDER — POTASSIUM CHLORIDE 20 MEQ
40 PACKET (EA) ORAL ONCE
Refills: 0 | Status: COMPLETED | OUTPATIENT
Start: 2023-03-09 | End: 2023-03-09

## 2023-03-09 RX ORDER — ACETAMINOPHEN 500 MG
2 TABLET ORAL
Qty: 0 | Refills: 0 | DISCHARGE
Start: 2023-03-09

## 2023-03-09 RX ADMIN — Medication 300 MILLIGRAM(S): at 09:27

## 2023-03-09 RX ADMIN — ESCITALOPRAM OXALATE 20 MILLIGRAM(S): 10 TABLET, FILM COATED ORAL at 09:28

## 2023-03-09 RX ADMIN — Medication 40 MILLIEQUIVALENT(S): at 09:45

## 2023-03-09 RX ADMIN — FINASTERIDE 5 MILLIGRAM(S): 5 TABLET, FILM COATED ORAL at 09:27

## 2023-03-09 RX ADMIN — MAGNESIUM OXIDE 400 MG ORAL TABLET 400 MILLIGRAM(S): 241.3 TABLET ORAL at 09:28

## 2023-03-09 RX ADMIN — Medication 1 TABLET(S): at 09:28

## 2023-03-09 RX ADMIN — Medication 200 MILLIGRAM(S): at 09:46

## 2023-03-09 RX ADMIN — Medication 1 TABLET(S): at 09:27

## 2023-03-09 NOTE — PROGRESS NOTE ADULT - PROVIDER SPECIALTY LIST ADULT
Hospitalist
Urology
Hospitalist
Infectious Disease
Urology
Critical Care
Hospitalist
Infectious Disease
SICU
Urology
Critical Care
Urology
Critical Care

## 2023-03-09 NOTE — PROGRESS NOTE ADULT - ASSESSMENT
81 yo Male s/p Cysto/ Bladder Fulguration and s/p right nephrostomy tube placement by IR. Now s/p PCNU by IR and nephrostomy tube clamped. Pt will be discharged home with plan to follow up with Dr. Awan and will be scheduled for outpatient ureteroscopy. Pt's wife aware of plan and all questions/concerns answered.  Recommend  Continue antibiotics, adjust as per cultures/ sensitivities F/U ID recs   Follow up with urology outpatient for further management       Case discussed with Dr. Awan

## 2023-03-09 NOTE — PROGRESS NOTE ADULT - SUBJECTIVE AND OBJECTIVE BOX
"              After Visit Summary   10/9/2017    Miky Perez    MRN: [id number]           Patient Information     Date Of Birth          1946        Visit Information        Provider Department      10/9/2017 10:40 AM Brandt Mcgee MD [facility]        Today's Diagnoses     Mixed hyperlipidemia    -  1    Need for prophylactic vaccination and inoculation against influenza        Malignant neoplasm of lingual tonsil (H)        Increased prostate specific antigen (PSA) velocity           Follow-ups after your visit        Who to contact     If you have questions or need follow up information about today's clinic visit or your schedule please contact [facility] directly at [phone].  Normal or non-critical lab and imaging results will be communicated to you by MyChart, letter or phone within 4 business days after the clinic has received the results. If you do not hear from us within 7 days, please contact the clinic through MyChart or phone. If you have a critical or abnormal lab result, we will notify you by phone as soon as possible.  Submit refill requests through [employer] or call your pharmacy and they will forward the refill request to us. Please allow 3 business days for your refill to be completed.          Additional Information About Your Visit        MyChart Information     [employer] lets you send messages to your doctor, view your test results, renew your prescriptions, schedule appointments and more. To sign up, go to [email or web address] . Click on \"Log in\" on the left side of the screen, which will take you to the Welcome page. Then click on \"Sign up Now\" on the right side of the page.     You will be asked to enter the access code listed below, as well as some personal information. Please follow the directions to create your username and password.     Your access code is: [email or web address]  Expires: 2018 11:02 AM     Your access code will  in 90 "
CC: 81y/o M PMHx significant for Bladder cancer, Depression, Hypertension, s/p back surgery (TLIF), hypercholesterolemia, [place] (uses bilateral hearing aides), urolithiasis s/p sten in the past, BPH, HTN, HLD, DM, HFpEF and osteoarthritis presents with right flank pain. Started yesterday intermittent but more pronounced and constant this morning. Associated nausea with vomiting, lethargy, weakness, chills, subjective fevers. Denies chest pain, syncope, weight gain/loss.    3/6 - no cp palps sob abdo pain, watch off O2 and TTV today   3/7 - no cp palps sob abdo pain, is OOB, off harding     Vital Signs Last 24 Hrs  T(C): 36.7 (07 Mar 2023 15:14), Max: 37.2 (06 Mar 2023 21:07)  T(F): 98 (07 Mar 2023 15:14), Max: 98.9 (06 Mar 2023 21:07)  HR: 99 (07 Mar 2023 15:14) (90 - 110)  BP: 137/75 (07 Mar 2023 15:14) (137/75 - 150/77)  RR: 18 (07 Mar 2023 15:14) (18 - 18)  SpO2: 95% (07 Mar 2023 15:14) (95% - 98%)  Parameters below as of 07 Mar 2023 15:14  Patient On (Oxygen Delivery Method): room air  Constitutional: NAD, awake and alert  HEENT: PERR, EOMI  Neck: Soft and supple,  No JVD  Respiratory: Breath sounds are clear bilaterally, No wheezing, rales or rhonchi  Cardiovascular: S1 and S2, regular rate and rhythm, no Murmurs  Gastrointestinal: Bowel Sounds present, soft, nontender, nondistended  Extremities: No peripheral edema  Vascular: 2+ peripheral pulses  Neurological: A/O x 3, no focal deficits  Musculoskeletal: 5/5 strength b/l upper and lower extremities  Skin: No rashes  Rt PCN present; off harding and off O2     RADIOLOGY/EKG:  < from: Xray Chest 1 View- PORTABLE-Urgent (Xray Chest 1 View- PORTABLE-Urgent .) (03.03.23 @ 16:28) >  IMPRESSION:  1. Linear atelectasis in the left lower lobe with no evidence of   pneumonia.  2. Slightly prominent cardiac silhouette, at least partially magnified by   technique and while seen with some engorgement of central pulmonary   veins, there is no pulmonary edema.  3. Old right posterior lateral rib fractures.    < end of copied text >  < from: US Kidney and Bladder (03.03.23 @ 09:36) >  IMPRESSION:  Atypical sonolucency for hydronephrosis involving the upper right kidney   and indeterminate 1.8 cm exophytic lesion involving the upper pole of   right kidney. Consider a CT urogram or MR imaging for further evaluation.    LABS: All Labs Reviewed:                     11.1   8.71  )-----------( 192      ( 07 Mar 2023 06:29 )             34.3   135  |  102  |  20  ----------------------------<  131<H>  3.6   |  29  |  1.21  Ca    9.7      07 Mar 2023 06:29  Phos  2.8     03-06  Mg     1.6     03-07      MEDS:   acetaminophen     Tablet .. 650 milliGRAM(s) Oral every 6 hours PRN  albuterol    0.083% 2.5 milliGRAM(s) Nebulizer every 6 hours PRN  albuterol    90 MICROgram(s) HFA Inhaler 1 Puff(s) Inhalation every 8 hours  aluminum hydroxide/magnesium hydroxide/simethicone Suspension 30 milliLiter(s) Oral every 4 hours PRN  chlorhexidine 2% Cloths 1 Application(s) Topical <User Schedule>  ciprofloxacin   IVPB 400 milliGRAM(s) IV Intermittent every 12 hours  cyclobenzaprine 5 milliGRAM(s) Oral every 8 hours PRN  diltiazem    milliGRAM(s) Oral daily  escitalopram 20 milliGRAM(s) Oral daily  finasteride 5 milliGRAM(s) Oral daily  iron sucrose Injectable 100 milliGRAM(s) IV Push every 24 hours  lactobacillus acidophilus 1 Tablet(s) Oral daily  magnesium oxide 400 milliGRAM(s) Oral daily  melatonin 3 milliGRAM(s) Oral at bedtime PRN  morphine  - Injectable 4 milliGRAM(s) IV Push every 6 hours PRN  multivitamin 1 Tablet(s) Oral daily  naloxone Injectable 0.4 milliGRAM(s) IV Push once  ondansetron Injectable 4 milliGRAM(s) IV Push every 8 hours PRN  oxycodone    5 mG/acetaminophen 325 mG 1 Tablet(s) Oral every 4 hours PRN  polyethylene glycol 3350 17 Gram(s) Oral daily PRN  senna 2 Tablet(s) Oral at bedtime  tamsulosin 0.4 milliGRAM(s) Oral at bedtime      
Patient is a 82y old  Male who presents with a chief complaint of Urolithiasis (04 Mar 2023 10:06)    24 hour events:     Allergies    penicillin (Hives)  sulfADIAZINE (Rash)    Intolerances      REVIEW OF SYSTEMS: SEE BELOW       ICU Vital Signs Last 24 Hrs  T(C): 36.9 (04 Mar 2023 16:35), Max: 37.4 (03 Mar 2023 20:34)  T(F): 98.4 (04 Mar 2023 16:35), Max: 99.4 (03 Mar 2023 20:34)  HR: 95 (04 Mar 2023 18:00) (87 - 98)  BP: 121/79 (04 Mar 2023 18:00) (88/53 - 125/81)  BP(mean): 93 (04 Mar 2023 18:00) (59 - 93)  ABP: --  ABP(mean): --  RR: 26 (04 Mar 2023 18:00) (17 - 34)  SpO2: 89% (04 Mar 2023 18:00) (86% - 99%)    O2 Parameters below as of 04 Mar 2023 18:00  Patient On (Oxygen Delivery Method): nasal cannula  O2 Flow (L/min): 4          CAPILLARY BLOOD GLUCOSE      POCT Blood Glucose.: 139 mg/dL (04 Mar 2023 17:30)  POCT Blood Glucose.: 224 mg/dL (04 Mar 2023 12:40)  POCT Blood Glucose.: 208 mg/dL (03 Mar 2023 21:33)      I&O's Summary    03 Mar 2023 07:01  -  04 Mar 2023 07:00  --------------------------------------------------------  IN: 7750 mL / OUT: 6470 mL / NET: 1280 mL    04 Mar 2023 07:01  -  04 Mar 2023 19:13  --------------------------------------------------------  IN: 3100 mL / OUT: 3725 mL / NET: -625 mL            MEDICATIONS  (STANDING):  albuterol    90 MICROgram(s) HFA Inhaler 2 Puff(s) Inhalation once  albuterol/ipratropium for Nebulization 3 milliLiter(s) Nebulizer every 6 hours  chlorhexidine 2% Cloths 1 Application(s) Topical <User Schedule>  ciprofloxacin   IVPB 200 milliGRAM(s) IV Intermittent every 12 hours  ciprofloxacin   IVPB      dextrose 5%. 1000 milliLiter(s) (50 mL/Hr) IV Continuous <Continuous>  dextrose 5%. 1000 milliLiter(s) (100 mL/Hr) IV Continuous <Continuous>  dextrose 50% Injectable 25 Gram(s) IV Push once  dextrose 50% Injectable 12.5 Gram(s) IV Push once  dextrose 50% Injectable 25 Gram(s) IV Push once  diltiazem    milliGRAM(s) Oral daily  escitalopram 20 milliGRAM(s) Oral daily  finasteride 5 milliGRAM(s) Oral daily  glucagon  Injectable 1 milliGRAM(s) IntraMuscular once  heparin   Injectable 5000 Unit(s) SubCutaneous every 8 hours  insulin lispro (ADMELOG) corrective regimen sliding scale   SubCutaneous Before meals and at bedtime  iron sucrose Injectable 100 milliGRAM(s) IV Push every 24 hours  lactated ringers. 1000 milliLiter(s) (75 mL/Hr) IV Continuous <Continuous>  lactated ringers. 1000 milliLiter(s) (75 mL/Hr) IV Continuous <Continuous>  magnesium oxide 400 milliGRAM(s) Oral daily  multivitamin 1 Tablet(s) Oral daily  naloxone Injectable 0.4 milliGRAM(s) IV Push once  senna 2 Tablet(s) Oral at bedtime  tamsulosin 0.4 milliGRAM(s) Oral at bedtime      MEDICATIONS  (PRN):  acetaminophen     Tablet .. 650 milliGRAM(s) Oral every 6 hours PRN Temp greater or equal to 38C (100.4F), Mild Pain (1 - 3)  aluminum hydroxide/magnesium hydroxide/simethicone Suspension 30 milliLiter(s) Oral every 4 hours PRN Dyspepsia  cyclobenzaprine 5 milliGRAM(s) Oral every 8 hours PRN Spasm  dextrose Oral Gel 15 Gram(s) Oral once PRN Blood Glucose LESS THAN 70 milliGRAM(s)/deciliter  melatonin 3 milliGRAM(s) Oral at bedtime PRN Insomnia  morphine  - Injectable 4 milliGRAM(s) IV Push every 6 hours PRN Severe Pain (7 - 10)  ondansetron Injectable 4 milliGRAM(s) IV Push every 8 hours PRN Nausea and/or Vomiting  oxycodone    5 mG/acetaminophen 325 mG 1 Tablet(s) Oral every 4 hours PRN Moderate Pain (4 - 6)  polyethylene glycol 3350 17 Gram(s) Oral daily PRN Constipation      PHYSICAL EXAM: SEE BELOW                          9.5    10.21 )-----------( 118      ( 04 Mar 2023 05:37 )             30.2     Bands 17.0    03-04    136  |  103  |  39<H>  ----------------------------<  131<H>  3.4<L>   |  28  |  2.20<H>    Ca    8.0<L>      04 Mar 2023 05:37  Phos  3.4     03-04  Mg     2.5     03-04    TPro  5.9<L>  /  Alb  2.6<L>  /  TBili  0.6  /  DBili  x   /  AST  32  /  ALT  39  /  AlkPhos  56  03-04    Lactate 1.9           03-04 @ 05:37    Lactate 3.2           03-03 @ 23:01          PT/INR - ( 03 Mar 2023 07:50 )   PT: 16.7 sec;   INR: 1.43 ratio         PTT - ( 03 Mar 2023 07:50 )  PTT:26.3 sec    .Urine RIGHT NEPHROSTOMY   Culture in progress -- 03-03 @ 11:00  Catheterized Catheterized   50,000 - 99,000 CFU/mL Enterococcus faecalis -- 03-02 @ 19:00  .Blood None   Growth in aerobic and anaerobic bottles: Enterococcus faecalis  See previous culture [id number]   Growth in anaerobic bottle: Gram positive cocci in pairs  Growth in aerobic bottle: Gram positive cocci in pairs 03-02 @ 18:44  .Blood None   Growth in aerobic and anaerobic bottles: Enterococcus faecalis  ***Blood Panel PCR results on this specimen are available  approximately 3 hours after the Gram stain result.***  Gram stain, PCR, and/or culture results may not always  correspond dueto difference in methodologies.  ************************************************************  This PCR assay was performed by multiplex PCR. This  Assay tests for 66 bacterial and resistance gene targets.  Please refer to the Health system Labs test directory  at [email or web address] for details.   Growth in anaerobic bottle: Gram positive cocci in pairs  Growth in aerobic bottle: Gram Positive Cocci in Pairs and Chains 03-02 @ 18:42          
Patient is a 82y old  Male who presents with a chief complaint of Urolithiasis (05 Mar 2023 12:41)    24 hour events: improved     Allergies    penicillin (Hives)  sulfADIAZINE (Rash)    Intolerances      REVIEW OF SYSTEMS: SEE BELOW       ICU Vital Signs Last 24 Hrs  T(C): 36.8 (05 Mar 2023 08:49), Max: 36.9 (04 Mar 2023 16:35)  T(F): 98.2 (05 Mar 2023 08:49), Max: 98.4 (04 Mar 2023 16:35)  HR: 93 (05 Mar 2023 14:39) (82 - 96)  BP: 132/89 (05 Mar 2023 09:00) (108/85 - 147/82)  BP(mean): 102 (05 Mar 2023 09:00) (79 - 104)  ABP: --  ABP(mean): --  RR: 23 (05 Mar 2023 14:00) (16 - 34)  SpO2: 98% (05 Mar 2023 14:39) (88% - 98%)    O2 Parameters below as of 05 Mar 2023 14:39  Patient On (Oxygen Delivery Method): nasal cannula            CAPILLARY BLOOD GLUCOSE      POCT Blood Glucose.: 129 mg/dL (05 Mar 2023 16:10)  POCT Blood Glucose.: 163 mg/dL (05 Mar 2023 12:27)  POCT Blood Glucose.: 133 mg/dL (05 Mar 2023 08:47)  POCT Blood Glucose.: 193 mg/dL (04 Mar 2023 21:04)  POCT Blood Glucose.: 139 mg/dL (04 Mar 2023 17:30)      I&O's Summary    04 Mar 2023 07:01  -  05 Mar 2023 07:00  --------------------------------------------------------  IN: 4000 mL / OUT: 7525 mL / NET: -3525 mL    05 Mar 2023 07:01  -  05 Mar 2023 16:23  --------------------------------------------------------  IN: 0 mL / OUT: 2100 mL / NET: -2100 mL            MEDICATIONS  (STANDING):  albuterol    90 MICROgram(s) HFA Inhaler 2 Puff(s) Inhalation once  albuterol/ipratropium for Nebulization 3 milliLiter(s) Nebulizer every 6 hours  chlorhexidine 2% Cloths 1 Application(s) Topical <User Schedule>  ciprofloxacin   IVPB 400 milliGRAM(s) IV Intermittent every 12 hours  dextrose 5%. 1000 milliLiter(s) (50 mL/Hr) IV Continuous <Continuous>  dextrose 5%. 1000 milliLiter(s) (100 mL/Hr) IV Continuous <Continuous>  dextrose 50% Injectable 25 Gram(s) IV Push once  dextrose 50% Injectable 12.5 Gram(s) IV Push once  dextrose 50% Injectable 25 Gram(s) IV Push once  diltiazem    milliGRAM(s) Oral daily  escitalopram 20 milliGRAM(s) Oral daily  finasteride 5 milliGRAM(s) Oral daily  glucagon  Injectable 1 milliGRAM(s) IntraMuscular once  heparin   Injectable 5000 Unit(s) SubCutaneous every 8 hours  insulin lispro (ADMELOG) corrective regimen sliding scale   SubCutaneous Before meals and at bedtime  iron sucrose Injectable 100 milliGRAM(s) IV Push every 24 hours  magnesium oxide 400 milliGRAM(s) Oral daily  multivitamin 1 Tablet(s) Oral daily  naloxone Injectable 0.4 milliGRAM(s) IV Push once  senna 2 Tablet(s) Oral at bedtime  tamsulosin 0.4 milliGRAM(s) Oral at bedtime      MEDICATIONS  (PRN):  acetaminophen     Tablet .. 650 milliGRAM(s) Oral every 6 hours PRN Temp greater or equal to 38C (100.4F), Mild Pain (1 - 3)  aluminum hydroxide/magnesium hydroxide/simethicone Suspension 30 milliLiter(s) Oral every 4 hours PRN Dyspepsia  cyclobenzaprine 5 milliGRAM(s) Oral every 8 hours PRN Spasm  dextrose Oral Gel 15 Gram(s) Oral once PRN Blood Glucose LESS THAN 70 milliGRAM(s)/deciliter  melatonin 3 milliGRAM(s) Oral at bedtime PRN Insomnia  morphine  - Injectable 4 milliGRAM(s) IV Push every 6 hours PRN Severe Pain (7 - 10)  ondansetron Injectable 4 milliGRAM(s) IV Push every 8 hours PRN Nausea and/or Vomiting  oxycodone    5 mG/acetaminophen 325 mG 1 Tablet(s) Oral every 4 hours PRN Moderate Pain (4 - 6)  polyethylene glycol 3350 17 Gram(s) Oral daily PRN Constipation      PHYSICAL EXAM: SEE BELOW                          9.9    8.71  )-----------( 133      ( 05 Mar 2023 06:19 )             30.7       03-05    136  |  103  |  25<H>  ----------------------------<  153<H>  3.6   |  30  |  1.28    Ca    9.0      05 Mar 2023 06:19  Phos  2.0     03-05  Mg     2.3     03-05    TPro  5.9<L>  /  Alb  2.6<L>  /  TBili  0.6  /  DBili  x   /  AST  32  /  ALT  39  /  AlkPhos  56  03-04              .Urine RIGHT NEPHROSTOMY   Culture in progress -- 03-03 @ 11:00  Catheterized Catheterized   50,000 - 99,000 CFU/mL Enterococcus faecalis -- 03-02 @ 19:00  .Blood None   Growth in aerobic and anaerobic bottles: Enterococcus faecalis  See previous culture [id number]   Growth in anaerobic bottle: Gram positive cocci in pairs  Growth in aerobic bottle: Gram positive cocci in pairs 03-02 @ 18:44  .Blood None   Growth in aerobic and anaerobic bottles: Enterococcus faecalis  ***Blood Panel PCR results on this specimen are available  approximately 3 hours after the Gram stain result.***  Gram stain, PCR, and/or culture results may not always  correspond dueto difference in methodologies.  ************************************************************  This PCR assay was performed by multiplex PCR. This  Assay tests for 66 bacterial and resistance gene targets.  Please refer to the [facility] Labs test directory  at [email or web address] for details.   Growth in anaerobic bottle: Gram positive cocci in pairs  Growth in aerobic bottle: Gram Positive Cocci in Pairs and Chains 03-02 @ 18:42          
Pt seen at bedside, no acute complaints. Pt reports he is feeling much better. Denies any abd/flank pain.    PE  General: No distress, No anxiety  VITALS  T(C): 37.1 (03-07-23 @ 08:20), Max: 37.2 (03-06-23 @ 21:07)  HR: 90 (03-07-23 @ 08:20) (90 - 98)  BP: 145/98 (03-07-23 @ 08:20) (145/98 - 150/77)  RR: 18 (03-07-23 @ 08:20) (18 - 18)  SpO2: 95% (03-07-23 @ 08:20) (94% - 98%)            Skin     : No jaundice   HEENT: Normocephalic, no icterus , EOM full , No epistaxis  Lung    : No resp distress  Abdo:   : Soft, Non tender, No guarding, No distension   Back    : Right PCN with tonya colored urine  Neuro   : A&Ox3    LABS                        11.1   8.71  )-----------( 192      ( 07 Mar 2023 06:29 )             34.3   03-07    135  |  102  |  20  ----------------------------<  131<H>  3.6   |  29  |  1.21    Ca    9.7      07 Mar 2023 06:29  Phos  2.8     03-06  Mg     1.6     03-07    
  Pt seen and examined without complaints. Pain is controlled. Denies SOB/CP/N/V.     Vital Signs Last 24 Hrs  T(C): 36.3 (08 Mar 2023 09:25), Max: 36.7 (07 Mar 2023 15:14)  T(F): 97.3 (08 Mar 2023 09:25), Max: 98 (07 Mar 2023 15:14)  HR: 96 (08 Mar 2023 09:25) (93 - 99)  BP: 143/86 (08 Mar 2023 09:25) (137/75 - 155/76)  BP(mean): --  RR: 18 (08 Mar 2023 09:25) (18 - 18)  SpO2: 96% (08 Mar 2023 09:25) (95% - 97%)    Parameters below as of 08 Mar 2023 09:25  Patient On (Oxygen Delivery Method): room air        I&O's Summary    07 Mar 2023 07:01  -  08 Mar 2023 07:00  --------------------------------------------------------  IN: 0 mL / OUT: 1025 mL / NET: -1025 mL    08 Mar 2023 07:01  -  08 Mar 2023 11:09  --------------------------------------------------------  IN: 0 mL / OUT: 300 mL / NET: -300 mL        Physical Exam  Gen: NAD, A&Ox3  Pulm: No respiratory distress, no subcostal retractions  CV: RRR, no JVD  Abd: Soft, NT, ND  Back: R Neph tube in place draining yellow urine                          11.1   8.71  )-----------( 192      ( 07 Mar 2023 06:29 )             34.3       03-07    135  |  102  |  20  ----------------------------<  131<H>  3.6   |  29  |  1.21    Ca    9.7      07 Mar 2023 06:29  Mg     1.6     03-07          
"days. If you need help or a new code, please call your [place] clinic or [phone].        Care EveryWhere ID     This is your Care EveryWhere ID. This could be used by other organizations to access your [place] medical records  [id number]        Your Vitals Were     Pulse Temperature Respirations Height Pulse Oximetry BMI (Body Mass Index)    67 97.3  F (36.3  C) (Tympanic) 14 5' 11\" (1.803 m) 98% 29.43 kg/m2       Blood Pressure from Last 3 Encounters:   10/09/17 125/77   02/07/17 130/72   01/08/16 126/76    Weight from Last 3 Encounters:   10/09/17 211 lb (95.7 kg)   02/07/17 221 lb (100.2 kg)   01/08/16 220 lb (99.8 kg)              Today, you had the following     No orders found for display       Primary Care Provider Office Phone # Fax #    Brandt MARY ELLEN Mcgee [phone]       [address] [facility] [address]        Equal Access to Services     [facility]: Hadii andrea chavezo Sozena, waaxda luqadaha, qaybta kaalmamalathi jeter, bharath sylvester . So [facility] [phone].    ATENCIÓN: Si habla español, tiene a viera disposición servicios gratuitos de asistencia lingüística. [facility] [phone].    We comply with applicable federal civil rights laws and [place] laws. We do not discriminate on the basis of race, color, national origin, age, disability, sex, sexual orientation, or gender identity.            Thank you!     Thank you for choosing [facility]  for your care. Our goal is always to provide you with excellent care. Hearing back from our patients is one way we can continue to improve our services. Please take a few minutes to complete the written survey that you may receive in the mail after your visit with us. Thank you!             Your Updated Medication List - Protect others around you: Learn how to safely use, store and throw away your medicines at www.disposemymeds.org.          This list is accurate as of: 10/9/17 11:02 "
AM.  Always use your most recent med list.                   Brand Name Dispense Instructions for use Diagnosis    atorvastatin 20 MG tablet    LIPITOR    90 tablet    Take 1 tablet (20 mg) by mouth daily    Hyperlipidemia LDL goal <160       fish oil-omega-3 fatty acids 1000 MG capsule      Take 2 g by mouth daily        tadalafil 5 MG tablet    CIALIS    6 tablet    Take 1 tablet (5 mg) by mouth as needed for erectile dysfunction Never use with nitroglycerin, terazosin or doxazosin.    Erectile dysfunction, unspecified erectile dysfunction type         
CC: 83y/o M PMHx significant for Bladder cancer, Depression, Hypertension, s/p back surgery (TLIF), hypercholesterolemia, [place] (uses bilateral hearing aides), urolithiasis s/p sten in the past, BPH, HTN, HLD, DM, HFpEF and osteoarthritis presents with right flank pain. Started yesterday intermittent but more pronounced and constant this morning. Associated nausea with vomiting, lethargy, weakness, chills, subjective fevers. Denies chest pain, syncope, weight gain/loss.    3/6 - no cp palps sob abdo pain, watch off O2 and TTV today     Vital Signs Last 24 Hrs  T(F): 98.9 (06 Mar 2023 09:46), Max: 99.7 (05 Mar 2023 21:02)  HR: 97 (06 Mar 2023 13:56) (89 - 97)  BP: 146/78 (06 Mar 2023 15:48) (142/79 - 154/84)  RR: 18 (06 Mar 2023 15:48) (18 - 18)  SpO2: 95% (06 Mar 2023 15:48) (93% - 97%)  Parameters below as of 06 Mar 2023 15:48  Patient On (Oxygen Delivery Method): room air  Constitutional: NAD, awake and alert  HEENT: PERR, EOMI  Neck: Soft and supple,  No JVD  Respiratory: Breath sounds are clear bilaterally, No wheezing, rales or rhonchi  Cardiovascular: S1 and S2, regular rate and rhythm, no Murmurs  Gastrointestinal: Bowel Sounds present, soft, nontender, nondistended  Extremities: No peripheral edema  Vascular: 2+ peripheral pulses  Neurological: A/O x 3, no focal deficits  Musculoskeletal: 5/5 strength b/l upper and lower extremities  Skin: No rashes  Rt PCN present, harding +     MEDICATIONS:  MEDICATIONS  (STANDING):  albuterol    90 MICROgram(s) HFA Inhaler 2 Puff(s) Inhalation once  albuterol/ipratropium for Nebulization 3 milliLiter(s) Nebulizer every 6 hours  chlorhexidine 2% Cloths 1 Application(s) Topical <User Schedule>  ciprofloxacin   IVPB 400 milliGRAM(s) IV Intermittent every 12 hours  diltiazem    milliGRAM(s) Oral daily  escitalopram 20 milliGRAM(s) Oral daily  finasteride 5 milliGRAM(s) Oral daily  heparin   Injectable 5000 Unit(s) SubCutaneous every 8 hours  iron sucrose Injectable 100 milliGRAM(s) IV Push every 24 hours  magnesium oxide 400 milliGRAM(s) Oral daily  multivitamin 1 Tablet(s) Oral daily  naloxone Injectable 0.4 milliGRAM(s) IV Push once  senna 2 Tablet(s) Oral at bedtime  tamsulosin 0.4 milliGRAM(s) Oral at bedtime      LABS: All Labs Reviewed:                      9.8    7.42  )-----------( 161      ( 06 Mar 2023 06:51 )             31.5   138  |  103  |  21  ----------------------------<  132<H>  3.8   |  33<H>  |  1.18  Ca    9.5      06 Mar 2023 06:51  Phos  2.8     03-06  Mg     1.9     03-06    Blood Culture: 03-05 @ 06:19  Organism --  Gram Stain Blood -- Gram Stain --  Specimen Source .Blood None  Culture-Blood --    03-03 @ 11:00  Organism Enterococcus faecalis  Gram Stain Blood -- Gram Stain --  Specimen Source .Urine RIGHT NEPHROSTOMY  Culture-Blood --    03-02 @ 19:00  Organism Enterococcus faecalis  Gram Stain Blood -- Gram Stain --  Specimen Source Catheterized Catheterized  Culture-Blood --    03-02 @ 18:44  Organism --  Gram Stain Blood -- Gram Stain   Growth in anaerobic bottle: Gram positive cocci in pairs  Growth in aerobic bottle: Gram positive cocci in pairs  Specimen Source .Blood None  Culture-Blood --    03-02 @ 18:42  Organism Blood Culture PCR  Gram Stain Blood -- Gram Stain   Growth in anaerobic bottle: Gram positive cocci in pairs  Growth in aerobic bottle: Gram Positive Cocci in Pairs and Chains  Specimen Source .Blood None  Culture-Blood --        RADIOLOGY/EKG:  < from: Xray Chest 1 View- PORTABLE-Urgent (Xray Chest 1 View- PORTABLE-Urgent .) (03.03.23 @ 16:28) >  IMPRESSION:  1. Linear atelectasis in the left lower lobe with no evidence of   pneumonia.  2. Slightly prominent cardiac silhouette, at least partially magnified by   technique and while seen with some engorgement of central pulmonary   veins, there is no pulmonary edema.  3. Old right posterior lateral rib fractures.    < end of copied text >  < from: US Kidney and Bladder (03.03.23 @ 09:36) >  IMPRESSION:  Atypical sonolucency for hydronephrosis involving the upper right kidney   and indeterminate 1.8 cm exophytic lesion involving the upper pole of   right kidney. Consider a CT urogram or MR imaging for further evaluation.    < end of copied text >  
Pt seen at bedside, no acute complaints. Pt reports he is feeling much better. Denies any abd/flank pain.    PE  Vital Signs Last 24 Hrs  T(C): 36.4 (03-09-23 @ 09:11), Max: 36.9 (03-08-23 @ 18:01)  T(F): 97.5 (03-09-23 @ 09:11), Max: 98.4 (03-08-23 @ 18:01)  HR: 101 (03-09-23 @ 09:11) (86 - 102)  BP: 120/72 (03-09-23 @ 09:11) (120/72 - 159/91)  BP(mean): --  RR: 18 (03-09-23 @ 09:11) (16 - 19)  SpO2: 96% (03-09-23 @ 09:11) (90% - 98%)      Skin     : No jaundice   HEENT: Normocephalic, no icterus , EOM full , No epistaxis  Lung    : No resp distress  Abdo:   : Soft, Non tender, No guarding, No distension   Back    : Right PCN capped  Neuro   : A&Ox3    LABS                          11.0   8.46  )-----------( 218      ( 09 Mar 2023 07:13 )             34.3   03-09    136  |  103  |  23  ----------------------------<  132<H>  3.4<L>   |  28  |  1.29    Ca    9.0      09 Mar 2023 07:13  Mg     1.9     03-09        
CC: 83y/o M PMHx significant for Bladder cancer, Depression, Hypertension, s/p back surgery (TLIF), hypercholesterolemia, [place] (uses bilateral hearing aides), urolithiasis s/p sten in the past, BPH, HTN, HLD, DM, HFpEF and osteoarthritis presents with right flank pain. Started yesterday intermittent but more pronounced and constant this morning. Associated nausea with vomiting, lethargy, weakness, chills, subjective fevers. Denies chest pain, syncope, weight gain/loss.    3/6 - no cp palps sob abdo pain, watch off O2 and TTV today   3/7 - no cp palps sob abdo pain, is OOB, off harding   3/8 - no cp palps sob abdo pain, NPO for IR     Vital Signs Last 24 Hrs  T(F): 98.4 (08 Mar 2023 18:01), Max: 98.4 (08 Mar 2023 18:01)  HR: 98 (08 Mar 2023 18:01) (86 - 98)  BP: 153/88 (08 Mar 2023 18:01) (131/80 - 159/91)  RR: 18 (08 Mar 2023 18:01) (16 - 19)  SpO2: 93% (08 Mar 2023 18:01) (93% - 98%)/RA   Constitutional: NAD, awake and alert  HEENT: PERR, EOMI  Neck: Soft and supple,  No JVD  Respiratory: Breath sounds are clear bilaterally, No wheezing, rales or rhonchi  Cardiovascular: S1 and S2, regular rate and rhythm, no Murmurs  Gastrointestinal: Bowel Sounds present, soft, nontender, nondistended  Extremities: No peripheral edema  Vascular: 2+ peripheral pulses  Neurological: A/O x 3, no focal deficits  Musculoskeletal: 5/5 strength b/l upper and lower extremities  Skin: No rashes  Rt PCN present; off harding and off O2     RADIOLOGY/EKG:  < from: Xray Chest 1 View- PORTABLE-Urgent (Xray Chest 1 View- PORTABLE-Urgent .) (03.03.23 @ 16:28) >  IMPRESSION:  1. Linear atelectasis in the left lower lobe with no evidence of   pneumonia.  2. Slightly prominent cardiac silhouette, at least partially magnified by   technique and while seen with some engorgement of central pulmonary   veins, there is no pulmonary edema.  3. Old right posterior lateral rib fractures.    < end of copied text >  < from: US Kidney and Bladder (03.03.23 @ 09:36) >  IMPRESSION:  Atypical sonolucency for hydronephrosis involving the upper right kidney   and indeterminate 1.8 cm exophytic lesion involving the upper pole of   right kidney. Consider a CT urogram or MR imaging for further evaluation.    LABS: All Labs Reviewed:                      11.1   8.71  )-----------( 192      ( 07 Mar 2023 06:29 )             34.3   135  |  102  |  20  ----------------------------<  131<H>  3.6   |  29  |  1.21    MEDS:   acetaminophen     Tablet .. 650 milliGRAM(s) Oral every 6 hours PRN  albuterol    0.083% 2.5 milliGRAM(s) Nebulizer every 6 hours PRN  albuterol    90 MICROgram(s) HFA Inhaler 1 Puff(s) Inhalation every 8 hours  aluminum hydroxide/magnesium hydroxide/simethicone Suspension 30 milliLiter(s) Oral every 4 hours PRN  chlorhexidine 2% Cloths 1 Application(s) Topical <User Schedule>  ciprofloxacin   IVPB 400 milliGRAM(s) IV Intermittent every 12 hours  cyclobenzaprine 5 milliGRAM(s) Oral every 8 hours PRN  diltiazem    milliGRAM(s) Oral daily  escitalopram 20 milliGRAM(s) Oral daily  finasteride 5 milliGRAM(s) Oral daily  iron sucrose Injectable 100 milliGRAM(s) IV Push every 24 hours  lactobacillus acidophilus 1 Tablet(s) Oral daily  magnesium oxide 400 milliGRAM(s) Oral daily  melatonin 3 milliGRAM(s) Oral at bedtime PRN  morphine  - Injectable 4 milliGRAM(s) IV Push every 6 hours PRN  multivitamin 1 Tablet(s) Oral daily  naloxone Injectable 0.4 milliGRAM(s) IV Push once  ondansetron Injectable 4 milliGRAM(s) IV Push every 8 hours PRN  oxycodone    5 mG/acetaminophen 325 mG 1 Tablet(s) Oral every 4 hours PRN  polyethylene glycol 3350 17 Gram(s) Oral daily PRN  senna 2 Tablet(s) Oral at bedtime  tamsulosin 0.4 milliGRAM(s) Oral at bedtime              
Date of service: 03-05-23 @ 12:41    OOB to chair  Has urinary frequency  No chills    ROS: no fever or chills; denies dizziness, no HA, no SOB or cough, no abdominal pain, no diarrhea or constipation; no dysuria, no legs pain, no rashes    MEDICATIONS  (STANDING):  albuterol    90 MICROgram(s) HFA Inhaler 2 Puff(s) Inhalation once  albuterol/ipratropium for Nebulization 3 milliLiter(s) Nebulizer every 6 hours  chlorhexidine 2% Cloths 1 Application(s) Topical <User Schedule>  ciprofloxacin   IVPB 400 milliGRAM(s) IV Intermittent every 12 hours  dextrose 5%. 1000 milliLiter(s) (100 mL/Hr) IV Continuous <Continuous>  dextrose 5%. 1000 milliLiter(s) (50 mL/Hr) IV Continuous <Continuous>  dextrose 50% Injectable 25 Gram(s) IV Push once  dextrose 50% Injectable 12.5 Gram(s) IV Push once  dextrose 50% Injectable 25 Gram(s) IV Push once  diltiazem    milliGRAM(s) Oral daily  escitalopram 20 milliGRAM(s) Oral daily  finasteride 5 milliGRAM(s) Oral daily  glucagon  Injectable 1 milliGRAM(s) IntraMuscular once  heparin   Injectable 5000 Unit(s) SubCutaneous every 8 hours  insulin lispro (ADMELOG) corrective regimen sliding scale   SubCutaneous Before meals and at bedtime  iron sucrose Injectable 100 milliGRAM(s) IV Push every 24 hours  lactated ringers. 1000 milliLiter(s) (75 mL/Hr) IV Continuous <Continuous>  lactated ringers. 1000 milliLiter(s) (75 mL/Hr) IV Continuous <Continuous>  magnesium oxide 400 milliGRAM(s) Oral daily  multivitamin 1 Tablet(s) Oral daily  naloxone Injectable 0.4 milliGRAM(s) IV Push once  senna 2 Tablet(s) Oral at bedtime  tamsulosin 0.4 milliGRAM(s) Oral at bedtime    Vital Signs Last 24 Hrs  T(C): 36.8 (05 Mar 2023 08:49), Max: 37 (04 Mar 2023 14:31)  T(F): 98.2 (05 Mar 2023 08:49), Max: 98.6 (04 Mar 2023 14:31)  HR: 96 (05 Mar 2023 10:00) (82 - 98)  BP: 132/89 (05 Mar 2023 09:00) (108/85 - 147/82)  BP(mean): 102 (05 Mar 2023 09:00) (79 - 104)  RR: 18 (05 Mar 2023 10:00) (18 - 34)  SpO2: 96% (05 Mar 2023 10:00) (88% - 97%)    Parameters below as of 05 Mar 2023 09:49  Patient On (Oxygen Delivery Method): nasal cannula     Physical exam:    Constitutional:  No acute distress  HEENT: NC/AT, EOMI, PERRLA, conjunctivae clear; ears and nose atraumatic  Neck: supple; thyroid not palpable  Back: no tenderness  Respiratory: respiratory effort normal; clear to auscultation  Cardiovascular: S1S2 regular, no murmurs  Abdomen: soft, not tender, not distended, positive BS  Genitourinary: no suprapubic tenderness  Right nephrostomy  Lymphatic: no LN palpable  Musculoskeletal: no muscle tenderness, no joint swelling or tenderness  Extremities: no pedal edema  Neurological/ Psychiatric: AxOx3, moving all extremities  Skin: no rashes; no palpable lesions    Labs: reviewed                        9.9    8.71  )-----------( 133      ( 05 Mar 2023 06:19 )             30.7     03-05    136  |  103  |  25<H>  ----------------------------<  153<H>  3.6   |  30  |  1.28    Ca    9.0      05 Mar 2023 06:19  Phos  2.0     03-05  Mg     2.3     03-05    TPro  5.9<L>  /  Alb  2.6<L>  /  TBili  0.6  /  DBili  x   /  AST  32  /  ALT  39  /  AlkPhos  56  03-04    Vancomycin Level, Trough: 5.0 ug/mL (03-04 @ 05:37)    Ferritin, Serum: 468 ng/mL (03-04-23 @ 05:37)  D-Dimer Assay, Quantitative: 245 ng/mL DDU (03-02-23 @ 12:49)                        9.5    10.21 )-----------( 118      ( 04 Mar 2023 05:37 )             30.2     03-04    136  |  103  |  39<H>  ----------------------------<  131<H>  3.4<L>   |  28  |  2.20<H>    Ca    8.0<L>      04 Mar 2023 05:37  Phos  3.4     03-04  Mg     2.5     03-04    TPro  5.9<L>  /  Alb  2.6<L>  /  TBili  0.6  /  DBili  x   /  AST  32  /  ALT  39  /  AlkPhos  56  03-04     LIVER FUNCTIONS - ( 04 Mar 2023 05:37 )  Alb: 2.6 g/dL / Pro: 5.9 gm/dL / ALK PHOS: 56 U/L / ALT: 39 U/L / AST: 32 U/L / GGT: x             Culture - Urine (collected 03 Mar 2023 11:00)  Source: .Urine RIGHT NEPHROSTOMY  Preliminary Report (04 Mar 2023 11:58):    Culture in progress    Culture - Urine (collected 02 Mar 2023 19:00)  Source: Catheterized Catheterized  Preliminary Report (04 Mar 2023 17:16):    50,000 - 99,000 CFU/mL Enterococcus faecalis    Culture - Blood (collected 02 Mar 2023 18:44)  Source: .Blood None  Gram Stain (03 Mar 2023 15:52):    Growth in anaerobic bottle: Gram positive cocci in pairs    Growth in aerobic bottle: Gram positive cocci in pairs  Final Report (05 Mar 2023 10:56):    Growth in aerobic and anaerobic bottles: Enterococcus faecalis    See previous culture [id number]    Culture - Blood (collected 02 Mar 2023 18:42)  Source: .Blood None  Gram Stain (03 Mar 2023 16:58):    Growth in anaerobic bottle: Gram positive cocci in pairs    Growth in aerobic bottle: Gram Positive Cocci in Pairs and Chains  Final Report (05 Mar 2023 10:55):    Growth in aerobic and anaerobic bottles: Enterococcus faecalis  Organism: Blood Culture PCR  Enterococcus faecalis (05 Mar 2023 11:34)  Organism: Enterococcus faecalis (05 Mar 2023 11:34)      -  Ampicillin: S <=2 Predicts results to ampicillin/sulbactam, amoxacillin-clavulanate and  piperacillin-tazobactam.      -  Ciprofloxacin: S <=1      -  Gentamicin synergy: S <=500      -  Streptomycin synergy: S <=1000      -  Vancomycin: S 1      Method Type: GERSON  Organism: Blood Culture PCR (05 Mar 2023 11:34)      -  Enterococcus faecalis: Detec      Method Type: PCR    Radiology: all available radiological tests reviewed    < from: Xray Chest 1 View- PORTABLE-Urgent (Xray Chest 1 View- PORTABLE-Urgent .) (03.03.23 @ 16:28) >  1. Linear atelectasis in the left lower lobe with no evidence of pneumonia.  2. Slightly prominent cardiac silhouette, at least partially magnified by   technique and while seen with some engorgement of central pulmonary veins, there is no pulmonary edema.  3. Old right posterior lateral rib fractures.  < end of copied text >    < from: US Kidney and Bladder (03.03.23 @ 09:36) >  Atypical sonolucency for hydronephrosis involving the upper right kidney   and indeterminate 1.8 cm exophytic lesion involving the upper pole of   right kidney. Consider a CT urogram or MR imaging for further evaluation.  Left kidney: 12.7 cm. No renal mass, hydronephrosis or calculi.  Urinary bladder: Dillon catheter.  < end of copied text >    Advanced directives addressed: full resuscitation
Date of service: 03-06-23 @ 13:15    Lying in bed in NAD  Has low grade fever  Has urinary frequency    ROS: no fever or chills; denies dizziness, no HA, no SOB or cough, no abdominal pain, no diarrhea or constipation; no legs pain, no rashes    MEDICATIONS  (STANDING):  albuterol    90 MICROgram(s) HFA Inhaler 2 Puff(s) Inhalation once  albuterol/ipratropium for Nebulization 3 milliLiter(s) Nebulizer every 6 hours  chlorhexidine 2% Cloths 1 Application(s) Topical <User Schedule>  ciprofloxacin   IVPB 400 milliGRAM(s) IV Intermittent every 12 hours  diltiazem    milliGRAM(s) Oral daily  escitalopram 20 milliGRAM(s) Oral daily  finasteride 5 milliGRAM(s) Oral daily  heparin   Injectable 5000 Unit(s) SubCutaneous every 8 hours  iron sucrose Injectable 100 milliGRAM(s) IV Push every 24 hours  magnesium oxide 400 milliGRAM(s) Oral daily  multivitamin 1 Tablet(s) Oral daily  naloxone Injectable 0.4 milliGRAM(s) IV Push once  senna 2 Tablet(s) Oral at bedtime  tamsulosin 0.4 milliGRAM(s) Oral at bedtime    Vital Signs Last 24 Hrs  T(C): 37.2 (06 Mar 2023 09:46), Max: 37.6 (05 Mar 2023 21:02)  T(F): 98.9 (06 Mar 2023 09:46), Max: 99.7 (05 Mar 2023 21:02)  HR: 97 (06 Mar 2023 11:16) (86 - 97)  BP: 146/89 (06 Mar 2023 11:16) (142/79 - 154/84)  BP(mean): --  RR: 18 (06 Mar 2023 11:16) (18 - 23)  SpO2: 94% (06 Mar 2023 11:16) (93% - 98%)    Parameters below as of 06 Mar 2023 11:16  Patient On (Oxygen Delivery Method): room air     Physical exam:    Constitutional:  No acute distress  HEENT: NC/AT, EOMI, PERRLA, conjunctivae clear; ears and nose atraumatic  Neck: supple; thyroid not palpable  Back: no tenderness  Respiratory: respiratory effort normal; clear to auscultation  Cardiovascular: S1S2 regular, no murmurs  Abdomen: soft, not tender, not distended, positive BS  Genitourinary: no suprapubic tenderness  Right nephrostomy  Lymphatic: no LN palpable  Musculoskeletal: no muscle tenderness, no joint swelling or tenderness  Extremities: no pedal edema  Neurological/ Psychiatric: AxOx3, moving all extremities  Skin: no rashes; no palpable lesions    Labs: reviewed                        9.9    8.71  )-----------( 133      ( 05 Mar 2023 06:19 )             30.7     03-05    136  |  103  |  25<H>  ----------------------------<  153<H>  3.6   |  30  |  1.28    Ca    9.0      05 Mar 2023 06:19  Phos  2.0     03-05  Mg     2.3     03-05    TPro  5.9<L>  /  Alb  2.6<L>  /  TBili  0.6  /  DBili  x   /  AST  32  /  ALT  39  /  AlkPhos  56  03-04    Vancomycin Level, Trough: 5.0 ug/mL (03-04 @ 05:37)    Ferritin, Serum: 468 ng/mL (03-04-23 @ 05:37)  D-Dimer Assay, Quantitative: 245 ng/mL DDU (03-02-23 @ 12:49)                        9.5    10.21 )-----------( 118      ( 04 Mar 2023 05:37 )             30.2     03-04    136  |  103  |  39<H>  ----------------------------<  131<H>  3.4<L>   |  28  |  2.20<H>    Ca    8.0<L>      04 Mar 2023 05:37  Phos  3.4     03-04  Mg     2.5     03-04    TPro  5.9<L>  /  Alb  2.6<L>  /  TBili  0.6  /  DBili  x   /  AST  32  /  ALT  39  /  AlkPhos  56  03-04     LIVER FUNCTIONS - ( 04 Mar 2023 05:37 )  Alb: 2.6 g/dL / Pro: 5.9 gm/dL / ALK PHOS: 56 U/L / ALT: 39 U/L / AST: 32 U/L / GGT: x             Culture - Urine (collected 03 Mar 2023 11:00)  Source: .Urine RIGHT NEPHROSTOMY  Preliminary Report (04 Mar 2023 11:58):    Culture in progress    Culture - Urine (collected 02 Mar 2023 19:00)  Source: Catheterized Catheterized  Preliminary Report (04 Mar 2023 17:16):    50,000 - 99,000 CFU/mL Enterococcus faecalis    Culture - Blood (collected 02 Mar 2023 18:44)  Source: .Blood None  Gram Stain (03 Mar 2023 15:52):    Growth in anaerobic bottle: Gram positive cocci in pairs    Growth in aerobic bottle: Gram positive cocci in pairs  Final Report (05 Mar 2023 10:56):    Growth in aerobic and anaerobic bottles: Enterococcus faecalis    See previous culture [id number]    Culture - Blood (collected 02 Mar 2023 18:42)  Source: .Blood None  Gram Stain (03 Mar 2023 16:58):    Growth in anaerobic bottle: Gram positive cocci in pairs    Growth in aerobic bottle: Gram Positive Cocci in Pairs and Chains  Final Report (05 Mar 2023 10:55):    Growth in aerobic and anaerobic bottles: Enterococcus faecalis  Organism: Blood Culture PCR  Enterococcus faecalis (05 Mar 2023 11:34)  Organism: Enterococcus faecalis (05 Mar 2023 11:34)      -  Ampicillin: S <=2 Predicts results to ampicillin/sulbactam, amoxacillin-clavulanate and  piperacillin-tazobactam.      -  Ciprofloxacin: S <=1      -  Gentamicin synergy: S <=500      -  Streptomycin synergy: S <=1000      -  Vancomycin: S 1      Method Type: GERSON  Organism: Blood Culture PCR (05 Mar 2023 11:34)      -  Enterococcus faecalis: Detec      Method Type: PCR      Culture - Blood (collected 05 Mar 2023 06:19)  Source: .Blood None  Preliminary Report (06 Mar 2023 13:02):    No growth to date.    Culture - Blood (collected 05 Mar 2023 06:19)  Source: .Blood None  Preliminary Report (06 Mar 2023 13:02):    No growth to date.    Culture - Urine (collected 03 Mar 2023 11:00)  Source: .Urine RIGHT NEPHROSTOMY  Final Report (05 Mar 2023 19:56):    >100,000 CFU/ml Enterococcus faecalis  Organism: Enterococcus faecalis (05 Mar 2023 19:56)  Organism: Enterococcus faecalis (05 Mar 2023 19:56)      -  Ampicillin: S <=2 Predicts results to ampicillin/sulbactam, amoxacillin-clavulanate and  piperacillin-tazobactam.      -  Ciprofloxacin: S <=1      -  Levofloxacin: S <=0.5      -  Nitrofurantoin: S <=32 Should not be used to treat pyelonephritis.      -  Tetracycline: R >8      -  Vancomycin: S 1      Method Type: GERSON    Culture - Urine (collected 02 Mar 2023 19:00)  Source: Catheterized Catheterized  Final Report (05 Mar 2023 20:05):    50,000 - 99,000 CFU/mL Enterococcus faecalis  Organism: Enterococcus faecalis (05 Mar 2023 20:05)  Organism: Enterococcus faecalis (05 Mar 2023 20:05)      -  Ampicillin: S <=2 Predicts results to ampicillin/sulbactam, amoxacillin-clavulanate and  piperacillin-tazobactam.      -  Ciprofloxacin: S <=1      -  Levofloxacin: S <=0.5      -  Nitrofurantoin: S <=32 Should not be used to treat pyelonephritis.      -  Tetracycline: R >8      -  Vancomycin: S 1      Method Type: GERSON      Radiology: all available radiological tests reviewed    < from: Xray Chest 1 View- PORTABLE-Urgent (Xray Chest 1 View- PORTABLE-Urgent .) (03.03.23 @ 16:28) >  1. Linear atelectasis in the left lower lobe with no evidence of pneumonia.  2. Slightly prominent cardiac silhouette, at least partially magnified by   technique and while seen with some engorgement of central pulmonary veins, there is no pulmonary edema.  3. Old right posterior lateral rib fractures.  < end of copied text >    < from: US Kidney and Bladder (03.03.23 @ 09:36) >  Atypical sonolucency for hydronephrosis involving the upper right kidney   and indeterminate 1.8 cm exophytic lesion involving the upper pole of   right kidney. Consider a CT urogram or MR imaging for further evaluation.  Left kidney: 12.7 cm. No renal mass, hydronephrosis or calculi.  Urinary bladder: Dillon catheter.  < end of copied text >    Advanced directives addressed: full resuscitation
Date of service: 03-08-23 @ 09:06    Lying in bed in NAD  Fever is down  Has urinary frequency    ROS: no fever or chills; denies dizziness, no HA, no SOB or cough, no abdominal pain, no diarrhea or constipation; no legs pain, no rashes    MEDICATIONS  (STANDING):  albuterol    90 MICROgram(s) HFA Inhaler 1 Puff(s) Inhalation every 8 hours  chlorhexidine 2% Cloths 1 Application(s) Topical <User Schedule>  ciprofloxacin   IVPB 400 milliGRAM(s) IV Intermittent every 12 hours  diltiazem    milliGRAM(s) Oral daily  escitalopram 20 milliGRAM(s) Oral daily  finasteride 5 milliGRAM(s) Oral daily  iron sucrose Injectable 100 milliGRAM(s) IV Push every 24 hours  lactobacillus acidophilus 1 Tablet(s) Oral daily  magnesium oxide 400 milliGRAM(s) Oral daily  multivitamin 1 Tablet(s) Oral daily  naloxone Injectable 0.4 milliGRAM(s) IV Push once  senna 2 Tablet(s) Oral at bedtime  tamsulosin 0.4 milliGRAM(s) Oral at bedtime    Vital Signs Last 24 Hrs  T(C): 36.4 (07 Mar 2023 21:36), Max: 36.7 (07 Mar 2023 15:14)  T(F): 97.6 (07 Mar 2023 21:36), Max: 98 (07 Mar 2023 15:14)  HR: 93 (07 Mar 2023 21:36) (90 - 110)  BP: 155/76 (07 Mar 2023 21:36) (137/75 - 155/76)  BP(mean): --  RR: 18 (07 Mar 2023 21:36) (18 - 18)  SpO2: 97% (07 Mar 2023 21:36) (95% - 97%)    Parameters below as of 07 Mar 2023 21:36  Patient On (Oxygen Delivery Method): room air     Physical exam:    Constitutional:  No acute distress  HEENT: NC/AT, EOMI, PERRLA, conjunctivae clear; ears and nose atraumatic  Neck: supple; thyroid not palpable  Back: no tenderness  Respiratory: respiratory effort normal; clear to auscultation  Cardiovascular: S1S2 regular, no murmurs  Abdomen: soft, not tender, not distended, positive BS  Genitourinary: no suprapubic tenderness  Right nephrostomy  Lymphatic: no LN palpable  Musculoskeletal: no muscle tenderness, no joint swelling or tenderness  Extremities: no pedal edema  Neurological/ Psychiatric: AxOx3, moving all extremities  Skin: no rashes; no palpable lesions    Labs: reviewed                        11.1   8.71  )-----------( 192      ( 07 Mar 2023 06:29 )             34.3     03-07    135  |  102  |  20  ----------------------------<  131<H>  3.6   |  29  |  1.21    Ca    9.7      07 Mar 2023 06:29  Mg     1.6     03-07    Ferritin, Serum: 468 ng/mL (03-04-23 @ 05:37)  D-Dimer Assay, Quantitative: 245 ng/mL DDU (03-02-23 @ 12:49)                        9.9    8.71  )-----------( 133      ( 05 Mar 2023 06:19 )             30.7     03-05    136  |  103  |  25<H>  ----------------------------<  153<H>  3.6   |  30  |  1.28    Ca    9.0      05 Mar 2023 06:19  Phos  2.0     03-05  Mg     2.3     03-05    TPro  5.9<L>  /  Alb  2.6<L>  /  TBili  0.6  /  DBili  x   /  AST  32  /  ALT  39  /  AlkPhos  56  03-04    Vancomycin Level, Trough: 5.0 ug/mL (03-04 @ 05:37)    Ferritin, Serum: 468 ng/mL (03-04-23 @ 05:37)  D-Dimer Assay, Quantitative: 245 ng/mL DDU (03-02-23 @ 12:49)                        9.5    10.21 )-----------( 118      ( 04 Mar 2023 05:37 )             30.2     03-04    136  |  103  |  39<H>  ----------------------------<  131<H>  3.4<L>   |  28  |  2.20<H>    Ca    8.0<L>      04 Mar 2023 05:37  Phos  3.4     03-04  Mg     2.5     03-04    TPro  5.9<L>  /  Alb  2.6<L>  /  TBili  0.6  /  DBili  x   /  AST  32  /  ALT  39  /  AlkPhos  56  03-04     LIVER FUNCTIONS - ( 04 Mar 2023 05:37 )  Alb: 2.6 g/dL / Pro: 5.9 gm/dL / ALK PHOS: 56 U/L / ALT: 39 U/L / AST: 32 U/L / GGT: x             Culture - Urine (collected 03 Mar 2023 11:00)  Source: .Urine RIGHT NEPHROSTOMY  Preliminary Report (04 Mar 2023 11:58):    Culture in progress    Culture - Urine (collected 02 Mar 2023 19:00)  Source: Catheterized Catheterized  Preliminary Report (04 Mar 2023 17:16):    50,000 - 99,000 CFU/mL Enterococcus faecalis    Culture - Blood (collected 02 Mar 2023 18:44)  Source: .Blood None  Gram Stain (03 Mar 2023 15:52):    Growth in anaerobic bottle: Gram positive cocci in pairs    Growth in aerobic bottle: Gram positive cocci in pairs  Final Report (05 Mar 2023 10:56):    Growth in aerobic and anaerobic bottles: Enterococcus faecalis    See previous culture [id number]    Culture - Blood (collected 02 Mar 2023 18:42)  Source: .Blood None  Gram Stain (03 Mar 2023 16:58):    Growth in anaerobic bottle: Gram positive cocci in pairs    Growth in aerobic bottle: Gram Positive Cocci in Pairs and Chains  Final Report (05 Mar 2023 10:55):    Growth in aerobic and anaerobic bottles: Enterococcus faecalis  Organism: Blood Culture PCR  Enterococcus faecalis (05 Mar 2023 11:34)  Organism: Enterococcus faecalis (05 Mar 2023 11:34)      -  Ampicillin: S <=2 Predicts results to ampicillin/sulbactam, amoxacillin-clavulanate and  piperacillin-tazobactam.      -  Ciprofloxacin: S <=1      -  Gentamicin synergy: S <=500      -  Streptomycin synergy: S <=1000      -  Vancomycin: S 1      Method Type: GERSON  Organism: Blood Culture PCR (05 Mar 2023 11:34)      -  Enterococcus faecalis: Detec      Method Type: PCR      Culture - Blood (collected 05 Mar 2023 06:19)  Source: .Blood None  Preliminary Report (06 Mar 2023 13:02):    No growth to date.    Culture - Blood (collected 05 Mar 2023 06:19)  Source: .Blood None  Preliminary Report (06 Mar 2023 13:02):    No growth to date.    Culture - Urine (collected 03 Mar 2023 11:00)  Source: .Urine RIGHT NEPHROSTOMY  Final Report (05 Mar 2023 19:56):    >100,000 CFU/ml Enterococcus faecalis  Organism: Enterococcus faecalis (05 Mar 2023 19:56)  Organism: Enterococcus faecalis (05 Mar 2023 19:56)      -  Ampicillin: S <=2 Predicts results to ampicillin/sulbactam, amoxacillin-clavulanate and  piperacillin-tazobactam.      -  Ciprofloxacin: S <=1      -  Levofloxacin: S <=0.5      -  Nitrofurantoin: S <=32 Should not be used to treat pyelonephritis.      -  Tetracycline: R >8      -  Vancomycin: S 1      Method Type: GERSON    Culture - Urine (collected 02 Mar 2023 19:00)  Source: Catheterized Catheterized  Final Report (05 Mar 2023 20:05):    50,000 - 99,000 CFU/mL Enterococcus faecalis  Organism: Enterococcus faecalis (05 Mar 2023 20:05)  Organism: Enterococcus faecalis (05 Mar 2023 20:05)      -  Ampicillin: S <=2 Predicts results to ampicillin/sulbactam, amoxacillin-clavulanate and  piperacillin-tazobactam.      -  Ciprofloxacin: S <=1      -  Levofloxacin: S <=0.5      -  Nitrofurantoin: S <=32 Should not be used to treat pyelonephritis.      -  Tetracycline: R >8      -  Vancomycin: S 1      Method Type: GERSON      Radiology: all available radiological tests reviewed    < from: Xray Chest 1 View- PORTABLE-Urgent (Xray Chest 1 View- PORTABLE-Urgent .) (03.03.23 @ 16:28) >  1. Linear atelectasis in the left lower lobe with no evidence of pneumonia.  2. Slightly prominent cardiac silhouette, at least partially magnified by   technique and while seen with some engorgement of central pulmonary veins, there is no pulmonary edema.  3. Old right posterior lateral rib fractures.  < end of copied text >    < from: US Kidney and Bladder (03.03.23 @ 09:36) >  Atypical sonolucency for hydronephrosis involving the upper right kidney   and indeterminate 1.8 cm exophytic lesion involving the upper pole of   right kidney. Consider a CT urogram or MR imaging for further evaluation.  Left kidney: 12.7 cm. No renal mass, hydronephrosis or calculi.  Urinary bladder: Dillon catheter.  < end of copied text >    Advanced directives addressed: full resuscitation
Date of service: 03-09-23 @ 08:21    Lying in bed in NAD  s/p ureteral stent placement  No fever  Urine clear in bag    ROS: no fever or chills; denies dizziness, no HA, no SOB or cough, no abdominal pain, no diarrhea or constipation; no dysuria, no legs pain, no rashes    MEDICATIONS  (STANDING):  albuterol    90 MICROgram(s) HFA Inhaler 1 Puff(s) Inhalation every 8 hours  chlorhexidine 2% Cloths 1 Application(s) Topical <User Schedule>  ciprofloxacin   IVPB 400 milliGRAM(s) IV Intermittent every 12 hours  diltiazem    milliGRAM(s) Oral daily  escitalopram 20 milliGRAM(s) Oral daily  finasteride 5 milliGRAM(s) Oral daily  lactobacillus acidophilus 1 Tablet(s) Oral daily  magnesium oxide 400 milliGRAM(s) Oral daily  multivitamin 1 Tablet(s) Oral daily  naloxone Injectable 0.4 milliGRAM(s) IV Push once  senna 2 Tablet(s) Oral at bedtime  tamsulosin 0.4 milliGRAM(s) Oral at bedtime    Vital Signs Last 24 Hrs  T(C): 36.7 (09 Mar 2023 04:05), Max: 36.9 (08 Mar 2023 18:01)  T(F): 98.1 (09 Mar 2023 04:05), Max: 98.4 (08 Mar 2023 18:01)  HR: 95 (09 Mar 2023 04:05) (86 - 102)  BP: 123/64 (09 Mar 2023 04:05) (123/64 - 159/91)  BP(mean): --  RR: 16 (09 Mar 2023 04:05) (16 - 19)  SpO2: 92% (09 Mar 2023 04:05) (90% - 98%)    Parameters below as of 09 Mar 2023 04:05  Patient On (Oxygen Delivery Method): room air     Physical exam:    Constitutional:  No acute distress  HEENT: NC/AT, EOMI, PERRLA, conjunctivae clear; ears and nose atraumatic  Neck: supple; thyroid not palpable  Back: no tenderness  Respiratory: respiratory effort normal; clear to auscultation  Cardiovascular: S1S2 regular, no murmurs  Abdomen: soft, not tender, not distended, positive BS  Genitourinary: no suprapubic tenderness  Right nephrostomy  Lymphatic: no LN palpable  Musculoskeletal: no muscle tenderness, no joint swelling or tenderness  Extremities: no pedal edema  Neurological/ Psychiatric: AxOx3, moving all extremities  Skin: no rashes; no palpable lesions    Labs: reviewed                        11.0   8.46  )-----------( 218      ( 09 Mar 2023 07:13 )             34.3     03-09    136  |  103  |  23  ----------------------------<  132<H>  3.4<L>   |  28  |  1.29    Ca    9.0      09 Mar 2023 07:13  Mg     1.9     03-09    Ferritin, Serum: 468 ng/mL (03-04-23 @ 05:37)  D-Dimer Assay, Quantitative: 245 ng/mL DDU (03-02-23 @ 12:49)                        11.1   8.71  )-----------( 192      ( 07 Mar 2023 06:29 )             34.3     03-07    135  |  102  |  20  ----------------------------<  131<H>  3.6   |  29  |  1.21    Ca    9.7      07 Mar 2023 06:29  Mg     1.6     03-07    Ferritin, Serum: 468 ng/mL (03-04-23 @ 05:37)  D-Dimer Assay, Quantitative: 245 ng/mL DDU (03-02-23 @ 12:49)                        9.5    10.21 )-----------( 118      ( 04 Mar 2023 05:37 )             30.2     03-04    136  |  103  |  39<H>  ----------------------------<  131<H>  3.4<L>   |  28  |  2.20<H>    Ca    8.0<L>      04 Mar 2023 05:37  Phos  3.4     03-04  Mg     2.5     03-04    TPro  5.9<L>  /  Alb  2.6<L>  /  TBili  0.6  /  DBili  x   /  AST  32  /  ALT  39  /  AlkPhos  56  03-04     LIVER FUNCTIONS - ( 04 Mar 2023 05:37 )  Alb: 2.6 g/dL / Pro: 5.9 gm/dL / ALK PHOS: 56 U/L / ALT: 39 U/L / AST: 32 U/L / GGT: x             Culture - Urine (collected 03 Mar 2023 11:00)  Source: .Urine RIGHT NEPHROSTOMY  Preliminary Report (04 Mar 2023 11:58):    Culture in progress    Culture - Urine (collected 02 Mar 2023 19:00)  Source: Catheterized Catheterized  Preliminary Report (04 Mar 2023 17:16):    50,000 - 99,000 CFU/mL Enterococcus faecalis    Culture - Blood (collected 02 Mar 2023 18:44)  Source: .Blood None  Gram Stain (03 Mar 2023 15:52):    Growth in anaerobic bottle: Gram positive cocci in pairs    Growth in aerobic bottle: Gram positive cocci in pairs  Final Report (05 Mar 2023 10:56):    Growth in aerobic and anaerobic bottles: Enterococcus faecalis    See previous culture [id number]    Culture - Blood (collected 02 Mar 2023 18:42)  Source: .Blood None  Gram Stain (03 Mar 2023 16:58):    Growth in anaerobic bottle: Gram positive cocci in pairs    Growth in aerobic bottle: Gram Positive Cocci in Pairs and Chains  Final Report (05 Mar 2023 10:55):    Growth in aerobic and anaerobic bottles: Enterococcus faecalis  Organism: Blood Culture PCR  Enterococcus faecalis (05 Mar 2023 11:34)  Organism: Enterococcus faecalis (05 Mar 2023 11:34)      -  Ampicillin: S <=2 Predicts results to ampicillin/sulbactam, amoxacillin-clavulanate and  piperacillin-tazobactam.      -  Ciprofloxacin: S <=1      -  Gentamicin synergy: S <=500      -  Streptomycin synergy: S <=1000      -  Vancomycin: S 1      Method Type: GERSON  Organism: Blood Culture PCR (05 Mar 2023 11:34)      -  Enterococcus faecalis: Detec      Method Type: PCR      Culture - Blood (collected 05 Mar 2023 06:19)  Source: .Blood None  Preliminary Report (06 Mar 2023 13:02):    No growth to date.    Culture - Blood (collected 05 Mar 2023 06:19)  Source: .Blood None  Preliminary Report (06 Mar 2023 13:02):    No growth to date.    Culture - Urine (collected 03 Mar 2023 11:00)  Source: .Urine RIGHT NEPHROSTOMY  Final Report (05 Mar 2023 19:56):    >100,000 CFU/ml Enterococcus faecalis  Organism: Enterococcus faecalis (05 Mar 2023 19:56)  Organism: Enterococcus faecalis (05 Mar 2023 19:56)      -  Ampicillin: S <=2 Predicts results to ampicillin/sulbactam, amoxacillin-clavulanate and  piperacillin-tazobactam.      -  Ciprofloxacin: S <=1      -  Levofloxacin: S <=0.5      -  Nitrofurantoin: S <=32 Should not be used to treat pyelonephritis.      -  Tetracycline: R >8      -  Vancomycin: S 1      Method Type: GERSON    Culture - Urine (collected 02 Mar 2023 19:00)  Source: Catheterized Catheterized  Final Report (05 Mar 2023 20:05):    50,000 - 99,000 CFU/mL Enterococcus faecalis  Organism: Enterococcus faecalis (05 Mar 2023 20:05)  Organism: Enterococcus faecalis (05 Mar 2023 20:05)      -  Ampicillin: S <=2 Predicts results to ampicillin/sulbactam, amoxacillin-clavulanate and  piperacillin-tazobactam.      -  Ciprofloxacin: S <=1      -  Levofloxacin: S <=0.5      -  Nitrofurantoin: S <=32 Should not be used to treat pyelonephritis.      -  Tetracycline: R >8      -  Vancomycin: S 1      Method Type: GERSON      Radiology: all available radiological tests reviewed    < from: Xray Chest 1 View- PORTABLE-Urgent (Xray Chest 1 View- PORTABLE-Urgent .) (03.03.23 @ 16:28) >  1. Linear atelectasis in the left lower lobe with no evidence of pneumonia.  2. Slightly prominent cardiac silhouette, at least partially magnified by   technique and while seen with some engorgement of central pulmonary veins, there is no pulmonary edema.  3. Old right posterior lateral rib fractures.  < end of copied text >    < from: US Kidney and Bladder (03.03.23 @ 09:36) >  Atypical sonolucency for hydronephrosis involving the upper right kidney   and indeterminate 1.8 cm exophytic lesion involving the upper pole of   right kidney. Consider a CT urogram or MR imaging for further evaluation.  Left kidney: 12.7 cm. No renal mass, hydronephrosis or calculi.  Urinary bladder: Dillon catheter.  < end of copied text >    Advanced directives addressed: full resuscitation
Patient is a 82y old  Male who presents with a chief complaint of Urolithiasis (03 Mar 2023 12:00)      BRIEF HOSPITAL COURSE: 81yo M PMHx significant for Bladder cancer, Depression, Hypertension, s/p back surgery (TLIF), hypercholesterolemia, [place] (uses bilateral hearing aides), urolithiasis s/p sten in the past, BPH, HTN, HLD, DM, HFpEF and osteoarthritis presents with right flank pain. Started yesterday intermittent but more pronounced and constant this morning. Associated nausea with vomiting, lethargy, weakness, chills, subjective fevers.     CT abd/pel with bilateral dependent atelectasis, splenomegaly, mild right hydronephrosis with 1-2mm right proximal ureteral stone and also incompletely characterized right upper pole kidney exophytic lesion (2.1x1.9cm), mild bladder wall thickening and enlarged prostate.      3/3 went to IR this am for R nephrostomy tube. code sepsis in PACU, febrile 102F given tylenol, 1.5L IV fluid. hypoxic placed on NRB. pt seen this afternoon in SICU. pt asking for water, denies pain or SOB, but remains on NRB sating 93%    PAST MEDICAL & SURGICAL HISTORY:  Bladder cancer  2014  Uses hearing aid  HTN (hypertension)  Hypercholesterolemia  DM (diabetes mellitus)  Type 2  CA skin, basal cell  Depression  OA (osteoarthritis)  Lumbar spinal stenosis  Herniated nucleus pulposus, L5-S  Kidney stones  Left  BPH (benign prostatic hyperplasia)  H/O hernia repair  left inguinal  History of bladder surgery  TURBT, cystoscopy--aprox 8 yrs ago, dx bladder cancer  S/P tonsillectomy  History of back surgery  TLI  History of cystoscop  Left kidney stones.  Left stent---22    Medications:  cefTRIAXone Injectable.      cefTRIAXone Injectable. 1000 milliGRAM(s) IV Push every 24 hours    diltiazem    milliGRAM(s) Oral daily    albuterol    90 MICROgram(s) HFA Inhaler 2 Puff(s) Inhalation once  albuterol    90 MICROgram(s) HFA Inhaler 2 Puff(s) Inhalation once  albuterol/ipratropium for Nebulization 3 milliLiter(s) Nebulizer every 6 hours    acetaminophen     Tablet .. 650 milliGRAM(s) Oral every 6 hours PRN  cyclobenzaprine 5 milliGRAM(s) Oral every 8 hours PRN  escitalopram 20 milliGRAM(s) Oral daily  melatonin 3 milliGRAM(s) Oral at bedtime PRN  morphine  - Injectable 4 milliGRAM(s) IV Push every 6 hours PRN  ondansetron Injectable 4 milliGRAM(s) IV Push every 8 hours PRN  oxycodone    5 mG/acetaminophen 325 mG 1 Tablet(s) Oral every 4 hours PRN      heparin   Injectable 5000 Unit(s) SubCutaneous every 8 hours    aluminum hydroxide/magnesium hydroxide/simethicone Suspension 30 milliLiter(s) Oral every 4 hours PRN  polyethylene glycol 3350 17 Gram(s) Oral daily PRN  senna 2 Tablet(s) Oral at bedtime    tamsulosin 0.4 milliGRAM(s) Oral at bedtime    dextrose 50% Injectable 25 Gram(s) IV Push once  dextrose 50% Injectable 12.5 Gram(s) IV Push once  dextrose 50% Injectable 25 Gram(s) IV Push once  dextrose Oral Gel 15 Gram(s) Oral once PRN  finasteride 5 milliGRAM(s) Oral daily  glucagon  Injectable 1 milliGRAM(s) IntraMuscular once  insulin lispro (ADMELOG) corrective regimen sliding scale   SubCutaneous three times a day before meals  insulin lispro (ADMELOG) corrective regimen sliding scale   SubCutaneous at bedtime    dextrose 5%. 1000 milliLiter(s) IV Continuous <Continuous>  dextrose 5%. 1000 milliLiter(s) IV Continuous <Continuous>  lactated ringers. 1000 milliLiter(s) IV Continuous <Continuous>  magnesium oxide 400 milliGRAM(s) Oral daily  multivitamin 1 Tablet(s) Oral daily      chlorhexidine 2% Cloths 1 Application(s) Topical <User Schedule>    naloxone Injectable 0.4 milliGRAM(s) IV Push once          ICU Vital Signs Last 24 Hrs  T(C): 37.3 (03 Mar 2023 13:00), Max: 40.5 (02 Mar 2023 18:50)  T(F): 99.1 (03 Mar 2023 13:00), Max: 104.9 (02 Mar 2023 18:50)  HR: 90 (03 Mar 2023 13:30) (76 - 120)  BP: 100/66 (03 Mar 2023 13:30) (89/58 - 160/90)  BP(mean): 80 (03 Mar 2023 08:00) (69 - 80)  ABP: --  ABP(mean): --  RR: 24 (03 Mar 2023 13:30) (18 - 36)  SpO2: 93% (03 Mar 2023 13:30) (90% - 100%)    O2 Parameters below as of 03 Mar 2023 13:30  Patient On (Oxygen Delivery Method): mask, nonrebreather  O2 Flow (L/min): 15    ABG - ( 02 Mar 2023 18:30 )  pH, Arterial: 7.42  pH, Blood: x     /  pCO2: 42    /  pO2: 62    / HCO3: 27    / Base Excess: 2.4   /  SaO2: 93          I&O's Detail    02 Mar 2023 07:01  -  03 Mar 2023 07:00  --------------------------------------------------------  IN:    Lactated Ringers: 300 mL    Other (mL): 5000 mL  Total IN: 5300 mL    OUT:    Oral Fluid: 0 mL    Other (mL): 5200 mL  Total OUT: 5200 mL    Total NET: 100 mL      03 Mar 2023 07:01  -  03 Mar 2023 14:40  --------------------------------------------------------  IN:    Continuous Bladder Irrigation (mL): 900 mL    Lactated Ringers: 1500 mL    Oral Fluid: 100 mL    Other (mL): 150 mL  Total IN: 2650 mL    OUT:    Continuous Bladder Irrigation (mL): 700 mL    Drain (mL): 70 mL  Total OUT: 770 mL    Total NET: 1880 mL            LABS:                        10.6   21.82 )-----------( 142      ( 03 Mar 2023 05:37 )             33.8     03-03    136  |  104  |  28<H>  ----------------------------<  201<H>  3.8   |  26  |  2.55<H>    Ca    8.0<L>      03 Mar 2023 05:37  Phos  3.9     03-03  Mg     1.3     03-03    TPro  6.8  /  Alb  3.5  /  TBili  1.1  /  DBili  x   /  AST  20  /  ALT  24  /  AlkPhos  77            CAPILLARY BLOOD GLUCOSE      POCT Blood Glucose.: 179 mg/dL (03 Mar 2023 07:55)    PT/INR - ( 03 Mar 2023 07:50 )   PT: 16.7 sec;   INR: 1.43 ratio         PTT - ( 03 Mar 2023 07:50 )  PTT:26.3 sec  Urinalysis Basic - ( 02 Mar 2023 07:51 )    Color: Yellow / Appearance: Clear / S.020 / pH: x  Gluc: x / Ketone: Negative  / Bili: Negative / Urobili: Negative   Blood: x / Protein: Negative / Nitrite: Negative   Leuk Esterase: Trace / RBC: 11-25 /HPF / WBC 11-25 /HPF   Sq Epi: x / Non Sq Epi: Few / Bacteria: Few      CULTURES:  Culture Results:   Growth in anaerobic bottle: Gram positive cocci in pairs ( @ 18:44)  Culture Results:   Growth in anaerobic bottle: Gram positive cocci in pairs  ***Blood Panel PCR results on this specimen are available  approximately 3 hours after the Gram stain result.***  Gram stain, PCR, and/or culture results may not always  correspond due to difference in methodologies.  ************************************************************  This PCR assay was performed by multiplex PCR. This  Assay tests for 66 bacterial and resistance gene targets.  Please refer to the [facility] test directory  at [email or web address] for details. ( @ 18:42)      Physical Examination:    General: No acute distress.    HEENT: Pupils equal, reactive to light.  Symmetric.  PULM: difficult to hear d/t body habitus  NECK: Supple, no lymphadenopathy, trachea midline  CVS: Regular rate and rhythm, no murmurs  ABD: Soft, globular, nondistended, nontender, normoactive bowel sounds. R nephrostomy tube in place with bloody output  : harding - light pink   EXT: No edema, nontender  SKIN: Warm and well perfused, no rashes noted.  NEURO: Alert, oriented, interactive, nonfocal    DEVICES:   R perc nephrostomy 3/3 by IR  harding     RADIOLOGY:   < from: CT Abdomen and Pelvis No Cont (23 @ 05:45) >  IMPRESSION:    Mild right hydronephrosis likely secondary to faint 1-2mm right proximal   ureteralstone. Right perinephric stranding.    Vague hyperdensities identified within the proximal right renal pelvis,   concerning for hemorrhagic products. Consider nonemergent retrograde   evaluation to exclude underlying neoplasm. Incompletely characterized   right upper pole exophytic lesion measuring 2.1 x 1.9 cm. Consider   nonemergent correlation with renal ultrasound or MR for better   characterization.    Mild bladder wall thickening, difficult to assess secondary to inadequate   distention. Correlate with urinalysis and lab values to assess for   cystitis and/or ascending urinary tract infection.    < end of copied text >    
Events Overnight: responded to RRT in PACU for rigors    HPI:         83y/o M PMHx significant for Bladder cancer,   Hypertension,  ), urolithiasis s/p stent  in the past, BPH,  DM, HFpEF   presented yesterday  with right flank pain.  with associated nausea with vomiting, lethargy, weakness, chills, subjective fevers.      In the ER Tmax 98.4, HR 78-83, /101, RR 18, SpO2 86-91% on RA? then 95% on 4-5L NC and then 91-94% on RA  Had negative CTA on admission.  ct abdomen showed left obstructing kidney stone.  Creatinine 1.4 on admission baseline 1.1 to 1.3    on pm 3/02 spiked temp to 104, went to OR stent could not be placed, had hematuria, CBI placed  Today 3/03 he underwent left sided nephrostomy , bloody urine, post procedure with severe Rigors in PACU  with stable bp temp to 100.7     ROS - cnat obtain immediately post procedure, still lethargic    PMH:        as above    Depression    back surgery    hypercholesterolemia            MEDICATIONS  (STANDING):  albuterol    90 MICROgram(s) HFA Inhaler 2 Puff(s) Inhalation once  cefTRIAXone Injectable.      cefTRIAXone Injectable. 1000 milliGRAM(s) IV Push every 24 hours  chlorhexidine 2% Cloths 1 Application(s) Topical <User Schedule>  dextrose 5%. 1000 milliLiter(s) (100 mL/Hr) IV Continuous <Continuous>  dextrose 5%. 1000 milliLiter(s) (50 mL/Hr) IV Continuous <Continuous>  dextrose 50% Injectable 25 Gram(s) IV Push once  dextrose 50% Injectable 12.5 Gram(s) IV Push once  dextrose 50% Injectable 25 Gram(s) IV Push once  diltiazem    milliGRAM(s) Oral daily  escitalopram 20 milliGRAM(s) Oral daily  finasteride 5 milliGRAM(s) Oral daily  glucagon  Injectable 1 milliGRAM(s) IntraMuscular once  heparin   Injectable 5000 Unit(s) SubCutaneous every 8 hours  insulin lispro (ADMELOG) corrective regimen sliding scale   SubCutaneous three times a day before meals  insulin lispro (ADMELOG) corrective regimen sliding scale   SubCutaneous at bedtime  lactated ringers. 1000 milliLiter(s) (75 mL/Hr) IV Continuous <Continuous>  lactated ringers. 1000 milliLiter(s) (75 mL/Hr) IV Continuous <Continuous>  magnesium oxide 400 milliGRAM(s) Oral daily  multivitamin 1 Tablet(s) Oral daily  naloxone Injectable 0.4 milliGRAM(s) IV Push once  senna 2 Tablet(s) Oral at bedtime  tamsulosin 0.4 milliGRAM(s) Oral at bedtime    MEDICATIONS  (PRN):  acetaminophen     Tablet .. 650 milliGRAM(s) Oral every 6 hours PRN Temp greater or equal to 38C (100.4F), Mild Pain (1 - 3)  aluminum hydroxide/magnesium hydroxide/simethicone Suspension 30 milliLiter(s) Oral every 4 hours PRN Dyspepsia  cyclobenzaprine 5 milliGRAM(s) Oral every 8 hours PRN Spasm  dextrose Oral Gel 15 Gram(s) Oral once PRN Blood Glucose LESS THAN 70 milliGRAM(s)/deciliter  melatonin 3 milliGRAM(s) Oral at bedtime PRN Insomnia  morphine  - Injectable 4 milliGRAM(s) IV Push every 6 hours PRN Severe Pain (7 - 10)  ondansetron Injectable 4 milliGRAM(s) IV Push every 8 hours PRN Nausea and/or Vomiting  oxycodone    5 mG/acetaminophen 325 mG 1 Tablet(s) Oral every 4 hours PRN Moderate Pain (4 - 6)  polyethylene glycol 3350 17 Gram(s) Oral daily PRN Constipation      ICU Vital Signs Last 24 Hrs  T(C): 36.7 (03 Mar 2023 06:00), Max: 40.5 (02 Mar 2023 18:50)  T(F): 98.1 (03 Mar 2023 06:00), Max: 104.9 (02 Mar 2023 18:50)  HR: 84 (03 Mar 2023 08:00) (76 - 120)  BP: 110/69 (03 Mar 2023 08:00) (89/58 - 164/80)  BP(mean): 80 (03 Mar 2023 08:00) (69 - 93)  ABP: --  ABP(mean): --  RR: 19 (03 Mar 2023 00:30) (18 - 28)  SpO2: 95% (03 Mar 2023 08:00) (90% - 100%)    O2 Parameters below as of 03 Mar 2023 02:00  Patient On (Oxygen Delivery Method): mask, nonrebreather    O2 Concentration (%): 100    I&O's Summary    02 Mar 2023 07:01  -  03 Mar 2023 07:00  --------------------------------------------------------  IN: 5300 mL / OUT: 5200 mL / NET: 100 mL    Physical Exam    General - obese  HEENT - nc/at  neck supple  lungs clear on nasa cannula, wheezing  cv rrr  abdomen soft, non tender   - left sided nephrostomy   CBI clear  extremtiy warm                           10.6   21.82 )-----------( 142      ( 03 Mar 2023 05:37 )             33.8           136  |  104  |  28<H>  ----------------------------<  201<H>  3.8   |  26  |  2.55<H>    Ca    8.0<L>      03 Mar 2023 05:37  Phos  3.9     -  Mg     1.3     -    TPro  6.8  /  Alb  3.5  /  TBili  1.1  /  DBili  x   /  AST  20  /  ALT  24  /  AlkPhos  77  -    ABG - ( 02 Mar 2023 18:30 )  pH, Arterial: 7.42  pH, Blood: x     /  pCO2: 42    /  pO2: 62    / HCO3: 27    / Base Excess: 2.4   /  SaO2: 93        Urinalysis Basic - ( 02 Mar 2023 07:51 )    Color: Yellow / Appearance: Clear / S.020 / pH: x  Gluc: x / Ketone: Negative  / Bili: Negative / Urobili: Negative   Blood: x / Protein: Negative / Nitrite: Negative   Leuk Esterase: Trace / RBC: 11-25 /HPF / WBC 11-25 /HPF   Sq Epi: x / Non Sq Epi: Few / Bacteria: Few    DVT Prophylaxis:  held for hematuria and clots , SCD                                                                                     
 POD # 1    Pt resting in bed.  Feels thristy. Not having any pain.  Still on CBI. No other complaints    Vital Signs Last 24 Hrs  T(C): 37.3 (03 Mar 2023 13:00), Max: 40.5 (02 Mar 2023 18:50)  T(F): 99.1 (03 Mar 2023 13:00), Max: 104.9 (02 Mar 2023 18:50)  HR: 90 (03 Mar 2023 13:30) (76 - 120)  BP: 100/66 (03 Mar 2023 13:30) (89/58 - 160/90)  BP(mean): 80 (03 Mar 2023 08:00) (69 - 80)  RR: 24 (03 Mar 2023 13:30) (18 - 36)  SpO2: 93% (03 Mar 2023 13:30) (90% - 100%)    Parameters below as of 03 Mar 2023 13:30  Patient On (Oxygen Delivery Method): mask, nonrebreather  O2 Flow (L/min): 15      I&O's Summary    02 Mar 2023 07:01  -  03 Mar 2023 07:00  --------------------------------------------------------  IN: 5300 mL / OUT: 5200 mL / NET: 100 mL    03 Mar 2023 07:01  -  03 Mar 2023 15:20  --------------------------------------------------------  IN: 2650 mL / OUT: 770 mL / NET: 1880 mL        Physical Exam  Gen: NAD, A&Ox3  Pulm: No respiratory distress, no subcostal retractions  CV: RRR, S1S2 normal  Abd: Soft, NT, ND, +BS          No bladder palp  Back: Right Neph tube draining punch colored urine  :  Dillon in place with CBI- Urine pink to clear                          10.6   21.82 )-----------( 142      ( 03 Mar 2023 05:37 )             33.8       03-03    136  |  104  |  28<H>  ----------------------------<  201<H>  3.8   |  26  |  2.55<H>    Ca    8.0<L>      03 Mar 2023 05:37  Phos  3.9     03-03  Mg     1.3     03-03    TPro  6.8  /  Alb  3.5  /  TBili  1.1  /  DBili  x   /  AST  20  /  ALT  24  /  AlkPhos  77  03-02

## 2023-03-09 NOTE — DISCHARGE NOTE PROVIDER - HOSPITAL COURSE
CC: 83y/o M PMHx significant for Bladder cancer, Depression, Hypertension, s/p back surgery (TLIF), hypercholesterolemia, [place] (uses bilateral hearing aides), urolithiasis s/p sten in the past, BPH, HTN, HLD, DM, HFpEF and osteoarthritis presents with right flank pain. Started yesterday intermittent but more pronounced and constant this morning. Associated nausea with vomiting, lethargy, weakness, chills, subjective fevers.    HOSPITAL COURSE:   Severe sepsis from acute pyelonephritis   R ureteral stone with hydronephrosis   Acute hypoxic resp failure - resolved   СВЕТЛАНА from ATN + JHON   Lactic acidosis   E. faecalis bacteremia   S/p R PCN 3/3    Plan:   Clinically stable    E. faecalis sensitive to Cipro - continue, WBC improved, afebrile, needs 8 more days of CIPRO to complete a 14-day course  s/p IR for right antegrade nephrostogram with possible conversion of nephrostomy to PCNU depending on the findings  10F PCN to 10F PCNU conversion. No obstruction or stenosis notes on antegrade nephrostogra  discussed with URO   Off CBI, off Harding   dc NC and OOB - pt comfortable off O2       DC Home in 1-2 days  CONT NPO and HOLD VTE PXConstitutional: NAD, awake and alert  HEENT: PERR, EOMI  Neck: Soft and supple,  No JVD  Respiratory: Breath sounds are clear bilaterally, No wheezing, rales or rhonchi  Cardiovascular: S1 and S2, regular rate and rhythm, no Murmurs  Gastrointestinal: Bowel Sounds present, soft, nontender, nondistended  Extremities: No peripheral edema  Vascular: 2+ peripheral pulses  Neurological: A/O x 3, no focal deficits  Musculoskeletal: 5/5 strength b/l upper and lower extremities  Skin: No rashes  Rt PCN present; off harding and off O2     RADIOLOGY/EKG:  < from: Xray Chest 1 View- PORTABLE-Urgent (Xray Chest 1 View- PORTABLE-Urgent .) (03.03.23 @ 16:28) >  IMPRESSION:  1. Linear atelectasis in the left lower lobe with no evidence of   pneumonia.  2. Slightly prominent cardiac silhouette, at least partially magnified by   technique and while seen with some engorgement of central pulmonary   veins, there is no pulmonary edema.  3. Old right posterior lateral rib fractures.    < end of copied text >  < from: US Kidney and Bladder (03.03.23 @ 09:36) >  IMPRESSION:  Atypical sonolucency for hydronephrosis involving the upper right kidney   and indeterminate 1.8 cm exophytic lesion involving the upper pole of   right kidney. Consider a CT urogram or MR imaging for further evaluation. CC: 81y/o M PMHx significant for Bladder cancer, Depression, Hypertension, s/p back surgery (TLIF), hypercholesterolemia, [place] (uses bilateral hearing aides), urolithiasis s/p sten in the past, BPH, HTN, HLD, DM, HFpEF and osteoarthritis presents with right flank pain. Started yesterday intermittent but more pronounced and constant this morning. Associated nausea with vomiting, lethargy, weakness, chills, subjective fevers.    HOSPITAL COURSE:   Severe sepsis from acute pyelonephritis   R ureteral stone with hydronephrosis   Acute hypoxic resp failure - resolved   СВЕТЛАНА from ATN + JHON   Lactic acidosis   E. faecalis bacteremia   S/p R PCN 3/3    Plan:   Clinically stable    E. faecalis sensitive to Cipro - continue, WBC improved, afebrile, needs 8 more days of CIPRO to complete a 14-day course  s/p Cysto/ Bladder Fulguration and s/p right nephrostomy tube placement by IR.  s/p IR for right antegrade nephrostogram with conversion of 10F PCN to 10F PCNU.   No obstruction or stenosis notes on antegrade nephrostogram per Dr Watkins   Nephrostomy tube is capped. Pt to follow up with Dr. Awan  for outpatient ureteroscopy.  discussed with URO   dc NC and OOB - pt comfortable off O2     DC Home today    OTHER DETAILS:     3/9 - no cp palps sob is OOB     T(F): 97.5 (09 Mar 2023 09:11), Max: 98.4 (08 Mar 2023 18:01)  HR: 101 (09 Mar 2023 09:11) (86 - 102)  BP: 120/72 (09 Mar 2023 09:11) (120/72 - 159/91)  RR: 18 (09 Mar 2023 09:11) (16 - 19)  SpO2: 96% (09 Mar 2023 09:11) (90% - 98%) RA   Constitutional: NAD, awake and alert  HEENT: PERR, EOMI  Neck: Soft and supple,  No JVD  Respiratory: Breath sounds are clear bilaterally, No wheezing, rales or rhonchi  Cardiovascular: S1 and S2, regular rate and rhythm, no Murmurs  Gastrointestinal: Bowel Sounds present, soft, nontender, nondistended  Extremities: No peripheral edema  Vascular: 2+ peripheral pulses  Neurological: A/O x 3, no focal deficits  Musculoskeletal: 5/5 strength b/l upper and lower extremities  Skin: No rashes  Rt PCN present; off harding and off O2     RADIOLOGY/EKG:  < from: Xray Chest 1 View- PORTABLE-Urgent (Xray Chest 1 View- PORTABLE-Urgent .) (03.03.23 @ 16:28) >  1. Linear atelectasis in the left lower lobe with no evidence of   pneumonia.  2. Slightly prominent cardiac silhouette, at least partially magnified by   technique and while seen with some engorgement of central pulmonary   veins, there is no pulmonary edema.  3. Old right posterior lateral rib fractures.    < from: US Kidney and Bladder (03.03.23 @ 09:36) >  Atypical sonolucency for hydronephrosis involving the upper right kidney   and indeterminate 1.8 cm exophytic lesion involving the upper pole of   right kidney.     time spent on discharge - 55 mins

## 2023-03-09 NOTE — DISCHARGE NOTE PROVIDER - NSDCCPCAREPLAN_GEN_ALL_CORE_FT
PRINCIPAL DISCHARGE DIAGNOSIS  Diagnosis: Sepsis  Assessment and Plan of Treatment: with bacteremia.  please complete your antibiotics as prescribed.   your PCN has been capped and you have a ureteral stent.  please see Dr Awan for follow-up care   change dressing eevry 3 days   if you develop a fever, weakness, or trouble urinating, please call your doctor or return to the ER.

## 2023-03-09 NOTE — DISCHARGE NOTE PROVIDER - CARE PROVIDER_API CALL
Sergei Dorantes)  Internal Medicine  [address] [place] [address]  Phone: [phone]  Fax: [phone]  Follow Up Time: 1 week    Moise Awan)  Urology  [address] [facility], [address] [place] [address]  Phone: [phone]  Fax: [phone]  Follow Up Time: 1 week

## 2023-03-09 NOTE — DISCHARGE NOTE PROVIDER - NSDCMRMEDTOKEN_GEN_ALL_CORE_FT
chlorthalidone 25 mg oral tablet: 1 tab(s) orally once a day  dilTIAZem 300 mg/24 hours oral capsule, extended release: 1 cap(s) orally once a day  escitalopram 20 mg oral tablet: 1 tab(s) orally once a day  finasteride 5 mg oral tablet: 1 tab(s) orally 3 times a week  ***Mon, Wed, Fri***  losartan 100 mg oral tablet: 1 tab(s) orally once a day  metFORMIN 500 mg oral tablet, extended release: 2 tab(s) orally once a day at dinner  Multiple Vitamins oral tablet: 1 tab(s) orally once a day  pravastatin 40 mg oral tablet: 1 tab(s) orally 3 times a week  ***Mon, Wed, Fri***  tamsulosin 0.4 mg oral capsule: 1 cap(s) orally once a day   acetaminophen 325 mg oral tablet: 2 tab(s) orally every 6 hours, As needed, Temp greater or equal to 38C (100.4F), Mild Pain (1 - 3)  chlorthalidone 25 mg oral tablet: 1 tab(s) orally once a day  ciprofloxacin 500 mg oral tablet: 1 tab(s) orally every 12 hours   dilTIAZem 300 mg/24 hours oral capsule, extended release: 1 cap(s) orally once a day  escitalopram 20 mg oral tablet: 1 tab(s) orally once a day  finasteride 5 mg oral tablet: 1 tab(s) orally 3 times a week  ***Mon, Wed, Fri***  lactobacillus acidophilus oral capsule: 1 tab(s) orally once a day   losartan 100 mg oral tablet: 1 tab(s) orally once a day  metFORMIN 500 mg oral tablet, extended release: 2 tab(s) orally once a day at dinner  Multiple Vitamins oral tablet: 1 tab(s) orally once a day  pravastatin 40 mg oral tablet: 1 tab(s) orally 3 times a week  ***Mon, Wed, Fri***  tamsulosin 0.4 mg oral capsule: 1 cap(s) orally once a day

## 2023-03-09 NOTE — DISCHARGE NOTE PROVIDER - PROVIDER TOKENS
PROVIDER:[TOKEN:[[id number]],FOLLOWUP:[1 week]],PROVIDER:[TOKEN:[[id number]],FOLLOWUP:[1 week]]

## 2023-03-09 NOTE — PROGRESS NOTE ADULT - REASON FOR ADMISSION
Urolithiasis

## 2023-03-09 NOTE — PROGRESS NOTE ADULT - ASSESSMENT
81 y/o Male with h/o Bladder cancer, depression, hypertension, back surgery (TLIF), [place] (uses bilateral hearing aides), urolithiasis s/p stent, BPH, HTN, HLD, DM, HFpEF and osteoarthritis was admitted on 3/2 for right flank pain x one day. The right flank pain started on the day PTA, intermittent but more pronounced and constant later on. Associated nausea with vomiting, lethargy, weakness, chills, subjective fevers. He was reported with bacteremia. In ER he was noted febrile. On 3/3 PM he received vancomycin 1 gm IV x 1.     1. Sepsis with ENFA resolving. Pyuria. UTI with ENFA. Right nephrostomy. Kidney stones. ARF on CRF stage 3. Allergy to PCN.   -cultures reviewed  -BC x 2 and urine c/s collected  -s/p vancomycin 1 gm IV x 1 last PM  -on cipro 400 mg IV q12h # 6  -tolerating abx well so far; no side effects noted  -renal function is improving  -plan for new ureteral stent  -f/u cultures  -continue abx coverage   -may change abx to oral cipro when ready for discharge   -monitor temps  -f/u CBC  -supportive care  2. Other issues:   -care per medicine

## 2023-03-10 ENCOUNTER — NON-APPOINTMENT (OUTPATIENT)
Age: 83
End: 2023-03-10

## 2023-03-10 LAB
CULTURE RESULTS: SIGNIFICANT CHANGE UP
CULTURE RESULTS: SIGNIFICANT CHANGE UP
SPECIMEN SOURCE: SIGNIFICANT CHANGE UP
SPECIMEN SOURCE: SIGNIFICANT CHANGE UP

## 2023-03-14 DIAGNOSIS — A41.81 SEPSIS DUE TO ENTEROCOCCUS: ICD-10-CM

## 2023-03-14 DIAGNOSIS — N17.0 ACUTE KIDNEY FAILURE WITH TUBULAR NECROSIS: ICD-10-CM

## 2023-03-14 DIAGNOSIS — E11.22 TYPE 2 DIABETES MELLITUS WITH DIABETIC CHRONIC KIDNEY DISEASE: ICD-10-CM

## 2023-03-14 DIAGNOSIS — N18.31 CHRONIC KIDNEY DISEASE, STAGE 3A: ICD-10-CM

## 2023-03-14 DIAGNOSIS — I13.0 HYPERTENSIVE HEART AND CHRONIC KIDNEY DISEASE WITH HEART FAILURE AND STAGE 1 THROUGH STAGE 4 CHRONIC KIDNEY DISEASE, OR UNSPECIFIED CHRONIC KIDNEY DISEASE: ICD-10-CM

## 2023-03-14 DIAGNOSIS — Z88.0 ALLERGY STATUS TO PENICILLIN: ICD-10-CM

## 2023-03-14 DIAGNOSIS — I50.32 CHRONIC DIASTOLIC (CONGESTIVE) HEART FAILURE: ICD-10-CM

## 2023-03-14 DIAGNOSIS — F32.A DEPRESSION, UNSPECIFIED: ICD-10-CM

## 2023-03-14 DIAGNOSIS — N13.6 PYONEPHROSIS: ICD-10-CM

## 2023-03-14 DIAGNOSIS — N40.0 BENIGN PROSTATIC HYPERPLASIA WITHOUT LOWER URINARY TRACT SYMPTOMS: ICD-10-CM

## 2023-03-14 DIAGNOSIS — J96.01 ACUTE RESPIRATORY FAILURE WITH HYPOXIA: ICD-10-CM

## 2023-03-14 DIAGNOSIS — R65.20 SEVERE SEPSIS WITHOUT SEPTIC SHOCK: ICD-10-CM

## 2023-03-15 NOTE — PATIENT PROFILE ADULT - NSPRESCRALCSCORE_GEN_A_NUR_CAL
pt is a 76 y.o. male c/o hyperglycemia, pt is ax4, on room air, walks w/ a steady gait. pt reports vomiting, denies sob, chest pain. no other concerns noted.
5

## 2023-03-17 ENCOUNTER — APPOINTMENT (OUTPATIENT)
Dept: UROLOGY | Facility: CLINIC | Age: 83
End: 2023-03-17
Payer: MEDICARE

## 2023-03-17 VITALS
HEIGHT: 73 IN | SYSTOLIC BLOOD PRESSURE: 118 MMHG | RESPIRATION RATE: 15 BRPM | HEART RATE: 54 BPM | DIASTOLIC BLOOD PRESSURE: 72 MMHG | BODY MASS INDEX: 32.87 KG/M2 | OXYGEN SATURATION: 95 % | WEIGHT: 248 LBS | TEMPERATURE: 97.4 F

## 2023-03-17 PROCEDURE — 99214 OFFICE O/P EST MOD 30 MIN: CPT

## 2023-03-20 NOTE — END OF VISIT
[FreeTextEntry3] : Stone prevention was gone over with the pt and his daughter. He will drink more water and use lemon lime based beverages. He will have a US, PSA, and urine in 1 year.

## 2023-03-20 NOTE — HISTORY OF PRESENT ILLNESS
[FreeTextEntry1] : 82M presents today for a post nephrostomy and post integrated stent placement for an obstructing ureteral stone. Pt is here with his daughter and needs to be set up for a ureteroscopy.

## 2023-04-07 ENCOUNTER — OUTPATIENT (OUTPATIENT)
Dept: OUTPATIENT SERVICES | Facility: HOSPITAL | Age: 83
LOS: 1 days | End: 2023-04-07
Payer: MEDICARE

## 2023-04-07 VITALS
HEART RATE: 82 BPM | WEIGHT: 250 LBS | OXYGEN SATURATION: 96 % | RESPIRATION RATE: 16 BRPM | TEMPERATURE: 98 F | DIASTOLIC BLOOD PRESSURE: 76 MMHG | HEIGHT: 73 IN | SYSTOLIC BLOOD PRESSURE: 109 MMHG

## 2023-04-07 DIAGNOSIS — Z01.818 ENCOUNTER FOR OTHER PREPROCEDURAL EXAMINATION: ICD-10-CM

## 2023-04-07 DIAGNOSIS — N20.1 CALCULUS OF URETER: ICD-10-CM

## 2023-04-07 DIAGNOSIS — Z90.89 ACQUIRED ABSENCE OF OTHER ORGANS: Chronic | ICD-10-CM

## 2023-04-07 DIAGNOSIS — E11.9 TYPE 2 DIABETES MELLITUS WITHOUT COMPLICATIONS: ICD-10-CM

## 2023-04-07 DIAGNOSIS — Z98.890 OTHER SPECIFIED POSTPROCEDURAL STATES: Chronic | ICD-10-CM

## 2023-04-07 DIAGNOSIS — Z93.6 OTHER ARTIFICIAL OPENINGS OF URINARY TRACT STATUS: Chronic | ICD-10-CM

## 2023-04-07 DIAGNOSIS — I10 ESSENTIAL (PRIMARY) HYPERTENSION: ICD-10-CM

## 2023-04-07 LAB
ANION GAP SERPL CALC-SCNC: 4 MMOL/L — LOW (ref 5–17)
APPEARANCE UR: ABNORMAL
APTT BLD: 29.7 SEC — SIGNIFICANT CHANGE UP (ref 27.5–35.5)
BASOPHILS # BLD AUTO: 0.05 K/UL — SIGNIFICANT CHANGE UP (ref 0–0.2)
BASOPHILS NFR BLD AUTO: 0.7 % — SIGNIFICANT CHANGE UP (ref 0–2)
BILIRUB UR-MCNC: NEGATIVE — SIGNIFICANT CHANGE UP
BUN SERPL-MCNC: 25 MG/DL — HIGH (ref 7–23)
CALCIUM SERPL-MCNC: 10 MG/DL — SIGNIFICANT CHANGE UP (ref 8.5–10.1)
CHLORIDE SERPL-SCNC: 101 MMOL/L — SIGNIFICANT CHANGE UP (ref 96–108)
CO2 SERPL-SCNC: 30 MMOL/L — SIGNIFICANT CHANGE UP (ref 22–31)
COLOR SPEC: YELLOW — SIGNIFICANT CHANGE UP
CREAT SERPL-MCNC: 1.28 MG/DL — SIGNIFICANT CHANGE UP (ref 0.5–1.3)
DIFF PNL FLD: ABNORMAL
EGFR: 57 ML/MIN/1.73M2 — LOW
EOSINOPHIL # BLD AUTO: 0.21 K/UL — SIGNIFICANT CHANGE UP (ref 0–0.5)
EOSINOPHIL NFR BLD AUTO: 3 % — SIGNIFICANT CHANGE UP (ref 0–6)
GLUCOSE SERPL-MCNC: 123 MG/DL — HIGH (ref 70–99)
GLUCOSE UR QL: NEGATIVE — SIGNIFICANT CHANGE UP
HCT VFR BLD CALC: 37.6 % — LOW (ref 39–50)
HGB BLD-MCNC: 11.6 G/DL — LOW (ref 13–17)
IMM GRANULOCYTES NFR BLD AUTO: 0.6 % — SIGNIFICANT CHANGE UP (ref 0–0.9)
INR BLD: 1.12 RATIO — SIGNIFICANT CHANGE UP (ref 0.88–1.16)
KETONES UR-MCNC: NEGATIVE — SIGNIFICANT CHANGE UP
LEUKOCYTE ESTERASE UR-ACNC: ABNORMAL
LYMPHOCYTES # BLD AUTO: 1.51 K/UL — SIGNIFICANT CHANGE UP (ref 1–3.3)
LYMPHOCYTES # BLD AUTO: 21.3 % — SIGNIFICANT CHANGE UP (ref 13–44)
MCHC RBC-ENTMCNC: 19.4 PG — LOW (ref 27–34)
MCHC RBC-ENTMCNC: 30.9 GM/DL — LOW (ref 32–36)
MCV RBC AUTO: 63 FL — LOW (ref 80–100)
MONOCYTES # BLD AUTO: 0.75 K/UL — SIGNIFICANT CHANGE UP (ref 0–0.9)
MONOCYTES NFR BLD AUTO: 10.6 % — SIGNIFICANT CHANGE UP (ref 2–14)
NEUTROPHILS # BLD AUTO: 4.54 K/UL — SIGNIFICANT CHANGE UP (ref 1.8–7.4)
NEUTROPHILS NFR BLD AUTO: 63.8 % — SIGNIFICANT CHANGE UP (ref 43–77)
NITRITE UR-MCNC: NEGATIVE — SIGNIFICANT CHANGE UP
PH UR: 6 — SIGNIFICANT CHANGE UP (ref 5–8)
PLATELET # BLD AUTO: 238 K/UL — SIGNIFICANT CHANGE UP (ref 150–400)
POTASSIUM SERPL-MCNC: 3.5 MMOL/L — SIGNIFICANT CHANGE UP (ref 3.5–5.3)
POTASSIUM SERPL-SCNC: 3.5 MMOL/L — SIGNIFICANT CHANGE UP (ref 3.5–5.3)
PROT UR-MCNC: 30 MG/DL
PROTHROM AB SERPL-ACNC: 13 SEC — SIGNIFICANT CHANGE UP (ref 10.5–13.4)
RBC # BLD: 5.97 M/UL — HIGH (ref 4.2–5.8)
RBC # FLD: 17.2 % — HIGH (ref 10.3–14.5)
SODIUM SERPL-SCNC: 135 MMOL/L — SIGNIFICANT CHANGE UP (ref 135–145)
SP GR SPEC: 1.01 — SIGNIFICANT CHANGE UP (ref 1.01–1.02)
UROBILINOGEN FLD QL: NEGATIVE — SIGNIFICANT CHANGE UP
WBC # BLD: 7.1 K/UL — SIGNIFICANT CHANGE UP (ref 3.8–10.5)
WBC # FLD AUTO: 7.1 K/UL — SIGNIFICANT CHANGE UP (ref 3.8–10.5)

## 2023-04-07 PROCEDURE — 80048 BASIC METABOLIC PNL TOTAL CA: CPT

## 2023-04-07 PROCEDURE — 87086 URINE CULTURE/COLONY COUNT: CPT

## 2023-04-07 PROCEDURE — 85730 THROMBOPLASTIN TIME PARTIAL: CPT

## 2023-04-07 PROCEDURE — 87186 SC STD MICRODIL/AGAR DIL: CPT

## 2023-04-07 PROCEDURE — 86850 RBC ANTIBODY SCREEN: CPT

## 2023-04-07 PROCEDURE — 85025 COMPLETE CBC W/AUTO DIFF WBC: CPT

## 2023-04-07 PROCEDURE — 86901 BLOOD TYPING SEROLOGIC RH(D): CPT

## 2023-04-07 PROCEDURE — 86900 BLOOD TYPING SEROLOGIC ABO: CPT

## 2023-04-07 PROCEDURE — 85610 PROTHROMBIN TIME: CPT

## 2023-04-07 PROCEDURE — 81001 URINALYSIS AUTO W/SCOPE: CPT

## 2023-04-07 PROCEDURE — 87077 CULTURE AEROBIC IDENTIFY: CPT

## 2023-04-07 PROCEDURE — 36415 COLL VENOUS BLD VENIPUNCTURE: CPT

## 2023-04-07 NOTE — H&P PST ADULT - PROBLEM SELECTOR PLAN 1
Pre op instructions given and explained.  Avoid NSAIDs and OTC supplements.   Patient verbalized understanding  medical consult requested by surgeon  continue Escitslopram on the DOS

## 2023-04-07 NOTE — H&P PST ADULT - HISTORY OF PRESENT ILLNESS
81 y/o Male with h/o Bladder cancer, depression, hypertension, back surgery, [place] (uses bilateral hearing aides), urolithiasis s/p stent, BPH, HTN, HLD, DM type 2 s/p admission at  3/2023 for flank pain right nephrostomy tube placed. Patient now presents to [facility] for scheduled cystoscopy urethroscopy and removal of ureteral stone on the right on 4/17/23.

## 2023-04-07 NOTE — H&P PST ADULT - ASSESSMENT
Patient now presents to [facility] for scheduled cystoscopy urethroscopy and removal of ureteral stone on the right on 4/17/23.

## 2023-04-07 NOTE — H&P PST ADULT - NSICDXPASTSURGICALHX_GEN_ALL_CORE_FT
PAST SURGICAL HISTORY:  H/O hernia repair left inguinal    History of back surgery TLIF    History of bladder surgery TURBT, cystoscopy--aprox 8 yrs ago, dx bladder cancer    History of cystoscopy Left kidney stones.  Left stent---4/20/22    Nephrostomy status     S/P tonsillectomy

## 2023-04-07 NOTE — H&P PST ADULT - PROBLEM SELECTOR PLAN 3
On  the DOS  with sip of water take cardiac meds - Diltiazem and losartan   Patient verbalized understanding.

## 2023-04-07 NOTE — H&P PST ADULT - NSICDXPASTMEDICALHX_GEN_ALL_CORE_FT
PAST MEDICAL HISTORY:  Bladder cancer 2014    BPH (benign prostatic hyperplasia)     CA skin, basal cell     Depression     Herniated nucleus pulposus, L5-S1     HTN (hypertension)     Hypercholesterolemia     Kidney stones Left    Lumbar spinal stenosis     OA (osteoarthritis)     Type 2 diabetes mellitus     Uses hearing aid

## 2023-04-08 DIAGNOSIS — Z01.818 ENCOUNTER FOR OTHER PREPROCEDURAL EXAMINATION: ICD-10-CM

## 2023-04-08 DIAGNOSIS — N20.1 CALCULUS OF URETER: ICD-10-CM

## 2023-04-10 ENCOUNTER — APPOINTMENT (OUTPATIENT)
Dept: INTERNAL MEDICINE | Facility: CLINIC | Age: 83
End: 2023-04-10
Payer: MEDICARE

## 2023-04-10 VITALS
HEART RATE: 86 BPM | HEIGHT: 73 IN | RESPIRATION RATE: 14 BRPM | OXYGEN SATURATION: 95 % | BODY MASS INDEX: 33.13 KG/M2 | TEMPERATURE: 98.4 F | DIASTOLIC BLOOD PRESSURE: 80 MMHG | SYSTOLIC BLOOD PRESSURE: 124 MMHG | WEIGHT: 250 LBS

## 2023-04-10 DIAGNOSIS — N39.0 URINARY TRACT INFECTION, SITE NOT SPECIFIED: ICD-10-CM

## 2023-04-10 DIAGNOSIS — N20.1 CALCULUS OF URETER: ICD-10-CM

## 2023-04-10 PROCEDURE — 99214 OFFICE O/P EST MOD 30 MIN: CPT

## 2023-04-10 NOTE — HISTORY OF PRESENT ILLNESS
[No Pertinent Cardiac History] : no history of aortic stenosis, atrial fibrillation, coronary artery disease, recent myocardial infarction, or implantable device/pacemaker [No Pertinent Pulmonary History] : no history of asthma, COPD, sleep apnea, or smoking [No Adverse Anesthesia Reaction] : no adverse anesthesia reaction in self or family member [Chronic Anticoagulation] : no chronic anticoagulation [Chronic Kidney Disease] : no chronic kidney disease [Diabetes] : diabetes [(Patient denies any chest pain, claudication, dyspnea on exertion, orthopnea, palpitations or syncope)] : Patient denies any chest pain, claudication, dyspnea on exertion, orthopnea, palpitations or syncope [FreeTextEntry1] : cystoscopy, ureteroscopy, removal of rightbureteral stone [FreeTextEntry3] : Dr. Mittal [FreeTextEntry2] : 4/17/23 [FreeTextEntry4] : [facility] CUI,  44, is here for presurgical evaluation.\par Overall feeling well.\par Pt denies chest pain, dyspnea, lightheadedness, palpitations, fever, chills, or sweats

## 2023-04-11 ENCOUNTER — NON-APPOINTMENT (OUTPATIENT)
Age: 83
End: 2023-04-11

## 2023-04-11 PROBLEM — E11.9 TYPE 2 DIABETES MELLITUS WITHOUT COMPLICATIONS: Chronic | Status: ACTIVE | Noted: 2023-04-07

## 2023-04-13 ENCOUNTER — RX RENEWAL (OUTPATIENT)
Age: 83
End: 2023-04-13

## 2023-04-24 ENCOUNTER — RX RENEWAL (OUTPATIENT)
Age: 83
End: 2023-04-24

## 2023-04-26 ENCOUNTER — RESULT REVIEW (OUTPATIENT)
Age: 83
End: 2023-04-26

## 2023-04-26 ENCOUNTER — APPOINTMENT (OUTPATIENT)
Dept: UROLOGY | Facility: CLINIC | Age: 83
End: 2023-04-26
Payer: MEDICARE

## 2023-04-26 DIAGNOSIS — N40.0 BENIGN PROSTATIC HYPERPLASIA WITHOUT LOWER URINARY TRACT SYMPMS: ICD-10-CM

## 2023-04-26 DIAGNOSIS — N13.30 UNSPECIFIED HYDRONEPHROSIS: ICD-10-CM

## 2023-04-26 DIAGNOSIS — R93.41 ABNORMAL RADIOLOGIC FINDINGS ON DIAGNOSTIC IMAGING OF OF RENAL PELVIS, URETER, OR BLADDER: ICD-10-CM

## 2023-04-26 DIAGNOSIS — R31.0 GROSS HEMATURIA: ICD-10-CM

## 2023-04-26 DIAGNOSIS — Z87.440 PERSONAL HISTORY OF URINARY (TRACT) INFECTIONS: ICD-10-CM

## 2023-04-26 PROCEDURE — 99214 OFFICE O/P EST MOD 30 MIN: CPT

## 2023-05-02 ENCOUNTER — APPOINTMENT (OUTPATIENT)
Dept: CT IMAGING | Facility: CLINIC | Age: 83
End: 2023-05-02
Payer: MEDICARE

## 2023-05-02 ENCOUNTER — OUTPATIENT (OUTPATIENT)
Dept: OUTPATIENT SERVICES | Facility: HOSPITAL | Age: 83
LOS: 1 days | End: 2023-05-02
Payer: MEDICARE

## 2023-05-02 DIAGNOSIS — Z98.890 OTHER SPECIFIED POSTPROCEDURAL STATES: Chronic | ICD-10-CM

## 2023-05-02 DIAGNOSIS — Z93.6 OTHER ARTIFICIAL OPENINGS OF URINARY TRACT STATUS: Chronic | ICD-10-CM

## 2023-05-02 DIAGNOSIS — Z90.89 ACQUIRED ABSENCE OF OTHER ORGANS: Chronic | ICD-10-CM

## 2023-05-02 DIAGNOSIS — R93.41 ABNORMAL RADIOLOGIC FINDINGS ON DIAGNOSTIC IMAGING OF RENAL PELVIS, URETER, OR BLADDER: ICD-10-CM

## 2023-05-02 LAB — BACTERIA UR CULT: NORMAL

## 2023-05-02 PROCEDURE — 74178 CT ABD&PLV WO CNTR FLWD CNTR: CPT

## 2023-05-02 PROCEDURE — 74178 CT ABD&PLV WO CNTR FLWD CNTR: CPT | Mod: 26,MH

## 2023-05-04 ENCOUNTER — RX RENEWAL (OUTPATIENT)
Age: 83
End: 2023-05-04

## 2023-05-05 ENCOUNTER — NON-APPOINTMENT (OUTPATIENT)
Age: 83
End: 2023-05-05

## 2023-05-07 PROBLEM — N13.30 HYDRONEPHROSIS, RIGHT: Status: ACTIVE | Noted: 2023-05-07

## 2023-05-07 NOTE — ASSESSMENT
[FreeTextEntry1] : Reviewed outside records on [facility] [employer]. \par Patient had a antegrade nephrostogram with IR which showed no obstruction prior to nephroureteral tube placement.\par \par Gross hematuria:\par Hydronephrosis:\par Abnormal ureter on CT scan:\par Discussed treatment options. \par Removed nephroureteral tube. Dressing applied. \par Will get Urinalysis, Urine culture and Urine cytology. \par Will get CT Urogram.\par \par Benign Prostatic Hyperplasia:\par Discussed treatment options, will continue Flomax and Finasteride. \par  \par Urinary tract infection:\par Continue Levofloxacin for 5 days. \par \par Will inform results. \par If concerning findings will proceed with ureteroscopy on 5/8/23. \par Recommend call us or go to Emergency department if condition worsens.

## 2023-05-07 NOTE — HISTORY OF PRESENT ILLNESS
[FreeTextEntry1] : 82 year old male presents for follow up. \par Patient was admitted to the hospital in March 2023 gross hematuria and right flank pain. \par CT scan Abdomen and Pelvis with out IV contrast(3/2/23):\par Mild right hydronephrosis likely secondary to faint 1-2 mm right proximal ureteral stone on image. \par Vague hyperdensities identified within the proximal right renal pelvis, concerning for hemorrhagic products. Consider nonemergent retrograde evaluation to exclude underlying neoplasm. Incompletely characterize right upper pole exophytic lesion measuring 2.1 x 1.9 cm. \par Patient underwent cystoscopy, clot evacuation and fulguration with failed attempt for retrograde ureteral stent by Dr. Awan. \par Patient then had a nephrostomy tube placed by IR which was later converted to nephroureteral tube and was clamped prior to patient being discharged.\par Scheduled for ureteroscopy with Dr Mittal on 5/8/23. Started on Levofloxacin for positive Urine culture from PST. \par Taking Flomax and Finasteride for BPH \par Has reasonable stream, daytime frequency every 3-4 hours, nocturia 1-2 x\par Notes some lower abdominal and right flank discomfort due to the ureteral stent. \par Denies dysuria, hematuria, nausea, vomiting, fever, chills or rigors. \par  \par Status post left ureteroscopy and ureteral stent exchange on 6/13/22 at [facility]. \par No ureteral stone seen.

## 2023-05-07 NOTE — END OF VISIT
[Time Spent: ___ minutes] : I have spent [unfilled] minutes of time on the encounter. [FreeTextEntry3] : Patient was seen and examined by me, agree with above note, where necessary edits were made.

## 2023-05-08 ENCOUNTER — APPOINTMENT (OUTPATIENT)
Dept: UROLOGY | Facility: HOSPITAL | Age: 83
End: 2023-05-08

## 2023-05-11 ENCOUNTER — NON-APPOINTMENT (OUTPATIENT)
Age: 83
End: 2023-05-11

## 2023-05-12 ENCOUNTER — APPOINTMENT (OUTPATIENT)
Dept: UROLOGY | Facility: CLINIC | Age: 83
End: 2023-05-12

## 2023-05-17 ENCOUNTER — APPOINTMENT (OUTPATIENT)
Dept: UROLOGY | Facility: CLINIC | Age: 83
End: 2023-05-17
Payer: MEDICARE

## 2023-05-17 PROCEDURE — 99214 OFFICE O/P EST MOD 30 MIN: CPT

## 2023-05-19 ENCOUNTER — APPOINTMENT (OUTPATIENT)
Dept: CT IMAGING | Facility: CLINIC | Age: 83
End: 2023-05-19
Payer: MEDICARE

## 2023-05-19 ENCOUNTER — RESULT REVIEW (OUTPATIENT)
Age: 83
End: 2023-05-19

## 2023-05-19 ENCOUNTER — OUTPATIENT (OUTPATIENT)
Dept: OUTPATIENT SERVICES | Facility: HOSPITAL | Age: 83
LOS: 1 days | End: 2023-05-19
Payer: MEDICARE

## 2023-05-19 DIAGNOSIS — Z93.6 OTHER ARTIFICIAL OPENINGS OF URINARY TRACT STATUS: Chronic | ICD-10-CM

## 2023-05-19 DIAGNOSIS — Z90.89 ACQUIRED ABSENCE OF OTHER ORGANS: Chronic | ICD-10-CM

## 2023-05-19 DIAGNOSIS — N28.89 OTHER SPECIFIED DISORDERS OF KIDNEY AND URETER: ICD-10-CM

## 2023-05-19 DIAGNOSIS — Z98.890 OTHER SPECIFIED POSTPROCEDURAL STATES: Chronic | ICD-10-CM

## 2023-05-19 PROCEDURE — 71260 CT THORAX DX C+: CPT | Mod: 26,MH

## 2023-05-19 PROCEDURE — 71260 CT THORAX DX C+: CPT

## 2023-05-19 NOTE — PHYSICAL EXAM
[General Appearance - Well Developed] : well developed [General Appearance - Well Nourished] : well nourished [Normal Appearance] : normal appearance [Edema] : no peripheral edema [Exaggerated Use Of Accessory Muscles For Inspiration] : no accessory muscle use [Skin Color & Pigmentation] : normal skin color and pigmentation [] : no rash [No Focal Deficits] : no focal deficits [Oriented To Time, Place, And Person] : oriented to person, place, and time [Affect] : the affect was normal [Not Anxious] : not anxious [Well Groomed] : well groomed [General Appearance - In No Acute Distress] : no acute distress [Respiration, Rhythm And Depth] : normal respiratory rhythm and effort [Abdomen Soft] : soft [Abdomen Tenderness] : non-tender [Costovertebral Angle Tenderness] : no ~M costovertebral angle tenderness [Urethral Meatus] : meatus normal [Urinary Bladder Findings] : the bladder was normal on palpation [Scrotum] : the scrotum was normal [Testes Mass (___cm)] : there were no testicular masses [Normal Station and Gait] : the gait and station were normal for the patient's age [Mood] : the mood was normal [No Palpable Adenopathy] : no palpable adenopathy

## 2023-05-19 NOTE — HISTORY OF PRESENT ILLNESS
[FreeTextEntry1] : 81yo\par bladder cx 2014 tx by Dr. Rosas at [place]\par TURBT\par \par had stone pain 3/2, suspected kidney stone, sepsis, 8d stay, stent placed, nephrostomy tube, tube removed 2 wks ago. \par \par 5/2/23- CT Urogram\par 3cm R renal mass in R upper pole?TCC and \par perineal mass 2.7cm ?lymphoma in upper pole\par here for management\par

## 2023-05-19 NOTE — ASSESSMENT
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

## 2023-06-01 ENCOUNTER — NON-APPOINTMENT (OUTPATIENT)
Age: 83
End: 2023-06-01

## 2023-06-02 ENCOUNTER — OUTPATIENT (OUTPATIENT)
Dept: OUTPATIENT SERVICES | Facility: HOSPITAL | Age: 83
LOS: 1 days | End: 2023-06-02
Payer: MEDICARE

## 2023-06-02 ENCOUNTER — APPOINTMENT (OUTPATIENT)
Dept: UROLOGY | Facility: CLINIC | Age: 83
End: 2023-06-02

## 2023-06-02 VITALS — HEIGHT: 73 IN | TEMPERATURE: 98 F | WEIGHT: 250 LBS

## 2023-06-02 DIAGNOSIS — Z98.890 OTHER SPECIFIED POSTPROCEDURAL STATES: Chronic | ICD-10-CM

## 2023-06-02 DIAGNOSIS — Z93.6 OTHER ARTIFICIAL OPENINGS OF URINARY TRACT STATUS: Chronic | ICD-10-CM

## 2023-06-02 DIAGNOSIS — N28.89 OTHER SPECIFIED DISORDERS OF KIDNEY AND URETER: ICD-10-CM

## 2023-06-02 DIAGNOSIS — Z90.89 ACQUIRED ABSENCE OF OTHER ORGANS: Chronic | ICD-10-CM

## 2023-06-02 DIAGNOSIS — C67.9 MALIGNANT NEOPLASM OF BLADDER, UNSPECIFIED: ICD-10-CM

## 2023-06-02 DIAGNOSIS — I10 ESSENTIAL (PRIMARY) HYPERTENSION: ICD-10-CM

## 2023-06-02 DIAGNOSIS — E11.9 TYPE 2 DIABETES MELLITUS WITHOUT COMPLICATIONS: ICD-10-CM

## 2023-06-02 LAB
ADD ON TEST-SPECIMEN IN LAB: SIGNIFICANT CHANGE UP
ANION GAP SERPL CALC-SCNC: 2 MMOL/L — LOW (ref 5–17)
ANISOCYTOSIS BLD QL: SIGNIFICANT CHANGE UP
APPEARANCE UR: CLEAR — SIGNIFICANT CHANGE UP
APTT BLD: 26.4 SEC — LOW (ref 27.5–35.5)
BACTERIA # UR AUTO: ABNORMAL
BASOPHILS # BLD AUTO: 0.03 K/UL — SIGNIFICANT CHANGE UP (ref 0–0.2)
BASOPHILS NFR BLD AUTO: 0.6 % — SIGNIFICANT CHANGE UP (ref 0–2)
BILIRUB UR-MCNC: NEGATIVE — SIGNIFICANT CHANGE UP
BLD GP AB SCN SERPL QL: SIGNIFICANT CHANGE UP
BUN SERPL-MCNC: 23 MG/DL — SIGNIFICANT CHANGE UP (ref 7–23)
CALCIUM SERPL-MCNC: 10 MG/DL — SIGNIFICANT CHANGE UP (ref 8.5–10.1)
CHLORIDE SERPL-SCNC: 105 MMOL/L — SIGNIFICANT CHANGE UP (ref 96–108)
CO2 SERPL-SCNC: 33 MMOL/L — HIGH (ref 22–31)
COLOR SPEC: YELLOW — SIGNIFICANT CHANGE UP
CREAT SERPL-MCNC: 1.4 MG/DL — HIGH (ref 0.5–1.3)
DIFF PNL FLD: ABNORMAL
EGFR: 51 ML/MIN/1.73M2 — LOW
ELLIPTOCYTES BLD QL SMEAR: SLIGHT — SIGNIFICANT CHANGE UP
EOSINOPHIL # BLD AUTO: 0.1 K/UL — SIGNIFICANT CHANGE UP (ref 0–0.5)
EOSINOPHIL NFR BLD AUTO: 1.9 % — SIGNIFICANT CHANGE UP (ref 0–6)
EPI CELLS # UR: SIGNIFICANT CHANGE UP
GLUCOSE SERPL-MCNC: 106 MG/DL — HIGH (ref 70–99)
GLUCOSE UR QL: NEGATIVE — SIGNIFICANT CHANGE UP
HCT VFR BLD CALC: 39.5 % — SIGNIFICANT CHANGE UP (ref 39–50)
HGB BLD-MCNC: 12.2 G/DL — LOW (ref 13–17)
HYPOCHROMIA BLD QL: SLIGHT — SIGNIFICANT CHANGE UP
IMM GRANULOCYTES NFR BLD AUTO: 0.4 % — SIGNIFICANT CHANGE UP (ref 0–0.9)
INR BLD: 1.09 RATIO — SIGNIFICANT CHANGE UP (ref 0.88–1.16)
KETONES UR-MCNC: ABNORMAL
LEUKOCYTE ESTERASE UR-ACNC: ABNORMAL
LYMPHOCYTES # BLD AUTO: 1.26 K/UL — SIGNIFICANT CHANGE UP (ref 1–3.3)
LYMPHOCYTES # BLD AUTO: 23.7 % — SIGNIFICANT CHANGE UP (ref 13–44)
MANUAL SMEAR VERIFICATION: SIGNIFICANT CHANGE UP
MCHC RBC-ENTMCNC: 19.6 PG — LOW (ref 27–34)
MCHC RBC-ENTMCNC: 30.9 GM/DL — LOW (ref 32–36)
MCV RBC AUTO: 63.5 FL — LOW (ref 80–100)
MICROCYTES BLD QL: SIGNIFICANT CHANGE UP
MONOCYTES # BLD AUTO: 0.52 K/UL — SIGNIFICANT CHANGE UP (ref 0–0.9)
MONOCYTES NFR BLD AUTO: 9.8 % — SIGNIFICANT CHANGE UP (ref 2–14)
NEUTROPHILS # BLD AUTO: 3.38 K/UL — SIGNIFICANT CHANGE UP (ref 1.8–7.4)
NEUTROPHILS NFR BLD AUTO: 63.6 % — SIGNIFICANT CHANGE UP (ref 43–77)
NITRITE UR-MCNC: NEGATIVE — SIGNIFICANT CHANGE UP
OVALOCYTES BLD QL SMEAR: SLIGHT — SIGNIFICANT CHANGE UP
PH UR: 5 — SIGNIFICANT CHANGE UP (ref 5–8)
PLAT MORPH BLD: NORMAL — SIGNIFICANT CHANGE UP
PLATELET # BLD AUTO: 198 K/UL — SIGNIFICANT CHANGE UP (ref 150–400)
PLATELET COUNT - ESTIMATE: NORMAL — SIGNIFICANT CHANGE UP
POIKILOCYTOSIS BLD QL AUTO: SLIGHT — SIGNIFICANT CHANGE UP
POTASSIUM SERPL-MCNC: 3.5 MMOL/L — SIGNIFICANT CHANGE UP (ref 3.5–5.3)
POTASSIUM SERPL-SCNC: 3.5 MMOL/L — SIGNIFICANT CHANGE UP (ref 3.5–5.3)
PROT UR-MCNC: SIGNIFICANT CHANGE UP MG/DL
PROTHROM AB SERPL-ACNC: 12.6 SEC — SIGNIFICANT CHANGE UP (ref 10.5–13.4)
RBC # BLD: 6.22 M/UL — HIGH (ref 4.2–5.8)
RBC # FLD: 17.6 % — HIGH (ref 10.3–14.5)
RBC BLD AUTO: ABNORMAL
RBC CASTS # UR COMP ASSIST: SIGNIFICANT CHANGE UP /HPF (ref 0–4)
SODIUM SERPL-SCNC: 140 MMOL/L — SIGNIFICANT CHANGE UP (ref 135–145)
SP GR SPEC: 1.02 — SIGNIFICANT CHANGE UP (ref 1.01–1.02)
UROBILINOGEN FLD QL: NEGATIVE — SIGNIFICANT CHANGE UP
WBC # BLD: 5.31 K/UL — SIGNIFICANT CHANGE UP (ref 3.8–10.5)
WBC # FLD AUTO: 5.31 K/UL — SIGNIFICANT CHANGE UP (ref 3.8–10.5)
WBC UR QL: ABNORMAL /HPF (ref 0–5)

## 2023-06-02 PROCEDURE — 81001 URINALYSIS AUTO W/SCOPE: CPT

## 2023-06-02 PROCEDURE — 83036 HEMOGLOBIN GLYCOSYLATED A1C: CPT

## 2023-06-02 PROCEDURE — 86923 COMPATIBILITY TEST ELECTRIC: CPT

## 2023-06-02 PROCEDURE — 99214 OFFICE O/P EST MOD 30 MIN: CPT | Mod: 25

## 2023-06-02 PROCEDURE — 85610 PROTHROMBIN TIME: CPT

## 2023-06-02 PROCEDURE — 87186 SC STD MICRODIL/AGAR DIL: CPT

## 2023-06-02 PROCEDURE — 80048 BASIC METABOLIC PNL TOTAL CA: CPT

## 2023-06-02 PROCEDURE — 87086 URINE CULTURE/COLONY COUNT: CPT

## 2023-06-02 PROCEDURE — 86901 BLOOD TYPING SEROLOGIC RH(D): CPT

## 2023-06-02 PROCEDURE — 86850 RBC ANTIBODY SCREEN: CPT

## 2023-06-02 PROCEDURE — 87077 CULTURE AEROBIC IDENTIFY: CPT

## 2023-06-02 PROCEDURE — 85025 COMPLETE CBC W/AUTO DIFF WBC: CPT

## 2023-06-02 PROCEDURE — 85730 THROMBOPLASTIN TIME PARTIAL: CPT

## 2023-06-02 PROCEDURE — 86900 BLOOD TYPING SEROLOGIC ABO: CPT

## 2023-06-02 PROCEDURE — 36415 COLL VENOUS BLD VENIPUNCTURE: CPT

## 2023-06-02 NOTE — H&P PST ADULT - ASSESSMENT
Patient now presents to [facility] for scheduled cystoscopy ureteroscopy with biopsy on 6/9/23. Patient reports s/p ct scan for follow up kidney stone mass noted and advised to have a biopsy.

## 2023-06-02 NOTE — H&P PST ADULT - PROBLEM SELECTOR PLAN 1
Pre op instructions given and explained.  Avoid NSAIDs and OTC supplements.   Patient verbalized understanding  medical consult requested by surgeon

## 2023-06-02 NOTE — H&P PST ADULT - HISTORY OF PRESENT ILLNESS
81 y/o Male with h/o Bladder cancer, depression, hypertension, back surgery, [place] (uses bilateral hearing aides), urolithiasis s/p stent, BPH, HTN, HLD, DM type 2 s/p admission at  3/2023 for flank pain right nephrostomy tube and removed 4/2023. Patient now presents to [facility] for scheduled cystoscopy ureteroscopy with biopsy on 6/9/23. Patient reports s/p ct scan for follow up kidney stone mass noted and advised to have a biopsy.

## 2023-06-02 NOTE — H&P PST ADULT - PROBLEM SELECTOR PLAN 2
On  the DOS  with sip of water take cardiac meds - continue losartan and diltiazem   Patient verbalized understanding.

## 2023-06-03 DIAGNOSIS — N28.89 OTHER SPECIFIED DISORDERS OF KIDNEY AND URETER: ICD-10-CM

## 2023-06-03 LAB
A1C WITH ESTIMATED AVERAGE GLUCOSE RESULT: 6.4 % — HIGH (ref 4–5.6)
ESTIMATED AVERAGE GLUCOSE: 137 MG/DL — HIGH (ref 68–114)

## 2023-06-05 ENCOUNTER — APPOINTMENT (OUTPATIENT)
Dept: INTERNAL MEDICINE | Facility: CLINIC | Age: 83
End: 2023-06-05
Payer: MEDICARE

## 2023-06-05 VITALS — DIASTOLIC BLOOD PRESSURE: 64 MMHG | SYSTOLIC BLOOD PRESSURE: 124 MMHG

## 2023-06-05 VITALS
OXYGEN SATURATION: 96 % | DIASTOLIC BLOOD PRESSURE: 80 MMHG | SYSTOLIC BLOOD PRESSURE: 160 MMHG | RESPIRATION RATE: 14 BRPM | BODY MASS INDEX: 33.13 KG/M2 | HEART RATE: 84 BPM | WEIGHT: 250 LBS | HEIGHT: 73 IN | TEMPERATURE: 98.7 F

## 2023-06-05 DIAGNOSIS — N39.0 URINARY TRACT INFECTION, SITE NOT SPECIFIED: ICD-10-CM

## 2023-06-05 PROCEDURE — 99214 OFFICE O/P EST MOD 30 MIN: CPT

## 2023-06-05 NOTE — ASSESSMENT
[Patient Optimized for Surgery] : Patient optimized for surgery [No Further Testing Recommended] : no further testing recommended [FreeTextEntry4] : pt is on Levaquin 25omg QD for UTI\par Presurgical labs are within acceptable limits.\par EKG 3/2/23 SR, first degree AVB, PVCs, cannot rule out old anterior infarct, within acceptable limits\par There are no medical contraincications to proceeding with the planned renal biopsy

## 2023-06-05 NOTE — HISTORY OF PRESENT ILLNESS
[No Pertinent Cardiac History] : no history of aortic stenosis, atrial fibrillation, coronary artery disease, recent myocardial infarction, or implantable device/pacemaker [No Pertinent Pulmonary History] : no history of asthma, COPD, sleep apnea, or smoking [No Adverse Anesthesia Reaction] : no adverse anesthesia reaction in self or family member [Diabetes] : diabetes [(Patient denies any chest pain, claudication, dyspnea on exertion, orthopnea, palpitations or syncope)] : Patient denies any chest pain, claudication, dyspnea on exertion, orthopnea, palpitations or syncope [Chronic Anticoagulation] : no chronic anticoagulation [Chronic Kidney Disease] : no chronic kidney disease [FreeTextEntry1] : Right kidney biopsy [FreeTextEntry3] : Dr. Mckinney [FreeTextEntry2] : 6/9/23 [FreeTextEntry4] : [facility] CUI,  44, is here for presurgical evaluation.\par Pt is overall feeling well.\par Pt denies chest pain, dyspnea, palpitations, lightheadedness, fever, chills, or sweats.

## 2023-06-06 ENCOUNTER — NON-APPOINTMENT (OUTPATIENT)
Age: 83
End: 2023-06-06

## 2023-06-09 ENCOUNTER — OUTPATIENT (OUTPATIENT)
Dept: INPATIENT UNIT | Facility: HOSPITAL | Age: 83
LOS: 1 days | Discharge: HOME | End: 2023-06-09
Payer: MEDICARE

## 2023-06-09 ENCOUNTER — RESULT REVIEW (OUTPATIENT)
Age: 83
End: 2023-06-09

## 2023-06-09 ENCOUNTER — APPOINTMENT (OUTPATIENT)
Dept: UROLOGY | Facility: HOSPITAL | Age: 83
End: 2023-06-09

## 2023-06-09 ENCOUNTER — TRANSCRIPTION ENCOUNTER (OUTPATIENT)
Age: 83
End: 2023-06-09

## 2023-06-09 VITALS
DIASTOLIC BLOOD PRESSURE: 70 MMHG | TEMPERATURE: 98 F | HEART RATE: 82 BPM | OXYGEN SATURATION: 96 % | RESPIRATION RATE: 16 BRPM | SYSTOLIC BLOOD PRESSURE: 136 MMHG

## 2023-06-09 VITALS
TEMPERATURE: 97 F | HEART RATE: 81 BPM | DIASTOLIC BLOOD PRESSURE: 108 MMHG | HEIGHT: 73 IN | WEIGHT: 250 LBS | OXYGEN SATURATION: 99 % | SYSTOLIC BLOOD PRESSURE: 164 MMHG | RESPIRATION RATE: 16 BRPM

## 2023-06-09 DIAGNOSIS — N28.89 OTHER SPECIFIED DISORDERS OF KIDNEY AND URETER: ICD-10-CM

## 2023-06-09 DIAGNOSIS — Z98.890 OTHER SPECIFIED POSTPROCEDURAL STATES: Chronic | ICD-10-CM

## 2023-06-09 DIAGNOSIS — R31.9 HEMATURIA, UNSPECIFIED: ICD-10-CM

## 2023-06-09 DIAGNOSIS — Z90.89 ACQUIRED ABSENCE OF OTHER ORGANS: Chronic | ICD-10-CM

## 2023-06-09 DIAGNOSIS — Z93.6 OTHER ARTIFICIAL OPENINGS OF URINARY TRACT STATUS: Chronic | ICD-10-CM

## 2023-06-09 PROCEDURE — C2617: CPT

## 2023-06-09 PROCEDURE — 88305 TISSUE EXAM BY PATHOLOGIST: CPT

## 2023-06-09 PROCEDURE — 52354 CYSTOURETERO W/BIOPSY: CPT | Mod: RT

## 2023-06-09 PROCEDURE — C1889: CPT

## 2023-06-09 PROCEDURE — 88305 TISSUE EXAM BY PATHOLOGIST: CPT | Mod: 26

## 2023-06-09 PROCEDURE — 51703 INSERT BLADDER CATH COMPLEX: CPT

## 2023-06-09 PROCEDURE — 74420 UROGRAPHY RTRGR +-KUB: CPT | Mod: 26,RT

## 2023-06-09 PROCEDURE — C1747: CPT

## 2023-06-09 PROCEDURE — 76000 FLUOROSCOPY <1 HR PHYS/QHP: CPT

## 2023-06-09 RX ORDER — OXYBUTYNIN CHLORIDE 5 MG
1 TABLET ORAL
Qty: 12 | Refills: 0
Start: 2023-06-09

## 2023-06-09 RX ORDER — OXYCODONE AND ACETAMINOPHEN 5; 325 MG/1; MG/1
1 TABLET ORAL
Qty: 10 | Refills: 0
Start: 2023-06-09

## 2023-06-09 RX ORDER — ONDANSETRON 8 MG/1
4 TABLET, FILM COATED ORAL ONCE
Refills: 0 | Status: DISCONTINUED | OUTPATIENT
Start: 2023-06-09 | End: 2023-06-09

## 2023-06-09 RX ORDER — SODIUM CHLORIDE 9 MG/ML
1000 INJECTION, SOLUTION INTRAVENOUS
Refills: 0 | Status: DISCONTINUED | OUTPATIENT
Start: 2023-06-09 | End: 2023-06-09

## 2023-06-09 RX ORDER — FENTANYL CITRATE 50 UG/ML
25 INJECTION INTRAVENOUS
Refills: 0 | Status: DISCONTINUED | OUTPATIENT
Start: 2023-06-09 | End: 2023-06-09

## 2023-06-09 RX ORDER — OXYCODONE HYDROCHLORIDE 5 MG/1
5 TABLET ORAL ONCE
Refills: 0 | Status: DISCONTINUED | OUTPATIENT
Start: 2023-06-09 | End: 2023-06-09

## 2023-06-09 NOTE — ASU DISCHARGE PLAN (ADULT/PEDIATRIC) - CARE PROVIDER_API CALL
Jassi Mckinney  Urology  [address] [place] [id number]  Phone: [phone]  Fax: [phone]  Follow Up Time:

## 2023-06-09 NOTE — ASU DISCHARGE PLAN (ADULT/PEDIATRIC) - NS MD DC FALL RISK RISK
For information on Fall & Injury Prevention, visit: [email or web address] OR  [email or web address] OR  https://www.cdc.gov/steadi/patient.html

## 2023-06-09 NOTE — BRIEF OPERATIVE NOTE - NSICDXBRIEFPROCEDURE_GEN_ALL_CORE_FT
PROCEDURES:  Right ureteroscopy 09-Jun-2023 12:50:22  Diann Elizabeth  Insertion, ureteral stent 09-Jun-2023 12:50:33  Diann Elizabeth   18-Nov-2020 16:31

## 2023-06-09 NOTE — PROGRESS NOTE ADULT - SUBJECTIVE AND OBJECTIVE BOX
Pt resting in bed. Voided bloody urine with 1 large clot. No other complaints    Vital Signs Last 24 Hrs  T(C): 36.4 (09 Jun 2023 16:00), Max: 36.7 (09 Jun 2023 12:43)  T(F): 97.5 (09 Jun 2023 16:00), Max: 98 (09 Jun 2023 12:43)  HR: 82 (09 Jun 2023 16:00) (81 - 85)  BP: 136/70 (09 Jun 2023 16:00) (134/68 - 151/96)  BP(mean): --  RR: 16 (09 Jun 2023 16:00) (12 - 16)  SpO2: 96% (09 Jun 2023 16:00) (94% - 99%)    Parameters below as of 09 Jun 2023 16:00  Patient On (Oxygen Delivery Method): room air        I&O's Summary    09 Jun 2023 07:01  -  09 Jun 2023 16:33  --------------------------------------------------------  IN: 2000 mL / OUT: 1000 mL / NET: 1000 mL        Physical Exam  Gen: NAD, A&Ox3  Abd: Soft, NT, ND, no bladder palp  : Under sterile conditions, placed 18 Fr catheter without any problems                  PVR - 250cc pink urine.  Irrigated harding and no clots.  Took out harding

## 2023-06-09 NOTE — PROGRESS NOTE ADULT - ASSESSMENT
A/P: 82y Male s/p right ureteroscopy/ right stent placement  Encourage po fluids  If pt voids >100cc and urine is pink, can d/c patient home  Above discussed with Dr. Mckinney

## 2023-06-13 LAB — SURGICAL PATHOLOGY STUDY: SIGNIFICANT CHANGE UP

## 2023-06-14 DIAGNOSIS — F32.A DEPRESSION, UNSPECIFIED: ICD-10-CM

## 2023-06-14 DIAGNOSIS — Z88.0 ALLERGY STATUS TO PENICILLIN: ICD-10-CM

## 2023-06-14 DIAGNOSIS — E11.9 TYPE 2 DIABETES MELLITUS WITHOUT COMPLICATIONS: ICD-10-CM

## 2023-06-14 DIAGNOSIS — Z87.891 PERSONAL HISTORY OF NICOTINE DEPENDENCE: ICD-10-CM

## 2023-06-14 DIAGNOSIS — C44.91 BASAL CELL CARCINOMA OF SKIN, UNSPECIFIED: ICD-10-CM

## 2023-06-14 DIAGNOSIS — M19.90 UNSPECIFIED OSTEOARTHRITIS, UNSPECIFIED SITE: ICD-10-CM

## 2023-06-14 DIAGNOSIS — E78.00 PURE HYPERCHOLESTEROLEMIA, UNSPECIFIED: ICD-10-CM

## 2023-06-14 DIAGNOSIS — N40.0 BENIGN PROSTATIC HYPERPLASIA WITHOUT LOWER URINARY TRACT SYMPTOMS: ICD-10-CM

## 2023-06-14 DIAGNOSIS — I10 ESSENTIAL (PRIMARY) HYPERTENSION: ICD-10-CM

## 2023-06-14 DIAGNOSIS — Z85.51 PERSONAL HISTORY OF MALIGNANT NEOPLASM OF BLADDER: ICD-10-CM

## 2023-06-14 DIAGNOSIS — Z88.2 ALLERGY STATUS TO SULFONAMIDES: ICD-10-CM

## 2023-06-14 DIAGNOSIS — D41.11 NEOPLASM OF UNCERTAIN BEHAVIOR OF RIGHT RENAL PELVIS: ICD-10-CM

## 2023-06-14 DIAGNOSIS — Z93.6 OTHER ARTIFICIAL OPENINGS OF URINARY TRACT STATUS: ICD-10-CM

## 2023-06-14 DIAGNOSIS — D09.0 CARCINOMA IN SITU OF BLADDER: ICD-10-CM

## 2023-06-14 DIAGNOSIS — Z79.84 LONG TERM (CURRENT) USE OF ORAL HYPOGLYCEMIC DRUGS: ICD-10-CM

## 2023-06-19 ENCOUNTER — APPOINTMENT (OUTPATIENT)
Dept: UROLOGY | Facility: CLINIC | Age: 83
End: 2023-06-19
Payer: MEDICARE

## 2023-06-19 VITALS
OXYGEN SATURATION: 95 % | HEART RATE: 86 BPM | HEIGHT: 73 IN | SYSTOLIC BLOOD PRESSURE: 118 MMHG | DIASTOLIC BLOOD PRESSURE: 73 MMHG | BODY MASS INDEX: 33.13 KG/M2 | WEIGHT: 250 LBS

## 2023-06-19 PROCEDURE — 52310 CYSTOSCOPY AND TREATMENT: CPT

## 2023-06-19 PROCEDURE — 81003 URINALYSIS AUTO W/O SCOPE: CPT | Mod: QW

## 2023-06-21 ENCOUNTER — RX RENEWAL (OUTPATIENT)
Age: 83
End: 2023-06-21

## 2023-06-22 ENCOUNTER — INPATIENT (INPATIENT)
Facility: HOSPITAL | Age: 83
LOS: 1 days | Discharge: HOME | DRG: 657 | End: 2023-06-24
Attending: UROLOGY | Admitting: UROLOGY
Payer: MEDICARE

## 2023-06-22 ENCOUNTER — RESULT REVIEW (OUTPATIENT)
Age: 83
End: 2023-06-22

## 2023-06-22 ENCOUNTER — APPOINTMENT (OUTPATIENT)
Dept: UROLOGY | Facility: HOSPITAL | Age: 83
End: 2023-06-22

## 2023-06-22 VITALS
TEMPERATURE: 97 F | HEIGHT: 73 IN | DIASTOLIC BLOOD PRESSURE: 90 MMHG | SYSTOLIC BLOOD PRESSURE: 143 MMHG | WEIGHT: 250 LBS | HEART RATE: 81 BPM | RESPIRATION RATE: 16 BRPM | OXYGEN SATURATION: 95 %

## 2023-06-22 DIAGNOSIS — Z85.51 PERSONAL HISTORY OF MALIGNANT NEOPLASM OF BLADDER: ICD-10-CM

## 2023-06-22 DIAGNOSIS — I10 ESSENTIAL (PRIMARY) HYPERTENSION: ICD-10-CM

## 2023-06-22 DIAGNOSIS — F39 UNSPECIFIED MOOD [AFFECTIVE] DISORDER: ICD-10-CM

## 2023-06-22 DIAGNOSIS — R59.0 LOCALIZED ENLARGED LYMPH NODES: ICD-10-CM

## 2023-06-22 DIAGNOSIS — R35.1 NOCTURIA: ICD-10-CM

## 2023-06-22 DIAGNOSIS — Z98.890 OTHER SPECIFIED POSTPROCEDURAL STATES: Chronic | ICD-10-CM

## 2023-06-22 DIAGNOSIS — Z87.442 PERSONAL HISTORY OF URINARY CALCULI: ICD-10-CM

## 2023-06-22 DIAGNOSIS — Z87.891 PERSONAL HISTORY OF NICOTINE DEPENDENCE: ICD-10-CM

## 2023-06-22 DIAGNOSIS — Z90.89 ACQUIRED ABSENCE OF OTHER ORGANS: Chronic | ICD-10-CM

## 2023-06-22 DIAGNOSIS — N28.9 DISORDER OF KIDNEY AND URETER, UNSPECIFIED: ICD-10-CM

## 2023-06-22 DIAGNOSIS — E11.9 TYPE 2 DIABETES MELLITUS WITHOUT COMPLICATIONS: ICD-10-CM

## 2023-06-22 DIAGNOSIS — F32.A DEPRESSION, UNSPECIFIED: ICD-10-CM

## 2023-06-22 DIAGNOSIS — N32.89 OTHER SPECIFIED DISORDERS OF BLADDER: ICD-10-CM

## 2023-06-22 DIAGNOSIS — Z88.0 ALLERGY STATUS TO PENICILLIN: ICD-10-CM

## 2023-06-22 DIAGNOSIS — Z88.2 ALLERGY STATUS TO SULFONAMIDES: ICD-10-CM

## 2023-06-22 DIAGNOSIS — N17.9 ACUTE KIDNEY FAILURE, UNSPECIFIED: ICD-10-CM

## 2023-06-22 DIAGNOSIS — C64.1 MALIGNANT NEOPLASM OF RIGHT KIDNEY, EXCEPT RENAL PELVIS: ICD-10-CM

## 2023-06-22 DIAGNOSIS — R31.9 HEMATURIA, UNSPECIFIED: ICD-10-CM

## 2023-06-22 DIAGNOSIS — Z79.899 OTHER LONG TERM (CURRENT) DRUG THERAPY: ICD-10-CM

## 2023-06-22 DIAGNOSIS — R35.0 FREQUENCY OF MICTURITION: ICD-10-CM

## 2023-06-22 DIAGNOSIS — N40.1 BENIGN PROSTATIC HYPERPLASIA WITH LOWER URINARY TRACT SYMPTOMS: ICD-10-CM

## 2023-06-22 DIAGNOSIS — Z79.84 LONG TERM (CURRENT) USE OF ORAL HYPOGLYCEMIC DRUGS: ICD-10-CM

## 2023-06-22 DIAGNOSIS — Z93.6 OTHER ARTIFICIAL OPENINGS OF URINARY TRACT STATUS: Chronic | ICD-10-CM

## 2023-06-22 DIAGNOSIS — Z85.828 PERSONAL HISTORY OF OTHER MALIGNANT NEOPLASM OF SKIN: ICD-10-CM

## 2023-06-22 DIAGNOSIS — E78.00 PURE HYPERCHOLESTEROLEMIA, UNSPECIFIED: ICD-10-CM

## 2023-06-22 LAB
ANION GAP SERPL CALC-SCNC: 8 MMOL/L — SIGNIFICANT CHANGE UP (ref 5–17)
ANISOCYTOSIS BLD QL: SIGNIFICANT CHANGE UP
BASOPHILS # BLD AUTO: 0.03 K/UL — SIGNIFICANT CHANGE UP (ref 0–0.2)
BASOPHILS NFR BLD AUTO: 0.2 % — SIGNIFICANT CHANGE UP (ref 0–2)
BUN SERPL-MCNC: 20 MG/DL — SIGNIFICANT CHANGE UP (ref 7–23)
CALCIUM SERPL-MCNC: 8.8 MG/DL — SIGNIFICANT CHANGE UP (ref 8.5–10.1)
CHLORIDE SERPL-SCNC: 102 MMOL/L — SIGNIFICANT CHANGE UP (ref 96–108)
CO2 SERPL-SCNC: 26 MMOL/L — SIGNIFICANT CHANGE UP (ref 22–31)
CREAT SERPL-MCNC: 1.97 MG/DL — HIGH (ref 0.5–1.3)
EGFR: 34 ML/MIN/1.73M2 — LOW
ELLIPTOCYTES BLD QL SMEAR: SLIGHT — SIGNIFICANT CHANGE UP
EOSINOPHIL # BLD AUTO: 0 K/UL — SIGNIFICANT CHANGE UP (ref 0–0.5)
EOSINOPHIL NFR BLD AUTO: 0 % — SIGNIFICANT CHANGE UP (ref 0–6)
GLUCOSE BLDC GLUCOMTR-MCNC: 171 MG/DL — HIGH (ref 70–99)
GLUCOSE SERPL-MCNC: 163 MG/DL — HIGH (ref 70–99)
HCT VFR BLD CALC: 37.9 % — LOW (ref 39–50)
HGB BLD-MCNC: 11.8 G/DL — LOW (ref 13–17)
IMM GRANULOCYTES NFR BLD AUTO: 0.5 % — SIGNIFICANT CHANGE UP (ref 0–0.9)
LYMPHOCYTES # BLD AUTO: 0.59 K/UL — LOW (ref 1–3.3)
LYMPHOCYTES # BLD AUTO: 4.5 % — LOW (ref 13–44)
MANUAL SMEAR VERIFICATION: SIGNIFICANT CHANGE UP
MCHC RBC-ENTMCNC: 19.8 PG — LOW (ref 27–34)
MCHC RBC-ENTMCNC: 31.1 GM/DL — LOW (ref 32–36)
MCV RBC AUTO: 63.6 FL — LOW (ref 80–100)
MICROCYTES BLD QL: SIGNIFICANT CHANGE UP
MONOCYTES # BLD AUTO: 0.22 K/UL — SIGNIFICANT CHANGE UP (ref 0–0.9)
MONOCYTES NFR BLD AUTO: 1.7 % — LOW (ref 2–14)
NEUTROPHILS # BLD AUTO: 12.08 K/UL — HIGH (ref 1.8–7.4)
NEUTROPHILS NFR BLD AUTO: 93.1 % — HIGH (ref 43–77)
OVALOCYTES BLD QL SMEAR: SLIGHT — SIGNIFICANT CHANGE UP
PLAT MORPH BLD: NORMAL — SIGNIFICANT CHANGE UP
PLATELET # BLD AUTO: 173 K/UL — SIGNIFICANT CHANGE UP (ref 150–400)
PLATELET COUNT - ESTIMATE: NORMAL — SIGNIFICANT CHANGE UP
POIKILOCYTOSIS BLD QL AUTO: SLIGHT — SIGNIFICANT CHANGE UP
POTASSIUM SERPL-MCNC: 3.7 MMOL/L — SIGNIFICANT CHANGE UP (ref 3.5–5.3)
POTASSIUM SERPL-SCNC: 3.7 MMOL/L — SIGNIFICANT CHANGE UP (ref 3.5–5.3)
RBC # BLD: 5.96 M/UL — HIGH (ref 4.2–5.8)
RBC # FLD: 17.1 % — HIGH (ref 10.3–14.5)
RBC BLD AUTO: ABNORMAL
SODIUM SERPL-SCNC: 136 MMOL/L — SIGNIFICANT CHANGE UP (ref 135–145)
WBC # BLD: 12.99 K/UL — HIGH (ref 3.8–10.5)
WBC # FLD AUTO: 12.99 K/UL — HIGH (ref 3.8–10.5)

## 2023-06-22 PROCEDURE — 99024 POSTOP FOLLOW-UP VISIT: CPT

## 2023-06-22 PROCEDURE — 50548 LAPARO REMOVE W/URETER: CPT | Mod: RT

## 2023-06-22 PROCEDURE — C1889: CPT

## 2023-06-22 PROCEDURE — 88307 TISSUE EXAM BY PATHOLOGIST: CPT | Mod: 26

## 2023-06-22 PROCEDURE — 85027 COMPLETE CBC AUTOMATED: CPT

## 2023-06-22 PROCEDURE — 87086 URINE CULTURE/COLONY COUNT: CPT

## 2023-06-22 PROCEDURE — 88307 TISSUE EXAM BY PATHOLOGIST: CPT

## 2023-06-22 PROCEDURE — 82962 GLUCOSE BLOOD TEST: CPT

## 2023-06-22 PROCEDURE — 84100 ASSAY OF PHOSPHORUS: CPT

## 2023-06-22 PROCEDURE — S2900: CPT

## 2023-06-22 PROCEDURE — 36415 COLL VENOUS BLD VENIPUNCTURE: CPT

## 2023-06-22 PROCEDURE — 85025 COMPLETE CBC W/AUTO DIFF WBC: CPT

## 2023-06-22 PROCEDURE — 50548 LAPARO REMOVE W/URETER: CPT | Mod: AS,RT

## 2023-06-22 PROCEDURE — 93970 EXTREMITY STUDY: CPT

## 2023-06-22 PROCEDURE — 83735 ASSAY OF MAGNESIUM: CPT

## 2023-06-22 PROCEDURE — 80048 BASIC METABOLIC PNL TOTAL CA: CPT

## 2023-06-22 RX ORDER — FINASTERIDE 5 MG/1
1 TABLET, FILM COATED ORAL
Qty: 0 | Refills: 0 | DISCHARGE

## 2023-06-22 RX ORDER — DILTIAZEM HCL 120 MG
300 CAPSULE, EXT RELEASE 24 HR ORAL DAILY
Refills: 0 | Status: DISCONTINUED | OUTPATIENT
Start: 2023-06-22 | End: 2023-06-24

## 2023-06-22 RX ORDER — ONDANSETRON 8 MG/1
4 TABLET, FILM COATED ORAL ONCE
Refills: 0 | Status: DISCONTINUED | OUTPATIENT
Start: 2023-06-22 | End: 2023-06-22

## 2023-06-22 RX ORDER — SODIUM CHLORIDE 9 MG/ML
1000 INJECTION INTRAMUSCULAR; INTRAVENOUS; SUBCUTANEOUS
Refills: 0 | Status: DISCONTINUED | OUTPATIENT
Start: 2023-06-22 | End: 2023-06-24

## 2023-06-22 RX ORDER — OXYCODONE HYDROCHLORIDE 5 MG/1
5 TABLET ORAL EVERY 6 HOURS
Refills: 0 | Status: DISCONTINUED | OUTPATIENT
Start: 2023-06-22 | End: 2023-06-24

## 2023-06-22 RX ORDER — ACETAMINOPHEN 500 MG
1000 TABLET ORAL ONCE
Refills: 0 | Status: COMPLETED | OUTPATIENT
Start: 2023-06-23 | End: 2023-06-23

## 2023-06-22 RX ORDER — ONDANSETRON 8 MG/1
4 TABLET, FILM COATED ORAL EVERY 6 HOURS
Refills: 0 | Status: DISCONTINUED | OUTPATIENT
Start: 2023-06-22 | End: 2023-06-24

## 2023-06-22 RX ORDER — OXYCODONE HYDROCHLORIDE 5 MG/1
10 TABLET ORAL EVERY 6 HOURS
Refills: 0 | Status: DISCONTINUED | OUTPATIENT
Start: 2023-06-22 | End: 2023-06-24

## 2023-06-22 RX ORDER — DEXTROSE 50 % IN WATER 50 %
25 SYRINGE (ML) INTRAVENOUS ONCE
Refills: 0 | Status: DISCONTINUED | OUTPATIENT
Start: 2023-06-22 | End: 2023-06-24

## 2023-06-22 RX ORDER — CEFAZOLIN SODIUM 1 G
500 VIAL (EA) INJECTION EVERY 8 HOURS
Refills: 0 | Status: DISCONTINUED | OUTPATIENT
Start: 2023-06-22 | End: 2023-06-22

## 2023-06-22 RX ORDER — HYDROMORPHONE HYDROCHLORIDE 2 MG/ML
0.3 INJECTION INTRAMUSCULAR; INTRAVENOUS; SUBCUTANEOUS
Refills: 0 | Status: DISCONTINUED | OUTPATIENT
Start: 2023-06-22 | End: 2023-06-22

## 2023-06-22 RX ORDER — FINASTERIDE 5 MG/1
5 TABLET, FILM COATED ORAL DAILY
Refills: 0 | Status: DISCONTINUED | OUTPATIENT
Start: 2023-06-22 | End: 2023-06-24

## 2023-06-22 RX ORDER — ATORVASTATIN CALCIUM 80 MG/1
10 TABLET, FILM COATED ORAL AT BEDTIME
Refills: 0 | Status: DISCONTINUED | OUTPATIENT
Start: 2023-06-23 | End: 2023-06-24

## 2023-06-22 RX ORDER — SODIUM CHLORIDE 9 MG/ML
1000 INJECTION, SOLUTION INTRAVENOUS
Refills: 0 | Status: DISCONTINUED | OUTPATIENT
Start: 2023-06-22 | End: 2023-06-22

## 2023-06-22 RX ORDER — HEPARIN SODIUM 5000 [USP'U]/ML
5000 INJECTION INTRAVENOUS; SUBCUTANEOUS EVERY 12 HOURS
Refills: 0 | Status: DISCONTINUED | OUTPATIENT
Start: 2023-06-22 | End: 2023-06-24

## 2023-06-22 RX ORDER — ESCITALOPRAM OXALATE 10 MG/1
1 TABLET, FILM COATED ORAL
Qty: 0 | Refills: 0 | DISCHARGE

## 2023-06-22 RX ORDER — OXYCODONE HYDROCHLORIDE 5 MG/1
5 TABLET ORAL EVERY 4 HOURS
Refills: 0 | Status: DISCONTINUED | OUTPATIENT
Start: 2023-06-22 | End: 2023-06-22

## 2023-06-22 RX ORDER — ACETAMINOPHEN 500 MG
1000 TABLET ORAL ONCE
Refills: 0 | Status: COMPLETED | OUTPATIENT
Start: 2023-06-22 | End: 2023-06-22

## 2023-06-22 RX ORDER — TAMSULOSIN HYDROCHLORIDE 0.4 MG/1
1 CAPSULE ORAL
Qty: 0 | Refills: 0 | DISCHARGE

## 2023-06-22 RX ORDER — ESCITALOPRAM OXALATE 10 MG/1
20 TABLET, FILM COATED ORAL DAILY
Refills: 0 | Status: DISCONTINUED | OUTPATIENT
Start: 2023-06-22 | End: 2023-06-24

## 2023-06-22 RX ORDER — POTASSIUM CHLORIDE 20 MEQ
2 PACKET (EA) ORAL
Refills: 0 | DISCHARGE

## 2023-06-22 RX ORDER — DEXTROSE 50 % IN WATER 50 %
15 SYRINGE (ML) INTRAVENOUS ONCE
Refills: 0 | Status: DISCONTINUED | OUTPATIENT
Start: 2023-06-22 | End: 2023-06-24

## 2023-06-22 RX ORDER — HEPARIN SODIUM 5000 [USP'U]/ML
5000 INJECTION INTRAVENOUS; SUBCUTANEOUS THREE TIMES A DAY
Refills: 0 | Status: DISCONTINUED | OUTPATIENT
Start: 2023-06-22 | End: 2023-06-22

## 2023-06-22 RX ORDER — SODIUM CHLORIDE 9 MG/ML
1000 INJECTION, SOLUTION INTRAVENOUS
Refills: 0 | Status: DISCONTINUED | OUTPATIENT
Start: 2023-06-22 | End: 2023-06-24

## 2023-06-22 RX ORDER — INSULIN LISPRO 100/ML
VIAL (ML) SUBCUTANEOUS
Refills: 0 | Status: DISCONTINUED | OUTPATIENT
Start: 2023-06-22 | End: 2023-06-24

## 2023-06-22 RX ORDER — FENTANYL CITRATE 50 UG/ML
25 INJECTION INTRAVENOUS
Refills: 0 | Status: DISCONTINUED | OUTPATIENT
Start: 2023-06-22 | End: 2023-06-22

## 2023-06-22 RX ORDER — TAMSULOSIN HYDROCHLORIDE 0.4 MG/1
0.4 CAPSULE ORAL AT BEDTIME
Refills: 0 | Status: DISCONTINUED | OUTPATIENT
Start: 2023-06-23 | End: 2023-06-24

## 2023-06-22 RX ORDER — DEXTROSE 50 % IN WATER 50 %
12.5 SYRINGE (ML) INTRAVENOUS ONCE
Refills: 0 | Status: DISCONTINUED | OUTPATIENT
Start: 2023-06-22 | End: 2023-06-24

## 2023-06-22 RX ORDER — HYDROMORPHONE HYDROCHLORIDE 2 MG/ML
0.2 INJECTION INTRAMUSCULAR; INTRAVENOUS; SUBCUTANEOUS EVERY 6 HOURS
Refills: 0 | Status: DISCONTINUED | OUTPATIENT
Start: 2023-06-22 | End: 2023-06-24

## 2023-06-22 RX ORDER — DILTIAZEM HCL 120 MG
1 CAPSULE, EXT RELEASE 24 HR ORAL
Qty: 0 | Refills: 0 | DISCHARGE

## 2023-06-22 RX ORDER — CEFAZOLIN SODIUM 1 G
500 VIAL (EA) INJECTION EVERY 8 HOURS
Refills: 0 | Status: COMPLETED | OUTPATIENT
Start: 2023-06-22 | End: 2023-06-23

## 2023-06-22 RX ORDER — OXYCODONE AND ACETAMINOPHEN 5; 325 MG/1; MG/1
1 TABLET ORAL EVERY 4 HOURS
Refills: 0 | Status: DISCONTINUED | OUTPATIENT
Start: 2023-06-22 | End: 2023-06-22

## 2023-06-22 RX ORDER — GLUCAGON INJECTION, SOLUTION 0.5 MG/.1ML
1 INJECTION, SOLUTION SUBCUTANEOUS ONCE
Refills: 0 | Status: DISCONTINUED | OUTPATIENT
Start: 2023-06-22 | End: 2023-06-24

## 2023-06-22 RX ADMIN — OXYCODONE HYDROCHLORIDE 5 MILLIGRAM(S): 5 TABLET ORAL at 19:11

## 2023-06-22 RX ADMIN — Medication 1000 MILLIGRAM(S): at 22:27

## 2023-06-22 RX ADMIN — HYDROMORPHONE HYDROCHLORIDE 0.3 MILLIGRAM(S): 2 INJECTION INTRAMUSCULAR; INTRAVENOUS; SUBCUTANEOUS at 20:14

## 2023-06-22 RX ADMIN — OXYCODONE HYDROCHLORIDE 10 MILLIGRAM(S): 5 TABLET ORAL at 22:41

## 2023-06-22 RX ADMIN — Medication 500 MILLIGRAM(S): at 22:39

## 2023-06-22 RX ADMIN — OXYCODONE HYDROCHLORIDE 10 MILLIGRAM(S): 5 TABLET ORAL at 22:11

## 2023-06-22 RX ADMIN — HEPARIN SODIUM 5000 UNIT(S): 5000 INJECTION INTRAVENOUS; SUBCUTANEOUS at 21:57

## 2023-06-22 RX ADMIN — FENTANYL CITRATE 25 MICROGRAM(S): 50 INJECTION INTRAVENOUS at 19:26

## 2023-06-22 RX ADMIN — OXYCODONE HYDROCHLORIDE 5 MILLIGRAM(S): 5 TABLET ORAL at 19:41

## 2023-06-22 RX ADMIN — SODIUM CHLORIDE 125 MILLILITER(S): 9 INJECTION INTRAMUSCULAR; INTRAVENOUS; SUBCUTANEOUS at 21:56

## 2023-06-22 RX ADMIN — Medication 400 MILLIGRAM(S): at 21:57

## 2023-06-22 RX ADMIN — SODIUM CHLORIDE 100 MILLILITER(S): 9 INJECTION, SOLUTION INTRAVENOUS at 18:56

## 2023-06-22 RX ADMIN — FENTANYL CITRATE 25 MICROGRAM(S): 50 INJECTION INTRAVENOUS at 18:56

## 2023-06-22 NOTE — PROGRESS NOTE ADULT - SUBJECTIVE AND OBJECTIVE BOX
82y Male with hx of renal mass POD#0 s/p Robot-assisted right nephroureterectomy  Patient seen and examined at bedside. hard of hearing, requiring hearing aids. pain controlled. tolerating water. not yet passing flatus. harding in situ. denies dizziness, SOB, CP or palpitations    ceFAZolin  Injectable. 500 milliGRAM(s) IV Push every 8 hours  dextrose 5%. 1000 milliLiter(s) IV Continuous <Continuous>  dextrose 5%. 1000 milliLiter(s) IV Continuous <Continuous>  dextrose 50% Injectable 25 Gram(s) IV Push once  dextrose 50% Injectable 12.5 Gram(s) IV Push once  dextrose 50% Injectable 25 Gram(s) IV Push once  dextrose Oral Gel 15 Gram(s) Oral once PRN  diltiazem    milliGRAM(s) Oral daily  escitalopram 20 milliGRAM(s) Oral daily  finasteride 5 milliGRAM(s) Oral daily  glucagon  Injectable 1 milliGRAM(s) IntraMuscular once  heparin   Injectable 5000 Unit(s) SubCutaneous every 12 hours  HYDROmorphone  Injectable 0.2 milliGRAM(s) IV Push every 6 hours PRN  insulin lispro (ADMELOG) corrective regimen sliding scale   SubCutaneous three times a day before meals  ondansetron Injectable 4 milliGRAM(s) IV Push every 6 hours PRN  oxyCODONE    IR 10 milliGRAM(s) Oral every 6 hours PRN  oxyCODONE    IR 5 milliGRAM(s) Oral every 6 hours PRN  sodium chloride 0.9%. 1000 milliLiter(s) IV Continuous <Continuous>      Vital Signs Last 24 Hrs  T(C): 36.6 (22 Jun 2023 21:02), Max: 37.3 (22 Jun 2023 18:15)  T(F): 97.8 (22 Jun 2023 21:02), Max: 99.1 (22 Jun 2023 18:15)  HR: 84 (22 Jun 2023 21:02) (81 - 92)  BP: 130/72 (22 Jun 2023 21:02) (117/77 - 154/97)  BP(mean): --  RR: 18 (22 Jun 2023 21:02) (11 - 18)  SpO2: 97% (22 Jun 2023 21:02) (93% - 100%)    Parameters below as of 22 Jun 2023 21:02  Patient On (Oxygen Delivery Method): room air        I&O's Summary    22 Jun 2023 07:01  -  22 Jun 2023 22:36  --------------------------------------------------------  IN: 1600 mL / OUT: 205 mL / NET: 1395 mL        Physical Exam  Gen: NAD, comfortable in bed  Neuro: A&Ox3  Lungs: CTAB  CV: S1/S2, RRR  Abd: Soft, slightly distended. + incisional tenderness, no rebound no guarding.   : harding in place + 400 cc clear urine   Extremities: Venodynes in place. No LE edema bl                          11.8   12.99 )-----------( 173      ( 22 Jun 2023 19:21 )             37.9       06-22    136  |  102  |  20  ----------------------------<  163<H>  3.7   |  26  |  1.97<H>    Ca    8.8      22 Jun 2023 19:21

## 2023-06-22 NOTE — PROGRESS NOTE ADULT - ASSESSMENT
A/P: 82y  Male POD #0  s/p Robot-assisted right nephroureterectomy      ADAT  Strict I&O's  Pain controlled  DVT prophylaxis/OOB  Encourage Incentive spirometry  f/u AM labs    Urology  Cecy Tristan PA-C

## 2023-06-22 NOTE — PATIENT PROFILE ADULT - FALL HARM RISK - UNIVERSAL INTERVENTIONS
Bed in lowest position, wheels locked, appropriate side rails in place/Call bell, personal items and telephone in reach/Instruct patient to call for assistance before getting out of bed or chair/Non-slip footwear when patient is out of bed/[place] to call system/Physically safe environment - no spills, clutter or unnecessary equipment/Purposeful Proactive Rounding/Room/bathroom lighting operational, light cord in reach

## 2023-06-22 NOTE — ASU PREOP CHECKLIST - BSA (M2)
No chief complaint on file.      HISTORY OF PRESENT ILLNESS:  Carlos Alberto is a 89 year old male patient who is seen today in follow-up for EMG results. He did have an insect bite awhile ago that became infected and states that's when the numbness reoccurred. He had a previous carpal tunnel in 2016 of the right hand. He had no relief from the 2016 surgery.He reports his middle digit is completely numb, this is a new symptom.  He reports his left hand has some tingling still since his carpal tunnel in (9/27/22) but has relief from the numbness.    ALLERGIES:  Codeine and Simvastatin    REVIEW OF SYSTEMS:    No recent chest pain or shortness of breath.    PHYSICAL EXAMINATION:    Constitutional:  Well developed, well nourished, in no acute distress.   Vital Signs:  There were no vitals taken for this visit., There is no height or weight on file to calculate BMI.    Extremities:   Moderate atrophy of right hand  Phalans Test: Positive  Tinsel Test: Positive    Neurologic:  Coordination is intact and symmetric in all four extremities.  Sensation to light touch is intact, equal and symmetric in both upper and lower extremities.  The patient is alert and oriented and has an appropriate mood and affect.     DIAGNOSTIC STUDIES:  EMG Results (12/8/22)    Findings:   1. The right median CMAP distal latency is moderately prolonged at 5.9 milliseconds with a severely reduced amplitude of 2.9 mV.  Normal forearm conduction velocity of 42 m/sec.  F-wave latency is moderately prolonged at 34 milliseconds.   2. The right ulnar CMAP distal latency is normal at 2.4 milliseconds with normal CMAP amplitude of 8 mV.  Conduction velocity 54 m/sec in the forearm segment and 56 m/sec across the elbow.  Amplitude drops at the below elbow stimulation site down to 5.3 mV and above the elbow at 4.9 mV.  F-wave latency is normal at 30 milliseconds.   3. The right median snap response not reproducible at the 2nd digit.   4. The right ulnar peak sensory  latency is normal at 2.9 milliseconds with a moderately reduced amplitude of 8 microvolts.   5. The right sural snap responses not reproducible.     6. Needle EMG examination of the right upper extremity muscles including the right deltoid, biceps, triceps, flexor carpi radialis, 1st dorsal interosseous showed normal age-appropriate motor unit potential configuration and recruitment pattern.  The right APB shows mild reduced recruitment with out significant altered motor unit potential morphology.  No evidence of increased spontaneous activity.     Impression:   This is an abnormal study indicating a prolonged right median CMAP distal latency across the wrist with moderate reduction in the CMAP amplitudes recording at the APB and absence of the right median snap response at the 2nd digit.  These could represent residual findings from prior median nerve entrapment or suggestive of recurrent right median nerve entrapment at the level of the wrist.  I do not have a comparison study from after the carpal tunnel decompression on the right.     IMPRESSION:    Carpal Tunnel Syndrome of Right Hand.    PLAN:    Discussed patient's physical exam, pertinent findings, reviewed diagnostic imaging results with patient and discussed treatment plan options.     I discussed the entirety of the procedure including informed consent, major risks and benefits, the surgical procedure approach and process, pre-op informational classes and materials used. I discussed long term outcomes for carpal tunnel release including lingering weakness, numbness and tingling that may be present for up to 18 months post operatively. I discussed in detail that the nerves takes longer to heal and that these concerns will slowly subside. I reviewed his/her EMG showing carpal tunnel syndrome and stated that due to severity of the carpal tunnel syndrome his/her  strength, tingling and sensation may take longer to heal. He seems to be reassured after this  conversation. Patient has elected to proceed with this treatment option. He will be scheduled for right carpal tunnel release.     Patient understands and agrees to plan, all questions have been answered.  I have advised the patient to call the office with any questions or concerns.     On 12/27/2022, IChristi scribed the services personally performed by Juancho Hook MD.     2.37

## 2023-06-23 LAB
ANION GAP SERPL CALC-SCNC: 4 MMOL/L — LOW (ref 5–17)
ANION GAP SERPL CALC-SCNC: 6 MMOL/L — SIGNIFICANT CHANGE UP (ref 5–17)
ANISOCYTOSIS BLD QL: SLIGHT — SIGNIFICANT CHANGE UP
BASOPHILS # BLD AUTO: 0.01 K/UL — SIGNIFICANT CHANGE UP (ref 0–0.2)
BASOPHILS # BLD AUTO: 0.01 K/UL — SIGNIFICANT CHANGE UP (ref 0–0.2)
BASOPHILS NFR BLD AUTO: 0.1 % — SIGNIFICANT CHANGE UP (ref 0–2)
BASOPHILS NFR BLD AUTO: 0.1 % — SIGNIFICANT CHANGE UP (ref 0–2)
BILIRUB UR QL STRIP: NORMAL
BUN SERPL-MCNC: 21 MG/DL — SIGNIFICANT CHANGE UP (ref 7–23)
BUN SERPL-MCNC: 21 MG/DL — SIGNIFICANT CHANGE UP (ref 7–23)
CALCIUM SERPL-MCNC: 8.1 MG/DL — LOW (ref 8.5–10.1)
CALCIUM SERPL-MCNC: 8.3 MG/DL — LOW (ref 8.5–10.1)
CHLORIDE SERPL-SCNC: 100 MMOL/L — SIGNIFICANT CHANGE UP (ref 96–108)
CHLORIDE SERPL-SCNC: 101 MMOL/L — SIGNIFICANT CHANGE UP (ref 96–108)
CO2 SERPL-SCNC: 27 MMOL/L — SIGNIFICANT CHANGE UP (ref 22–31)
CO2 SERPL-SCNC: 30 MMOL/L — SIGNIFICANT CHANGE UP (ref 22–31)
CREAT SERPL-MCNC: 2.03 MG/DL — HIGH (ref 0.5–1.3)
CREAT SERPL-MCNC: 2.08 MG/DL — HIGH (ref 0.5–1.3)
EGFR: 32 ML/MIN/1.73M2 — LOW
EGFR: 33 ML/MIN/1.73M2 — LOW
ELLIPTOCYTES BLD QL SMEAR: SLIGHT — SIGNIFICANT CHANGE UP
EOSINOPHIL # BLD AUTO: 0 K/UL — SIGNIFICANT CHANGE UP (ref 0–0.5)
EOSINOPHIL # BLD AUTO: 0 K/UL — SIGNIFICANT CHANGE UP (ref 0–0.5)
EOSINOPHIL NFR BLD AUTO: 0 % — SIGNIFICANT CHANGE UP (ref 0–6)
EOSINOPHIL NFR BLD AUTO: 0 % — SIGNIFICANT CHANGE UP (ref 0–6)
GLUCOSE BLDC GLUCOMTR-MCNC: 142 MG/DL — HIGH (ref 70–99)
GLUCOSE BLDC GLUCOMTR-MCNC: 160 MG/DL — HIGH (ref 70–99)
GLUCOSE BLDC GLUCOMTR-MCNC: 201 MG/DL — HIGH (ref 70–99)
GLUCOSE SERPL-MCNC: 159 MG/DL — HIGH (ref 70–99)
GLUCOSE SERPL-MCNC: 166 MG/DL — HIGH (ref 70–99)
GLUCOSE UR-MCNC: NORMAL
HCG UR QL: 0.2 EU/DL
HCT VFR BLD CALC: 34.2 % — LOW (ref 39–50)
HCT VFR BLD CALC: 34.6 % — LOW (ref 39–50)
HGB BLD-MCNC: 10.6 G/DL — LOW (ref 13–17)
HGB BLD-MCNC: 10.6 G/DL — LOW (ref 13–17)
HGB UR QL STRIP.AUTO: ABNORMAL
IMM GRANULOCYTES NFR BLD AUTO: 0.4 % — SIGNIFICANT CHANGE UP (ref 0–0.9)
IMM GRANULOCYTES NFR BLD AUTO: 0.6 % — SIGNIFICANT CHANGE UP (ref 0–0.9)
KETONES UR-MCNC: NORMAL
LEUKOCYTE ESTERASE UR QL STRIP: ABNORMAL
LYMPHOCYTES # BLD AUTO: 0.67 K/UL — LOW (ref 1–3.3)
LYMPHOCYTES # BLD AUTO: 0.71 K/UL — LOW (ref 1–3.3)
LYMPHOCYTES # BLD AUTO: 6 % — LOW (ref 13–44)
LYMPHOCYTES # BLD AUTO: 6.4 % — LOW (ref 13–44)
MANUAL SMEAR VERIFICATION: SIGNIFICANT CHANGE UP
MCHC RBC-ENTMCNC: 19.6 PG — LOW (ref 27–34)
MCHC RBC-ENTMCNC: 19.7 PG — LOW (ref 27–34)
MCHC RBC-ENTMCNC: 30.6 GM/DL — LOW (ref 32–36)
MCHC RBC-ENTMCNC: 31 GM/DL — LOW (ref 32–36)
MCV RBC AUTO: 63.5 FL — LOW (ref 80–100)
MCV RBC AUTO: 64 FL — LOW (ref 80–100)
MICROCYTES BLD QL: SIGNIFICANT CHANGE UP
MONOCYTES # BLD AUTO: 0.61 K/UL — SIGNIFICANT CHANGE UP (ref 0–0.9)
MONOCYTES # BLD AUTO: 0.68 K/UL — SIGNIFICANT CHANGE UP (ref 0–0.9)
MONOCYTES NFR BLD AUTO: 5.5 % — SIGNIFICANT CHANGE UP (ref 2–14)
MONOCYTES NFR BLD AUTO: 6.1 % — SIGNIFICANT CHANGE UP (ref 2–14)
NEUTROPHILS # BLD AUTO: 9.68 K/UL — HIGH (ref 1.8–7.4)
NEUTROPHILS # BLD AUTO: 9.74 K/UL — HIGH (ref 1.8–7.4)
NEUTROPHILS NFR BLD AUTO: 87.2 % — HIGH (ref 43–77)
NEUTROPHILS NFR BLD AUTO: 87.6 % — HIGH (ref 43–77)
NITRITE UR QL STRIP: NORMAL
OVALOCYTES BLD QL SMEAR: SLIGHT — SIGNIFICANT CHANGE UP
PH UR STRIP: 5.5
PLAT MORPH BLD: NORMAL — SIGNIFICANT CHANGE UP
PLATELET # BLD AUTO: 178 K/UL — SIGNIFICANT CHANGE UP (ref 150–400)
PLATELET # BLD AUTO: 180 K/UL — SIGNIFICANT CHANGE UP (ref 150–400)
POIKILOCYTOSIS BLD QL AUTO: SIGNIFICANT CHANGE UP
POTASSIUM SERPL-MCNC: 3.9 MMOL/L — SIGNIFICANT CHANGE UP (ref 3.5–5.3)
POTASSIUM SERPL-MCNC: 4.1 MMOL/L — SIGNIFICANT CHANGE UP (ref 3.5–5.3)
POTASSIUM SERPL-SCNC: 3.9 MMOL/L — SIGNIFICANT CHANGE UP (ref 3.5–5.3)
POTASSIUM SERPL-SCNC: 4.1 MMOL/L — SIGNIFICANT CHANGE UP (ref 3.5–5.3)
PROT UR STRIP-MCNC: ABNORMAL
RBC # BLD: 5.39 M/UL — SIGNIFICANT CHANGE UP (ref 4.2–5.8)
RBC # BLD: 5.41 M/UL — SIGNIFICANT CHANGE UP (ref 4.2–5.8)
RBC # FLD: 16.5 % — HIGH (ref 10.3–14.5)
RBC # FLD: 16.5 % — HIGH (ref 10.3–14.5)
RBC BLD AUTO: ABNORMAL
SCHISTOCYTES BLD QL AUTO: SLIGHT — SIGNIFICANT CHANGE UP
SODIUM SERPL-SCNC: 134 MMOL/L — LOW (ref 135–145)
SODIUM SERPL-SCNC: 134 MMOL/L — LOW (ref 135–145)
SP GR UR STRIP: 1.02
WBC # BLD: 11.05 K/UL — HIGH (ref 3.8–10.5)
WBC # BLD: 11.17 K/UL — HIGH (ref 3.8–10.5)
WBC # FLD AUTO: 11.05 K/UL — HIGH (ref 3.8–10.5)
WBC # FLD AUTO: 11.17 K/UL — HIGH (ref 3.8–10.5)

## 2023-06-23 PROCEDURE — 99221 1ST HOSP IP/OBS SF/LOW 40: CPT

## 2023-06-23 PROCEDURE — 99497 ADVNCD CARE PLAN 30 MIN: CPT | Mod: 25

## 2023-06-23 RX ORDER — ACETAMINOPHEN 500 MG
1000 TABLET ORAL ONCE
Refills: 0 | Status: COMPLETED | OUTPATIENT
Start: 2023-06-24 | End: 2023-06-24

## 2023-06-23 RX ORDER — ACETAMINOPHEN 500 MG
1000 TABLET ORAL ONCE
Refills: 0 | Status: COMPLETED | OUTPATIENT
Start: 2023-06-23 | End: 2023-06-23

## 2023-06-23 RX ADMIN — Medication 2: at 08:39

## 2023-06-23 RX ADMIN — SODIUM CHLORIDE 125 MILLILITER(S): 9 INJECTION INTRAMUSCULAR; INTRAVENOUS; SUBCUTANEOUS at 14:08

## 2023-06-23 RX ADMIN — ESCITALOPRAM OXALATE 20 MILLIGRAM(S): 10 TABLET, FILM COATED ORAL at 09:43

## 2023-06-23 RX ADMIN — SODIUM CHLORIDE 125 MILLILITER(S): 9 INJECTION INTRAMUSCULAR; INTRAVENOUS; SUBCUTANEOUS at 23:27

## 2023-06-23 RX ADMIN — Medication 400 MILLIGRAM(S): at 14:03

## 2023-06-23 RX ADMIN — TAMSULOSIN HYDROCHLORIDE 0.4 MILLIGRAM(S): 0.4 CAPSULE ORAL at 22:07

## 2023-06-23 RX ADMIN — OXYCODONE HYDROCHLORIDE 5 MILLIGRAM(S): 5 TABLET ORAL at 11:08

## 2023-06-23 RX ADMIN — Medication 1000 MILLIGRAM(S): at 22:22

## 2023-06-23 RX ADMIN — SODIUM CHLORIDE 125 MILLILITER(S): 9 INJECTION INTRAMUSCULAR; INTRAVENOUS; SUBCUTANEOUS at 06:03

## 2023-06-23 RX ADMIN — HEPARIN SODIUM 5000 UNIT(S): 5000 INJECTION INTRAVENOUS; SUBCUTANEOUS at 09:43

## 2023-06-23 RX ADMIN — Medication 1000 MILLIGRAM(S): at 14:18

## 2023-06-23 RX ADMIN — FINASTERIDE 5 MILLIGRAM(S): 5 TABLET, FILM COATED ORAL at 22:07

## 2023-06-23 RX ADMIN — Medication 1: at 13:00

## 2023-06-23 RX ADMIN — Medication 500 MILLIGRAM(S): at 06:03

## 2023-06-23 RX ADMIN — Medication 400 MILLIGRAM(S): at 22:07

## 2023-06-23 RX ADMIN — OXYCODONE HYDROCHLORIDE 5 MILLIGRAM(S): 5 TABLET ORAL at 10:38

## 2023-06-23 RX ADMIN — OXYCODONE HYDROCHLORIDE 10 MILLIGRAM(S): 5 TABLET ORAL at 19:25

## 2023-06-23 RX ADMIN — HEPARIN SODIUM 5000 UNIT(S): 5000 INJECTION INTRAVENOUS; SUBCUTANEOUS at 22:07

## 2023-06-23 RX ADMIN — Medication 400 MILLIGRAM(S): at 06:03

## 2023-06-23 RX ADMIN — ATORVASTATIN CALCIUM 10 MILLIGRAM(S): 80 TABLET, FILM COATED ORAL at 22:07

## 2023-06-23 RX ADMIN — Medication 300 MILLIGRAM(S): at 09:43

## 2023-06-23 NOTE — PROGRESS NOTE ADULT - SUBJECTIVE AND OBJECTIVE BOX
Progress Note- Urology    POST OPERATIVE DAY #:  1          Status Post: Robotic assisted right nephroureterectomy       SUBJECTIVE:   Pt c/o pain ~6/10 improved with meds. Flatus ++ no BM No NV HUNGRY     MEDICATIONS  (STANDING):  atorvastatin 10 milliGRAM(s) Oral at bedtime  dextrose 5%. 1000 milliLiter(s) (50 mL/Hr) IV Continuous <Continuous>  dextrose 5%. 1000 milliLiter(s) (100 mL/Hr) IV Continuous <Continuous>  dextrose 50% Injectable 12.5 Gram(s) IV Push once  dextrose 50% Injectable 25 Gram(s) IV Push once  dextrose 50% Injectable 25 Gram(s) IV Push once  diltiazem    milliGRAM(s) Oral daily  escitalopram 20 milliGRAM(s) Oral daily  finasteride 5 milliGRAM(s) Oral daily  glucagon  Injectable 1 milliGRAM(s) IntraMuscular once  heparin   Injectable 5000 Unit(s) SubCutaneous every 12 hours  insulin lispro (ADMELOG) corrective regimen sliding scale   SubCutaneous three times a day before meals  sodium chloride 0.9%. 1000 milliLiter(s) (125 mL/Hr) IV Continuous <Continuous>  tamsulosin 0.4 milliGRAM(s) Oral at bedtime    MEDICATIONS  (PRN):  dextrose Oral Gel 15 Gram(s) Oral once PRN Blood Glucose LESS THAN 70 milliGRAM(s)/deciliter  HYDROmorphone  Injectable 0.2 milliGRAM(s) IV Push every 6 hours PRN breakthrough pain  ondansetron Injectable 4 milliGRAM(s) IV Push every 6 hours PRN Nausea and/or Vomiting  oxyCODONE    IR 5 milliGRAM(s) Oral every 6 hours PRN Moderate Pain (4 - 6)  oxyCODONE    IR 10 milliGRAM(s) Oral every 6 hours PRN Severe Pain (7 - 10)      Vital Signs Last 24 Hrs  T(C): 36.6 (23 Jun 2023 09:00), Max: 37.3 (22 Jun 2023 18:15)  T(F): 97.8 (23 Jun 2023 09:00), Max: 99.1 (22 Jun 2023 18:15)  HR: 86 (23 Jun 2023 09:00) (81 - 92)  BP: 105/63 (23 Jun 2023 09:00) (105/63 - 154/97)  BP(mean): --  RR: 16 (23 Jun 2023 09:00) (11 - 18)  SpO2: 94% (23 Jun 2023 09:00) (93% - 100%)    Parameters below as of 23 Jun 2023 09:00  Patient On (Oxygen Delivery Method): room air        PHYSICAL EXAM:      Constitutional:   WDWN elderly male in NAD     Respiratory:    CTA     Cardiovascular:   RR S1S2 Nl no M    Gastrointestinal:    Multiple post sites all C/D/I  BS +  there is a fullness the RT flank/ lateral abdomen that is soft with no induration or ecchymosis Softly distended     Genitourinary:   Dillon in place- clear yellow urine      Rectal:    Extremities:    Vascular:    Neurological:    Skin:    Lymph Nodes:    Musculoskeletal:    Psychiatric:        I&O's Detail    22 Jun 2023 07:01  -  23 Jun 2023 07:00  --------------------------------------------------------  IN:    Other (mL): 1600 mL  Total IN: 1600 mL    OUT:    Indwelling Catheter - Urethral (mL): 1150 mL    Other (mL): 205 mL  Total OUT: 1355 mL    Total NET: 245 mL          LABS:                        10.6   11.17 )-----------( 180      ( 23 Jun 2023 07:34 )             34.6     06-23    134<L>  |  101  |  21  ----------------------------<  166<H>  3.9   |  27  |  2.08<H>    Ca    8.1<L>      23 Jun 2023 07:34        Urinalysis Basic - ( 23 Jun 2023 07:34 )    Color: x / Appearance: x / SG: x / pH: x  Gluc: 166 mg/dL / Ketone: x  / Bili: x / Urobili: x   Blood: x / Protein: x / Nitrite: x   Leuk Esterase: x / RBC: x / WBC x   Sq Epi: x / Non Sq Epi: x / Bacteria: x           Progress Note- Urology    POST OPERATIVE DAY #:  1          Status Post: Robotic assisted right nephroureterectomy       SUBJECTIVE:   Pt c/o pain ~6/10 improved with meds. Flatus ++ no BM No NV HUNGRY     MEDICATIONS  (STANDING):  atorvastatin 10 milliGRAM(s) Oral at bedtime  dextrose 5%. 1000 milliLiter(s) (50 mL/Hr) IV Continuous <Continuous>  dextrose 5%. 1000 milliLiter(s) (100 mL/Hr) IV Continuous <Continuous>  dextrose 50% Injectable 12.5 Gram(s) IV Push once  dextrose 50% Injectable 25 Gram(s) IV Push once  dextrose 50% Injectable 25 Gram(s) IV Push once  diltiazem    milliGRAM(s) Oral daily  escitalopram 20 milliGRAM(s) Oral daily  finasteride 5 milliGRAM(s) Oral daily  glucagon  Injectable 1 milliGRAM(s) IntraMuscular once  heparin   Injectable 5000 Unit(s) SubCutaneous every 12 hours  insulin lispro (ADMELOG) corrective regimen sliding scale   SubCutaneous three times a day before meals  sodium chloride 0.9%. 1000 milliLiter(s) (125 mL/Hr) IV Continuous <Continuous>  tamsulosin 0.4 milliGRAM(s) Oral at bedtime    MEDICATIONS  (PRN):  dextrose Oral Gel 15 Gram(s) Oral once PRN Blood Glucose LESS THAN 70 milliGRAM(s)/deciliter  HYDROmorphone  Injectable 0.2 milliGRAM(s) IV Push every 6 hours PRN breakthrough pain  ondansetron Injectable 4 milliGRAM(s) IV Push every 6 hours PRN Nausea and/or Vomiting  oxyCODONE    IR 5 milliGRAM(s) Oral every 6 hours PRN Moderate Pain (4 - 6)  oxyCODONE    IR 10 milliGRAM(s) Oral every 6 hours PRN Severe Pain (7 - 10)      Vital Signs Last 24 Hrs  T(C): 36.6 (23 Jun 2023 09:00), Max: 37.3 (22 Jun 2023 18:15)  T(F): 97.8 (23 Jun 2023 09:00), Max: 99.1 (22 Jun 2023 18:15)  HR: 86 (23 Jun 2023 09:00) (81 - 92)  BP: 105/63 (23 Jun 2023 09:00) (105/63 - 154/97)  BP(mean): --  RR: 16 (23 Jun 2023 09:00) (11 - 18)  SpO2: 94% (23 Jun 2023 09:00) (93% - 100%)    Parameters below as of 23 Jun 2023 09:00  Patient On (Oxygen Delivery Method): room air        PHYSICAL EXAM:      Constitutional:   WDWN elderly male in NAD     Respiratory:    CTA     Cardiovascular:   RR S1S2 Nl no M    Gastrointestinal:    Multiple post sites all C/D/I  BS +  there is a fullness the RT flank/ lateral abdomen that is soft with no induration or ecchymosis Softly distended     Genitourinary:   Dillon in place- clear yellow urine    Extremities: no CCE    Vascular:   Ext warm     Neurological:   A and O x 4     I&O's Detail    22 Jun 2023 07:01  -  23 Jun 2023 07:00  --------------------------------------------------------  IN:    Other (mL): 1600 mL  Total IN: 1600 mL    OUT:    Indwelling Catheter - Urethral (mL): 1150 mL    Other (mL): 205 mL  Total OUT: 1355 mL    Total NET: 245 mL          LABS:                        10.6   11.17 )-----------( 180      ( 23 Jun 2023 07:34 )             34.6     06-23    134<L>  |  101  |  21  ----------------------------<  166<H>  3.9   |  27  |  2.08<H>    Ca    8.1<L>      23 Jun 2023 07:34        Urinalysis Basic - ( 23 Jun 2023 07:34 )    Color: x / Appearance: x / SG: x / pH: x  Gluc: 166 mg/dL / Ketone: x  / Bili: x / Urobili: x   Blood: x / Protein: x / Nitrite: x   Leuk Esterase: x / RBC: x / WBC x   Sq Epi: x / Non Sq Epi: x / Bacteria: x           Progress Note- Urology    POST OPERATIVE DAY #:  1          Status Post: Robotic assisted right nephroureterectomy       SUBJECTIVE:   Pt c/o pain ~6/10 improved with meds. no Flatus no BM No NV HUNGRY     MEDICATIONS  (STANDING):  atorvastatin 10 milliGRAM(s) Oral at bedtime  dextrose 5%. 1000 milliLiter(s) (50 mL/Hr) IV Continuous <Continuous>  dextrose 5%. 1000 milliLiter(s) (100 mL/Hr) IV Continuous <Continuous>  dextrose 50% Injectable 12.5 Gram(s) IV Push once  dextrose 50% Injectable 25 Gram(s) IV Push once  dextrose 50% Injectable 25 Gram(s) IV Push once  diltiazem    milliGRAM(s) Oral daily  escitalopram 20 milliGRAM(s) Oral daily  finasteride 5 milliGRAM(s) Oral daily  glucagon  Injectable 1 milliGRAM(s) IntraMuscular once  heparin   Injectable 5000 Unit(s) SubCutaneous every 12 hours  insulin lispro (ADMELOG) corrective regimen sliding scale   SubCutaneous three times a day before meals  sodium chloride 0.9%. 1000 milliLiter(s) (125 mL/Hr) IV Continuous <Continuous>  tamsulosin 0.4 milliGRAM(s) Oral at bedtime    MEDICATIONS  (PRN):  dextrose Oral Gel 15 Gram(s) Oral once PRN Blood Glucose LESS THAN 70 milliGRAM(s)/deciliter  HYDROmorphone  Injectable 0.2 milliGRAM(s) IV Push every 6 hours PRN breakthrough pain  ondansetron Injectable 4 milliGRAM(s) IV Push every 6 hours PRN Nausea and/or Vomiting  oxyCODONE    IR 5 milliGRAM(s) Oral every 6 hours PRN Moderate Pain (4 - 6)  oxyCODONE    IR 10 milliGRAM(s) Oral every 6 hours PRN Severe Pain (7 - 10)      Vital Signs Last 24 Hrs  T(C): 36.6 (23 Jun 2023 09:00), Max: 37.3 (22 Jun 2023 18:15)  T(F): 97.8 (23 Jun 2023 09:00), Max: 99.1 (22 Jun 2023 18:15)  HR: 86 (23 Jun 2023 09:00) (81 - 92)  BP: 105/63 (23 Jun 2023 09:00) (105/63 - 154/97)  BP(mean): --  RR: 16 (23 Jun 2023 09:00) (11 - 18)  SpO2: 94% (23 Jun 2023 09:00) (93% - 100%)    Parameters below as of 23 Jun 2023 09:00  Patient On (Oxygen Delivery Method): room air        PHYSICAL EXAM:      Constitutional:   WDWN elderly male in NAD     Respiratory:    CTA     Cardiovascular:   RR S1S2 Nl no M    Gastrointestinal:    Multiple post sites all C/D/I  BS +  there is a fullness the RT flank/ lateral abdomen that is soft with no induration or ecchymosis Softly distended     Genitourinary:   Dillon in place- clear yellow urine    Extremities: no CCE    Vascular:   Ext warm     Neurological:   A and O x 4     I&O's Detail    22 Jun 2023 07:01  -  23 Jun 2023 07:00  --------------------------------------------------------  IN:    Other (mL): 1600 mL  Total IN: 1600 mL    OUT:    Indwelling Catheter - Urethral (mL): 1150 mL    Other (mL): 205 mL  Total OUT: 1355 mL    Total NET: 245 mL          LABS:                        10.6   11.17 )-----------( 180      ( 23 Jun 2023 07:34 )             34.6     06-23    134<L>  |  101  |  21  ----------------------------<  166<H>  3.9   |  27  |  2.08<H>    Ca    8.1<L>      23 Jun 2023 07:34        Urinalysis Basic - ( 23 Jun 2023 07:34 )    Color: x / Appearance: x / SG: x / pH: x  Gluc: 166 mg/dL / Ketone: x  / Bili: x / Urobili: x   Blood: x / Protein: x / Nitrite: x   Leuk Esterase: x / RBC: x / WBC x   Sq Epi: x / Non Sq Epi: x / Bacteria: x

## 2023-06-23 NOTE — PROGRESS NOTE ADULT - ASSESSMENT
83yo male now POD 1 s/p Robot-assisted right nephroureterectomy   ·	Doing well  ·	Elevated creatinine- likely post op doubt СВЕТЛАНА  ·	Diet as harvinder  ·	OOB ambulating  ·	Hospitalist consult  ·	Continue Dillon for strict I and O    DW Dr. Mckinney  83yo male now POD 1 s/p Robot-assisted right nephroureterectomy   ·	Doing well  ·	Elevated creatinine- likely post op doubt СВЕТЛАНА  ·	Diet as harvinder  ·	OOB ambulating  ·	Hospitalist consult- ? resumption of Losartan in face of elevated Cr.   ·	Continue Dillon for strict I and O    DW Dr. Mckinney  83yo male now POD 1 s/p Robot-assisted right nephroureterectomy   ·	Doing well  ·	Elevated creatinine- likely post op doubt СВЕТЛАНА  ·	Diet as harvinder  ·	OOB ambulating  ·	Hospitalist consult- ? resumption of Losartan in face of elevated Cr.   ·	Continue Dillon for strict I and O  ·	AM labs ordered     DW Dr. Mckinney

## 2023-06-23 NOTE — CONSULT NOTE ADULT - SUBJECTIVE AND OBJECTIVE BOX
Chief Complaint: abdominal pain    HPI:    83 y/o Male with h/o Bladder cancer, depression, hypertension, back surgery, [place] (uses bilateral hearing aides), urolithiasis s/p stent, BPH, HTN, HLD, DM type 2 s/p admission at  3/2023 for flank pain right nephrostomy tube and removed 4/2023. Patient now presents to [facility] for scheduled cystoscopy ureteroscopy with biopsy on 6/9/23. Patient reports s/p ct scan for follow up kidney stone mass noted and advised to have a biopsy.         REVIEW OF SYSTEMS:    CONSTITUTIONAL: No weakness, fevers or chills  EYES/ENT: No visual changes;  No vertigo or throat pain   NECK: No pain or stiffness  RESPIRATORY: No cough, wheezing, hemoptysis; No shortness of breath  CARDIOVASCULAR: No chest pain or palpitations  GASTROINTESTINAL: No abdominal or epigastric pain. No nausea, vomiting, or hematemesis; No diarrhea or constipation. No melena or hematochezia.  GENITOURINARY: No dysuria, frequency or hematuria  NEUROLOGICAL: No numbness or weakness  SKIN: No itching, burning, rashes, or lesions   All other review of systems is negative unless indicated above    PAST MEDICAL & SURGICAL HISTORY:  Bladder cancer  2014      Uses hearing aid      HTN (hypertension)      Hypercholesterolemia      CA skin, basal cell      Depression      OA (osteoarthritis)      Lumbar spinal stenosis      Herniated nucleus pulposus, L5-S1      Kidney stones  Left      BPH (benign prostatic hyperplasia)      Type 2 diabetes mellitus      H/O hernia repair  left inguinal      History of bladder surgery  TURBT, cystoscopy--aprox 8 yrs ago, dx bladder cancer      S/P tonsillectomy      History of back surgery  TLIF      History of cystoscopy  Left kidney stones.  Left stent---4/20/22      Nephrostomy status          Social history : Denies ETOH use, IVDU, smoking   No pertinent history of stroke, MI, cancer , DM  in first degree relatives.    Vital Signs Last 24 Hrs  T(C): 36.6 (23 Jun 2023 09:00), Max: 37.3 (22 Jun 2023 18:15)  T(F): 97.8 (23 Jun 2023 09:00), Max: 99.1 (22 Jun 2023 18:15)  HR: 86 (23 Jun 2023 09:00) (81 - 92)  BP: 105/63 (23 Jun 2023 09:00) (105/63 - 154/97)  BP(mean): --  RR: 16 (23 Jun 2023 09:00) (11 - 18)  SpO2: 94% (23 Jun 2023 09:00) (93% - 100%)    Parameters below as of 23 Jun 2023 09:00  Patient On (Oxygen Delivery Method): room air        I&O's Summary    22 Jun 2023 07:01  -  23 Jun 2023 07:00  --------------------------------------------------------  IN: 1600 mL / OUT: 1355 mL / NET: 245 mL        CAPILLARY BLOOD GLUCOSE      POCT Blood Glucose.: 201 mg/dL (23 Jun 2023 08:06)  POCT Blood Glucose.: 171 mg/dL (22 Jun 2023 22:01)      PHYSICAL EXAM:    Constitutional: NAD, awake and alert, well-developed  HEENT: PERR, EOMI, Normal Hearing, MMM  Neck: Soft and supple, No LAD, No JVD  Respiratory: Breath sounds are clear bilaterally, No wheezing, rales or rhonchi  Cardiovascular: S1 and S2, regular rate and rhythm, no Murmurs, gallops or rubs  Gastrointestinal: Bowel Sounds present, soft, nontender, nondistended, no guarding, no rebound  Extremities: No peripheral edema  Vascular: 2+ peripheral pulses  Neurological: A/O x 3, no focal deficits  Musculoskeletal: 5/5 strength b/l upper and lower extremities  Skin: No rashes    Medications:  MEDICATIONS  (STANDING):  atorvastatin 10 milliGRAM(s) Oral at bedtime  dextrose 5%. 1000 milliLiter(s) (50 mL/Hr) IV Continuous <Continuous>  dextrose 5%. 1000 milliLiter(s) (100 mL/Hr) IV Continuous <Continuous>  dextrose 50% Injectable 25 Gram(s) IV Push once  dextrose 50% Injectable 12.5 Gram(s) IV Push once  dextrose 50% Injectable 25 Gram(s) IV Push once  diltiazem    milliGRAM(s) Oral daily  escitalopram 20 milliGRAM(s) Oral daily  finasteride 5 milliGRAM(s) Oral daily  glucagon  Injectable 1 milliGRAM(s) IntraMuscular once  heparin   Injectable 5000 Unit(s) SubCutaneous every 12 hours  insulin lispro (ADMELOG) corrective regimen sliding scale   SubCutaneous three times a day before meals  sodium chloride 0.9%. 1000 milliLiter(s) (125 mL/Hr) IV Continuous <Continuous>  tamsulosin 0.4 milliGRAM(s) Oral at bedtime      Labs: All Labs Reviewed:                        10.6   11.17 )-----------( 180      ( 23 Jun 2023 07:34 )             34.6     06-23    134<L>  |  101  |  21  ----------------------------<  166<H>  3.9   |  27  |  2.08<H>    Ca    8.1<L>      23 Jun 2023 07:34            Blood Culture:     RADIOLOGY/EKG: all reviewed     Assessment/Plan:    DVT PPX:    Advance Directive:  - Full code, diagnosis, prognosis discussed  - 17 minutes spend    Disposition:    Time Span:    Thank you for consult, will follow with you.  Chief Complaint: abdominal pain    HPI:    83 y/o Male with h/o Bladder cancer, depression, hypertension, back surgery, [place] (uses bilateral hearing aides), urolithiasis s/p stent, BPH, HTN, HLD, DM type 2 s/p admission at  3/2023 for flank pain right nephrostomy tube and removed 4/2023. Patient now presents for scheduled cystoscopy ureteroscopy with biopsy on 6/9/23. Patient reports s/p ct scan for follow up kidney stone mass noted and advised to have a biopsy.     6/22 s/p Robot-assisted right nephroureterectomy     6/23 - pt seen and examined, afebrile , + abdominal pain, tolerating po intake, + flatus , no BM, + some cough , plan discussed         REVIEW OF SYSTEMS:    CONSTITUTIONAL: No weakness, fevers or chills  EYES/ENT: No visual changes;  No vertigo or throat pain   NECK: No pain or stiffness  RESPIRATORY: No cough, wheezing, hemoptysis; No shortness of breath  CARDIOVASCULAR: No chest pain or palpitations  GASTROINTESTINAL: No abdominal or epigastric pain. No nausea, vomiting, or hematemesis; No diarrhea or constipation. No melena or hematochezia.  GENITOURINARY: No dysuria, frequency or hematuria  NEUROLOGICAL: No numbness or weakness  SKIN: No itching, burning, rashes, or lesions   All other review of systems is negative unless indicated above    PAST MEDICAL & SURGICAL HISTORY:  Bladder cancer 2014  Uses hearing aid  HTN (hypertension)  Hypercholesterolemia  CA skin, basal cell  Depression  OA (osteoarthritis)  Lumbar spinal stenosis  Herniated nucleus pulposus, L5-S1  Kidney stones Left  BPH (benign prostatic hyperplasia)  Type 2 diabetes mellitus  H/O hernia repair left inguinal  History of bladder surgery  TURBT, cystoscopy--aprox 8 yrs ago, dx bladder cancer  S/P tonsillectomy  History of back surgery TLIF  History of cystoscopy Left kidney stones.  Left stent---4/20/22  Nephrostomy status    Social history : Denies ETOH use, IVDU, smoking   No pertinent history of stroke,  DM  in first degree relatives.  MI and cancer in mother    Vital Signs Last 24 Hrs  T(C): 36.6 (23 Jun 2023 09:00), Max: 37.3 (22 Jun 2023 18:15)  T(F): 97.8 (23 Jun 2023 09:00), Max: 99.1 (22 Jun 2023 18:15)  HR: 86 (23 Jun 2023 09:00) (81 - 92)  BP: 105/63 (23 Jun 2023 09:00) (105/63 - 154/97)  BP(mean): --  RR: 16 (23 Jun 2023 09:00) (11 - 18)  SpO2: 94% (23 Jun 2023 09:00) (93% - 100%)    Parameters below as of 23 Jun 2023 09:00  Patient On (Oxygen Delivery Method): room air  I&O's Summary  22 Jun 2023 07:01  -  23 Jun 2023 07:00  --------------------------------------------------------  IN: 1600 mL / OUT: 1355 mL / NET: 245 mL  CAPILLARY BLOOD GLUCOSE  POCT Blood Glucose.: 201 mg/dL (23 Jun 2023 08:06)  POCT Blood Glucose.: 171 mg/dL (22 Jun 2023 22:01)      PHYSICAL EXAM:    Constitutional: NAD, awake and alert, well-developed  HEENT: PERR, EOMI, Normal Hearing, MMM  Neck: Soft and supple, No LAD, No JVD  Respiratory: Breath sounds are clear bilaterally, No wheezing, rales or rhonchi  Cardiovascular: S1 and S2, regular rate and rhythm, no Murmurs, gallops or rubs  Gastrointestinal: Bowel Sounds present, soft, nontender, nondistended, no guarding, no rebound, + Dillon , incisions d/c/i   Extremities: No peripheral edema  Vascular: 2+ peripheral pulses  Neurological: A/O x 3, no focal deficits  Musculoskeletal: 5/5 strength b/l upper and lower extremities  Skin: No rashes    Medications:  MEDICATIONS  (STANDING):  atorvastatin 10 milliGRAM(s) Oral at bedtime  dextrose 5%. 1000 milliLiter(s) (50 mL/Hr) IV Continuous <Continuous>  dextrose 5%. 1000 milliLiter(s) (100 mL/Hr) IV Continuous <Continuous>  dextrose 50% Injectable 25 Gram(s) IV Push once  dextrose 50% Injectable 12.5 Gram(s) IV Push once  dextrose 50% Injectable 25 Gram(s) IV Push once  diltiazem    milliGRAM(s) Oral daily  escitalopram 20 milliGRAM(s) Oral daily  finasteride 5 milliGRAM(s) Oral daily  glucagon  Injectable 1 milliGRAM(s) IntraMuscular once  heparin   Injectable 5000 Unit(s) SubCutaneous every 12 hours  insulin lispro (ADMELOG) corrective regimen sliding scale   SubCutaneous three times a day before meals  sodium chloride 0.9%. 1000 milliLiter(s) (125 mL/Hr) IV Continuous <Continuous>  tamsulosin 0.4 milliGRAM(s) Oral at bedtime      Labs: All Labs Reviewed:                        10.6 11.17 )-----------( 180      ( 23 Jun 2023 07:34 )             34.6     06-23    134<L>  |  101  |  21  ----------------------------<  166<H>  3.9   |  27  |  2.08<H>    Ca    8.1<L>      23 Jun 2023 07:34      Blood Culture:     RADIOLOGY/EKG: all reviewed    12 Lead ECG (03.02.23 @ 20:20) >  Ventricular Rate 94 BPM  Normal sinus rhythm  Normal ECG  When compared with ECG of 02-MAR-2023 05:11,  Premature ventricular complexes are no longer Present  NM interval has decreased    MEDICATIONS  (STANDING):  acetaminophen   IVPB .. 1000 milliGRAM(s) IV Intermittent once  atorvastatin 10 milliGRAM(s) Oral at bedtime  dextrose 5%. 1000 milliLiter(s) (100 mL/Hr) IV Continuous <Continuous>  dextrose 5%. 1000 milliLiter(s) (50 mL/Hr) IV Continuous <Continuous>  dextrose 50% Injectable 12.5 Gram(s) IV Push once  dextrose 50% Injectable 25 Gram(s) IV Push once  dextrose 50% Injectable 25 Gram(s) IV Push once  diltiazem    milliGRAM(s) Oral daily  escitalopram 20 milliGRAM(s) Oral daily  finasteride 5 milliGRAM(s) Oral daily  glucagon  Injectable 1 milliGRAM(s) IntraMuscular once  heparin   Injectable 5000 Unit(s) SubCutaneous every 12 hours  insulin lispro (ADMELOG) corrective regimen sliding scale   SubCutaneous three times a day before meals  sodium chloride 0.9%. 1000 milliLiter(s) (125 mL/Hr) IV Continuous <Continuous>  tamsulosin 0.4 milliGRAM(s) Oral at bedtime    MEDICATIONS  (PRN):  dextrose Oral Gel 15 Gram(s) Oral once PRN Blood Glucose LESS THAN 70 milliGRAM(s)/deciliter  HYDROmorphone  Injectable 0.2 milliGRAM(s) IV Push every 6 hours PRN breakthrough pain  ondansetron Injectable 4 milliGRAM(s) IV Push every 6 hours PRN Nausea and/or Vomiting  oxyCODONE    IR 10 milliGRAM(s) Oral every 6 hours PRN Severe Pain (7 - 10)  oxyCODONE    IR 5 milliGRAM(s) Oral every 6 hours PRN Moderate Pain (4 - 6)

## 2023-06-23 NOTE — CONSULT NOTE ADULT - ASSESSMENT
83 y/o Male with h/o Bladder cancer, depression, hypertension, back surgery, [place] (uses bilateral hearing aides), urolithiasis s/p stent, BPH, HTN, HLD, DM type 2 s/p admission at  3/2023 for flank pain right nephrostomy tube and removed 4/2023.Patient reports s/p ct scan for follow up kidney stone mass noted and advised to have a biopsy.     Assessment/Plan:    Right kidney mass  s/p Robot-assisted right nephroureterectomy  on 6/22/23   - management as per urology   - follow pathology report  - consider oncology consult    СВЕТЛАНА with baseline cr 1.4   - Creatinine Trend: 2.08<--, 2.03<--, 1.97<--, 1.40<--  - hold losartan  - c/w IV fluids    HTN   - well controlled with Cardizem   - hold losartan   - if BP elevated hydralazine 10 IV q6h prn for SBP >140 can be used or 25 mg PO q6h     DM2 with A1C 6.4  - on metformin at home  - given СВЕТЛАНА hold for now  - diet, ISS    BPH - c/w tamsulosin and finasteride    HLD - c/w satin    Mood disorder - c/w SSRI      DVT PPX: as per surgery     Advance Directive:  - Full code, diagnosis, prognosis   - 17 minutes spend    Disposition: as per surgery     Time Span: 75 min    Thank you for consult, will follow with you.

## 2023-06-24 ENCOUNTER — TRANSCRIPTION ENCOUNTER (OUTPATIENT)
Age: 83
End: 2023-06-24

## 2023-06-24 VITALS
TEMPERATURE: 98 F | RESPIRATION RATE: 18 BRPM | HEART RATE: 68 BPM | DIASTOLIC BLOOD PRESSURE: 89 MMHG | SYSTOLIC BLOOD PRESSURE: 141 MMHG | OXYGEN SATURATION: 97 %

## 2023-06-24 LAB
ANION GAP SERPL CALC-SCNC: 4 MMOL/L — LOW (ref 5–17)
BUN SERPL-MCNC: 23 MG/DL — SIGNIFICANT CHANGE UP (ref 7–23)
CALCIUM SERPL-MCNC: 8.4 MG/DL — LOW (ref 8.5–10.1)
CHLORIDE SERPL-SCNC: 106 MMOL/L — SIGNIFICANT CHANGE UP (ref 96–108)
CO2 SERPL-SCNC: 29 MMOL/L — SIGNIFICANT CHANGE UP (ref 22–31)
CREAT SERPL-MCNC: 2.12 MG/DL — HIGH (ref 0.5–1.3)
EGFR: 31 ML/MIN/1.73M2 — LOW
GLUCOSE BLDC GLUCOMTR-MCNC: 119 MG/DL — HIGH (ref 70–99)
GLUCOSE BLDC GLUCOMTR-MCNC: 130 MG/DL — HIGH (ref 70–99)
GLUCOSE BLDC GLUCOMTR-MCNC: 137 MG/DL — HIGH (ref 70–99)
GLUCOSE SERPL-MCNC: 124 MG/DL — HIGH (ref 70–99)
HCT VFR BLD CALC: 34 % — LOW (ref 39–50)
HGB BLD-MCNC: 10.1 G/DL — LOW (ref 13–17)
MAGNESIUM SERPL-MCNC: 1.8 MG/DL — SIGNIFICANT CHANGE UP (ref 1.6–2.6)
MCHC RBC-ENTMCNC: 19.7 PG — LOW (ref 27–34)
MCHC RBC-ENTMCNC: 29.7 GM/DL — LOW (ref 32–36)
MCV RBC AUTO: 66.3 FL — LOW (ref 80–100)
PHOSPHATE SERPL-MCNC: 2.9 MG/DL — SIGNIFICANT CHANGE UP (ref 2.5–4.5)
PLATELET # BLD AUTO: 144 K/UL — LOW (ref 150–400)
POTASSIUM SERPL-MCNC: 3.5 MMOL/L — SIGNIFICANT CHANGE UP (ref 3.5–5.3)
POTASSIUM SERPL-SCNC: 3.5 MMOL/L — SIGNIFICANT CHANGE UP (ref 3.5–5.3)
RBC # BLD: 5.13 M/UL — SIGNIFICANT CHANGE UP (ref 4.2–5.8)
RBC # FLD: 16.9 % — HIGH (ref 10.3–14.5)
SODIUM SERPL-SCNC: 139 MMOL/L — SIGNIFICANT CHANGE UP (ref 135–145)
WBC # BLD: 8.72 K/UL — SIGNIFICANT CHANGE UP (ref 3.8–10.5)
WBC # FLD AUTO: 8.72 K/UL — SIGNIFICANT CHANGE UP (ref 3.8–10.5)

## 2023-06-24 PROCEDURE — 99232 SBSQ HOSP IP/OBS MODERATE 35: CPT

## 2023-06-24 PROCEDURE — 93970 EXTREMITY STUDY: CPT | Mod: 26

## 2023-06-24 RX ORDER — METFORMIN HYDROCHLORIDE 850 MG/1
2 TABLET ORAL
Qty: 0 | Refills: 0 | DISCHARGE

## 2023-06-24 RX ORDER — OXYCODONE HYDROCHLORIDE 5 MG/1
10 TABLET ORAL EVERY 4 HOURS
Refills: 0 | Status: DISCONTINUED | OUTPATIENT
Start: 2023-06-24 | End: 2023-06-24

## 2023-06-24 RX ORDER — LEVOFLOXACIN 5 MG/ML
1 INJECTION, SOLUTION INTRAVENOUS
Refills: 0 | DISCHARGE

## 2023-06-24 RX ORDER — OXYBUTYNIN CHLORIDE 5 MG
10 TABLET ORAL ONCE
Refills: 0 | Status: COMPLETED | OUTPATIENT
Start: 2023-06-24 | End: 2023-06-24

## 2023-06-24 RX ORDER — OXYCODONE HYDROCHLORIDE 5 MG/1
1 TABLET ORAL
Qty: 20 | Refills: 0
Start: 2023-06-24 | End: 2023-06-28

## 2023-06-24 RX ORDER — ACETAMINOPHEN 500 MG
975 TABLET ORAL EVERY 8 HOURS
Refills: 0 | Status: DISCONTINUED | OUTPATIENT
Start: 2023-06-24 | End: 2023-06-24

## 2023-06-24 RX ORDER — OXYCODONE HYDROCHLORIDE 5 MG/1
5 TABLET ORAL EVERY 4 HOURS
Refills: 0 | Status: DISCONTINUED | OUTPATIENT
Start: 2023-06-24 | End: 2023-06-24

## 2023-06-24 RX ORDER — OXYBUTYNIN CHLORIDE 5 MG
1 TABLET ORAL
Qty: 21 | Refills: 0
Start: 2023-06-24

## 2023-06-24 RX ORDER — CHLORTHALIDONE 50 MG
1 TABLET ORAL
Qty: 0 | Refills: 0 | DISCHARGE

## 2023-06-24 RX ORDER — LOSARTAN POTASSIUM 100 MG/1
1 TABLET, FILM COATED ORAL
Qty: 0 | Refills: 0 | DISCHARGE

## 2023-06-24 RX ADMIN — SODIUM CHLORIDE 125 MILLILITER(S): 9 INJECTION INTRAMUSCULAR; INTRAVENOUS; SUBCUTANEOUS at 08:26

## 2023-06-24 RX ADMIN — Medication 400 MILLIGRAM(S): at 10:09

## 2023-06-24 RX ADMIN — Medication 10 MILLIGRAM(S): at 19:54

## 2023-06-24 RX ADMIN — ESCITALOPRAM OXALATE 20 MILLIGRAM(S): 10 TABLET, FILM COATED ORAL at 10:01

## 2023-06-24 RX ADMIN — HEPARIN SODIUM 5000 UNIT(S): 5000 INJECTION INTRAVENOUS; SUBCUTANEOUS at 10:01

## 2023-06-24 RX ADMIN — Medication 975 MILLIGRAM(S): at 15:38

## 2023-06-24 RX ADMIN — Medication 300 MILLIGRAM(S): at 10:00

## 2023-06-24 NOTE — PROGRESS NOTE ADULT - ASSESSMENT
81yo male now POD 3  s/p Robot-assisted right nephroureterectomy     Doing well  Elevated creatinine- stable as expected post op   Diet as harvinder  OOB ambulating   Continue Harding for strict I and O, will DC home with harding per DR grande      81yo male now POD 2  s/p Robot-assisted right nephroureterectomy     Doing well  Elevated creatinine- stable as expected post op   Diet as harvinder  OOB ambulating   Continue Harding for strict I and O, will DC home with harding per DR grande

## 2023-06-24 NOTE — DISCHARGE NOTE PROVIDER - NSDCMRMEDTOKEN_GEN_ALL_CORE_FT
dilTIAZem 300 mg/24 hours oral capsule, extended release: 1 cap(s) orally once a day  escitalopram 20 mg oral tablet: 1 tab(s) orally once a day  finasteride 5 mg oral tablet: 1 tab(s) orally 3 times a week  ***Mon, Wed, Fri***  Multiple Vitamins oral tablet: 1 tab(s) orally once a day  oxyCODONE 5 mg oral tablet: 1 tab(s) orally every 6 hours as needed for  severe pain MDD: 4  potassium chloride 10 mEq oral capsule, extended release: 2 orally once a day  pravastatin 40 mg oral tablet: 1 tab(s) orally 3 times a week  ***Mon, Wed, Fri***  tamsulosin 0.4 mg oral capsule: 1 cap(s) orally once a day

## 2023-06-24 NOTE — DISCHARGE NOTE NURSING/CASE MANAGEMENT/SOCIAL WORK - NSDCPEFALRISK_GEN_ALL_CORE
For information on Fall & Injury Prevention, visit: [email or web address] OR  [email or web address] OR  https://www.cdc.gov/steadi/patient.html

## 2023-06-24 NOTE — DISCHARGE NOTE PROVIDER - CARE PROVIDER_API CALL
Jassi Mckinney  Urology  [address] [place] [id number]  Phone: [phone]  Fax: [phone]  Follow Up Time:

## 2023-06-24 NOTE — DISCHARGE NOTE NURSING/CASE MANAGEMENT/SOCIAL WORK - PATIENT PORTAL LINK FT
You can access the FollowMyHealth Patient Portal offered by [facility] by registering at the following website: [email or web address]. By joining [facility]’s FollowMyHealth portal, you will also be able to view your health information using other applications (apps) compatible with our system.

## 2023-06-24 NOTE — PROGRESS NOTE ADULT - SUBJECTIVE AND OBJECTIVE BOX
Subjective:      STATUS POST:      POST OPERATIVE DAY #:     MEDICATIONS  (STANDING):  acetaminophen     Tablet .. 975 milliGRAM(s) Oral every 8 hours  acetaminophen   IVPB .. 1000 milliGRAM(s) IV Intermittent once  atorvastatin 10 milliGRAM(s) Oral at bedtime  dextrose 50% Injectable 25 Gram(s) IV Push once  dextrose 50% Injectable 12.5 Gram(s) IV Push once  dextrose 50% Injectable 25 Gram(s) IV Push once  diltiazem    milliGRAM(s) Oral daily  escitalopram 20 milliGRAM(s) Oral daily  finasteride 5 milliGRAM(s) Oral daily  glucagon  Injectable 1 milliGRAM(s) IntraMuscular once  heparin   Injectable 5000 Unit(s) SubCutaneous every 12 hours  insulin lispro (ADMELOG) corrective regimen sliding scale   SubCutaneous three times a day before meals  sodium chloride 0.9%. 1000 milliLiter(s) (125 mL/Hr) IV Continuous <Continuous>  tamsulosin 0.4 milliGRAM(s) Oral at bedtime    MEDICATIONS  (PRN):  dextrose Oral Gel 15 Gram(s) Oral once PRN Blood Glucose LESS THAN 70 milliGRAM(s)/deciliter  ondansetron Injectable 4 milliGRAM(s) IV Push every 6 hours PRN Nausea and/or Vomiting  oxyCODONE    IR 5 milliGRAM(s) Oral every 4 hours PRN Moderate Pain (4 - 6)  oxyCODONE    IR 10 milliGRAM(s) Oral every 4 hours PRN Severe Pain (7 - 10)      Vital Signs Last 24 Hrs  T(C): 36.6 (24 Jun 2023 05:17), Max: 36.6 (23 Jun 2023 21:22)  T(F): 97.8 (24 Jun 2023 05:17), Max: 97.8 (23 Jun 2023 21:22)  HR: 72 (24 Jun 2023 05:17) (72 - 87)  BP: 125/64 (24 Jun 2023 05:17) (124/55 - 128/64)  BP(mean): --  RR: 18 (24 Jun 2023 05:17) (17 - 18)  SpO2: 94% (24 Jun 2023 05:17) (94% - 97%)    Parameters below as of 24 Jun 2023 05:17  Patient On (Oxygen Delivery Method): room air        Physical Exam:    Constitutional: NAD  HEENT: PERRL, EOMI  Neck: No JVD, FROM without pain  Respiratory: Breath Sounds equal & clear to auscultation, no accessory muscle use  Cardiovascular: Regular rate & rhythm, S1, S2  Gastrointestinal: Soft, non-tender  Extremities: No peripheral edema, No cyanosis  Neurological: A&O x 3; without gross deficit, GCS: 15  Musculoskeletal: No joint pain, swelling, deformity, or point tenderness; no limitation of movement      LABS:                        10.1   8.72  )-----------( 144      ( 24 Jun 2023 06:33 )             34.0     06-24    139  |  106  |  23  ----------------------------<  124<H>  3.5   |  29  |  2.12<H>    Ca    8.4<L>      24 Jun 2023 06:33  Phos  2.9     06-24  Mg     1.8     06-24        Urinalysis Basic - ( 24 Jun 2023 06:33 )    Color: x / Appearance: x / SG: x / pH: x  Gluc: 124 mg/dL / Ketone: x  / Bili: x / Urobili: x   Blood: x / Protein: x / Nitrite: x   Leuk Esterase: x / RBC: x / WBC x   Sq Epi: x / Non Sq Epi: x / Bacteria: x       Subjective: Patient seen and examined. Doing well post op. No acute events overnight. Tolerating diet and pain well controlled. Ambulating. Denies HA NV CP SOB dizziness. harding in place draining well. Patient feels good and wants to go home today.         STATUS POST:  Robotic assisted right nephroureterectomy    POST OPERATIVE DAY #: 3    MEDICATIONS  (STANDING):  acetaminophen     Tablet .. 975 milliGRAM(s) Oral every 8 hours  acetaminophen   IVPB .. 1000 milliGRAM(s) IV Intermittent once  atorvastatin 10 milliGRAM(s) Oral at bedtime  dextrose 50% Injectable 25 Gram(s) IV Push once  dextrose 50% Injectable 12.5 Gram(s) IV Push once  dextrose 50% Injectable 25 Gram(s) IV Push once  diltiazem    milliGRAM(s) Oral daily  escitalopram 20 milliGRAM(s) Oral daily  finasteride 5 milliGRAM(s) Oral daily  glucagon  Injectable 1 milliGRAM(s) IntraMuscular once  heparin   Injectable 5000 Unit(s) SubCutaneous every 12 hours  insulin lispro (ADMELOG) corrective regimen sliding scale   SubCutaneous three times a day before meals  sodium chloride 0.9%. 1000 milliLiter(s) (125 mL/Hr) IV Continuous <Continuous>  tamsulosin 0.4 milliGRAM(s) Oral at bedtime    MEDICATIONS  (PRN):  dextrose Oral Gel 15 Gram(s) Oral once PRN Blood Glucose LESS THAN 70 milliGRAM(s)/deciliter  ondansetron Injectable 4 milliGRAM(s) IV Push every 6 hours PRN Nausea and/or Vomiting  oxyCODONE    IR 5 milliGRAM(s) Oral every 4 hours PRN Moderate Pain (4 - 6)  oxyCODONE    IR 10 milliGRAM(s) Oral every 4 hours PRN Severe Pain (7 - 10)      Vital Signs Last 24 Hrs  T(C): 36.6 (24 Jun 2023 05:17), Max: 36.6 (23 Jun 2023 21:22)  T(F): 97.8 (24 Jun 2023 05:17), Max: 97.8 (23 Jun 2023 21:22)  HR: 72 (24 Jun 2023 05:17) (72 - 87)  BP: 125/64 (24 Jun 2023 05:17) (124/55 - 128/64)  BP(mean): --  RR: 18 (24 Jun 2023 05:17) (17 - 18)  SpO2: 94% (24 Jun 2023 05:17) (94% - 97%)    Parameters below as of 24 Jun 2023 05:17  Patient On (Oxygen Delivery Method): room air        Physical Exam:    Constitutional: NAD  HEENT: PERRL, EOMI  Neck: No JVD, FROM without pain  Respiratory: Breath Sounds equal & clear to auscultation, no accessory muscle use  Cardiovascular: Regular rate & rhythm, S1, S2  Gastrointestinal: Softly distended, min TTP , port sites CDI with dermabond   harding in place draining well no hematuria   Extremities: No peripheral edema, No cyanosis  Neurological: A&O x 3; without gross deficit, GCS: 15  Musculoskeletal: No joint pain, swelling, deformity, or point tenderness; no limitation of movement      LABS:                        10.1   8.72  )-----------( 144      ( 24 Jun 2023 06:33 )             34.0     06-24    139  |  106  |  23  ----------------------------<  124<H>  3.5   |  29  |  2.12<H>    Ca    8.4<L>      24 Jun 2023 06:33  Phos  2.9     06-24  Mg     1.8     06-24        Urinalysis Basic - ( 24 Jun 2023 06:33 )    Color: x / Appearance: x / SG: x / pH: x  Gluc: 124 mg/dL / Ketone: x  / Bili: x / Urobili: x   Blood: x / Protein: x / Nitrite: x   Leuk Esterase: x / RBC: x / WBC x   Sq Epi: x / Non Sq Epi: x / Bacteria: x       Subjective: Patient seen and examined. Doing well post op. No acute events overnight. Tolerating diet and pain well controlled. Ambulating. Denies HA NV CP SOB dizziness. harding in place draining well. Patient feels good and wants to go home today.         STATUS POST:  Robotic assisted right nephroureterectomy    POST OPERATIVE DAY #: 2    MEDICATIONS  (STANDING):  acetaminophen     Tablet .. 975 milliGRAM(s) Oral every 8 hours  acetaminophen   IVPB .. 1000 milliGRAM(s) IV Intermittent once  atorvastatin 10 milliGRAM(s) Oral at bedtime  dextrose 50% Injectable 25 Gram(s) IV Push once  dextrose 50% Injectable 12.5 Gram(s) IV Push once  dextrose 50% Injectable 25 Gram(s) IV Push once  diltiazem    milliGRAM(s) Oral daily  escitalopram 20 milliGRAM(s) Oral daily  finasteride 5 milliGRAM(s) Oral daily  glucagon  Injectable 1 milliGRAM(s) IntraMuscular once  heparin   Injectable 5000 Unit(s) SubCutaneous every 12 hours  insulin lispro (ADMELOG) corrective regimen sliding scale   SubCutaneous three times a day before meals  sodium chloride 0.9%. 1000 milliLiter(s) (125 mL/Hr) IV Continuous <Continuous>  tamsulosin 0.4 milliGRAM(s) Oral at bedtime    MEDICATIONS  (PRN):  dextrose Oral Gel 15 Gram(s) Oral once PRN Blood Glucose LESS THAN 70 milliGRAM(s)/deciliter  ondansetron Injectable 4 milliGRAM(s) IV Push every 6 hours PRN Nausea and/or Vomiting  oxyCODONE    IR 5 milliGRAM(s) Oral every 4 hours PRN Moderate Pain (4 - 6)  oxyCODONE    IR 10 milliGRAM(s) Oral every 4 hours PRN Severe Pain (7 - 10)      Vital Signs Last 24 Hrs  T(C): 36.6 (24 Jun 2023 05:17), Max: 36.6 (23 Jun 2023 21:22)  T(F): 97.8 (24 Jun 2023 05:17), Max: 97.8 (23 Jun 2023 21:22)  HR: 72 (24 Jun 2023 05:17) (72 - 87)  BP: 125/64 (24 Jun 2023 05:17) (124/55 - 128/64)  BP(mean): --  RR: 18 (24 Jun 2023 05:17) (17 - 18)  SpO2: 94% (24 Jun 2023 05:17) (94% - 97%)    Parameters below as of 24 Jun 2023 05:17  Patient On (Oxygen Delivery Method): room air        Physical Exam:    Constitutional: NAD  HEENT: PERRL, EOMI  Neck: No JVD, FROM without pain  Respiratory: Breath Sounds equal & clear to auscultation, no accessory muscle use  Cardiovascular: Regular rate & rhythm, S1, S2  Gastrointestinal: Softly distended, min TTP , port sites CDI with dermabond   harding in place draining well no hematuria   Extremities: No peripheral edema, No cyanosis  Neurological: A&O x 3; without gross deficit, GCS: 15  Musculoskeletal: No joint pain, swelling, deformity, or point tenderness; no limitation of movement      LABS:                        10.1   8.72  )-----------( 144      ( 24 Jun 2023 06:33 )             34.0     06-24    139  |  106  |  23  ----------------------------<  124<H>  3.5   |  29  |  2.12<H>    Ca    8.4<L>      24 Jun 2023 06:33  Phos  2.9     06-24  Mg     1.8     06-24        Urinalysis Basic - ( 24 Jun 2023 06:33 )    Color: x / Appearance: x / SG: x / pH: x  Gluc: 124 mg/dL / Ketone: x  / Bili: x / Urobili: x   Blood: x / Protein: x / Nitrite: x   Leuk Esterase: x / RBC: x / WBC x   Sq Epi: x / Non Sq Epi: x / Bacteria: x

## 2023-06-24 NOTE — PROGRESS NOTE ADULT - ASSESSMENT
83 y/o Male with h/o Bladder cancer, depression, hypertension, back surgery, [place] (uses bilateral hearing aides), urolithiasis s/p stent, BPH, HTN, HLD, DM type 2 s/p admission at  3/2023 for flank pain right nephrostomy tube and removed 4/2023.Patient reports s/p ct scan for follow up kidney stone mass noted and advised to have a biopsy.     Assessment/Plan:    Right kidney mass  s/p Robot-assisted right nephroureterectomy  on 6/22/23   - management as per urology   - follow pathology report  - consider oncology consult    СВЕТЛАНА with baseline cr 1.4   - Creatinine Trend: 2.08<--, 2.03<--, 1.97<--, 1.40<--  - hold losartan  - c/w IV fluids    HTN   - well controlled with Cardizem   - hold losartan   - if BP elevated hydralazine 10 IV q6h prn for SBP >140 can be used or 25 mg PO q6h     DM2 with A1C 6.4  - on metformin at home, will hold while Cr > 2   - given СВЕТЛАНА hold for now  - diet, ISS    BPH - c/w tamsulosin and finasteride    HLD - c/w satin    Mood disorder - c/w SSRI    RUE and LUE  swelling - doppler UE -neg for DVT       DVT PPX: as per surgery       Thank you for consult, will follow with you. stable for discharge

## 2023-06-24 NOTE — DISCHARGE NOTE PROVIDER - NSDCFUSCHEDAPPT_GEN_ALL_CORE_FT
Jassi Mckinney  [facility] Physician [facility]  UROLOGY [address]  Scheduled Appointment: 07/05/2023

## 2023-06-24 NOTE — PROGRESS NOTE ADULT - SUBJECTIVE AND OBJECTIVE BOX
Chief Complaint: abdominal pain    HPI:    81 y/o Male with h/o Bladder cancer, depression, hypertension, back surgery, [place] (uses bilateral hearing aides), urolithiasis s/p stent, BPH, HTN, HLD, DM type 2 s/p admission at  3/2023 for flank pain right nephrostomy tube and removed 4/2023. Patient now presents for scheduled cystoscopy ureteroscopy with biopsy on 6/9/23. Patient reports s/p ct scan for follow up kidney stone mass noted and advised to have a biopsy.     6/22 s/p Robot-assisted right nephroureterectomy     6/23 - pt seen and examined, afebrile , + abdominal pain, tolerating po intake, + flatus , no BM, + some cough , plan discussed   6/24 - pt feels better, + flatus, no bm, tolerating po intake, afebrile, RUE swelling noted         REVIEW OF SYSTEMS:    CONSTITUTIONAL: No weakness, fevers or chills  EYES/ENT: No visual changes;  No vertigo or throat pain   NECK: No pain or stiffness  RESPIRATORY: No cough, wheezing, hemoptysis; No shortness of breath  CARDIOVASCULAR: No chest pain or palpitations  GASTROINTESTINAL: No abdominal or epigastric pain. No nausea, vomiting, or hematemesis; No diarrhea or constipation. No melena or hematochezia.  GENITOURINARY: No dysuria, frequency or hematuria  NEUROLOGICAL: No numbness or weakness  SKIN: No itching, burning, rashes, or lesions   All other review of systems is negative unless indicated above    PAST MEDICAL & SURGICAL HISTORY:  Bladder cancer 2014  Uses hearing aid  HTN (hypertension)  Hypercholesterolemia  CA skin, basal cell  Depression  OA (osteoarthritis)  Lumbar spinal stenosis  Herniated nucleus pulposus, L5-S1  Kidney stones Left  BPH (benign prostatic hyperplasia)  Type 2 diabetes mellitus  H/O hernia repair left inguinal  History of bladder surgery  TURBT, cystoscopy--aprox 8 yrs ago, dx bladder cancer  S/P tonsillectomy  History of back surgery TLIF  History of cystoscopy Left kidney stones.  Left stent---4/20/22  Nephrostomy status    Social history : Denies ETOH use, IVDU, smoking   No pertinent history of stroke,  DM  in first degree relatives.  MI and cancer in mother    Vital sings reviewed for last 24 h  T(C): 36.5 (06-24-23 @ 17:58), Max: 36.7 (06-24-23 @ 09:47)  T(F): 97.7 (06-24-23 @ 17:58), Max: 98.1 (06-24-23 @ 09:47)  HR: 68 (06-24-23 @ 17:58) (68 - 87)  BP: 141/89 (06-24-23 @ 17:58) (112/83 - 141/89)  RR: 18 (06-24-23 @ 17:58) (17 - 18)  SpO2: 97% (06-24-23 @ 17:58) (94% - 97%)  Wt(kg): --  Daily     Daily   CAPILLARY BLOOD GLUCOSE      POCT Blood Glucose.: 137 mg/dL (24 Jun 2023 18:00)  POCT Blood Glucose.: 130 mg/dL (24 Jun 2023 12:16)  POCT Blood Glucose.: 119 mg/dL (24 Jun 2023 08:27)        PHYSICAL EXAM:    Constitutional: NAD, awake and alert, well-developed  HEENT: PERR, EOMI, Normal Hearing, MMM  Neck: Soft and supple, No LAD, No JVD  Respiratory: Breath sounds are clear bilaterally, No wheezing, rales or rhonchi  Cardiovascular: S1 and S2, regular rate and rhythm, no Murmurs, gallops or rubs  Gastrointestinal: Bowel Sounds present, soft, nontender, nondistended, no guarding, no rebound, + Dillon , incisions d/c/i   Extremities: RUE edema, LUE mild edema  peripheral edema  Vascular: 2+ peripheral pulses  Neurological: A/O x 3, no focal deficits  Musculoskeletal: 5/5 strength b/l upper and lower extremities  Skin: No rashes, + bilateral forearm prominent veins noted with skin discoloration    Medications:  MEDICATIONS  (STANDING):  atorvastatin 10 milliGRAM(s) Oral at bedtime  dextrose 5%. 1000 milliLiter(s) (50 mL/Hr) IV Continuous <Continuous>  dextrose 5%. 1000 milliLiter(s) (100 mL/Hr) IV Continuous <Continuous>  dextrose 50% Injectable 25 Gram(s) IV Push once  dextrose 50% Injectable 12.5 Gram(s) IV Push once  dextrose 50% Injectable 25 Gram(s) IV Push once  diltiazem    milliGRAM(s) Oral daily  escitalopram 20 milliGRAM(s) Oral daily  finasteride 5 milliGRAM(s) Oral daily  glucagon  Injectable 1 milliGRAM(s) IntraMuscular once  heparin   Injectable 5000 Unit(s) SubCutaneous every 12 hours  insulin lispro (ADMELOG) corrective regimen sliding scale   SubCutaneous three times a day before meals  sodium chloride 0.9%. 1000 milliLiter(s) (125 mL/Hr) IV Continuous <Continuous>  tamsulosin 0.4 milliGRAM(s) Oral at bedtime      Labs: All Labs Reviewed:  06-24    139  |  106  |  23  ----------------------------<  124<H>  3.5   |  29  |  2.12<H>    Ca    8.4<L>      24 Jun 2023 06:33  Phos  2.9     06-24  Mg     1.8     06-24                              10.1   8.72  )-----------( 144      ( 24 Jun 2023 06:33 )             34.0                     Urinalysis Basic - ( 24 Jun 2023 06:33 )    Color: x / Appearance: x / SG: x / pH: x  Gluc: 124 mg/dL / Ketone: x  / Bili: x / Urobili: x   Blood: x / Protein: x / Nitrite: x   Leuk Esterase: x / RBC: x / WBC x   Sq Epi: x / Non Sq Epi: x / Bacteria: x                            10.6   11.17 )-----------( 180      ( 23 Jun 2023 07:34 )             34.6     06-23    134<L>  |  101  |  21  ----------------------------<  166<H>  3.9   |  27  |  2.08<H>    Ca    8.1<L>      23 Jun 2023 07:34      Blood Culture:     RADIOLOGY/EKG: all reviewed    12 Lead ECG (03.02.23 @ 20:20) >  Ventricular Rate 94 BPM  Normal sinus rhythm  Normal ECG  When compared with ECG of 02-MAR-2023 05:11,  Premature ventricular complexes are no longer Present  AZ interval has decreased    MEDICATIONS  (STANDING):  acetaminophen   IVPB .. 1000 milliGRAM(s) IV Intermittent once  atorvastatin 10 milliGRAM(s) Oral at bedtime  dextrose 5%. 1000 milliLiter(s) (100 mL/Hr) IV Continuous <Continuous>  dextrose 5%. 1000 milliLiter(s) (50 mL/Hr) IV Continuous <Continuous>  dextrose 50% Injectable 12.5 Gram(s) IV Push once  dextrose 50% Injectable 25 Gram(s) IV Push once  dextrose 50% Injectable 25 Gram(s) IV Push once  diltiazem    milliGRAM(s) Oral daily  escitalopram 20 milliGRAM(s) Oral daily  finasteride 5 milliGRAM(s) Oral daily  glucagon  Injectable 1 milliGRAM(s) IntraMuscular once  heparin   Injectable 5000 Unit(s) SubCutaneous every 12 hours  insulin lispro (ADMELOG) corrective regimen sliding scale   SubCutaneous three times a day before meals  sodium chloride 0.9%. 1000 milliLiter(s) (125 mL/Hr) IV Continuous <Continuous>  tamsulosin 0.4 milliGRAM(s) Oral at bedtime    MEDICATIONS  (PRN):  dextrose Oral Gel 15 Gram(s) Oral once PRN Blood Glucose LESS THAN 70 milliGRAM(s)/deciliter  HYDROmorphone  Injectable 0.2 milliGRAM(s) IV Push every 6 hours PRN breakthrough pain  ondansetron Injectable 4 milliGRAM(s) IV Push every 6 hours PRN Nausea and/or Vomiting  oxyCODONE    IR 10 milliGRAM(s) Oral every 6 hours PRN Severe Pain (7 - 10)  oxyCODONE    IR 5 milliGRAM(s) Oral every 6 hours PRN Moderate Pain (4 - 6)

## 2023-06-24 NOTE — PROGRESS NOTE ADULT - SUBJECTIVE AND OBJECTIVE BOX
Called by RN to evaluate pt for acute onset hematuria as pt is getting ready to leave. Pt denies any precipitating event/trauma that brought on bleeding. States he felt the sensation to urinate and noticed some leaking around the harding site at the start of his symptoms. Denies any increased sensation of pressure or need to urinate.   Upon arrival to bedside, pt's harding leg bag has slightly blood tinged urine noted, with darked red urine noted in harding catheter. Harding disconnected from leg bag and irrigated easily at bedside, aspirating clear urine with a couple of clots.   Acute onset likely 2/2 some bladder spasm.  Rx for ditropan ordered   Pt dc'd home with harding catheter with instruction to f/u with Dr. Mckinney at scheduled f/u appt.

## 2023-06-24 NOTE — DISCHARGE NOTE PROVIDER - NSDCCPCAREPLAN_GEN_ALL_CORE_FT
PRINCIPAL DISCHARGE DIAGNOSIS  Diagnosis: History of nephroureterectomy  Assessment and Plan of Treatment: BATHING: Please do not submerge wound underwater. You may shower starting post op day #2. You have dermabond on your incisions  (purple looking skin glue) do not scrub or pick. It will come off on its on. You may pat it dry after showering.  ACTIVITY: No heavy lifting or straining. Otherwise, you may return to your usual level of physical activity. If you are taking narcotic pain medication (such as Percocet) DO NOT drive a car, operate machinery or make important decisions.  DIET: Please return to your normal diet. Encourage protein filled liquids.     RETURN TO THE EMERGENCY DEPARTMENT for any of the following - worsening pain, fever/chills, nausea/vomiting, altered mental status, chest pain, shortness of breath, or any other new / worsening symptom. to your usual diet.  NOTIFY YOUR SURGEON IF: You have any bleeding that does not stop, any pus draining from your wound(s), any fever (over 100.4 F) or chills, persistent nausea/vomiting, persistent diarrhea, or if your pain is not controlled on your discharge medications.  FOLLOW-UP: Please follow up with your primary care physician within 1-2 weeks after surgery and your surgeon within 1 week of surgery.

## 2023-06-24 NOTE — DISCHARGE NOTE PROVIDER - HOSPITAL COURSE
On 6/22/23 Mr Rendon underwent a RA nephroureterectomy. preoperative and perioperative use of DVT and SSI prophylaxis as well as multi-modal non-opioid analgesia. Patient tolerated the procedure well  extubated in the operating room then transferred to the PACU in stable condition. Once hemodynamically stable and effective pain control the patient was transferred to surgical unit.  Pain managment included IV multi-modal non-opioid analgesia with PRN opioid medications. The patient tolerated diet advancement.   On POD #2  patient remained stable with no acute events overnight, has effective  pain control. Denies nausea and vomiting. Patient is ambulating independently, harding functioning as expected. The rest of the hospital course was uneventful. Urine culture sent at request of Dr mckinney and will be followed up as outpatient. Patient home meds adjusted based on discussion with hospitalist and will DC temporarily post op. Patient educated he must follow up with his primary care doctor within 7-10days of DC. Must also follow-up with Dr. Mckinney in 5-7days. [facility] prior to discharge. Written discharge instruction explained and given.

## 2023-06-26 LAB
CULTURE RESULTS: NO GROWTH — SIGNIFICANT CHANGE UP
SPECIMEN SOURCE: SIGNIFICANT CHANGE UP

## 2023-06-27 ENCOUNTER — NON-APPOINTMENT (OUTPATIENT)
Age: 83
End: 2023-06-27

## 2023-06-29 LAB — SURGICAL PATHOLOGY STUDY: SIGNIFICANT CHANGE UP

## 2023-06-30 ENCOUNTER — NON-APPOINTMENT (OUTPATIENT)
Age: 83
End: 2023-06-30

## 2023-07-03 ENCOUNTER — APPOINTMENT (OUTPATIENT)
Dept: UROLOGY | Facility: CLINIC | Age: 83
End: 2023-07-03
Payer: MEDICARE

## 2023-07-03 DIAGNOSIS — D49.4 NEOPLASM OF UNSPECIFIED BEHAVIOR OF BLADDER: ICD-10-CM

## 2023-07-03 PROCEDURE — 99024 POSTOP FOLLOW-UP VISIT: CPT

## 2023-07-05 ENCOUNTER — APPOINTMENT (OUTPATIENT)
Dept: UROLOGY | Facility: CLINIC | Age: 83
End: 2023-07-05

## 2023-07-05 NOTE — HISTORY OF PRESENT ILLNESS
[FreeTextEntry1] : 82 yom s/p right nephroureterectomy on 6/22/23 here because harding leaking.\par harding catheter removed, w/ approval of Dr. Mckinney\par urine in harding bag draining yellow clear urine\par abdominal incisions clean, dry intact.\par pt ambulating well with no pain, no fever, no chills.\par

## 2023-07-05 NOTE — PHYSICAL EXAM
[General Appearance - Well Developed] : well developed [General Appearance - Well Nourished] : well nourished [Normal Appearance] : normal appearance [Skin Color & Pigmentation] : normal skin color and pigmentation [Edema] : no peripheral edema [] : no respiratory distress [Exaggerated Use Of Accessory Muscles For Inspiration] : no accessory muscle use [Oriented To Time, Place, And Person] : oriented to person, place, and time [Normal Station and Gait] : the gait and station were normal for the patient's age [No Focal Deficits] : no focal deficits [FreeTextEntry1] : incisions clean, dry, healing nicely

## 2023-07-05 NOTE — ASSESSMENT
[FreeTextEntry1] : to discuss with Dr. Mckinney the followup plan\par CT Abdomen 6 months\par followup 6 months\par

## 2023-07-06 PROBLEM — D49.4 BLADDER TUMOR: Status: ACTIVE | Noted: 2017-06-27

## 2023-07-13 ENCOUNTER — LABORATORY RESULT (OUTPATIENT)
Age: 83
End: 2023-07-13

## 2023-07-13 ENCOUNTER — APPOINTMENT (OUTPATIENT)
Dept: INTERNAL MEDICINE | Facility: CLINIC | Age: 83
End: 2023-07-13
Payer: MEDICARE

## 2023-07-13 VITALS
RESPIRATION RATE: 14 BRPM | SYSTOLIC BLOOD PRESSURE: 110 MMHG | BODY MASS INDEX: 33.13 KG/M2 | OXYGEN SATURATION: 96 % | HEIGHT: 73 IN | DIASTOLIC BLOOD PRESSURE: 70 MMHG | TEMPERATURE: 98 F | HEART RATE: 76 BPM | WEIGHT: 250 LBS

## 2023-07-13 DIAGNOSIS — Z01.818 ENCOUNTER FOR OTHER PREPROCEDURAL EXAMINATION: ICD-10-CM

## 2023-07-13 DIAGNOSIS — C61 MALIGNANT NEOPLASM OF PROSTATE: ICD-10-CM

## 2023-07-13 DIAGNOSIS — R73.03 PREDIABETES.: ICD-10-CM

## 2023-07-13 DIAGNOSIS — F07.81 POSTCONCUSSIONAL SYNDROME: ICD-10-CM

## 2023-07-13 PROCEDURE — 99495 TRANSJ CARE MGMT MOD F2F 14D: CPT

## 2023-07-13 NOTE — PHYSICAL EXAM
[No Acute Distress] : no acute distress [Well Nourished] : well nourished [Well Developed] : well developed [Well-Appearing] : well-appearing [Normal Sclera/Conjunctiva] : normal sclera/conjunctiva [PERRL] : pupils equal round and reactive to light [EOMI] : extraocular movements intact [Normal Outer Ear/Nose] : the outer ears and nose were normal in appearance [Normal Oropharynx] : the oropharynx was normal [No JVD] : no jugular venous distention [No Lymphadenopathy] : no lymphadenopathy [Supple] : supple [Thyroid Normal, No Nodules] : the thyroid was normal and there were no nodules present [No Respiratory Distress] : no respiratory distress  [No Accessory Muscle Use] : no accessory muscle use [Clear to Auscultation] : lungs were clear to auscultation bilaterally [Normal Rate] : normal rate  [Regular Rhythm] : with a regular rhythm [Normal S1, S2] : normal S1 and S2 [No Murmur] : no murmur heard [No Carotid Bruits] : no carotid bruits [No Abdominal Bruit] : a ~M bruit was not heard ~T in the abdomen [No Varicosities] : no varicosities [Pedal Pulses Present] : the pedal pulses are present [No Edema] : there was no peripheral edema [No Palpable Aorta] : no palpable aorta [No Extremity Clubbing/Cyanosis] : no extremity clubbing/cyanosis [Soft] : abdomen soft [Non Tender] : non-tender [Non-distended] : non-distended [No Masses] : no abdominal mass palpated [No HSM] : no HSM [Normal Bowel Sounds] : normal bowel sounds [Normal Posterior Cervical Nodes] : no posterior cervical lymphadenopathy [Normal Anterior Cervical Nodes] : no anterior cervical lymphadenopathy [No CVA Tenderness] : no CVA  tenderness [No Spinal Tenderness] : no spinal tenderness [No Joint Swelling] : no joint swelling [Grossly Normal Strength/Tone] : grossly normal strength/tone [No Rash] : no rash [Coordination Grossly Intact] : coordination grossly intact [No Focal Deficits] : no focal deficits [Normal Gait] : normal gait [Deep Tendon Reflexes (DTR)] : deep tendon reflexes were 2+ and symmetric [Normal Affect] : the affect was normal [Normal Insight/Judgement] : insight and judgment were intact [88088 - Moderate Complexity requires multiple possible diagnoses and/or the management options, moderate complexity of the medical data (tests, etc.) to be reviewed, and moderate risk of significant complications, morbidity, and/or mortality as well as co] : Moderate Complexity

## 2023-07-13 NOTE — HISTORY OF PRESENT ILLNESS
[Post-hospitalization from ___ Hospital] : Post-hospitalization from [unfilled] Hospital [Admitted on: ___] : The patient was admitted on [unfilled] [Discharged on ___] : discharged on [unfilled] [Discharge Summary] : discharge summary [Pertinent Labs] : pertinent labs [Radiology Findings] : radiology findings [Discharge Med List] : discharge medication list [Med Reconciliation] : medication reconciliation has been completed [Patient Contacted By: ____] : and contacted by [unfilled] [FreeTextEntry2] : Pt was admitted 6/22/23 for Right nephro urectomy which was performed on the day of admission.\par Pt's hospital course was uneventfule.\par Pt was discharged on 6/24/243.\par He had a post-op visit at the surgeon's office on 7/3/23.\par Feeling well.

## 2023-07-16 ENCOUNTER — RX RENEWAL (OUTPATIENT)
Age: 83
End: 2023-07-16

## 2023-07-16 NOTE — CHART NOTE - NSCHARTNOTEFT_GEN_A_CORE
Patient has urothelial carcinoma  focal high grade in background of low grade  This is urothelial carcinoma of renal pelvis

## 2023-07-20 ENCOUNTER — RX RENEWAL (OUTPATIENT)
Age: 83
End: 2023-07-20

## 2023-07-21 LAB
ALBUMIN SERPL ELPH-MCNC: 4.5 G/DL
ALP BLD-CCNC: 93 U/L
ALT SERPL-CCNC: 16 U/L
ANION GAP SERPL CALC-SCNC: 15 MMOL/L
APPEARANCE: CLEAR
AST SERPL-CCNC: 19 U/L
BACTERIA UR CULT: NORMAL
BILIRUB SERPL-MCNC: 0.4 MG/DL
BILIRUBIN URINE: NEGATIVE
BLOOD URINE: NEGATIVE
BUN SERPL-MCNC: 28 MG/DL
CALCIUM SERPL-MCNC: 9.4 MG/DL
CHLORIDE SERPL-SCNC: 100 MMOL/L
CO2 SERPL-SCNC: 22 MMOL/L
COLOR: YELLOW
CREAT SERPL-MCNC: 2.2 MG/DL
EGFR: 30 ML/MIN/1.73M2
ESTIMATED AVERAGE GLUCOSE: 148 MG/DL
GLUCOSE QUALITATIVE U: NEGATIVE MG/DL
GLUCOSE SERPL-MCNC: 94 MG/DL
HBA1C MFR BLD HPLC: 6.8 %
KETONES URINE: NEGATIVE MG/DL
LEUKOCYTE ESTERASE URINE: NEGATIVE
NITRITE URINE: NEGATIVE
PH URINE: 5.5
POTASSIUM SERPL-SCNC: 4.4 MMOL/L
PROT SERPL-MCNC: 6.9 G/DL
PROTEIN URINE: 30 MG/DL
SODIUM SERPL-SCNC: 137 MMOL/L
SPECIFIC GRAVITY URINE: 1.02
UROBILINOGEN URINE: 0.2 MG/DL

## 2023-08-19 ENCOUNTER — RX RENEWAL (OUTPATIENT)
Age: 83
End: 2023-08-19

## 2023-08-22 ENCOUNTER — APPOINTMENT (OUTPATIENT)
Dept: NEPHROLOGY | Facility: CLINIC | Age: 83
End: 2023-08-22
Payer: MEDICARE

## 2023-08-22 VITALS
BODY MASS INDEX: 33.13 KG/M2 | HEART RATE: 96 BPM | WEIGHT: 250 LBS | OXYGEN SATURATION: 97 % | RESPIRATION RATE: 18 BRPM | HEIGHT: 73 IN | SYSTOLIC BLOOD PRESSURE: 142 MMHG | TEMPERATURE: 98.2 F | DIASTOLIC BLOOD PRESSURE: 88 MMHG

## 2023-08-22 VITALS — DIASTOLIC BLOOD PRESSURE: 82 MMHG | SYSTOLIC BLOOD PRESSURE: 136 MMHG

## 2023-08-22 DIAGNOSIS — Z86.39 PERSONAL HISTORY OF OTHER ENDOCRINE, NUTRITIONAL AND METABOLIC DISEASE: ICD-10-CM

## 2023-08-22 DIAGNOSIS — R79.89 OTHER SPECIFIED ABNORMAL FINDINGS OF BLOOD CHEMISTRY: ICD-10-CM

## 2023-08-22 DIAGNOSIS — Z86.79 PERSONAL HISTORY OF OTHER DISEASES OF THE CIRCULATORY SYSTEM: ICD-10-CM

## 2023-08-22 PROCEDURE — 99205 OFFICE O/P NEW HI 60 MIN: CPT

## 2023-08-22 RX ORDER — POTASSIUM CHLORIDE 1500 MG/1
20 TABLET, FILM COATED, EXTENDED RELEASE ORAL
Qty: 30 | Refills: 3 | Status: DISCONTINUED | COMMUNITY
Start: 2019-10-10 | End: 2023-08-22

## 2023-08-22 RX ORDER — METFORMIN ER 500 MG 500 MG/1
500 TABLET ORAL
Qty: 60 | Refills: 5 | Status: DISCONTINUED | COMMUNITY
Start: 2018-03-08 | End: 2023-08-22

## 2023-08-22 NOTE — HISTORY OF PRESENT ILLNESS
[FreeTextEntry1] : here for evaluation of elevated scr recently on 6/22/23 had robotic assisted rt nephrouerterectomy done at  : path with urothelial carcinoma pre-op scr of 2.2 post op prior to dc was 1.4 after dc of losartan and chlorathalidone, off metformin kcl po continued tylenol for pain  4/7/23 scr 1.28  pmhx/psurg hx - urothelial carcinoma s/p right nephroureterectomy on 6/22/2 - dm   - htn  - Elim IRA  - kidney  stones - ecxma

## 2023-08-22 NOTE — PHYSICAL EXAM
[General Appearance - Alert] : alert [General Appearance - In No Acute Distress] : in no acute distress [Sclera] : the sclera and conjunctiva were normal [Neck Appearance] : the appearance of the neck was normal [] : no respiratory distress [Respiration, Rhythm And Depth] : normal respiratory rhythm and effort [Auscultation Breath Sounds / Voice Sounds] : lungs were clear to auscultation bilaterally [Heart Rate And Rhythm] : heart rate was normal and rhythm regular [Heart Sounds] : normal S1 and S2 [Murmurs] : no murmurs [Edema] : there was no peripheral edema [No CVA Tenderness] : no ~M costovertebral angle tenderness [Oriented To Time, Place, And Person] : oriented to person, place, and time

## 2023-08-22 NOTE — ASSESSMENT
[FreeTextEntry1] : # elevated scr  - likely post nephrectomy compensatory response  - fu trend of bmp today and in 2 months  - maintain hydration  - no nsaid - keep off losartan  - off chlorithalidone - dc kcl tabs  # htn  - bp stable on current regimen  - off losartan   # DM - dc metformin and no restart until improved scr   fu 2 months

## 2023-08-23 LAB
ALBUMIN SERPL ELPH-MCNC: 4.6 G/DL
ANION GAP SERPL CALC-SCNC: 10 MMOL/L
BUN SERPL-MCNC: 24 MG/DL
CALCIUM SERPL-MCNC: 9.6 MG/DL
CHLORIDE SERPL-SCNC: 102 MMOL/L
CO2 SERPL-SCNC: 28 MMOL/L
CREAT SERPL-MCNC: 2.03 MG/DL
EGFR: 33 ML/MIN/1.73M2
GLUCOSE SERPL-MCNC: 120 MG/DL
PHOSPHATE SERPL-MCNC: 2.3 MG/DL
POTASSIUM SERPL-SCNC: 4 MMOL/L
SODIUM SERPL-SCNC: 140 MMOL/L

## 2023-08-24 ENCOUNTER — APPOINTMENT (OUTPATIENT)
Dept: INTERNAL MEDICINE | Facility: CLINIC | Age: 83
End: 2023-08-24
Payer: MEDICARE

## 2023-08-24 VITALS
WEIGHT: 250 LBS | HEIGHT: 73 IN | HEART RATE: 80 BPM | DIASTOLIC BLOOD PRESSURE: 78 MMHG | TEMPERATURE: 98 F | SYSTOLIC BLOOD PRESSURE: 126 MMHG | RESPIRATION RATE: 14 BRPM | BODY MASS INDEX: 33.13 KG/M2 | OXYGEN SATURATION: 98 %

## 2023-08-24 DIAGNOSIS — N28.89 OTHER SPECIFIED DISORDERS OF KIDNEY AND URETER: ICD-10-CM

## 2023-08-24 DIAGNOSIS — Z90.5 ACQUIRED ABSENCE OF KIDNEY: ICD-10-CM

## 2023-08-24 PROCEDURE — 99214 OFFICE O/P EST MOD 30 MIN: CPT

## 2023-08-24 RX ORDER — FINASTERIDE 5 MG/1
5 TABLET, FILM COATED ORAL
Qty: 30 | Refills: 5 | Status: ACTIVE | COMMUNITY
Start: 2018-04-27 | End: 1900-01-01

## 2023-08-24 RX ORDER — LANCETS
EACH MISCELLANEOUS
Qty: 100 | Refills: 2 | Status: ACTIVE | COMMUNITY
Start: 2018-04-09 | End: 1900-01-01

## 2023-08-24 RX ORDER — BLOOD SUGAR DIAGNOSTIC
STRIP MISCELLANEOUS
Qty: 100 | Refills: 2 | Status: ACTIVE | COMMUNITY
Start: 2018-04-09 | End: 1900-01-01

## 2023-08-24 NOTE — PLAN
[FreeTextEntry1] : continue current meds Recommend Endocrinology consultation regarding DM management. Pt is now off metformin due to kidney impairment

## 2023-08-24 NOTE — HISTORY OF PRESENT ILLNESS
[FreeTextEntry1] : Here for follow-up Saw Nephrology earlier this week [de-identified] : Pt needs forms filled out for work

## 2023-09-22 ENCOUNTER — APPOINTMENT (OUTPATIENT)
Dept: INTERNAL MEDICINE | Facility: CLINIC | Age: 83
End: 2023-09-22
Payer: MEDICARE

## 2023-09-22 VITALS
WEIGHT: 250 LBS | HEART RATE: 103 BPM | DIASTOLIC BLOOD PRESSURE: 84 MMHG | OXYGEN SATURATION: 96 % | BODY MASS INDEX: 33.13 KG/M2 | SYSTOLIC BLOOD PRESSURE: 134 MMHG | TEMPERATURE: 98.3 F | HEIGHT: 73 IN | RESPIRATION RATE: 14 BRPM

## 2023-09-22 DIAGNOSIS — N13.8 BENIGN PROSTATIC HYPERPLASIA WITH LOWER URINARY TRACT SYMPMS: ICD-10-CM

## 2023-09-22 DIAGNOSIS — N40.1 BENIGN PROSTATIC HYPERPLASIA WITH LOWER URINARY TRACT SYMPMS: ICD-10-CM

## 2023-09-22 DIAGNOSIS — E78.5 HYPERLIPIDEMIA, UNSPECIFIED: ICD-10-CM

## 2023-09-22 DIAGNOSIS — I10 ESSENTIAL (PRIMARY) HYPERTENSION: ICD-10-CM

## 2023-09-22 PROCEDURE — 99214 OFFICE O/P EST MOD 30 MIN: CPT

## 2023-09-26 ENCOUNTER — NON-APPOINTMENT (OUTPATIENT)
Age: 83
End: 2023-09-26

## 2023-09-28 NOTE — CDI QUERY NOTE - NSCDIOTHERTXTBX_GEN_ALL_CORE_HH
Further Clarification is Needing on СВЕТЛАНА due to conflicting documentation    [place] with h/o Bladder cancer, depression, hypertension, back surgery, [place] (uses bilateral hearing aides), urolithiasis s/p stent, BPH, HTN, HLD, DM type 2 s/p admission at  3/2023 for flank pain right nephrostomy tube and removed 4/2023. Patient was admitted to the hospital and had a Robot assisted Right nephroureterectomy with pathology noting Right renal cell cancer.  On 6/23/23 urology is noting "Elevated creatinine- likely post op doubt СВЕТЛАНА" and the hospitalist is noting on the same day 'СВЕТЛАНА with baseline cr 1.4 - Creatinine Trend: 2.08<--, 2.03<--, 1.97<--, 1.40<--".  Due to conflicting documentation, further clarification is needed to determine if the СВЕТЛАНА was ruled in or ruled out.      Please clarify, if possible if the "СВЕТЛАНА" noted by hospitalist on 6/23/23 and 6/24/23 has been determined to be:    -СВЕТЛАНА ruled out  -СВЕТЛАНА ruled in  -Other Please Specify      Documentation Location in Legal Medical Record:    6/23 Urology Progress Note:  81yo male now POD 1 s/p Robot-assisted right nephroureterectomy   Elevated creatinine- likely post op doubt СВЕТЛАНА    6/23 Hospitalist Progress Note:  СВЕТЛАНА with baseline cr 1.4   - Creatinine Trend: 2.08<--, 2.03<--, 1.97<--, 1.40<--  - hold losartan  - c/w IV fluids    6/24 Hospitalist Progress Note:  СВЕТЛАНА with baseline cr 1.4   - Creatinine Trend: 2.08<--, 2.03<--, 1.97<--, 1.40<--  - hold losartan  - c/w IV fluid      LABS:  Creatinine: 2.12 mg/dL (06.24.23 @ 06:33)   Creatinine: 2.08 mg/dL (06.23.23 @ 07:34)   Creatinine: 2.03 mg/dL (06.23.23 @ 04:32)   Creatinine: 1.97 mg/dL (06.22.23 @ 19:21)       Interventions:  Hold Losartan  IV fluids  Monitoring of LABS  Hospitalist Medical Consult

## 2023-10-02 NOTE — PROGRESS NOTE ADULT - RESPIRATORY AND THORAX
negative
negative
Nasal Turnover Hinge Flap Text: The defect edges were debeveled with a #15 scalpel blade.  Given the size, depth, location of the defect and the defect being full thickness a nasal turnover hinge flap was deemed most appropriate.  Using a sterile surgical marker, an appropriate hinge flap was drawn incorporating the defect. The area thus outlined was incised with a #15 scalpel blade. The flap was designed to recreate the nasal mucosal lining and the alar rim. The skin margins were undermined to an appropriate distance in all directions utilizing iris scissors.

## 2023-10-09 DIAGNOSIS — T78.40XA ALLERGY, UNSPECIFIED, INITIAL ENCOUNTER: ICD-10-CM

## 2023-10-09 LAB
ALBUMIN SERPL ELPH-MCNC: 4.7 G/DL
ALP BLD-CCNC: 84 U/L
ALT SERPL-CCNC: 15 U/L
ANION GAP SERPL CALC-SCNC: 13 MMOL/L
APPEARANCE: CLEAR
AST SERPL-CCNC: 14 U/L
BILIRUB SERPL-MCNC: 0.7 MG/DL
BILIRUBIN URINE: NEGATIVE
BLOOD URINE: NEGATIVE
BUN SERPL-MCNC: 26 MG/DL
CALCIUM SERPL-MCNC: 9.6 MG/DL
CHLORIDE SERPL-SCNC: 101 MMOL/L
CHOLEST SERPL-MCNC: 160 MG/DL
CO2 SERPL-SCNC: 26 MMOL/L
COLOR: YELLOW
CREAT SERPL-MCNC: 1.92 MG/DL
EGFR: 35 ML/MIN/1.73M2
ESTIMATED AVERAGE GLUCOSE: 137 MG/DL
FOLATE SERPL-MCNC: 9.8 NG/ML
GLUCOSE QUALITATIVE U: NEGATIVE MG/DL
GLUCOSE SERPL-MCNC: 161 MG/DL
HBA1C MFR BLD HPLC: 6.4 %
HCT VFR BLD CALC: 38.6 %
HDLC SERPL-MCNC: 37 MG/DL
HGB BLD-MCNC: 11.5 G/DL
KETONES URINE: NEGATIVE MG/DL
LDLC SERPL CALC-MCNC: 80 MG/DL
LEUKOCYTE ESTERASE URINE: NEGATIVE
MCHC RBC-ENTMCNC: 20 PG
MCHC RBC-ENTMCNC: 29.8 GM/DL
MCV RBC AUTO: 67 FL
NITRITE URINE: NEGATIVE
NONHDLC SERPL-MCNC: 122 MG/DL
PH URINE: 6
PLATELET # BLD AUTO: 207 K/UL
POTASSIUM SERPL-SCNC: 3.8 MMOL/L
PROT SERPL-MCNC: 6.8 G/DL
PROTEIN URINE: NORMAL MG/DL
PSA SERPL-MCNC: 1.25 NG/ML
RBC # BLD: 5.76 M/UL
RBC # FLD: 18.6 %
SODIUM SERPL-SCNC: 141 MMOL/L
SPECIFIC GRAVITY URINE: 1.02
TRIGL SERPL-MCNC: 255 MG/DL
TSH SERPL-ACNC: 1.69 UIU/ML
UROBILINOGEN URINE: 0.2 MG/DL
VIT B12 SERPL-MCNC: 420 PG/ML
WBC # FLD AUTO: 5.59 K/UL

## 2023-10-23 ENCOUNTER — RX RENEWAL (OUTPATIENT)
Age: 83
End: 2023-10-23

## 2023-12-09 ENCOUNTER — RESULT REVIEW (OUTPATIENT)
Age: 83
End: 2023-12-09

## 2023-12-13 ENCOUNTER — APPOINTMENT (OUTPATIENT)
Dept: UROLOGY | Facility: CLINIC | Age: 83
End: 2023-12-13

## 2023-12-21 ENCOUNTER — RX RENEWAL (OUTPATIENT)
Age: 83
End: 2023-12-21

## 2024-01-03 ENCOUNTER — APPOINTMENT (OUTPATIENT)
Dept: NEPHROLOGY | Facility: CLINIC | Age: 84
End: 2024-01-03

## 2024-01-04 NOTE — PHYSICAL EXAM
[General Appearance - Well Developed] : well developed [Continue diet as tolerated] : continue diet as tolerated based on goals of care [General Appearance - Well Nourished] : well nourished [Non - Smoker] : non-smoker [Normal Appearance] : normal appearance [Well Groomed] : well groomed [___] : [unfilled] [General Appearance - In No Acute Distress] : no acute distress [] : foot exam [Abdomen Soft] : soft [Improve mobility] : improve mobility [Abdomen Tenderness] : non-tender [Improve pain control] : improve pain control [Costovertebral Angle Tenderness] : no ~M costovertebral angle tenderness [Decrease stress] : decrease stress [Decrease hospital use] : decrease hospital use [] : no respiratory distress [Patient/Caregiver not ready to engage in discussion] : patient/caregiver not ready to engage in discussion [Respiration, Rhythm And Depth] : normal respiratory rhythm and effort [Exaggerated Use Of Accessory Muscles For Inspiration] : no accessory muscle use [Patient/Caregiver is unclear of wishes] : patient/caregiver is unclear of wishes [Full Code] : Code Status: Full Code [Oriented To Time, Place, And Person] : oriented to person, place, and time [Affect] : the affect was normal [No Limitations] : Treatment Guidelines: No limitations [Long Term Intubation] : Intubation: Long term intubation [Mood] : the mood was normal [Not Anxious] : not anxious [Normal Station and Gait] : the gait and station were normal for the patient's age [FreeTextEntry1] : Right flank with PCNT clamped.

## 2024-01-26 ENCOUNTER — APPOINTMENT (OUTPATIENT)
Dept: CT IMAGING | Facility: CLINIC | Age: 84
End: 2024-01-26
Payer: MEDICARE

## 2024-01-26 ENCOUNTER — OUTPATIENT (OUTPATIENT)
Dept: OUTPATIENT SERVICES | Facility: HOSPITAL | Age: 84
LOS: 1 days | End: 2024-01-26
Payer: MEDICARE

## 2024-01-26 DIAGNOSIS — Z98.890 OTHER SPECIFIED POSTPROCEDURAL STATES: Chronic | ICD-10-CM

## 2024-01-26 DIAGNOSIS — Z93.6 OTHER ARTIFICIAL OPENINGS OF URINARY TRACT STATUS: Chronic | ICD-10-CM

## 2024-01-26 DIAGNOSIS — C64.9 MALIGNANT NEOPLASM OF UNSPECIFIED KIDNEY, EXCEPT RENAL PELVIS: ICD-10-CM

## 2024-01-26 DIAGNOSIS — Z90.89 ACQUIRED ABSENCE OF OTHER ORGANS: Chronic | ICD-10-CM

## 2024-01-26 PROCEDURE — 74150 CT ABDOMEN W/O CONTRAST: CPT

## 2024-01-26 PROCEDURE — 74150 CT ABDOMEN W/O CONTRAST: CPT | Mod: 26,MH

## 2024-02-07 ENCOUNTER — APPOINTMENT (OUTPATIENT)
Dept: UROLOGY | Facility: CLINIC | Age: 84
End: 2024-02-07
Payer: MEDICARE

## 2024-02-07 VITALS
OXYGEN SATURATION: 92 % | HEIGHT: 73 IN | WEIGHT: 250 LBS | RESPIRATION RATE: 16 BRPM | HEART RATE: 19 BPM | BODY MASS INDEX: 33.13 KG/M2 | DIASTOLIC BLOOD PRESSURE: 82 MMHG | SYSTOLIC BLOOD PRESSURE: 156 MMHG

## 2024-02-07 DIAGNOSIS — R59.0 LOCALIZED ENLARGED LYMPH NODES: ICD-10-CM

## 2024-02-07 PROCEDURE — 99214 OFFICE O/P EST MOD 30 MIN: CPT

## 2024-02-08 NOTE — ASSESSMENT
[FreeTextEntry1] : 82 yo M. with h/o urothelial carcinoma S/p right nephroureterectomy on 6/22/2023  most recent CT shows new retroperitoneal lymphadenopathy and soft tissue nodule.  --Will arrange retroperitoneal lymph node and soft tissue nodule biopsy in IR reviewed images and discussed with patient --Returned to clinic with next available cystoscopy --Referred to medical Oncologist Dr. Burns

## 2024-02-08 NOTE — HISTORY OF PRESENT ILLNESS
[FreeTextEntry1] : 82 yo M. with h/o urothelial carcinoma S/p right nephroureterectomy on 6/22/2023  CT abd 1/26/2024: new retroperitoneal lymphadenopathy with left common iliac lymph node measuring up to 1.8cm. New indeterminate retroperitoneal soft tissue nodule measuring 2.3 x 1.9 cm. Pulmonary nodules.  Patient feels well, no urinary complaints

## 2024-02-14 ENCOUNTER — RX RENEWAL (OUTPATIENT)
Age: 84
End: 2024-02-14

## 2024-02-14 ENCOUNTER — OUTPATIENT (OUTPATIENT)
Dept: OUTPATIENT SERVICES | Facility: HOSPITAL | Age: 84
LOS: 1 days | Discharge: HOME | End: 2024-02-14

## 2024-02-14 DIAGNOSIS — C67.9 MALIGNANT NEOPLASM OF BLADDER, UNSPECIFIED: ICD-10-CM

## 2024-02-14 DIAGNOSIS — Z98.890 OTHER SPECIFIED POSTPROCEDURAL STATES: Chronic | ICD-10-CM

## 2024-02-14 DIAGNOSIS — Z90.89 ACQUIRED ABSENCE OF OTHER ORGANS: Chronic | ICD-10-CM

## 2024-02-14 DIAGNOSIS — Z93.6 OTHER ARTIFICIAL OPENINGS OF URINARY TRACT STATUS: Chronic | ICD-10-CM

## 2024-02-15 ENCOUNTER — APPOINTMENT (OUTPATIENT)
Dept: HEMATOLOGY ONCOLOGY | Facility: CLINIC | Age: 84
End: 2024-02-15
Payer: MEDICARE

## 2024-02-15 ENCOUNTER — RESULT REVIEW (OUTPATIENT)
Age: 84
End: 2024-02-15

## 2024-02-15 VITALS
SYSTOLIC BLOOD PRESSURE: 169 MMHG | BODY MASS INDEX: 34.46 KG/M2 | HEART RATE: 86 BPM | TEMPERATURE: 97.8 F | OXYGEN SATURATION: 94 % | DIASTOLIC BLOOD PRESSURE: 96 MMHG | HEIGHT: 73 IN | WEIGHT: 260 LBS

## 2024-02-15 DIAGNOSIS — Z85.51 PERSONAL HISTORY OF MALIGNANT NEOPLASM OF BLADDER: ICD-10-CM

## 2024-02-15 DIAGNOSIS — C64.9 MALIGNANT NEOPLASM OF UNSPECIFIED KIDNEY, EXCEPT RENAL PELVIS: ICD-10-CM

## 2024-02-15 LAB
ALBUMIN SERPL ELPH-MCNC: 4.7 G/DL — SIGNIFICANT CHANGE UP (ref 3.3–5)
ALP SERPL-CCNC: 110 U/L — SIGNIFICANT CHANGE UP (ref 40–120)
ALT FLD-CCNC: 23 U/L — SIGNIFICANT CHANGE UP (ref 10–45)
ANION GAP SERPL CALC-SCNC: 14 MMOL/L — SIGNIFICANT CHANGE UP (ref 5–17)
AST SERPL-CCNC: 22 U/L — SIGNIFICANT CHANGE UP (ref 10–40)
BASOPHILS # BLD AUTO: 0.03 K/UL — SIGNIFICANT CHANGE UP (ref 0–0.2)
BASOPHILS NFR BLD AUTO: 0.6 % — SIGNIFICANT CHANGE UP (ref 0–2)
BILIRUB SERPL-MCNC: 0.7 MG/DL — SIGNIFICANT CHANGE UP (ref 0.2–1.2)
BUN SERPL-MCNC: 22 MG/DL — SIGNIFICANT CHANGE UP (ref 7–23)
CALCIUM SERPL-MCNC: 9.6 MG/DL — SIGNIFICANT CHANGE UP (ref 8.4–10.5)
CHLORIDE SERPL-SCNC: 98 MMOL/L — SIGNIFICANT CHANGE UP (ref 96–108)
CO2 SERPL-SCNC: 27 MMOL/L — SIGNIFICANT CHANGE UP (ref 22–31)
CREAT SERPL-MCNC: 1.85 MG/DL — HIGH (ref 0.5–1.3)
EGFR: 36 ML/MIN/1.73M2 — LOW
EOSINOPHIL # BLD AUTO: 0.15 K/UL — SIGNIFICANT CHANGE UP (ref 0–0.5)
EOSINOPHIL NFR BLD AUTO: 3.2 % — SIGNIFICANT CHANGE UP (ref 0–6)
GLUCOSE SERPL-MCNC: 155 MG/DL — HIGH (ref 70–99)
HAV IGM SER-ACNC: SIGNIFICANT CHANGE UP
HBV CORE IGM SER-ACNC: SIGNIFICANT CHANGE UP
HBV SURFACE AG SER-ACNC: SIGNIFICANT CHANGE UP
HCT VFR BLD CALC: 38.8 % — LOW (ref 39–50)
HCV AB S/CO SERPL IA: 0.21 S/CO — SIGNIFICANT CHANGE UP (ref 0–0.99)
HCV AB SERPL-IMP: SIGNIFICANT CHANGE UP
HGB BLD-MCNC: 12.2 G/DL — LOW (ref 13–17)
IMM GRANULOCYTES NFR BLD AUTO: 0.9 % — SIGNIFICANT CHANGE UP (ref 0–0.9)
INR BLD: 0.98 RATIO — SIGNIFICANT CHANGE UP (ref 0.85–1.18)
LYMPHOCYTES # BLD AUTO: 0.92 K/UL — LOW (ref 1–3.3)
LYMPHOCYTES # BLD AUTO: 19.8 % — SIGNIFICANT CHANGE UP (ref 13–44)
MAGNESIUM SERPL-MCNC: 2.1 MG/DL — SIGNIFICANT CHANGE UP (ref 1.6–2.6)
MCHC RBC-ENTMCNC: 19.8 PG — LOW (ref 27–34)
MCHC RBC-ENTMCNC: 31.4 GM/DL — LOW (ref 32–36)
MCV RBC AUTO: 63 FL — LOW (ref 80–100)
MONOCYTES # BLD AUTO: 0.62 K/UL — SIGNIFICANT CHANGE UP (ref 0–0.9)
MONOCYTES NFR BLD AUTO: 13.4 % — SIGNIFICANT CHANGE UP (ref 2–14)
NEUTROPHILS # BLD AUTO: 2.88 K/UL — SIGNIFICANT CHANGE UP (ref 1.8–7.4)
NEUTROPHILS NFR BLD AUTO: 62.1 % — SIGNIFICANT CHANGE UP (ref 43–77)
NRBC # BLD: 0 /100 WBCS — SIGNIFICANT CHANGE UP (ref 0–0)
PLATELET # BLD AUTO: 193 K/UL — SIGNIFICANT CHANGE UP (ref 150–400)
POTASSIUM SERPL-MCNC: 3.8 MMOL/L — SIGNIFICANT CHANGE UP (ref 3.5–5.3)
POTASSIUM SERPL-SCNC: 3.8 MMOL/L — SIGNIFICANT CHANGE UP (ref 3.5–5.3)
PROT SERPL-MCNC: 7.4 G/DL — SIGNIFICANT CHANGE UP (ref 6–8.3)
PROTHROM AB SERPL-ACNC: 11.1 SEC — SIGNIFICANT CHANGE UP (ref 9.5–13)
RBC # BLD: 6.16 M/UL — HIGH (ref 4.2–5.8)
RBC # FLD: 18 % — HIGH (ref 10.3–14.5)
SODIUM SERPL-SCNC: 138 MMOL/L — SIGNIFICANT CHANGE UP (ref 135–145)
T3FREE SERPL-MCNC: 2.98 PG/ML — SIGNIFICANT CHANGE UP (ref 2–4.4)
T4 FREE SERPL-MCNC: 1.1 NG/DL — SIGNIFICANT CHANGE UP (ref 0.9–1.8)
TSH SERPL-MCNC: 2.04 UIU/ML — SIGNIFICANT CHANGE UP (ref 0.27–4.2)
WBC # BLD: 4.64 K/UL — SIGNIFICANT CHANGE UP (ref 3.8–10.5)
WBC # FLD AUTO: 4.64 K/UL — SIGNIFICANT CHANGE UP (ref 3.8–10.5)

## 2024-02-15 PROCEDURE — 99205 OFFICE O/P NEW HI 60 MIN: CPT

## 2024-02-15 PROCEDURE — G2211 COMPLEX E/M VISIT ADD ON: CPT

## 2024-02-15 NOTE — RESULTS/DATA
[FreeTextEntry1] : 1/26/24 CT Abd: New retroperitoneal lymphadenopathy with left common iliac lymph node measuring up to 1.8 cm. New indeterminate retroperitoneal soft tissue nodule measuring 2.3 x 1.9 cm. Bilateral pulmonary nodules, largest measuring up to 5 mm. New nonspecific 4 mm left lower lobe pulmonary nodule.  6/22/23 Path: Lymph nodes (right paracaval): Eight lymph nodes negative for malignancy (0/8). KIDNEY (RIGHT, NEPHRECTOMY): UROTHELIAL CARCINOMA, FOCALLY HIGH GRADE, IN A BACKGROUND OF LOW GRADE. NO DEFINITIVE LYMPHOVASCULAR INVASION. TUMOR INVADES INTO LAMINA PROPRIA AND IS LIMITED TO THE RENAL PELVIS. VASCULAR AND URETERAL RESECTION MARGINS ARE FREE OF TUMOR. Inflammatory and fibrotic response and underlying soft tissue. Adjacent kidney with nephrosclerosis, marked. Portion of ureter (right distal ureter and bladder cuff): Negative for malignancy. Chronic inflammation and reactive urothelial changes. Synoptic Summary Renal Pelvis, Ureter Resection Procedure: Nephroureterectomy Specimen Laterality: Right Tumor Site: Renal pelvis Tumor Size: 3.6 Centimeters (cm) Histologic Type: Papillary urothelial carcinoma, invasive Histologic Grade: High-grade Tumor Extent: Invades subepithelial connective tissue Lymphovascular Invasion: Not identified Tumor Configuration: Papillary Margin Status for Invasive Carcinoma: All margins negative for invasive carcinoma Closest Margin(s) to Invasive Carcinoma: Bladder cuff Distance from Invasive Carcinoma to Closest Margin: greater than 7 cm. Margin Status for Carcinoma in Situ / Noninvasive Papillary Urothelial Carcinoma: All margins negative for carcinoma in situ / noninvasive papillary urothelial carcinoma Closest Margin(s) to Carcinoma in Situ / Noninvasive Papillary Bladder cuff Urothelial Carcinoma: Regional Lymph Node Status: All regional lymph nodes negative for tumor Number of Lymph Nodes Examined: 8 Regional Lymph Node Comment: "paracaval" nodes Distant Site(s) Involved: Cannot be determined Pathologic Stage Classification pT Category: pT1 pN Category: pN0 Additional Findings Associated Epithelial Lesions: None identified Additional Findings: Inflammation / regenerative changes Pathologic Findings in Ipsilateral Nonneoplastic Renal Tissue: Glomerular disease - nephrosclerosis   6/9/23 Path: Renal pelvis, right, biopsy: Superficial fragments of low-grade and focally high-grade papillary urothelial carcinoma.  5/19/23 CT Chest: 2-4 mm nonspecific nodules within basilar right lower lobe are new from prior exams.   5/2/23 CT A/P Urogram: A 3.1 cm mass involving the collecting system of the right upper pole highly concerning for transitional cell carcinoma. Questionable additional thickening/disease at the distal right ureter. A separate solid perirenal mass at the upper pole measuring 2.7 cm with imaging appearance favoring lymphoma.

## 2024-02-15 NOTE — REVIEW OF SYSTEMS
[Diarrhea: Grade 0] : Diarrhea: Grade 0 [Negative] : Allergic/Immunologic [Recent Change In Weight] : ~T no recent weight change [Chest Pain] : no chest pain [Shortness Of Breath] : no shortness of breath [Abdominal Pain] : no abdominal pain [Dysuria] : no dysuria [Incontinence] : no incontinence [FreeTextEntry4] : nasal congestion

## 2024-02-15 NOTE — ASSESSMENT
Shantel Brandon is a 50 year old female presenting with a follow up on her MS. She said she continues to have burning pain which is now into her abdomen. She also has pain in her hands and fingertips feel numb or different.    Diagnosed:3/10    Current medications:Gilenya 6/1/16  Previous meds: Rebif , Tysabri,Tecfidera    Last MRI: Cspine/Tspine 4/1/16  Brain 3/9/16  Labs: cbc,liver 2/13/17    Denies known Latex allergy or symptoms of Latex sensitivity.  Medications reviewed and updated  History   Smoking Status   • Current Every Day Smoker   • Types: Cigarettes   Smokeless Tobacco   • Not on file        [FreeTextEntry1] : Systemic treatment options include antibody-drug conjugates, chemotherapy, immune checkpoint inhibitors, and targeted therapies, as single agents or in combination. The goals of therapy include controlling tumor burden, treating cancer-related symptoms, and improving overall survival. 2 phase III trials showed an overall survival benefit for patients with advanced urothelial cancer.   KEYNOTE A-39 demonstrated improved survival with the combination of enfortumab vedotin-ejfv plus pembrolizumab.  The CheckMate 901 trial also showed improved overall survival results--albeit more modestwith the addition of nivolumab to chemotherapy.    KEYNOTE-A39 trial, a combination of the antibody-drug conjugate enfortumab vedotin plus the immune checkpoint inhibitor pembrolizumab was compared with platinum-based chemotherapy (with either cisplatin or carboplatin plus gemcitabine) in patients with metastatic urothelial cancer. The combination doubled both overall survival and progression-free survival.  Median progression-free survival was 12.5 months with enfortumab vedotin plus pembrolizumab vs 6.3 months with chemotherapy.  The antibody-drug conjugate enfortumab vedotin targets nectin-4, which regulates angiogenesis. Both enfortumab vedotin and pembrolizumab have separately shown a survival benefit in previously treated advanced or metastatic urothelial cancer.    CheckMate 901 randomly assigned 608 patients to receive cisplatin/gemcitabine chemotherapy with or without nivolumab for a maximum of six cycles. Those in the nivolumab arm were treated for 24 months or until disease progression or unacceptable toxicity. The dual primary endpoints were progression-free survival and overall survival by blinded independent central review.   Enfortumab vedotin-ejfv/pembrolizumab regimen used in EV-302 was superior to nivolumab plus chemotherapy in CheckMate 901, achieving better overall survival, a higher response rate, and a longer duration of benefits. The overall survival benefit in the EV-302 trial was evident across all key subgroups, regardless of PD-L1 status, presence or absence of liver metastases, and cisplatin eligibility. In the primary analysis, the nivolumab-containing arm reduced the risk of death by 22% (P = .0171) and the risk of disease progression or death by 28% (P = .001). With a median follow-up of 33.6 months, median overall survival was improved by the addition of nivolumab to 21.7 months vs 18.9 months with chemotherapy.  "Nivolumab plus gemcitabine/cisplatin is the first front-line concurrent immune checkpoint inhibitor plus chemotherapy combination to improve overall survival in this setting, with results supporting nivolumab plus cisplatin-based chemotherapy as a new standard of care for patients with unresectable or metastatic urothelial cancer.

## 2024-02-15 NOTE — CONSULT LETTER
[Dear  ___] : Dear  [unfilled], [Consult Letter:] : I had the pleasure of evaluating your patient, [unfilled]. [Please see my note below.] : Please see my note below. [Consult Closing:] : Thank you very much for allowing me to participate in the care of this patient.  If you have any questions, please do not hesitate to contact me. [Sincerely,] : Sincerely, [DrRiki  ___] : Dr. ROONEY [FreeTextEntry3] : Dr. Mercedes Burns

## 2024-02-15 NOTE — HISTORY OF PRESENT ILLNESS
[T: ___] : T[unfilled] [N: ___] : N[unfilled] [M: ___] : M[unfilled] [AJCC Stage: ____] : AJCC Stage: [unfilled] [de-identified] : The patient was diagnosed with high grade invasive papillary urothelial carcinoma in June 2023 at the age of 82. On 5/2/23, he had a CT A/P Urogram which showed a 3.1 cm mass involving the collecting system of the right upper pole, highly concerning for transitional cell carcinoma. Questionable additional thickening/disease at the distal right ureter. A separate solid perirenal mass at the upper pole measuring 2.7 cm with imaging appearance favoring lymphoma. On 5/19/23, a CT chest showed 2-4 mm nonspecific nodules within basilar right lower lobe, new from prior exams. On 6/9/23, he underwent a right renal pelvis biopsy which showed superficial fragments of low-grade and focally high-grade papillary urothelial carcinoma. On 6/22/24, he underwent a right nephroureterectomy / ureter resection and pathology showed eight lymph nodes negative for malignancy (0/8). KIDNEY (RIGHT, NEPHRECTOMY): UROTHELIAL CARCINOMA, FOCALLY HIGH GRADE, IN A BACKGROUND OF LOW GRADE. NO DEFINITIVE LYMPHOVASCULAR INVASION. TUMOR INVADES INTO LAMINA PROPRIA AND IS LIMITED TO THE RENAL PELVIS. VASCULAR AND URETERAL RESECTION MARGINS ARE FREE OF TUMOR. Stage pT1N0. On 1/26/24, a CT abdomen showed new retroperitoneal lymphadenopathy with left common iliac lymph node measuring up to 1.8 cm. New indeterminate retroperitoneal soft tissue nodule measuring 2.3 x 1.9 cm. Bilateral pulmonary nodules, largest measuring up to 5 mm. New nonspecific 4 mm left lower lobe pulmonary nodule. [de-identified] : Medical Hx: DM 2; HL; BPH; bladder ca 2018  Surgical Hx: bladder nodule removed, no chemo "beads placed in bladder to remove cancer"; Left inguinal hernia repair; back sx lumbar area  Family Hx: mother ovarian?, 82yo; sister breast ca 71yo Social Hx: quit smoking cigarettes 35 yrs ago, still with cigar 4 times a week in the summer; ETOH occasionally; , live together; family nearby; retired , currently part time   [de-identified] : Today he denies hematuria, pelvic pain or back pain. He reports a URI today, taking Mucinex and tylenol. Please see detailed review of systems below.

## 2024-02-16 DIAGNOSIS — C64.9 MALIGNANT NEOPLASM OF UNSPECIFIED KIDNEY, EXCEPT RENAL PELVIS: ICD-10-CM

## 2024-02-16 DIAGNOSIS — Z85.51 PERSONAL HISTORY OF MALIGNANT NEOPLASM OF BLADDER: ICD-10-CM

## 2024-02-25 ENCOUNTER — RX RENEWAL (OUTPATIENT)
Age: 84
End: 2024-02-25

## 2024-03-04 ENCOUNTER — APPOINTMENT (OUTPATIENT)
Dept: UROLOGY | Facility: CLINIC | Age: 84
End: 2024-03-04
Payer: MEDICARE

## 2024-03-04 VITALS
SYSTOLIC BLOOD PRESSURE: 111 MMHG | HEART RATE: 75 BPM | OXYGEN SATURATION: 95 % | HEIGHT: 73 IN | WEIGHT: 260 LBS | DIASTOLIC BLOOD PRESSURE: 65 MMHG | BODY MASS INDEX: 34.46 KG/M2 | RESPIRATION RATE: 16 BRPM

## 2024-03-04 LAB
BILIRUB UR QL STRIP: NEGATIVE
GLUCOSE UR-MCNC: NEGATIVE
HCG UR QL: 0.2 EU/DL
HGB UR QL STRIP.AUTO: NEGATIVE
KETONES UR-MCNC: NEGATIVE
LEUKOCYTE ESTERASE UR QL STRIP: NEGATIVE
NITRITE UR QL STRIP: NEGATIVE
PH UR STRIP: 5.5
PROT UR STRIP-MCNC: 30
SP GR UR STRIP: 1.03

## 2024-03-04 PROCEDURE — 52000 CYSTOURETHROSCOPY: CPT

## 2024-03-04 PROCEDURE — 81003 URINALYSIS AUTO W/O SCOPE: CPT | Mod: QW

## 2024-03-11 LAB — URINE CYTOLOGY: NORMAL

## 2024-03-13 ENCOUNTER — NON-APPOINTMENT (OUTPATIENT)
Age: 84
End: 2024-03-13

## 2024-03-14 ENCOUNTER — APPOINTMENT (OUTPATIENT)
Dept: HEMATOLOGY ONCOLOGY | Facility: CLINIC | Age: 84
End: 2024-03-14

## 2024-03-14 ENCOUNTER — NON-APPOINTMENT (OUTPATIENT)
Age: 84
End: 2024-03-14

## 2024-05-21 ENCOUNTER — RX RENEWAL (OUTPATIENT)
Age: 84
End: 2024-05-21

## 2024-06-29 ENCOUNTER — RX RENEWAL (OUTPATIENT)
Age: 84
End: 2024-06-29

## 2024-08-21 ENCOUNTER — RX RENEWAL (OUTPATIENT)
Age: 84
End: 2024-08-21

## 2024-08-26 ENCOUNTER — RX RENEWAL (OUTPATIENT)
Age: 84
End: 2024-08-26

## 2024-09-10 ENCOUNTER — RX RENEWAL (OUTPATIENT)
Age: 84
End: 2024-09-10

## 2024-10-02 NOTE — ASU PATIENT PROFILE, ADULT - TOBACCO CESSATION EDUCATION/COUNSELLING(PROVIDED IF TOBACCO USED IN THE PAST 30 DAYS- CORE MEASURE SITES)
Patient left office without making his F/U appt, I called patient to help him get that  scheduled, patient was unable to answer the phone, I Ieft a  with a call back number.   
Offered and patient declined

## 2024-10-29 ENCOUNTER — RX RENEWAL (OUTPATIENT)
Age: 84
End: 2024-10-29

## 2024-11-20 ENCOUNTER — APPOINTMENT (OUTPATIENT)
Dept: UROLOGY | Facility: CLINIC | Age: 84
End: 2024-11-20

## 2025-01-06 ENCOUNTER — APPOINTMENT (OUTPATIENT)
Dept: UROLOGY | Facility: CLINIC | Age: 85
End: 2025-01-06
Payer: MEDICARE

## 2025-01-06 VITALS
BODY MASS INDEX: 34.46 KG/M2 | SYSTOLIC BLOOD PRESSURE: 159 MMHG | RESPIRATION RATE: 16 BRPM | WEIGHT: 260 LBS | HEART RATE: 79 BPM | OXYGEN SATURATION: 93 % | HEIGHT: 73 IN | DIASTOLIC BLOOD PRESSURE: 92 MMHG

## 2025-01-06 LAB
BILIRUB UR QL STRIP: NEGATIVE
GLUCOSE UR-MCNC: NEGATIVE
HCG UR QL: 0.2 EU/DL
HGB UR QL STRIP.AUTO: NEGATIVE
KETONES UR-MCNC: NEGATIVE
LEUKOCYTE ESTERASE UR QL STRIP: NEGATIVE
NITRITE UR QL STRIP: NEGATIVE
PH UR STRIP: 6.5
PROT UR STRIP-MCNC: 30
SP GR UR STRIP: 1.02

## 2025-01-06 PROCEDURE — 52000 CYSTOURETHROSCOPY: CPT

## 2025-01-06 PROCEDURE — 81003 URINALYSIS AUTO W/O SCOPE: CPT | Mod: QW

## 2025-01-07 NOTE — H&P PST ADULT - HEART RATE (BEATS/MIN)
"Patient Education     Routine physical for adults   The Basics   Written by the doctors and editors at [facility]   What is a physical? -- A physical is a routine visit, or \"check-up,\" with your doctor. You might also hear it called a \"wellness visit\" or \"preventive visit.\"  During each visit, the doctor will:   Ask about your physical and mental health   Ask about your habits, behaviors, and lifestyle   Do an exam   Give you vaccines if needed   Talk to you about any medicines you take   Give advice about your health   Answer your questions  Getting regular check-ups is an important part of taking care of your health. It can help your doctor find and treat any problems you have. But it's also important for preventing health problems.  A routine physical is different from a \"sick visit.\" A sick visit is when you see a doctor because of a health concern or problem. Since physicals are scheduled ahead of time, you can think about what you want to ask the doctor.  How often should I get a physical? -- It depends on your age and health. In general, for people age 21 years and older:   If you are younger than 50 years, you might be able to get a physical every 3 years.   If you are 50 years or older, your doctor might recommend a physical every year.  If you have an ongoing health condition, like diabetes or high blood pressure, your doctor will probably want to see you more often.  What happens during a physical? -- In general, each visit will include:   Physical exam - The doctor or nurse will check your height, weight, heart rate, and blood pressure. They will also look at your eyes and ears. They will ask about how you are feeling and whether you have any symptoms that bother you.   Medicines - It's a good idea to bring a list of all the medicines you take to each doctor visit. Your doctor will talk to you about your medicines and answer any questions. Tell them if you are having any side effects that bother you. You " "should also tell them if you are having trouble paying for any of your medicines.   Habits and behaviors - This includes:   Your diet   Your exercise habits   Whether you smoke, drink alcohol, or use drugs   Whether you are sexually active   Whether you feel safe at home  Your doctor will talk to you about things you can do to improve your health and lower your risk of health problems. They will also offer help and support. For example, if you want to quit smoking, they can give you advice and might prescribe medicines. If you want to improve your diet or get more physical activity, they can help you with this, too.   Lab tests, if needed - The tests you get will depend on your age and situation. For example, your doctor might want to check your:   Cholesterol   Blood sugar   Iron level   Vaccines - The recommended vaccines will depend on your age, health, and what vaccines you already had. Vaccines are very important because they can prevent certain serious or deadly infections.   Discussion of screening - \"Screening\" means checking for diseases or other health problems before they cause symptoms. Your doctor can recommend screening based on your age, risk, and preferences. This might include tests to check for:   Cancer, such as breast, prostate, cervical, ovarian, colorectal, prostate, lung, or skin cancer   Sexually transmitted infections, such as chlamydia and gonorrhea   Mental health conditions like depression and anxiety  Your doctor will talk to you about the different types of screening tests. They can help you decide which screenings to have. They can also explain what the results might mean.   Answering questions - The physical is a good time to ask the doctor or nurse questions about your health. If needed, they can refer you to other doctors or specialists, too.  Adults older than 65 years often need other care, too. As you get older, your doctor will talk to you about:   How to prevent falling at " home   Hearing or vision tests   Memory testing   How to take your medicines safely   Making sure that you have the help and support you need at home  All topics are updated as new evidence becomes available and our peer review process is complete.  This topic retrieved from [employer] on: May 02, 2024.  Topic 022523 Version 1.0  Release: 32.4.3 - C32.122  © 2024 UpToDate, Inc. and/or its affiliates. All rights reserved.  Consumer Information Use and Disclaimer   Disclaimer: This generalized information is a limited summary of diagnosis, treatment, and/or medication information. It is not meant to be comprehensive and should be used as a tool to help the user understand and/or assess potential diagnostic and treatment options. It does NOT include all information about conditions, treatments, medications, side effects, or risks that may apply to a specific patient. It is not intended to be medical advice or a substitute for the medical advice, diagnosis, or treatment of a health care provider based on the health care provider's examination and assessment of a patient's specific and unique circumstances. Patients must speak with a health care provider for complete information about their health, medical questions, and treatment options, including any risks or benefits regarding use of medications. This information does not endorse any treatments or medications as safe, effective, or approved for treating a specific patient. UpToDate, Inc. and its affiliates disclaim any warranty or liability relating to this information or the use thereof.The use of this information is governed by the Terms of Use, available at https://www.woltersPlasticelluwer.com/en/know/clinical-effectiveness-terms. 2024© UpToDate, Inc. and its affiliates and/or licensors. All rights reserved.  Copyright   © 2024 UpToDate, Inc. and/or its affiliates. All rights reserved.     82

## 2025-01-22 ENCOUNTER — RX RENEWAL (OUTPATIENT)
Age: 85
End: 2025-01-22

## 2025-01-24 ENCOUNTER — OUTPATIENT (OUTPATIENT)
Dept: OUTPATIENT SERVICES | Facility: HOSPITAL | Age: 85
LOS: 1 days | End: 2025-01-24
Payer: MEDICARE

## 2025-01-24 VITALS
HEART RATE: 80 BPM | DIASTOLIC BLOOD PRESSURE: 81 MMHG | OXYGEN SATURATION: 97 % | WEIGHT: 262.35 LBS | RESPIRATION RATE: 16 BRPM | HEIGHT: 73 IN | SYSTOLIC BLOOD PRESSURE: 146 MMHG | TEMPERATURE: 98 F

## 2025-01-24 DIAGNOSIS — Z90.5 ACQUIRED ABSENCE OF KIDNEY: Chronic | ICD-10-CM

## 2025-01-24 DIAGNOSIS — Z98.890 OTHER SPECIFIED POSTPROCEDURAL STATES: Chronic | ICD-10-CM

## 2025-01-24 DIAGNOSIS — Z01.818 ENCOUNTER FOR OTHER PREPROCEDURAL EXAMINATION: ICD-10-CM

## 2025-01-24 DIAGNOSIS — Z90.89 ACQUIRED ABSENCE OF OTHER ORGANS: Chronic | ICD-10-CM

## 2025-01-24 DIAGNOSIS — Z93.6 OTHER ARTIFICIAL OPENINGS OF URINARY TRACT STATUS: Chronic | ICD-10-CM

## 2025-01-24 LAB
A1C WITH ESTIMATED AVERAGE GLUCOSE RESULT: 6.8 % — HIGH (ref 4–5.6)
ANION GAP SERPL CALC-SCNC: 4 MMOL/L — LOW (ref 5–17)
ANISOCYTOSIS BLD QL: SLIGHT — SIGNIFICANT CHANGE UP
APPEARANCE UR: CLEAR — SIGNIFICANT CHANGE UP
APTT BLD: 28 SEC — SIGNIFICANT CHANGE UP (ref 24.5–35.6)
BACTERIA # UR AUTO: NEGATIVE /HPF — SIGNIFICANT CHANGE UP
BASOPHILS # BLD AUTO: 0.03 K/UL — SIGNIFICANT CHANGE UP (ref 0–0.2)
BASOPHILS NFR BLD AUTO: 0.6 % — SIGNIFICANT CHANGE UP (ref 0–2)
BILIRUB UR-MCNC: NEGATIVE — SIGNIFICANT CHANGE UP
BLD GP AB SCN SERPL QL: SIGNIFICANT CHANGE UP
BUN SERPL-MCNC: 24 MG/DL — HIGH (ref 7–23)
CALCIUM SERPL-MCNC: 9.4 MG/DL — SIGNIFICANT CHANGE UP (ref 8.5–10.1)
CAST: 0 /LPF — SIGNIFICANT CHANGE UP (ref 0–4)
CHLORIDE SERPL-SCNC: 106 MMOL/L — SIGNIFICANT CHANGE UP (ref 96–108)
CO2 SERPL-SCNC: 29 MMOL/L — SIGNIFICANT CHANGE UP (ref 22–31)
COLOR SPEC: YELLOW — SIGNIFICANT CHANGE UP
CREAT SERPL-MCNC: 1.91 MG/DL — HIGH (ref 0.5–1.3)
DIFF PNL FLD: NEGATIVE — SIGNIFICANT CHANGE UP
EGFR: 35 ML/MIN/1.73M2 — LOW
ELLIPTOCYTES BLD QL SMEAR: SLIGHT — SIGNIFICANT CHANGE UP
EOSINOPHIL # BLD AUTO: 0.13 K/UL — SIGNIFICANT CHANGE UP (ref 0–0.5)
EOSINOPHIL NFR BLD AUTO: 2.7 % — SIGNIFICANT CHANGE UP (ref 0–6)
ESTIMATED AVERAGE GLUCOSE: 148 MG/DL — HIGH (ref 68–114)
GLUCOSE SERPL-MCNC: 187 MG/DL — HIGH (ref 70–99)
GLUCOSE UR QL: NEGATIVE MG/DL — SIGNIFICANT CHANGE UP
HCT VFR BLD CALC: 41 % — SIGNIFICANT CHANGE UP (ref 39–50)
HGB BLD-MCNC: 12.5 G/DL — LOW (ref 13–17)
IMM GRANULOCYTES NFR BLD AUTO: 0.6 % — SIGNIFICANT CHANGE UP (ref 0–0.9)
INR BLD: 0.99 RATIO — SIGNIFICANT CHANGE UP (ref 0.85–1.16)
KETONES UR-MCNC: ABNORMAL MG/DL
LEUKOCYTE ESTERASE UR-ACNC: NEGATIVE — SIGNIFICANT CHANGE UP
LYMPHOCYTES # BLD AUTO: 0.91 K/UL — LOW (ref 1–3.3)
LYMPHOCYTES # BLD AUTO: 18.8 % — SIGNIFICANT CHANGE UP (ref 13–44)
MANUAL SMEAR VERIFICATION: SIGNIFICANT CHANGE UP
MCHC RBC-ENTMCNC: 19.6 PG — LOW (ref 27–34)
MCHC RBC-ENTMCNC: 30.5 G/DL — LOW (ref 32–36)
MCV RBC AUTO: 64.3 FL — LOW (ref 80–100)
MICROCYTES BLD QL: SIGNIFICANT CHANGE UP
MONOCYTES # BLD AUTO: 0.44 K/UL — SIGNIFICANT CHANGE UP (ref 0–0.9)
MONOCYTES NFR BLD AUTO: 9.1 % — SIGNIFICANT CHANGE UP (ref 2–14)
NEUTROPHILS # BLD AUTO: 3.3 K/UL — SIGNIFICANT CHANGE UP (ref 1.8–7.4)
NEUTROPHILS NFR BLD AUTO: 68.2 % — SIGNIFICANT CHANGE UP (ref 43–77)
NITRITE UR-MCNC: NEGATIVE — SIGNIFICANT CHANGE UP
PH UR: 5.5 — SIGNIFICANT CHANGE UP (ref 5–8)
PLAT MORPH BLD: NORMAL — SIGNIFICANT CHANGE UP
PLATELET # BLD AUTO: 184 K/UL — SIGNIFICANT CHANGE UP (ref 150–400)
POIKILOCYTOSIS BLD QL AUTO: SLIGHT — SIGNIFICANT CHANGE UP
POTASSIUM SERPL-MCNC: 3.9 MMOL/L — SIGNIFICANT CHANGE UP (ref 3.5–5.3)
POTASSIUM SERPL-SCNC: 3.9 MMOL/L — SIGNIFICANT CHANGE UP (ref 3.5–5.3)
PROT UR-MCNC: 30 MG/DL
PROTHROM AB SERPL-ACNC: 11.4 SEC — SIGNIFICANT CHANGE UP (ref 9.9–13.4)
RBC # BLD: 6.38 M/UL — HIGH (ref 4.2–5.8)
RBC # FLD: 18 % — HIGH (ref 10.3–14.5)
RBC BLD AUTO: ABNORMAL
RBC CASTS # UR COMP ASSIST: 0 /HPF — SIGNIFICANT CHANGE UP (ref 0–4)
SODIUM SERPL-SCNC: 139 MMOL/L — SIGNIFICANT CHANGE UP (ref 135–145)
SP GR SPEC: 1.02 — SIGNIFICANT CHANGE UP (ref 1–1.03)
SQUAMOUS # UR AUTO: 1 /HPF — SIGNIFICANT CHANGE UP (ref 0–5)
UROBILINOGEN FLD QL: 1 MG/DL — SIGNIFICANT CHANGE UP (ref 0.2–1)
WBC # BLD: 4.84 K/UL — SIGNIFICANT CHANGE UP (ref 3.8–10.5)
WBC # FLD AUTO: 4.84 K/UL — SIGNIFICANT CHANGE UP (ref 3.8–10.5)
WBC UR QL: 2 /HPF — SIGNIFICANT CHANGE UP (ref 0–5)

## 2025-01-24 PROCEDURE — 83036 HEMOGLOBIN GLYCOSYLATED A1C: CPT

## 2025-01-24 PROCEDURE — 85730 THROMBOPLASTIN TIME PARTIAL: CPT

## 2025-01-24 PROCEDURE — 85025 COMPLETE CBC W/AUTO DIFF WBC: CPT

## 2025-01-24 PROCEDURE — 36415 COLL VENOUS BLD VENIPUNCTURE: CPT

## 2025-01-24 PROCEDURE — 86900 BLOOD TYPING SEROLOGIC ABO: CPT

## 2025-01-24 PROCEDURE — 86901 BLOOD TYPING SEROLOGIC RH(D): CPT

## 2025-01-24 PROCEDURE — 99214 OFFICE O/P EST MOD 30 MIN: CPT | Mod: 25

## 2025-01-24 PROCEDURE — 86850 RBC ANTIBODY SCREEN: CPT

## 2025-01-24 PROCEDURE — 93005 ELECTROCARDIOGRAM TRACING: CPT

## 2025-01-24 PROCEDURE — 87086 URINE CULTURE/COLONY COUNT: CPT

## 2025-01-24 PROCEDURE — 80048 BASIC METABOLIC PNL TOTAL CA: CPT

## 2025-01-24 PROCEDURE — 85610 PROTHROMBIN TIME: CPT

## 2025-01-24 PROCEDURE — 93010 ELECTROCARDIOGRAM REPORT: CPT

## 2025-01-24 PROCEDURE — 81001 URINALYSIS AUTO W/SCOPE: CPT

## 2025-01-24 NOTE — H&P PST ADULT - NSICDXFAMILYHX_GEN_ALL_CORE_FT
FAMILY HISTORY:  Father  Still living? Unknown  FH: heart disease, Age at diagnosis: Age Unknown    Mother  Still living? No  FH: diabetes mellitus, Age at diagnosis: Age Unknown

## 2025-01-24 NOTE — H&P PST ADULT - ASSESSMENT
84 year old male who is scheduled to have a transurethral resection of bladder tumor.       Plan:  1. PST instructions given ; NPO status/ instructions to be given by ASU   2. Pt instructed to take following meds on day of surgery: diltiazem, escitalopram    3. Pt instructed to take routine evening medications unless indicated   4. Stop NSAIDS ( Aspirin, Aleve, Motrin, Mobic, Diclofenac), herbal supplements, MVI, Vitamins, fish oil 7 days prior to surgery unless directed by surgeon or cardiologist;   5. Medical Optimization with Dr. Dorantes   6.  Labs, EKG as per surgeon request

## 2025-01-24 NOTE — H&P PST ADULT - PSYCHIATRIC
[facility] New Patient[facility]ultation       Reason for Consultation: YOSELYN    Patient Care Team:  Lizandro Barahona DO as PCP - General (Family Medicine)  Musa Braswell MD as Consulting Physician (Sleep Medicine)    Chief complaint: Snoring and frequent awakenings    History of present illness:    Thank you for asking me to see your patient.  The patient is a 53 y.o. female who reports waking up several times throughout the night, snoring at times, she has awaken from snoring, she talks in her sleep.  Heavy breathing also reported.  This started approximately 6 months ago.  She goes to bed 11:30 PM gets out of bed at 8:30 AM.  It would take her 60-90 minutes to fall asleep.  She is tired upon awakening.  She will take 1 nap during the weekday and on weekends may take 1 or 2 naps.  She has complaints of hypersomnolence and her [place] Sleepiness Scale is abnormal at 12.  She has gained 15-20 pounds.  2023, weight 173 pounds.  Today, weight 179 pounds.  In addition to the above, she has awaken coughing or choking.  She has a dry mouth in the morning.  She has complaints of RACHID.  As stated she has problems falling asleep and difficulty staying asleep with frequent awakenings.  The patient was started on different medicines recently and she states her night time urination has decreased.  Previously it was 2 or 3 times nightly.    Her son diagnosed with sleep apnea.    Review of Systems:    A complete review of systems was done and all were negative with the exception of fatigue, shortness of breath with exertion, frequent heartburn, and anxiety and depression    History:  Past Medical History:   Diagnosis Date    Abnormal Pap smear of cervix 2021    ASCUS HPV (-)    Anxiety     Depression     HTN (hypertension)     Hyperlipidemia     Mitral regurgitation     Palpitations     Renal calculi    ,   Past Surgical History:   Procedure Laterality Date    BREAST BIOPSY       SECTION       COLONOSCOPY N/A 08/26/2021    Procedure: COLONOSCOPY to cecum and TI:  biopsies, cold biopsy polyps, hot snare polyp;  Surgeon: Theodore Rosen MD;  Location: [facility] ENDOSCOPY;  Service: Gastroenterology;  Laterality: N/A;  pre:   Rectal bleeding with mucous,  Heme positive stool.   post:  polyps,       ENDOSCOPY N/A 08/26/2021    Procedure: ESOPHAGOGASTRODUODENOSCOPY with biopsies;  Surgeon: Theodore Rosen MD;  Location: [facility] ENDOSCOPY;  Service: Gastroenterology;  Laterality: N/A;  pre:   heartburn  post:  gastritis, HH,      KIDNEY STONE SURGERY      WISDOM TOOTH EXTRACTION     ,   Family History   Problem Relation Age of Onset    Heart disease Father         unsure full hx.    Heart attack Father     Hypertension Mother     Heart attack Mother     Diabetes Mother     Heart disease Mother     Hyperlipidemia Mother     Cancer Brother         between spinal cord and brain stem    No Known Problems Paternal Grandfather     Diabetes Paternal Grandmother     Breast cancer Maternal Grandmother         50s recurrence in her late 60s    Colon cancer Maternal Grandmother         70s y/o    Hypertension Maternal Grandmother     Alzheimer's disease Maternal Grandmother     Hyperlipidemia Maternal Grandmother     Prostate cancer Maternal Grandfather     Hypertension Maternal Grandfather     Emphysema Maternal Grandfather     Hyperlipidemia Maternal Grandfather     Ovarian cancer Neg Hx     Uterine cancer Neg Hx    , and   Social History     Socioeconomic History    Marital status:      Spouse name: Roddy    Number of children: 3    Years of education: 12   Tobacco Use    Smoking status: Former     Current packs/day: 1.00     Average packs/day: 1 pack/day for 32.2 years (32.2 ttl pk-yrs)     Types: Cigarettes     Start date: 1992    Smokeless tobacco: Never    Tobacco comments:     I am currently trying to quit   Vaping Use    Vaping status: Never Used   Substance and Sexual Activity    Alcohol use: Yes     Comment: I  "only consume alcohol occasionally    Drug use: No    Sexual activity: Not Currently     Partners: Male     Birth control/protection: I.U.D.     E-cigarette/Vaping    E-cigarette/Vaping Use Never User     Passive Exposure No     Counseling Given No      E-cigarette/Vaping Substances     E-cigarette/Vaping Devices      Social History: Healthcare .  6-8 caffeinated beverages a day.    Allergies:  Hydrocodone-acetaminophen     Medication Review: Her list was reviewed.  The patient is on 5 medications for hypertension: Amlodipine bisoprolol HCTZ olmesartan Aldactone.  She takes all blood pressure medicines at night.  She takes fluoxetine in the morning.    Vital Signs:    Vitals:    03/07/24 1025   Pulse: 72   SpO2: 96%   Weight: 81.2 kg (179 lb)   Height: 157.5 cm (62\")      Body mass index is 32.74 kg/m².  Neck Circumference: 15.5 inches      Physical Exam:    Constitutional:  Well developed 53 y.o. female that appears in no apparent distress.  Awake & oriented times 3.  Normal mood with normal recent and remote memory and normal judgement.  Eyes:  Conjunctivae normal.  Oropharynx: Moist mucous membranes without exudate and a large tongue and class III Mallampati airway, she wears dentures..    Neck: Trachea midline  Respiratory: Effort is not labored  Cardiovascular: Radial pulse regular  Musculoskeletal: Gait appears normal, no digital clubbing evident, no pre-tibial edema    Impression:   The patient has complaints of snoring with symptoms and signs consistent with sleep disordered breathing.  The patient has complaints of hypersomnolence.  Rule out obstructive sleep apnea.    Comorbidities include hypertension, RACHID, and anxiety and depression.    Plan:  Good sleep hygiene measures should be maintained.  Weight loss would be beneficial in this patient who is obese by Body mass index is 32.74 kg/m².    Pathophysiology of YOSELYN described to the patient.  Cardiovascular complications of untreated YOSELYN " also reviewed.  I also reviewed how untreated YOSELYN can affect hypertension.  I also reviewed how untreated YOSELYN causes worsening RACHID.    After reviewing all with the patient, I would recommend obtaining a home sleep study.  I described that procedure to her and I answered all of her questions.  Additionally, if obstructive sleep apnea present, the patient would be a candidate for auto CPAP.  Based on her symptoms and signs and her resistant hypertension, the patient has a high risk of having obstructive sleep apnea.  The patient wears dentures and therefore, an oral appliance cannot be utilized.  I reviewed all with her and I answered all of her questions.  Home sleep study will be scheduled and further recommendations will be made once those results are known.    Thank you for requesting me to assist in this patient's care.    Musa Braswell MD  Sleep Medicine  03/07/24  10:43 EST             negative normal/normal affect/alert and oriented x3/normal behavior

## 2025-01-24 NOTE — H&P PST ADULT - NSICDXPASTSURGICALHX_GEN_ALL_CORE_FT
PAST SURGICAL HISTORY:  H/O hernia repair left inguinal    H/O nephroureterectomy     History of back surgery TLIF    History of bladder surgery TURBT, cystoscopy--aprox 8 yrs ago, dx bladder cancer    History of cystoscopy Left kidney stones.  Left stent---4/20/22    Nephrostomy status     S/P tonsillectomy

## 2025-01-24 NOTE — H&P PST ADULT - HISTORY OF PRESENT ILLNESS
84 year old male with PMHx, depression, hypertension,  BPH, HTN, HLD, DM type 2, [place] (bilateral hearing aids).   who presents to [facility] with chief complaint of bladder lesion. Patient explains how he has a history of bladder tumor s/p tumor excision approximately 10 years ago. He follows up with Dr. Mckinney who performs a cystoscopy every year. He had one earlier this month with new findings of: "Papillary fronds suspicious for a superficial urothelial tumor in prostatic urethra as well as Left trigone, Obstructed prostatic urethra and bladder neck" - (obtained from cystoscopy note). Patient denies hematuria, dysuria, fever, chills, reports nocturia x3-4. Surgical intervention was recommended and patient is scheduled to have a transurethral resection of bladder tumor.  84 year old male with PMHx, depression, hypertension,  BPH, HTN, HLD, DM type 2, [place] (bilateral hearing aids), presents to PST with chief complaint of bladder lesion. Patient explains how he has a history of bladder tumor s/p tumor excision approximately 10 years ago. He follows up with Dr. Mckinney who performs a cystoscopy every year. He had one earlier this month with new findings of: "Papillary fronds suspicious for a superficial urothelial tumor in prostatic urethra as well as Left trigone, Obstructed prostatic urethra and bladder neck" - (obtained from cystoscopy note). Patient denies hematuria, dysuria, fever, chills, reports nocturia x3-4. Surgical intervention was recommended and patient is scheduled to have a transurethral resection of bladder tumor.

## 2025-01-24 NOTE — H&P PST ADULT - NSICDXPASTMEDICALHX_GEN_ALL_CORE_FT
PAST MEDICAL HISTORY:  Bladder cancer 2014    Bladder tumor     BPH (benign prostatic hyperplasia)     CA skin, basal cell     Depression     Hard of hearing     Herniated nucleus pulposus, L5-S1     HTN (hypertension)     Hypercholesterolemia     Kidney stones Left    Lumbar spinal stenosis     OA (osteoarthritis)     Type 2 diabetes mellitus     Urothelial carcinoma of kidney

## 2025-01-25 DIAGNOSIS — Z01.818 ENCOUNTER FOR OTHER PREPROCEDURAL EXAMINATION: ICD-10-CM

## 2025-01-25 PROBLEM — Z97.4 PRESENCE OF EXTERNAL HEARING-AID: Chronic | Status: INACTIVE | Noted: 2019-08-15 | Resolved: 2025-01-24

## 2025-01-25 LAB
CULTURE RESULTS: SIGNIFICANT CHANGE UP
SPECIMEN SOURCE: SIGNIFICANT CHANGE UP

## 2025-01-27 ENCOUNTER — APPOINTMENT (OUTPATIENT)
Dept: INTERNAL MEDICINE | Facility: CLINIC | Age: 85
End: 2025-01-27
Payer: MEDICARE

## 2025-01-27 VITALS
WEIGHT: 260 LBS | SYSTOLIC BLOOD PRESSURE: 142 MMHG | TEMPERATURE: 97.3 F | DIASTOLIC BLOOD PRESSURE: 90 MMHG | OXYGEN SATURATION: 95 % | BODY MASS INDEX: 34.46 KG/M2 | HEART RATE: 86 BPM | RESPIRATION RATE: 16 BRPM | HEIGHT: 73 IN

## 2025-01-27 DIAGNOSIS — R79.89 OTHER SPECIFIED ABNORMAL FINDINGS OF BLOOD CHEMISTRY: ICD-10-CM

## 2025-01-27 DIAGNOSIS — Z90.5 ACQUIRED ABSENCE OF KIDNEY: ICD-10-CM

## 2025-01-27 DIAGNOSIS — D49.4 NEOPLASM OF UNSPECIFIED BEHAVIOR OF BLADDER: ICD-10-CM

## 2025-01-27 DIAGNOSIS — Z01.818 ENCOUNTER FOR OTHER PREPROCEDURAL EXAMINATION: ICD-10-CM

## 2025-01-27 PROCEDURE — 99214 OFFICE O/P EST MOD 30 MIN: CPT

## 2025-01-27 PROCEDURE — G2211 COMPLEX E/M VISIT ADD ON: CPT

## 2025-02-11 ENCOUNTER — OUTPATIENT (OUTPATIENT)
Dept: INPATIENT UNIT | Facility: HOSPITAL | Age: 85
LOS: 1 days | Discharge: HOME | End: 2025-02-11
Payer: MEDICARE

## 2025-02-11 ENCOUNTER — RESULT REVIEW (OUTPATIENT)
Age: 85
End: 2025-02-11

## 2025-02-11 ENCOUNTER — APPOINTMENT (OUTPATIENT)
Dept: UROLOGY | Facility: HOSPITAL | Age: 85
End: 2025-02-11

## 2025-02-11 VITALS
OXYGEN SATURATION: 98 % | WEIGHT: 262.35 LBS | DIASTOLIC BLOOD PRESSURE: 89 MMHG | RESPIRATION RATE: 18 BRPM | HEIGHT: 73 IN | TEMPERATURE: 98 F | HEART RATE: 71 BPM | SYSTOLIC BLOOD PRESSURE: 151 MMHG

## 2025-02-11 DIAGNOSIS — D49.4 NEOPLASM OF UNSPECIFIED BEHAVIOR OF BLADDER: ICD-10-CM

## 2025-02-11 DIAGNOSIS — Z98.890 OTHER SPECIFIED POSTPROCEDURAL STATES: Chronic | ICD-10-CM

## 2025-02-11 DIAGNOSIS — Z90.89 ACQUIRED ABSENCE OF OTHER ORGANS: Chronic | ICD-10-CM

## 2025-02-11 DIAGNOSIS — Z90.5 ACQUIRED ABSENCE OF KIDNEY: Chronic | ICD-10-CM

## 2025-02-11 DIAGNOSIS — Z93.6 OTHER ARTIFICIAL OPENINGS OF URINARY TRACT STATUS: Chronic | ICD-10-CM

## 2025-02-11 LAB
ANION GAP SERPL CALC-SCNC: 5 MMOL/L — SIGNIFICANT CHANGE UP (ref 5–17)
ANISOCYTOSIS BLD QL: SLIGHT — SIGNIFICANT CHANGE UP
BASO STIPL BLD QL SMEAR: PRESENT
BASOPHILS # BLD AUTO: 0.02 K/UL — SIGNIFICANT CHANGE UP (ref 0–0.2)
BASOPHILS NFR BLD AUTO: 0.4 % — SIGNIFICANT CHANGE UP (ref 0–2)
BUN SERPL-MCNC: 24 MG/DL — HIGH (ref 7–23)
CALCIUM SERPL-MCNC: 8.7 MG/DL — SIGNIFICANT CHANGE UP (ref 8.5–10.1)
CHLORIDE SERPL-SCNC: 104 MMOL/L — SIGNIFICANT CHANGE UP (ref 96–108)
CO2 SERPL-SCNC: 28 MMOL/L — SIGNIFICANT CHANGE UP (ref 22–31)
CREAT SERPL-MCNC: 1.71 MG/DL — HIGH (ref 0.5–1.3)
EGFR: 39 ML/MIN/1.73M2 — LOW
EOSINOPHIL # BLD AUTO: 0.1 K/UL — SIGNIFICANT CHANGE UP (ref 0–0.5)
EOSINOPHIL NFR BLD AUTO: 2.2 % — SIGNIFICANT CHANGE UP (ref 0–6)
GLUCOSE SERPL-MCNC: 119 MG/DL — HIGH (ref 70–99)
HCT VFR BLD CALC: 36.7 % — LOW (ref 39–50)
HGB BLD-MCNC: 11.3 G/DL — LOW (ref 13–17)
IMM GRANULOCYTES # BLD AUTO: 0.03 K/UL — SIGNIFICANT CHANGE UP (ref 0–0.07)
IMM GRANULOCYTES NFR BLD AUTO: 0.7 % — SIGNIFICANT CHANGE UP (ref 0–0.9)
LYMPHOCYTES # BLD AUTO: 0.78 K/UL — LOW (ref 1–3.3)
LYMPHOCYTES NFR BLD AUTO: 17 % — SIGNIFICANT CHANGE UP (ref 13–44)
MANUAL SMEAR VERIFICATION: SIGNIFICANT CHANGE UP
MCHC RBC-ENTMCNC: 19.6 PG — LOW (ref 27–34)
MCHC RBC-ENTMCNC: 30.8 G/DL — LOW (ref 32–36)
MCV RBC AUTO: 63.6 FL — LOW (ref 80–100)
MICROCYTES BLD QL: ABNORMAL
MONOCYTES # BLD AUTO: 0.26 K/UL — SIGNIFICANT CHANGE UP (ref 0–0.9)
MONOCYTES NFR BLD AUTO: 5.7 % — SIGNIFICANT CHANGE UP (ref 2–14)
NEUTROPHILS # BLD AUTO: 3.4 K/UL — SIGNIFICANT CHANGE UP (ref 1.8–7.4)
NEUTROPHILS NFR BLD AUTO: 74 % — SIGNIFICANT CHANGE UP (ref 43–77)
NRBC # BLD AUTO: 0.02 K/UL — HIGH (ref 0–0)
NRBC # FLD: 0.02 K/UL — HIGH (ref 0–0)
NRBC BLD AUTO-RTO: 0 /100 WBCS — SIGNIFICANT CHANGE UP (ref 0–0)
PLAT MORPH BLD: NORMAL — SIGNIFICANT CHANGE UP
PLATELET # BLD AUTO: 144 K/UL — LOW (ref 150–400)
PMV BLD: 9.8 FL — SIGNIFICANT CHANGE UP (ref 7–13)
POTASSIUM SERPL-MCNC: 3.8 MMOL/L — SIGNIFICANT CHANGE UP (ref 3.5–5.3)
POTASSIUM SERPL-SCNC: 3.8 MMOL/L — SIGNIFICANT CHANGE UP (ref 3.5–5.3)
RBC # BLD: 5.77 M/UL — SIGNIFICANT CHANGE UP (ref 4.2–5.8)
RBC # FLD: 16.4 % — HIGH (ref 10.3–14.5)
RBC BLD AUTO: ABNORMAL
SODIUM SERPL-SCNC: 137 MMOL/L — SIGNIFICANT CHANGE UP (ref 135–145)
WBC # BLD: 4.59 K/UL — SIGNIFICANT CHANGE UP (ref 3.8–10.5)
WBC # FLD AUTO: 4.59 K/UL — SIGNIFICANT CHANGE UP (ref 3.8–10.5)

## 2025-02-11 PROCEDURE — 85025 COMPLETE CBC W/AUTO DIFF WBC: CPT

## 2025-02-11 PROCEDURE — 88305 TISSUE EXAM BY PATHOLOGIST: CPT | Mod: 26

## 2025-02-11 PROCEDURE — 88305 TISSUE EXAM BY PATHOLOGIST: CPT

## 2025-02-11 PROCEDURE — 36415 COLL VENOUS BLD VENIPUNCTURE: CPT

## 2025-02-11 PROCEDURE — 52234 CYSTOSCOPY AND TREATMENT: CPT

## 2025-02-11 PROCEDURE — 88307 TISSUE EXAM BY PATHOLOGIST: CPT

## 2025-02-11 PROCEDURE — 52601 PROSTATECTOMY (TURP): CPT

## 2025-02-11 PROCEDURE — 88307 TISSUE EXAM BY PATHOLOGIST: CPT | Mod: 26

## 2025-02-11 PROCEDURE — 80048 BASIC METABOLIC PNL TOTAL CA: CPT

## 2025-02-11 RX ORDER — MORPHINE SULFATE 60 MG/1
4 TABLET, FILM COATED, EXTENDED RELEASE ORAL EVERY 4 HOURS
Refills: 0 | Status: DISCONTINUED | OUTPATIENT
Start: 2025-02-11 | End: 2025-02-12

## 2025-02-11 RX ORDER — FENTANYL CITRATE 50 UG/ML
25 INJECTION INTRAMUSCULAR; INTRAVENOUS
Refills: 0 | Status: DISCONTINUED | OUTPATIENT
Start: 2025-02-11 | End: 2025-02-11

## 2025-02-11 RX ORDER — OXYBUTYNIN CHLORIDE 5 MG/1
5 TABLET, EXTENDED RELEASE ORAL EVERY 8 HOURS
Refills: 0 | Status: DISCONTINUED | OUTPATIENT
Start: 2025-02-11 | End: 2025-02-12

## 2025-02-11 RX ORDER — ONDANSETRON 4 MG/1
4 TABLET, ORALLY DISINTEGRATING ORAL ONCE
Refills: 0 | Status: DISCONTINUED | OUTPATIENT
Start: 2025-02-11 | End: 2025-02-11

## 2025-02-11 RX ORDER — MECOBAL/LEVOMEFOLAT CA/B6 PHOS 2-3-35 MG
1 TABLET ORAL DAILY
Refills: 0 | Status: DISCONTINUED | OUTPATIENT
Start: 2025-02-11 | End: 2025-02-12

## 2025-02-11 RX ORDER — BACTERIOSTATIC SODIUM CHLORIDE 0.9 %
1000 VIAL (ML) INJECTION
Refills: 0 | Status: DISCONTINUED | OUTPATIENT
Start: 2025-02-11 | End: 2025-02-12

## 2025-02-11 RX ORDER — SODIUM CHLORIDE 9 G/ML
1000 INJECTION, SOLUTION INTRAVENOUS
Refills: 0 | Status: DISCONTINUED | OUTPATIENT
Start: 2025-02-11 | End: 2025-02-11

## 2025-02-11 RX ORDER — OXYCODONE HYDROCHLORIDE 30 MG/1
5 TABLET ORAL EVERY 6 HOURS
Refills: 0 | Status: DISCONTINUED | OUTPATIENT
Start: 2025-02-11 | End: 2025-02-12

## 2025-02-11 RX ORDER — CEFAZOLIN SODIUM IN 0.9 % NACL 2 G/10 ML
2000 SYRINGE (ML) INTRAVENOUS EVERY 8 HOURS
Refills: 0 | Status: DISCONTINUED | OUTPATIENT
Start: 2025-02-11 | End: 2025-02-11

## 2025-02-11 RX ORDER — FENTANYL CITRATE 50 UG/ML
50 INJECTION INTRAMUSCULAR; INTRAVENOUS
Refills: 0 | Status: DISCONTINUED | OUTPATIENT
Start: 2025-02-11 | End: 2025-02-11

## 2025-02-11 RX ORDER — ATORVASTATIN CALCIUM 80 MG/1
10 TABLET, FILM COATED ORAL AT BEDTIME
Refills: 0 | Status: DISCONTINUED | OUTPATIENT
Start: 2025-02-11 | End: 2025-02-12

## 2025-02-11 RX ORDER — HEPARIN SODIUM,PORCINE 10000/ML
5000 VIAL (ML) INJECTION EVERY 12 HOURS
Refills: 0 | Status: DISCONTINUED | OUTPATIENT
Start: 2025-02-11 | End: 2025-02-12

## 2025-02-11 RX ORDER — TAMSULOSIN HYDROCHLORIDE 0.4 MG/1
0.4 CAPSULE ORAL AT BEDTIME
Refills: 0 | Status: DISCONTINUED | OUTPATIENT
Start: 2025-02-11 | End: 2025-02-12

## 2025-02-11 RX ORDER — DILTIAZEM HYDROCHLORIDE 60 MG/1
300 TABLET ORAL DAILY
Refills: 0 | Status: DISCONTINUED | OUTPATIENT
Start: 2025-02-11 | End: 2025-02-12

## 2025-02-11 RX ORDER — ESCITALOPRAM 10 MG/1
20 TABLET, FILM COATED ORAL DAILY
Refills: 0 | Status: DISCONTINUED | OUTPATIENT
Start: 2025-02-11 | End: 2025-02-12

## 2025-02-11 RX ORDER — CEFAZOLIN SODIUM IN 0.9 % NACL 2 G/10 ML
2000 SYRINGE (ML) INTRAVENOUS EVERY 8 HOURS
Refills: 0 | Status: COMPLETED | OUTPATIENT
Start: 2025-02-11 | End: 2025-02-12

## 2025-02-11 RX ORDER — ONDANSETRON 4 MG/1
4 TABLET, ORALLY DISINTEGRATING ORAL EVERY 6 HOURS
Refills: 0 | Status: DISCONTINUED | OUTPATIENT
Start: 2025-02-11 | End: 2025-02-12

## 2025-02-11 RX ADMIN — Medication 5000 UNIT(S): at 21:15

## 2025-02-11 RX ADMIN — SODIUM CHLORIDE 150 MILLILITER(S): 9 INJECTION, SOLUTION INTRAVENOUS at 15:16

## 2025-02-11 RX ADMIN — TAMSULOSIN HYDROCHLORIDE 0.4 MILLIGRAM(S): 0.4 CAPSULE ORAL at 21:15

## 2025-02-11 RX ADMIN — Medication 2000 MILLIGRAM(S): at 21:15

## 2025-02-11 RX ADMIN — Medication 125 MILLILITER(S): at 21:14

## 2025-02-11 RX ADMIN — OXYBUTYNIN CHLORIDE 5 MILLIGRAM(S): 5 TABLET, EXTENDED RELEASE ORAL at 16:38

## 2025-02-11 NOTE — ASU PATIENT PROFILE, ADULT - NSICDXPASTSURGICALHX_GEN_ALL_CORE_FT
PAST SURGICAL HISTORY:  H/O hernia repair left inguinal    H/O nephroureterectomy right nephrectomy    History of back surgery TLIF    History of bladder surgery TURBT, cystoscopy--aprox 8 yrs ago, dx bladder cancer    History of cystoscopy Left kidney stones.  Left stent---4/20/22    Nephrostomy status     S/P tonsillectomy

## 2025-02-11 NOTE — ASU PATIENT PROFILE, ADULT - FALL HARM RISK - UNIVERSAL INTERVENTIONS
Bed in lowest position, wheels locked, appropriate side rails in place/Call bell, personal items and telephone in reach/Instruct patient to call for assistance before getting out of bed or chair/Non-slip footwear when patient is out of bed/[place] to call system/Physically safe environment - no spills, clutter or unnecessary equipment/Purposeful Proactive Rounding/Room/bathroom lighting operational, light cord in reach

## 2025-02-12 ENCOUNTER — TRANSCRIPTION ENCOUNTER (OUTPATIENT)
Age: 85
End: 2025-02-12

## 2025-02-12 VITALS — DIASTOLIC BLOOD PRESSURE: 77 MMHG | SYSTOLIC BLOOD PRESSURE: 171 MMHG

## 2025-02-12 LAB
ANION GAP SERPL CALC-SCNC: 6 MMOL/L — SIGNIFICANT CHANGE UP (ref 5–17)
BASOPHILS # BLD AUTO: 0.02 K/UL — SIGNIFICANT CHANGE UP (ref 0–0.2)
BASOPHILS NFR BLD AUTO: 0.2 % — SIGNIFICANT CHANGE UP (ref 0–2)
BUN SERPL-MCNC: 24 MG/DL — HIGH (ref 7–23)
CALCIUM SERPL-MCNC: 8.8 MG/DL — SIGNIFICANT CHANGE UP (ref 8.5–10.1)
CHLORIDE SERPL-SCNC: 104 MMOL/L — SIGNIFICANT CHANGE UP (ref 96–108)
CO2 SERPL-SCNC: 26 MMOL/L — SIGNIFICANT CHANGE UP (ref 22–31)
CREAT SERPL-MCNC: 1.87 MG/DL — HIGH (ref 0.5–1.3)
EGFR: 35 ML/MIN/1.73M2 — LOW
EOSINOPHIL # BLD AUTO: 0 K/UL — SIGNIFICANT CHANGE UP (ref 0–0.5)
EOSINOPHIL NFR BLD AUTO: 0 % — SIGNIFICANT CHANGE UP (ref 0–6)
GLUCOSE SERPL-MCNC: 173 MG/DL — HIGH (ref 70–99)
HCT VFR BLD CALC: 36.7 % — LOW (ref 39–50)
HGB BLD-MCNC: 11.3 G/DL — LOW (ref 13–17)
IMM GRANULOCYTES # BLD AUTO: 0.03 K/UL — SIGNIFICANT CHANGE UP (ref 0–0.07)
IMM GRANULOCYTES NFR BLD AUTO: 0.3 % — SIGNIFICANT CHANGE UP (ref 0–0.9)
LYMPHOCYTES # BLD AUTO: 0.73 K/UL — LOW (ref 1–3.3)
LYMPHOCYTES NFR BLD AUTO: 8.4 % — LOW (ref 13–44)
MCHC RBC-ENTMCNC: 19.4 PG — LOW (ref 27–34)
MCHC RBC-ENTMCNC: 30.8 G/DL — LOW (ref 32–36)
MCV RBC AUTO: 63 FL — LOW (ref 80–100)
MONOCYTES # BLD AUTO: 0.55 K/UL — SIGNIFICANT CHANGE UP (ref 0–0.9)
MONOCYTES NFR BLD AUTO: 6.4 % — SIGNIFICANT CHANGE UP (ref 2–14)
NEUTROPHILS # BLD AUTO: 7.33 K/UL — SIGNIFICANT CHANGE UP (ref 1.8–7.4)
NEUTROPHILS NFR BLD AUTO: 84.7 % — HIGH (ref 43–77)
NRBC # BLD AUTO: 0 K/UL — SIGNIFICANT CHANGE UP (ref 0–0)
NRBC # FLD: 0 K/UL — SIGNIFICANT CHANGE UP (ref 0–0)
NRBC BLD AUTO-RTO: 0 /100 WBCS — SIGNIFICANT CHANGE UP (ref 0–0)
PLATELET # BLD AUTO: 157 K/UL — SIGNIFICANT CHANGE UP (ref 150–400)
PMV BLD: SIGNIFICANT CHANGE UP (ref 7–13)
POTASSIUM SERPL-MCNC: 3.6 MMOL/L — SIGNIFICANT CHANGE UP (ref 3.5–5.3)
POTASSIUM SERPL-SCNC: 3.6 MMOL/L — SIGNIFICANT CHANGE UP (ref 3.5–5.3)
RBC # BLD: 5.83 M/UL — HIGH (ref 4.2–5.8)
RBC # FLD: 18 % — HIGH (ref 10.3–14.5)
SODIUM SERPL-SCNC: 136 MMOL/L — SIGNIFICANT CHANGE UP (ref 135–145)
WBC # BLD: 8.66 K/UL — SIGNIFICANT CHANGE UP (ref 3.8–10.5)
WBC # FLD AUTO: 8.66 K/UL — SIGNIFICANT CHANGE UP (ref 3.8–10.5)

## 2025-02-12 RX ORDER — AMLODIPINE BESYLATE 5 MG
5 TABLET ORAL DAILY
Refills: 0 | Status: DISCONTINUED | OUTPATIENT
Start: 2025-02-12 | End: 2025-02-12

## 2025-02-12 RX ADMIN — ESCITALOPRAM 20 MILLIGRAM(S): 10 TABLET, FILM COATED ORAL at 10:41

## 2025-02-12 RX ADMIN — Medication 5000 UNIT(S): at 10:30

## 2025-02-12 RX ADMIN — Medication 2000 MILLIGRAM(S): at 06:30

## 2025-02-12 RX ADMIN — DILTIAZEM HYDROCHLORIDE 300 MILLIGRAM(S): 60 TABLET ORAL at 10:41

## 2025-02-12 RX ADMIN — Medication 5 MILLIGRAM(S): at 12:17

## 2025-02-12 RX ADMIN — Medication 1 TABLET(S): at 10:30

## 2025-02-12 RX ADMIN — Medication 125 MILLILITER(S): at 04:14

## 2025-02-12 RX ADMIN — Medication 5 MILLIGRAM(S): at 06:31

## 2025-02-12 NOTE — ASU DISCHARGE PLAN (ADULT/PEDIATRIC) - ASU DC SPECIAL INSTRUCTIONSFT
Follow up with your PCP and Cardiologist for your blood pressure  Follow up with Dr. Mckinney as scheduled on 2/19/25 at 9:10 am

## 2025-02-12 NOTE — ASU DISCHARGE PLAN (ADULT/PEDIATRIC) - FINANCIAL ASSISTANCE
[facility] provides services at a reduced cost to those who are determined to be eligible through [facility]’s financial assistance program. Information regarding [facility]’s financial assistance program can be found by going to [email or web address] or by calling [phone].

## 2025-02-12 NOTE — DISCHARGE NOTE NURSING/CASE MANAGEMENT/SOCIAL WORK - PATIENT PORTAL LINK FT
You can access the FollowMyHealth Patient Portal offered by [facility] by registering at the following website: [email or web address]. By joining [employer]’s FollowMyHealth portal, you will also be able to view your health information using other applications (apps) compatible with our system.

## 2025-02-12 NOTE — DISCHARGE NOTE NURSING/CASE MANAGEMENT/SOCIAL WORK - NSTOBACCOREFERRAL_GEN_A_CS
Coronary artery disease of native artery of native heart with stable angina pectoris
Coronary artery disease of native artery of native heart with stable angina pectoris
Patient declined information

## 2025-02-12 NOTE — ASU DISCHARGE PLAN (ADULT/PEDIATRIC) - CARE PROVIDER_API CALL
Jassi Mckinney  Urology  [address] [place] [id number]  Phone: [phone]  Fax: [phone]  Scheduled Appointment: 02/19/2025 09:10 AM

## 2025-02-12 NOTE — PROGRESS NOTE ADULT - SUBJECTIVE AND OBJECTIVE BOX
Patient passed trial of void with PVR of 2 mL. Notified by RN pt with elevated BP of 178/78 repeat manually also 172/80. Patient took his morning Cardiazem 300 mg and was given Amlodipine 5 mg and BP repeated an hour later still with 171/77. Patient anxious to leave and states he will sign out AMA. Patient has a blood pressure machine at home and will monitor his BP and understands the risks, reports he will call his cardiologist today and will follow up with him within a week. Discussed above with Dr. Mckinney who states patient can be discharged home.
Pt seen at bedside. Pt is POD 1 s/p cystoscopy TURBT. Patient is doing well post op. Dillon with yellow urine no clots    PE  Vital Signs Last 24 Hrs  T(C): 36.6 (12 Feb 2025 12:00), Max: 37.2 (11 Feb 2025 15:15)  T(F): 97.9 (12 Feb 2025 12:00), Max: 98.9 (11 Feb 2025 15:15)  HR: 45 (12 Feb 2025 12:00) (45 - 90)  BP: 174/71 (12 Feb 2025 12:00) (123/79 - 174/71)  BP(mean): --  RR: 18 (12 Feb 2025 12:00) (12 - 20)  SpO2: 96% (12 Feb 2025 12:00) (93% - 100%)    Parameters below as of 12 Feb 2025 12:00  Patient On (Oxygen Delivery Method): room air        Skin     : No jaundice   HEENT: Normocephalic, no icterus , EOM full , No epistaxis  Lung    : No resp distress  Abdo:   : Soft, Non tender, No guarding, No distension   Back    : No CVAT b/l  Extremity: No calf tenderness   Genitalia Male: Dillon with clear urine no clots  Neuro   : A&Ox3

## 2025-02-12 NOTE — DISCHARGE NOTE NURSING/CASE MANAGEMENT/SOCIAL WORK - NSDCPEFALRISK_GEN_ALL_CORE
For information on Fall & Injury Prevention, visit: [email or web address] OR  [email or web address] OR  https://www.cdc.gov/steadi/patient.html

## 2025-02-12 NOTE — DISCHARGE NOTE NURSING/CASE MANAGEMENT/SOCIAL WORK - FINANCIAL ASSISTANCE
[facility] provides services at a reduced cost to those who are determined to be eligible through [facility]’s financial assistance program. Information regarding [facility]’s financial assistance program can be found by going to [email or web address] or by calling [phone].

## 2025-02-12 NOTE — PROGRESS NOTE ADULT - ASSESSMENT
85 yo male POD 1 s/p cystoscopy TURBT. Patient is doing well post op. Dillon with yellow urine no clots  Reviewed labs. Discussed with Dr. Mckinney, Pt can have a trial of void prior to discharge.  - Trial of void check post void residual.  - Follow up with Dr. Mckinney outpatient as scheduled    Case discussed with Dr. Mckinney

## 2025-02-14 LAB — SURGICAL PATHOLOGY STUDY: SIGNIFICANT CHANGE UP

## 2025-02-18 ENCOUNTER — NON-APPOINTMENT (OUTPATIENT)
Age: 85
End: 2025-02-18

## 2025-02-19 DIAGNOSIS — Z88.0 ALLERGY STATUS TO PENICILLIN: ICD-10-CM

## 2025-02-19 DIAGNOSIS — N40.1 BENIGN PROSTATIC HYPERPLASIA WITH LOWER URINARY TRACT SYMPTOMS: ICD-10-CM

## 2025-02-19 DIAGNOSIS — E11.9 TYPE 2 DIABETES MELLITUS WITHOUT COMPLICATIONS: ICD-10-CM

## 2025-02-19 DIAGNOSIS — N13.8 OTHER OBSTRUCTIVE AND REFLUX UROPATHY: ICD-10-CM

## 2025-02-19 DIAGNOSIS — E78.00 PURE HYPERCHOLESTEROLEMIA, UNSPECIFIED: ICD-10-CM

## 2025-02-19 DIAGNOSIS — N41.1 CHRONIC PROSTATITIS: ICD-10-CM

## 2025-02-19 DIAGNOSIS — D09.0 CARCINOMA IN SITU OF BLADDER: ICD-10-CM

## 2025-02-19 DIAGNOSIS — I10 ESSENTIAL (PRIMARY) HYPERTENSION: ICD-10-CM

## 2025-02-19 DIAGNOSIS — Z87.891 PERSONAL HISTORY OF NICOTINE DEPENDENCE: ICD-10-CM

## 2025-02-19 DIAGNOSIS — D49.4 NEOPLASM OF UNSPECIFIED BEHAVIOR OF BLADDER: ICD-10-CM

## 2025-02-19 DIAGNOSIS — Z88.2 ALLERGY STATUS TO SULFONAMIDES: ICD-10-CM

## 2025-02-21 ENCOUNTER — RX RENEWAL (OUTPATIENT)
Age: 85
End: 2025-02-21

## 2025-03-05 ENCOUNTER — APPOINTMENT (OUTPATIENT)
Dept: UROLOGY | Facility: CLINIC | Age: 85
End: 2025-03-05
Payer: MEDICARE

## 2025-03-05 DIAGNOSIS — D49.4 NEOPLASM OF UNSPECIFIED BEHAVIOR OF BLADDER: ICD-10-CM

## 2025-03-05 PROCEDURE — 99213 OFFICE O/P EST LOW 20 MIN: CPT | Mod: 24

## 2025-03-05 RX ORDER — MITOMYCIN 40 MG/80ML
40 INJECTION, POWDER, LYOPHILIZED, FOR SOLUTION INTRAVENOUS
Qty: 1 | Refills: 5 | Status: ACTIVE | COMMUNITY
Start: 2025-03-05 | End: 1900-01-01

## 2025-03-06 PROBLEM — D49.4 NEOPLASM OF UNSPECIFIED BEHAVIOR OF BLADDER: Chronic | Status: ACTIVE | Noted: 2025-01-24

## 2025-03-06 PROBLEM — C64.9 MALIGNANT NEOPLASM OF UNSPECIFIED KIDNEY, EXCEPT RENAL PELVIS: Chronic | Status: ACTIVE | Noted: 2025-01-24

## 2025-03-06 PROBLEM — H91.90 UNSPECIFIED HEARING LOSS, UNSPECIFIED EAR: Chronic | Status: ACTIVE | Noted: 2025-01-24

## 2025-03-26 ENCOUNTER — APPOINTMENT (OUTPATIENT)
Dept: UROLOGY | Facility: CLINIC | Age: 85
End: 2025-03-26
Payer: MEDICARE

## 2025-03-26 VITALS
OXYGEN SATURATION: 93 % | SYSTOLIC BLOOD PRESSURE: 155 MMHG | BODY MASS INDEX: 33.8 KG/M2 | HEIGHT: 73 IN | DIASTOLIC BLOOD PRESSURE: 93 MMHG | HEART RATE: 85 BPM | RESPIRATION RATE: 16 BRPM | WEIGHT: 255 LBS

## 2025-03-26 LAB
BILIRUB UR QL STRIP: NEGATIVE
GLUCOSE UR-MCNC: NEGATIVE
HCG UR QL: 0.2 EU/DL
HGB UR QL STRIP.AUTO: ABNORMAL
KETONES UR-MCNC: NEGATIVE
LEUKOCYTE ESTERASE UR QL STRIP: ABNORMAL
NITRITE UR QL STRIP: NEGATIVE
PH UR STRIP: 6
PROT UR STRIP-MCNC: 30
SP GR UR STRIP: 1.02

## 2025-03-26 PROCEDURE — 81003 URINALYSIS AUTO W/O SCOPE: CPT | Mod: QW

## 2025-03-26 PROCEDURE — 51720 TREATMENT OF BLADDER LESION: CPT | Mod: 79

## 2025-03-26 RX ORDER — MITOMYCIN 40 MG/80ML
40 INJECTION, POWDER, LYOPHILIZED, FOR SOLUTION INTRAVENOUS
Qty: 1 | Refills: 0 | Status: COMPLETED | OUTPATIENT
Start: 2025-03-26

## 2025-04-02 ENCOUNTER — APPOINTMENT (OUTPATIENT)
Dept: UROLOGY | Facility: CLINIC | Age: 85
End: 2025-04-02
Payer: MEDICARE

## 2025-04-02 VITALS
HEART RATE: 81 BPM | DIASTOLIC BLOOD PRESSURE: 82 MMHG | HEIGHT: 73 IN | WEIGHT: 255 LBS | BODY MASS INDEX: 33.8 KG/M2 | OXYGEN SATURATION: 94 % | SYSTOLIC BLOOD PRESSURE: 178 MMHG

## 2025-04-02 PROBLEM — C67.9 BLADDER CANCER: Status: ACTIVE | Noted: 2025-03-26

## 2025-04-02 LAB
BILIRUB UR QL STRIP: NORMAL
CLARITY UR: CLEAR
COLLECTION METHOD: NORMAL
GLUCOSE UR-MCNC: NORMAL
HCG UR QL: 0.2 EU/DL
HGB UR QL STRIP.AUTO: ABNORMAL
KETONES UR-MCNC: NORMAL
LEUKOCYTE ESTERASE UR QL STRIP: ABNORMAL
NITRITE UR QL STRIP: NORMAL
PH UR STRIP: 6
PROT UR STRIP-MCNC: 30
SP GR UR STRIP: 1.02

## 2025-04-02 PROCEDURE — 81003 URINALYSIS AUTO W/O SCOPE: CPT | Mod: QW

## 2025-04-02 PROCEDURE — 51720 TREATMENT OF BLADDER LESION: CPT | Mod: 79

## 2025-04-02 RX ORDER — MITOMYCIN 40 MG/80ML
40 INJECTION, POWDER, LYOPHILIZED, FOR SOLUTION INTRAVENOUS
Qty: 1 | Refills: 0 | Status: COMPLETED | OUTPATIENT
Start: 2025-04-02

## 2025-04-02 RX ADMIN — MITOMYCIN 0 MG: 40 INJECTION, POWDER, LYOPHILIZED, FOR SOLUTION INTRAVENOUS at 00:00

## 2025-04-09 ENCOUNTER — APPOINTMENT (OUTPATIENT)
Dept: UROLOGY | Facility: CLINIC | Age: 85
End: 2025-04-09
Payer: MEDICARE

## 2025-04-09 VITALS
SYSTOLIC BLOOD PRESSURE: 135 MMHG | HEART RATE: 76 BPM | OXYGEN SATURATION: 92 % | HEIGHT: 73 IN | RESPIRATION RATE: 16 BRPM | DIASTOLIC BLOOD PRESSURE: 78 MMHG | BODY MASS INDEX: 33.8 KG/M2 | WEIGHT: 255 LBS

## 2025-04-09 PROCEDURE — 51720 TREATMENT OF BLADDER LESION: CPT | Mod: 79

## 2025-04-09 PROCEDURE — 81003 URINALYSIS AUTO W/O SCOPE: CPT | Mod: QW

## 2025-04-09 RX ORDER — MITOMYCIN 40 MG/80ML
40 INJECTION, POWDER, LYOPHILIZED, FOR SOLUTION INTRAVENOUS
Qty: 1 | Refills: 0 | Status: COMPLETED | OUTPATIENT
Start: 2025-04-09

## 2025-04-09 RX ADMIN — MITOMYCIN 0 MG: 40 INJECTION, POWDER, LYOPHILIZED, FOR SOLUTION INTRAVENOUS at 00:00

## 2025-04-16 ENCOUNTER — APPOINTMENT (OUTPATIENT)
Dept: UROLOGY | Facility: CLINIC | Age: 85
End: 2025-04-16
Payer: MEDICARE

## 2025-04-16 VITALS
OXYGEN SATURATION: 95 % | DIASTOLIC BLOOD PRESSURE: 96 MMHG | HEART RATE: 75 BPM | BODY MASS INDEX: 34.46 KG/M2 | WEIGHT: 260 LBS | HEIGHT: 73 IN | SYSTOLIC BLOOD PRESSURE: 157 MMHG

## 2025-04-16 LAB
BILIRUB UR QL STRIP: NORMAL
CLARITY UR: CLEAR
COLLECTION METHOD: NORMAL
GLUCOSE UR-MCNC: NORMAL
HCG UR QL: 0.2 EU/DL
HGB UR QL STRIP.AUTO: ABNORMAL
KETONES UR-MCNC: NORMAL
LEUKOCYTE ESTERASE UR QL STRIP: ABNORMAL
NITRITE UR QL STRIP: NORMAL
PH UR STRIP: 6
PROT UR STRIP-MCNC: 30
SP GR UR STRIP: 1.01

## 2025-04-16 PROCEDURE — 81003 URINALYSIS AUTO W/O SCOPE: CPT | Mod: QW

## 2025-04-16 RX ORDER — MITOMYCIN 40 MG/80ML
40 INJECTION, POWDER, LYOPHILIZED, FOR SOLUTION INTRAVENOUS
Qty: 1 | Refills: 0 | Status: COMPLETED | OUTPATIENT
Start: 2025-04-16

## 2025-04-16 RX ADMIN — MITOMYCIN 0 MG: 40 INJECTION, POWDER, LYOPHILIZED, FOR SOLUTION INTRAVENOUS at 00:00

## 2025-04-23 ENCOUNTER — RESULT CHARGE (OUTPATIENT)
Age: 85
End: 2025-04-23

## 2025-04-23 ENCOUNTER — APPOINTMENT (OUTPATIENT)
Dept: UROLOGY | Facility: CLINIC | Age: 85
End: 2025-04-23
Payer: MEDICARE

## 2025-04-23 VITALS
OXYGEN SATURATION: 99 % | DIASTOLIC BLOOD PRESSURE: 78 MMHG | SYSTOLIC BLOOD PRESSURE: 152 MMHG | HEIGHT: 73 IN | WEIGHT: 260 LBS | BODY MASS INDEX: 34.46 KG/M2 | HEART RATE: 60 BPM | RESPIRATION RATE: 16 BRPM

## 2025-04-23 LAB
BILIRUB UR QL STRIP: NORMAL
GLUCOSE UR-MCNC: NEGATIVE
HCG UR QL: 0.2 EU/DL
HGB UR QL STRIP.AUTO: NORMAL
KETONES UR-MCNC: NEGATIVE
LEUKOCYTE ESTERASE UR QL STRIP: NORMAL
NITRITE UR QL STRIP: NEGATIVE
PH UR STRIP: 6
PROT UR STRIP-MCNC: NORMAL
SP GR UR STRIP: 1.02

## 2025-04-23 PROCEDURE — 51720 TREATMENT OF BLADDER LESION: CPT | Mod: 79

## 2025-04-23 PROCEDURE — 81003 URINALYSIS AUTO W/O SCOPE: CPT | Mod: QW

## 2025-04-23 RX ORDER — BACILLUS CALMETTE-GUERIN 50 MG/50ML
50 POWDER, FOR SUSPENSION INTRAVESICAL
Refills: 0 | Status: COMPLETED | OUTPATIENT
Start: 2025-04-23

## 2025-04-23 RX ADMIN — BACILLUS CALMETTE-GUERIN 0 MG: 50 POWDER, FOR SUSPENSION INTRAVESICAL at 00:00

## 2025-04-30 ENCOUNTER — APPOINTMENT (OUTPATIENT)
Dept: UROLOGY | Facility: CLINIC | Age: 85
End: 2025-04-30
Payer: MEDICARE

## 2025-04-30 VITALS
OXYGEN SATURATION: 98 % | DIASTOLIC BLOOD PRESSURE: 91 MMHG | BODY MASS INDEX: 34.46 KG/M2 | HEART RATE: 75 BPM | SYSTOLIC BLOOD PRESSURE: 141 MMHG | HEIGHT: 73 IN | WEIGHT: 260 LBS

## 2025-04-30 DIAGNOSIS — C67.9 MALIGNANT NEOPLASM OF BLADDER, UNSPECIFIED: ICD-10-CM

## 2025-04-30 LAB
BILIRUB UR QL STRIP: NORMAL
CLARITY UR: CLEAR
COLLECTION METHOD: NORMAL
GLUCOSE UR-MCNC: NORMAL
HCG UR QL: 0.2 EU/DL
HGB UR QL STRIP.AUTO: ABNORMAL
KETONES UR-MCNC: NORMAL
LEUKOCYTE ESTERASE UR QL STRIP: ABNORMAL
NITRITE UR QL STRIP: NORMAL
PH UR STRIP: 6
PROT UR STRIP-MCNC: 100
SP GR UR STRIP: 1.02

## 2025-04-30 PROCEDURE — 51720 TREATMENT OF BLADDER LESION: CPT | Mod: 79

## 2025-04-30 PROCEDURE — 81003 URINALYSIS AUTO W/O SCOPE: CPT | Mod: QW

## 2025-04-30 RX ORDER — MITOMYCIN 40 MG/80ML
40 INJECTION, POWDER, LYOPHILIZED, FOR SOLUTION INTRAVENOUS
Qty: 1 | Refills: 0 | Status: COMPLETED | OUTPATIENT
Start: 2025-04-30

## 2025-04-30 RX ADMIN — MITOMYCIN 0 MG: 40 INJECTION, POWDER, LYOPHILIZED, FOR SOLUTION INTRAVENOUS at 00:00

## 2025-05-06 ENCOUNTER — INPATIENT (INPATIENT)
Facility: HOSPITAL | Age: 85
LOS: 1 days | Discharge: HOME | DRG: 204 | End: 2025-05-08
Attending: HOSPITALIST | Admitting: HOSPITALIST
Payer: MEDICARE

## 2025-05-06 VITALS
WEIGHT: 266.54 LBS | OXYGEN SATURATION: 95 % | DIASTOLIC BLOOD PRESSURE: 108 MMHG | TEMPERATURE: 99 F | HEART RATE: 79 BPM | RESPIRATION RATE: 18 BRPM | SYSTOLIC BLOOD PRESSURE: 154 MMHG

## 2025-05-06 DIAGNOSIS — Z90.89 ACQUIRED ABSENCE OF OTHER ORGANS: Chronic | ICD-10-CM

## 2025-05-06 DIAGNOSIS — Z98.890 OTHER SPECIFIED POSTPROCEDURAL STATES: Chronic | ICD-10-CM

## 2025-05-06 DIAGNOSIS — Z90.5 ACQUIRED ABSENCE OF KIDNEY: Chronic | ICD-10-CM

## 2025-05-06 DIAGNOSIS — Z93.6 OTHER ARTIFICIAL OPENINGS OF URINARY TRACT STATUS: Chronic | ICD-10-CM

## 2025-05-06 LAB
ALBUMIN SERPL ELPH-MCNC: 3.5 G/DL — SIGNIFICANT CHANGE UP (ref 3.3–5)
ALP SERPL-CCNC: 98 U/L — SIGNIFICANT CHANGE UP (ref 40–120)
ALT FLD-CCNC: 26 U/L — SIGNIFICANT CHANGE UP (ref 12–78)
ANION GAP SERPL CALC-SCNC: 5 MMOL/L — SIGNIFICANT CHANGE UP (ref 5–17)
AST SERPL-CCNC: 11 U/L — LOW (ref 15–37)
BILIRUB SERPL-MCNC: 0.6 MG/DL — SIGNIFICANT CHANGE UP (ref 0.2–1.2)
BUN SERPL-MCNC: 24 MG/DL — HIGH (ref 7–23)
CALCIUM SERPL-MCNC: 9.1 MG/DL — SIGNIFICANT CHANGE UP (ref 8.5–10.1)
CHLORIDE SERPL-SCNC: 110 MMOL/L — HIGH (ref 96–108)
CO2 SERPL-SCNC: 26 MMOL/L — SIGNIFICANT CHANGE UP (ref 22–31)
CREAT SERPL-MCNC: 1.84 MG/DL — HIGH (ref 0.5–1.3)
EGFR: 36 ML/MIN/1.73M2 — LOW
EGFR: 36 ML/MIN/1.73M2 — LOW
FLUAV AG NPH QL: SIGNIFICANT CHANGE UP
FLUBV AG NPH QL: SIGNIFICANT CHANGE UP
GLUCOSE SERPL-MCNC: 105 MG/DL — HIGH (ref 70–99)
HCT VFR BLD CALC: 38.1 % — LOW (ref 39–50)
HGB BLD-MCNC: 11.7 G/DL — LOW (ref 13–17)
LIDOCAIN IGE QN: 63 U/L — SIGNIFICANT CHANGE UP (ref 13–75)
MAGNESIUM SERPL-MCNC: 2.1 MG/DL — SIGNIFICANT CHANGE UP (ref 1.6–2.6)
MCHC RBC-ENTMCNC: 19.7 PG — LOW (ref 27–34)
MCHC RBC-ENTMCNC: 30.7 G/DL — LOW (ref 32–36)
MCV RBC AUTO: 64 FL — LOW (ref 80–100)
NRBC # BLD AUTO: 0 K/UL — SIGNIFICANT CHANGE UP (ref 0–0)
NRBC # FLD: 0 K/UL — SIGNIFICANT CHANGE UP (ref 0–0)
NRBC BLD AUTO-RTO: 0 /100 WBCS — SIGNIFICANT CHANGE UP (ref 0–0)
NT-PROBNP SERPL-SCNC: 255 PG/ML — SIGNIFICANT CHANGE UP (ref 0–450)
PLATELET # BLD AUTO: 171 K/UL — SIGNIFICANT CHANGE UP (ref 150–400)
PMV BLD: 10.8 FL — SIGNIFICANT CHANGE UP (ref 7–13)
POTASSIUM SERPL-MCNC: 3.7 MMOL/L — SIGNIFICANT CHANGE UP (ref 3.5–5.3)
POTASSIUM SERPL-SCNC: 3.7 MMOL/L — SIGNIFICANT CHANGE UP (ref 3.5–5.3)
PROT SERPL-MCNC: 6.6 GM/DL — SIGNIFICANT CHANGE UP (ref 6–8.3)
RBC # BLD: 5.95 M/UL — HIGH (ref 4.2–5.8)
RBC # FLD: 17.7 % — HIGH (ref 10.3–14.5)
RSV RNA NPH QL NAA+NON-PROBE: SIGNIFICANT CHANGE UP
SARS-COV-2 RNA SPEC QL NAA+PROBE: SIGNIFICANT CHANGE UP
SODIUM SERPL-SCNC: 141 MMOL/L — SIGNIFICANT CHANGE UP (ref 135–145)
SOURCE RESPIRATORY: SIGNIFICANT CHANGE UP
TROPONIN I, HIGH SENSITIVITY RESULT: 12.18 NG/L — SIGNIFICANT CHANGE UP
WBC # BLD: 5.54 K/UL — SIGNIFICANT CHANGE UP (ref 3.8–10.5)
WBC # FLD AUTO: 5.54 K/UL — SIGNIFICANT CHANGE UP (ref 3.8–10.5)

## 2025-05-06 PROCEDURE — 99285 EMERGENCY DEPT VISIT HI MDM: CPT

## 2025-05-06 PROCEDURE — 71045 X-RAY EXAM CHEST 1 VIEW: CPT | Mod: 26

## 2025-05-06 PROCEDURE — 71250 CT THORAX DX C-: CPT | Mod: 26

## 2025-05-06 PROCEDURE — 70450 CT HEAD/BRAIN W/O DYE: CPT | Mod: 26

## 2025-05-06 RX ORDER — ACETAMINOPHEN 500 MG/5ML
1000 LIQUID (ML) ORAL ONCE
Refills: 0 | Status: COMPLETED | OUTPATIENT
Start: 2025-05-06 | End: 2025-05-06

## 2025-05-06 RX ORDER — ALPRAZOLAM 0.5 MG
0.5 TABLET, EXTENDED RELEASE 24 HR ORAL ONCE
Refills: 0 | Status: DISCONTINUED | OUTPATIENT
Start: 2025-05-06 | End: 2025-05-06

## 2025-05-06 RX ADMIN — Medication 0.5 MILLIGRAM(S): at 22:24

## 2025-05-06 NOTE — PHARMACOTHERAPY INTERVENTION NOTE - COMMENTS
Medication reconciliation completed.  Reviewed Medication list and confirmed med allergies with patient; confirmed with Dr. First Medmanda.

## 2025-05-06 NOTE — ED ADULT TRIAGE NOTE - CHIEF COMPLAINT QUOTE
pt sent from urgent care c/o intermittent SOB x 2 weeks, worsening since Friday. PMH kidney cancer with METS. Denies chest pain, HA/dizziness, fevers at this time. Sent for stat EKG,

## 2025-05-07 ENCOUNTER — RESULT REVIEW (OUTPATIENT)
Age: 85
End: 2025-05-07

## 2025-05-07 DIAGNOSIS — R06.00 DYSPNEA, UNSPECIFIED: ICD-10-CM

## 2025-05-07 LAB
A1C WITH ESTIMATED AVERAGE GLUCOSE RESULT: 7.1 % — HIGH (ref 4–5.6)
ADD ON TEST-SPECIMEN IN LAB: SIGNIFICANT CHANGE UP
ADD ON TEST-SPECIMEN IN LAB: SIGNIFICANT CHANGE UP
ALBUMIN SERPL ELPH-MCNC: 3.5 G/DL — SIGNIFICANT CHANGE UP (ref 3.3–5)
ALP SERPL-CCNC: 88 U/L — SIGNIFICANT CHANGE UP (ref 40–120)
ALT FLD-CCNC: 22 U/L — SIGNIFICANT CHANGE UP (ref 12–78)
ANION GAP SERPL CALC-SCNC: 6 MMOL/L — SIGNIFICANT CHANGE UP (ref 5–17)
ANISOCYTOSIS BLD QL: SLIGHT — SIGNIFICANT CHANGE UP
APPEARANCE UR: CLEAR — SIGNIFICANT CHANGE UP
APTT BLD: 26.9 SEC — SIGNIFICANT CHANGE UP (ref 26.1–36.8)
AST SERPL-CCNC: 9 U/L — LOW (ref 15–37)
BACTERIA # UR AUTO: NEGATIVE /HPF — SIGNIFICANT CHANGE UP
BASO STIPL BLD QL SMEAR: PRESENT
BASOPHILS # BLD AUTO: 0.04 K/UL — SIGNIFICANT CHANGE UP (ref 0–0.2)
BASOPHILS # BLD AUTO: 0.04 K/UL — SIGNIFICANT CHANGE UP (ref 0–0.2)
BASOPHILS NFR BLD AUTO: 0.7 % — SIGNIFICANT CHANGE UP (ref 0–2)
BASOPHILS NFR BLD AUTO: 0.8 % — SIGNIFICANT CHANGE UP (ref 0–2)
BILIRUB SERPL-MCNC: 0.8 MG/DL — SIGNIFICANT CHANGE UP (ref 0.2–1.2)
BILIRUB UR-MCNC: NEGATIVE — SIGNIFICANT CHANGE UP
BUN SERPL-MCNC: 20 MG/DL — SIGNIFICANT CHANGE UP (ref 7–23)
CALCIUM SERPL-MCNC: 9 MG/DL — SIGNIFICANT CHANGE UP (ref 8.5–10.1)
CAST: 0 /LPF — SIGNIFICANT CHANGE UP (ref 0–4)
CHLORIDE SERPL-SCNC: 108 MMOL/L — SIGNIFICANT CHANGE UP (ref 96–108)
CHOLEST SERPL-MCNC: 153 MG/DL — SIGNIFICANT CHANGE UP
CO2 SERPL-SCNC: 25 MMOL/L — SIGNIFICANT CHANGE UP (ref 22–31)
COLOR SPEC: YELLOW — SIGNIFICANT CHANGE UP
CREAT SERPL-MCNC: 1.77 MG/DL — HIGH (ref 0.5–1.3)
CRP SERPL-MCNC: 4.6 MG/L — SIGNIFICANT CHANGE UP (ref 0–5)
CRP SERPL-MCNC: 5.4 MG/L — HIGH (ref 0–5)
D DIMER BLD IA.RAPID-MCNC: 273 NG/ML DDU — HIGH
DACRYOCYTES BLD QL SMEAR: SLIGHT — SIGNIFICANT CHANGE UP
DIFF PNL FLD: ABNORMAL
EGFR: 38 ML/MIN/1.73M2 — LOW
EGFR: 38 ML/MIN/1.73M2 — LOW
ELLIPTOCYTES BLD QL SMEAR: SLIGHT — SIGNIFICANT CHANGE UP
EOSINOPHIL # BLD AUTO: 0.18 K/UL — SIGNIFICANT CHANGE UP (ref 0–0.5)
EOSINOPHIL # BLD AUTO: 0.19 K/UL — SIGNIFICANT CHANGE UP (ref 0–0.5)
EOSINOPHIL NFR BLD AUTO: 3.4 % — SIGNIFICANT CHANGE UP (ref 0–6)
EOSINOPHIL NFR BLD AUTO: 3.6 % — SIGNIFICANT CHANGE UP (ref 0–6)
ERYTHROCYTE [SEDIMENTATION RATE] IN BLOOD: 8 MM/HR — SIGNIFICANT CHANGE UP (ref 0–20)
ESTIMATED AVERAGE GLUCOSE: 157 MG/DL — HIGH (ref 68–114)
GLUCOSE BLDC GLUCOMTR-MCNC: 133 MG/DL — HIGH (ref 70–99)
GLUCOSE BLDC GLUCOMTR-MCNC: 141 MG/DL — HIGH (ref 70–99)
GLUCOSE SERPL-MCNC: 122 MG/DL — HIGH (ref 70–99)
GLUCOSE UR QL: NEGATIVE MG/DL — SIGNIFICANT CHANGE UP
HCT VFR BLD CALC: 39.6 % — SIGNIFICANT CHANGE UP (ref 39–50)
HDLC SERPL-MCNC: 33 MG/DL — LOW
HGB BLD-MCNC: 12 G/DL — LOW (ref 13–17)
IMM GRANULOCYTES # BLD AUTO: 0.02 K/UL — SIGNIFICANT CHANGE UP (ref 0–0.07)
IMM GRANULOCYTES # BLD AUTO: 0.03 K/UL — SIGNIFICANT CHANGE UP (ref 0–0.07)
IMM GRANULOCYTES NFR BLD AUTO: 0.4 % — SIGNIFICANT CHANGE UP (ref 0–0.9)
IMM GRANULOCYTES NFR BLD AUTO: 0.6 % — SIGNIFICANT CHANGE UP (ref 0–0.9)
INR BLD: 0.99 RATIO — SIGNIFICANT CHANGE UP (ref 0.85–1.16)
KETONES UR-MCNC: NEGATIVE MG/DL — SIGNIFICANT CHANGE UP
LACTATE SERPL-SCNC: 1.2 MMOL/L — SIGNIFICANT CHANGE UP (ref 0.7–2)
LDLC SERPL-MCNC: 90 MG/DL — SIGNIFICANT CHANGE UP
LEUKOCYTE ESTERASE UR-ACNC: ABNORMAL
LIPID PNL WITH DIRECT LDL SERPL: 90 MG/DL — SIGNIFICANT CHANGE UP
LYMPHOCYTES # BLD AUTO: 1.01 K/UL — SIGNIFICANT CHANGE UP (ref 1–3.3)
LYMPHOCYTES # BLD AUTO: 1.04 K/UL — SIGNIFICANT CHANGE UP (ref 1–3.3)
LYMPHOCYTES NFR BLD AUTO: 18.8 % — SIGNIFICANT CHANGE UP (ref 13–44)
LYMPHOCYTES NFR BLD AUTO: 19.9 % — SIGNIFICANT CHANGE UP (ref 13–44)
MANUAL SMEAR VERIFICATION: SIGNIFICANT CHANGE UP
MCHC RBC-ENTMCNC: 19.4 PG — LOW (ref 27–34)
MCHC RBC-ENTMCNC: 30.3 G/DL — LOW (ref 32–36)
MCV RBC AUTO: 64.2 FL — LOW (ref 80–100)
MICROCYTES BLD QL: ABNORMAL
MONOCYTES # BLD AUTO: 0.48 K/UL — SIGNIFICANT CHANGE UP (ref 0–0.9)
MONOCYTES # BLD AUTO: 0.67 K/UL — SIGNIFICANT CHANGE UP (ref 0–0.9)
MONOCYTES NFR BLD AUTO: 12.1 % — SIGNIFICANT CHANGE UP (ref 2–14)
MONOCYTES NFR BLD AUTO: 9.5 % — SIGNIFICANT CHANGE UP (ref 2–14)
NEUTROPHILS # BLD AUTO: 3.33 K/UL — SIGNIFICANT CHANGE UP (ref 1.8–7.4)
NEUTROPHILS # BLD AUTO: 3.58 K/UL — SIGNIFICANT CHANGE UP (ref 1.8–7.4)
NEUTROPHILS NFR BLD AUTO: 64.6 % — SIGNIFICANT CHANGE UP (ref 43–77)
NEUTROPHILS NFR BLD AUTO: 65.6 % — SIGNIFICANT CHANGE UP (ref 43–77)
NITRITE UR-MCNC: NEGATIVE — SIGNIFICANT CHANGE UP
NONHDLC SERPL-MCNC: 120 MG/DL — SIGNIFICANT CHANGE UP
NRBC # BLD AUTO: 0 K/UL — SIGNIFICANT CHANGE UP (ref 0–0)
NRBC # FLD: 0 K/UL — SIGNIFICANT CHANGE UP (ref 0–0)
NRBC BLD AUTO-RTO: 0 /100 WBCS — SIGNIFICANT CHANGE UP (ref 0–0)
OVALOCYTES BLD QL SMEAR: SLIGHT — SIGNIFICANT CHANGE UP
PH UR: 7.5 — SIGNIFICANT CHANGE UP (ref 5–8)
PLAT MORPH BLD: NORMAL — SIGNIFICANT CHANGE UP
PLATELET # BLD AUTO: 165 K/UL — SIGNIFICANT CHANGE UP (ref 150–400)
PMV BLD: 10.2 FL — SIGNIFICANT CHANGE UP (ref 7–13)
POIKILOCYTOSIS BLD QL AUTO: SLIGHT — SIGNIFICANT CHANGE UP
POLYCHROMASIA BLD QL SMEAR: SLIGHT — SIGNIFICANT CHANGE UP
POTASSIUM SERPL-MCNC: 3.7 MMOL/L — SIGNIFICANT CHANGE UP (ref 3.5–5.3)
POTASSIUM SERPL-SCNC: 3.7 MMOL/L — SIGNIFICANT CHANGE UP (ref 3.5–5.3)
PROT SERPL-MCNC: 6.8 GM/DL — SIGNIFICANT CHANGE UP (ref 6–8.3)
PROT UR-MCNC: NEGATIVE MG/DL — SIGNIFICANT CHANGE UP
PROTHROM AB SERPL-ACNC: 11.4 SEC — SIGNIFICANT CHANGE UP (ref 9.9–13.4)
RBC # BLD: 6.17 M/UL — HIGH (ref 4.2–5.8)
RBC # FLD: 17.8 % — HIGH (ref 10.3–14.5)
RBC BLD AUTO: ABNORMAL
RBC CASTS # UR COMP ASSIST: 3 /HPF — SIGNIFICANT CHANGE UP (ref 0–4)
SODIUM SERPL-SCNC: 139 MMOL/L — SIGNIFICANT CHANGE UP (ref 135–145)
SP GR SPEC: 1.01 — SIGNIFICANT CHANGE UP (ref 1–1.03)
SQUAMOUS # UR AUTO: 0 /HPF — SIGNIFICANT CHANGE UP (ref 0–5)
TRIGL SERPL-MCNC: 172 MG/DL — HIGH
TROPONIN I, HIGH SENSITIVITY RESULT: 12.77 NG/L — SIGNIFICANT CHANGE UP
TROPONIN I, HIGH SENSITIVITY RESULT: 13.6 NG/L — SIGNIFICANT CHANGE UP
UROBILINOGEN FLD QL: 0.2 MG/DL — SIGNIFICANT CHANGE UP (ref 0.2–1)
WBC # BLD: 5.07 K/UL — SIGNIFICANT CHANGE UP (ref 3.8–10.5)
WBC # FLD AUTO: 5.07 K/UL — SIGNIFICANT CHANGE UP (ref 3.8–10.5)
WBC MORPHOLOGY: NORMAL — SIGNIFICANT CHANGE UP
WBC UR QL: 5 /HPF — SIGNIFICANT CHANGE UP (ref 0–5)

## 2025-05-07 PROCEDURE — 86140 C-REACTIVE PROTEIN: CPT

## 2025-05-07 PROCEDURE — 80048 BASIC METABOLIC PNL TOTAL CA: CPT

## 2025-05-07 PROCEDURE — 82962 GLUCOSE BLOOD TEST: CPT

## 2025-05-07 PROCEDURE — 86036 ANCA SCREEN EACH ANTIBODY: CPT

## 2025-05-07 PROCEDURE — 93970 EXTREMITY STUDY: CPT | Mod: 26

## 2025-05-07 PROCEDURE — 99223 1ST HOSP IP/OBS HIGH 75: CPT | Mod: FS

## 2025-05-07 PROCEDURE — 36415 COLL VENOUS BLD VENIPUNCTURE: CPT

## 2025-05-07 PROCEDURE — 78582 LUNG VENTILAT&PERFUS IMAGING: CPT | Mod: 26

## 2025-05-07 PROCEDURE — 86225 DNA ANTIBODY NATIVE: CPT

## 2025-05-07 PROCEDURE — 85652 RBC SED RATE AUTOMATED: CPT

## 2025-05-07 RX ORDER — SODIUM CHLORIDE 9 G/1000ML
1000 INJECTION, SOLUTION INTRAVENOUS
Refills: 0 | Status: DISCONTINUED | OUTPATIENT
Start: 2025-05-07 | End: 2025-05-08

## 2025-05-07 RX ORDER — DEXTROSE 50 % IN WATER 50 %
25 SYRINGE (ML) INTRAVENOUS ONCE
Refills: 0 | Status: DISCONTINUED | OUTPATIENT
Start: 2025-05-07 | End: 2025-05-08

## 2025-05-07 RX ORDER — GLUCAGON 3 MG/1
1 POWDER NASAL ONCE
Refills: 0 | Status: DISCONTINUED | OUTPATIENT
Start: 2025-05-07 | End: 2025-05-08

## 2025-05-07 RX ORDER — INSULIN LISPRO 100 U/ML
INJECTION, SOLUTION INTRAVENOUS; SUBCUTANEOUS
Refills: 0 | Status: DISCONTINUED | OUTPATIENT
Start: 2025-05-07 | End: 2025-05-08

## 2025-05-07 RX ORDER — HEPARIN SODIUM 1000 [USP'U]/ML
5000 INJECTION INTRAVENOUS; SUBCUTANEOUS EVERY 12 HOURS
Refills: 0 | Status: DISCONTINUED | OUTPATIENT
Start: 2025-05-07 | End: 2025-05-08

## 2025-05-07 RX ORDER — ESCITALOPRAM OXALATE 20 MG/1
20 TABLET ORAL DAILY
Refills: 0 | Status: DISCONTINUED | OUTPATIENT
Start: 2025-05-07 | End: 2025-05-08

## 2025-05-07 RX ORDER — ATORVASTATIN CALCIUM 80 MG/1
10 TABLET, FILM COATED ORAL
Refills: 0 | Status: DISCONTINUED | OUTPATIENT
Start: 2025-05-07 | End: 2025-05-08

## 2025-05-07 RX ORDER — DILTIAZEM HYDROCHLORIDE 120 MG/1
300 CAPSULE, EXTENDED RELEASE ORAL DAILY
Refills: 0 | Status: DISCONTINUED | OUTPATIENT
Start: 2025-05-07 | End: 2025-05-08

## 2025-05-07 RX ORDER — DILTIAZEM HYDROCHLORIDE 120 MG/1
300 CAPSULE, EXTENDED RELEASE ORAL DAILY
Refills: 0 | Status: DISCONTINUED | OUTPATIENT
Start: 2025-05-07 | End: 2025-05-07

## 2025-05-07 RX ORDER — INSULIN LISPRO 100 U/ML
INJECTION, SOLUTION INTRAVENOUS; SUBCUTANEOUS AT BEDTIME
Refills: 0 | Status: DISCONTINUED | OUTPATIENT
Start: 2025-05-07 | End: 2025-05-08

## 2025-05-07 RX ORDER — FINASTERIDE 1 MG/1
5 TABLET, FILM COATED ORAL DAILY
Refills: 0 | Status: DISCONTINUED | OUTPATIENT
Start: 2025-05-07 | End: 2025-05-08

## 2025-05-07 RX ORDER — TAMSULOSIN HYDROCHLORIDE 0.4 MG/1
0.4 CAPSULE ORAL AT BEDTIME
Refills: 0 | Status: DISCONTINUED | OUTPATIENT
Start: 2025-05-07 | End: 2025-05-08

## 2025-05-07 RX ORDER — B1/B2/B3/B5/B6/B12/VIT C/FOLIC 500-0.5 MG
1 TABLET ORAL DAILY
Refills: 0 | Status: DISCONTINUED | OUTPATIENT
Start: 2025-05-07 | End: 2025-05-08

## 2025-05-07 RX ORDER — DEXTROSE 50 % IN WATER 50 %
15 SYRINGE (ML) INTRAVENOUS ONCE
Refills: 0 | Status: DISCONTINUED | OUTPATIENT
Start: 2025-05-07 | End: 2025-05-08

## 2025-05-07 RX ORDER — DEXTROSE 50 % IN WATER 50 %
12.5 SYRINGE (ML) INTRAVENOUS ONCE
Refills: 0 | Status: DISCONTINUED | OUTPATIENT
Start: 2025-05-07 | End: 2025-05-08

## 2025-05-07 RX ADMIN — HEPARIN SODIUM 5000 UNIT(S): 1000 INJECTION INTRAVENOUS; SUBCUTANEOUS at 21:23

## 2025-05-07 RX ADMIN — Medication 5 MILLIGRAM(S): at 04:13

## 2025-05-07 RX ADMIN — Medication 50 MILLIGRAM(S): at 21:59

## 2025-05-07 RX ADMIN — DILTIAZEM HYDROCHLORIDE 300 MILLIGRAM(S): 120 CAPSULE, EXTENDED RELEASE ORAL at 06:14

## 2025-05-07 RX ADMIN — ESCITALOPRAM OXALATE 20 MILLIGRAM(S): 20 TABLET ORAL at 10:20

## 2025-05-07 RX ADMIN — TAMSULOSIN HYDROCHLORIDE 0.4 MILLIGRAM(S): 0.4 CAPSULE ORAL at 21:23

## 2025-05-07 RX ADMIN — HEPARIN SODIUM 5000 UNIT(S): 1000 INJECTION INTRAVENOUS; SUBCUTANEOUS at 10:21

## 2025-05-07 RX ADMIN — FINASTERIDE 5 MILLIGRAM(S): 1 TABLET, FILM COATED ORAL at 21:23

## 2025-05-07 RX ADMIN — Medication 1 TABLET(S): at 10:21

## 2025-05-07 NOTE — ED PROVIDER NOTE - DIFFERENTIAL DIAGNOSIS
Differential Diagnosis SOB, r/o pna, ACS, labs, imaging, likely admission. Pt with hx of nephrectomy, only one kidney, CT non contrast of chest shows no PNA, pt with pulmonary hypertension, VQ scan placed. pt with persistent SOB, r/o PE, r/o ACS, will request medical admission.

## 2025-05-07 NOTE — ED PROVIDER NOTE - OBJECTIVE STATEMENT
84 year old male with PMH of bladder cancer, HTN, HLD, depression, herniated disc, DM II, kidney stones, presents with cc of SOB, CESPEDES, onset for a week gradually worsening. No fever or chills. No melena or hematochezia. No visual or focal neurological complaints. No recent travel.

## 2025-05-07 NOTE — PATIENT PROFILE ADULT - NSPRESCRUSEDDRG_GEN_A_NUR
He's been throwing up for two hours and he can't breathe because he have the pain in the chest - brother No

## 2025-05-07 NOTE — ED PROVIDER NOTE - CLINICAL SUMMARY MEDICAL DECISION MAKING FREE TEXT BOX
SOB, r/o pna, ACS, labs, imaging, likely admission. Pt with hx of nephrectomy, only one kidney, CT non contrast of chest shows no PNA, pt with pulmonary hypertension, VQ scan placed. pt with persistent SOB, r/o PE, r/o ACS, will request medical admission.

## 2025-05-07 NOTE — CONSULT NOTE ADULT - CONSULT REASON
SOB
"Hx of HTN, HLD, DM, with sob, jones,   pending VQ scan (ct angio not done since hx   of nephrectomy and one fxing kidney). Please assess inpatient."  Frederick Lauren (07-May-2025 01:16)  "Dyspnea"  Michael Hayes (07-May-2025 05:45)

## 2025-05-07 NOTE — CONSULT NOTE ADULT - PROBLEM SELECTOR RECOMMENDATION 9
-by history unlikely cardiac in etiology  -BNP normal although can be falsely abnormal given pt's BMI=35.  -Echo w/ very mild diastolic dysfunction normal PA systolic pressures.    -Dyspnea does not appear to be due to cardiac causes.  -Could consider stress test as OP for further evaluation.  -V/Q scan reviewed - "very low prob" D-dimer neg  unlikely PE.    -OK to d/c from cardiac standpoint. Consider apt in 1-2 weeks  to reevaluate pt will consider stress testing as OP.  avoid coronary CTA given 1 kidney w/b orderline renal function.    Discussed w/ Dr. Alan.

## 2025-05-07 NOTE — H&P ADULT - HISTORY OF PRESENT ILLNESS
85 y/o M w/ PMH of bladder cancer, BPH, basal skin cancer, lumbar spinal stenosis, OA, HTN, dyslipidemia, depression, herniated disc, DM2, kidney stones, p/w SOB. Patient states he has been having SOB for last 2 weeks when he exerts himself. No SOB at rest, and is currently comfortable resting in bed. Denies CP / wheezing. Denies cough, runny nose, sore throat, nausea, vomiting, abdominal pain, fever, chills     PSH: inguinal hernia repair, nephroureterectomy, back surgery, TURBT, tonsillectomy     Social Hx: Tobacco - daily cigar in the summer, etoh - 1 beer with dinner    Family Hx:  Father - heart disease, mother - DM

## 2025-05-07 NOTE — H&P ADULT - CONVERSATION DETAILS
States wife Rossy and daughter Samira are HCPs. Patient denies having limitations on resuscitation status for this admission

## 2025-05-07 NOTE — ED ADULT NURSE REASSESSMENT NOTE - NS ED NURSE REASSESS COMMENT FT1
Received report from MICHAEL Atkins RN. Pt resting in stretcher with safety maintained. Respirations even and unlabored, no distress noted at this time. Pt awaiting a bed at this time.

## 2025-05-07 NOTE — CONSULT NOTE ADULT - SUBJECTIVE AND OBJECTIVE BOX
CHIEF COMPLAINT:    HPI:  83 y/o M w/ PMH of bladder cancer, BPH, basal skin cancer, lumbar spinal stenosis, OA, HTN, dyslipidemia, depression, herniated disc, DM2, kidney stones, p/w SOB. Patient states he has been having SOB for last 2 weeks when he exerts himself. No SOB at rest, and is currently comfortable resting in bed. Denies CP / wheezing. Denies cough, runny nose, sore throat, nausea, vomiting, abdominal pain, fever, chills     PSH: inguinal hernia repair, nephroureterectomy, back surgery, TURBT, tonsillectomy     Social Hx: Tobacco - daily cigar in the summer, etoh - 1 beer with dinner    Family Hx:  Father - heart disease, mother - DM  (07 May 2025 05:36)    consulted for dyspnea. reports dyspnea symptoms for 2 weeks.  mildly bothersome "my family kept telling me to go to ED"  no orthopnea, PND LE Edema.  Dad w/ MI in 50s.    PAST MEDICAL AND SURGICAL HISTORY:  PAST MEDICAL & SURGICAL HISTORY:  Bladder cancer  2014      HTN (hypertension)      Hypercholesterolemia      CA skin, basal cell      Depression      OA (osteoarthritis)      Lumbar spinal stenosis      Herniated nucleus pulposus, L5-S1      Kidney stones  Left      BPH (benign prostatic hyperplasia)      Type 2 diabetes mellitus      Bladder tumor      Hard of hearing      Urothelial carcinoma of kidney      H/O hernia repair  left inguinal      History of bladder surgery  TURBT, cystoscopy--aprox 8 yrs ago, dx bladder cancer      S/P tonsillectomy      History of back surgery  TLIF      History of cystoscopy  Left kidney stones.  Left stent---4/20/22      Nephrostomy status      H/O nephroureterectomy  right nephrectomy          ALLERGIES:  Allergies    sulfADIAZINE (Rash)  penicillin (Hives)    Intolerances        SOCIAL HISTORY:  Social History:      FAMILY  HISTORY:  FAMILY HISTORY:  FH: diabetes mellitus (Mother)    FH: heart disease (Father)        MEDICATIONS:  OUTPATIENT:  Home Medications:  dilTIAZem 300 mg/24 hours oral capsule, extended release: 1 cap(s) orally once a day (06 May 2025 23:10)  escitalopram 20 mg oral tablet: 1 tab(s) orally once a day (06 May 2025 23:10)  finasteride 5 mg oral tablet: 1 tab(s) orally Monday, Wednesday, and Friday in the Evening (06 May 2025 23:08)  Multiple Vitamins oral tablet: 1 tab(s) orally once a day (06 May 2025 23:10)  pravastatin 40 mg oral tablet: 1 tab(s) orally Monday, Wednesday, and Friday in the Evening (06 May 2025 23:09)  tamsulosin 0.4 mg oral capsule: 1 cap(s) orally once a day (at bedtime) (06 May 2025 23:10)      INPATIENT:  MEDICATIONS  (STANDING):  atorvastatin 10 milliGRAM(s) Oral <User Schedule>  dextrose 5%. 1000 milliLiter(s) (50 mL/Hr) IV Continuous <Continuous>  dextrose 5%. 1000 milliLiter(s) (100 mL/Hr) IV Continuous <Continuous>  dextrose 50% Injectable 25 Gram(s) IV Push once  dextrose 50% Injectable 12.5 Gram(s) IV Push once  dextrose 50% Injectable 25 Gram(s) IV Push once  diltiazem    milliGRAM(s) Oral daily  escitalopram 20 milliGRAM(s) Oral daily  finasteride 5 milliGRAM(s) Oral daily  glucagon  Injectable 1 milliGRAM(s) IntraMuscular once  heparin   Injectable 5000 Unit(s) SubCutaneous every 12 hours  insulin lispro (ADMELOG) corrective regimen sliding scale   SubCutaneous three times a day before meals  insulin lispro (ADMELOG) corrective regimen sliding scale   SubCutaneous at bedtime  multivitamin 1 Tablet(s) Oral daily  tamsulosin 0.4 milliGRAM(s) Oral at bedtime    MEDICATIONS  (PRN):  dextrose Oral Gel 15 Gram(s) Oral once PRN Blood Glucose LESS THAN 70 milliGRAM(s)/deciliter    MEDICATIONS  (PRN):  dextrose Oral Gel 15 Gram(s) Oral once PRN Blood Glucose LESS THAN 70 milliGRAM(s)/deciliter      REVIEW OF SYSTEMS:  ===============================  ===============================      Vital Signs Last 24 Hrs  T(C): 36.5 (07 May 2025 08:15), Max: 37.3 (06 May 2025 20:29)  T(F): 97.7 (07 May 2025 08:15), Max: 99.1 (06 May 2025 20:29)  HR: 65 (07 May 2025 08:15) (62 - 79)  BP: 164/86 (07 May 2025 08:15) (154/108 - 173/91)  BP(mean): 111 (07 May 2025 04:20) (111 - 124)  RR: 20 (07 May 2025 08:15) (16 - 20)  SpO2: 97% (07 May 2025 08:15) (95% - 98%)    Parameters below as of 07 May 2025 08:15  Patient On (Oxygen Delivery Method): room air        I&O's Summary      I&O's Detail      PHYSICAL EXAM:    Constitutional: NAD, awake and alert, well-developed  HEENT: PERR, EOMI,  No oral cyananosis.  Neck:  supple,  No JVD  Respiratory: Breath sounds are clear bilaterally, No wheezing, rales or rhonchi  Cardiovascular: S1 and S2, regular rate and rhythm, no Murmurs, gallops or rubs  Gastrointestinal: Bowel Sounds present, soft, nontender.   Extremities: No peripheral edema. No clubbing or cyanosis.  Vascular: 2+ peripheral pulses  Neurological: A/O x 3, no focal deficits  Musculoskeletal: no calf tenderness.  Skin: No rashes.    ===============================  ===============================  LABS:                         12.0   5.07  )-----------( 165      ( 07 May 2025 07:51 )             39.6                         11.7   5.54  )-----------( 171      ( 06 May 2025 22:32 )             38.1     07 May 2025 07:51    139    |  108    |  20     ----------------------------<  122    3.7     |  25     |  1.77   06 May 2025 22:32    141    |  110    |  24     ----------------------------<  105    3.7     |  26     |  1.84     Ca    9.0        07 May 2025 07:51  Ca    9.1        06 May 2025 22:32  Mg     2.1       06 May 2025 22:32    TPro  6.8    /  Alb  3.5    /  TBili  0.8    /  DBili  x      /  AST  9      /  ALT  22     /  AlkPhos  88     07 May 2025 07:51  TPro  6.6    /  Alb  3.5    /  TBili  0.6    /  DBili  x      /  AST  11     /  ALT  26     /  AlkPhos  98     06 May 2025 22:32    PT/INR - ( 07 May 2025 07:51 )   PT: 11.4 sec;   INR: 0.99 ratio         PTT - ( 07 May 2025 07:51 )  PTT:26.9 sec    THYROID STUDIES:    ===============================  ===============================  CARDIAC BIOMARKERS:  -------  -BNP VALUES:  Pro-Brain Natriuretic Peptide: 255 pg/mL (05.06.25 @ 22:32) normal.    -------  -TROPONIN VALUES:   Troponin I, High Sensitivity Result: 12.77 ng/L (05-07-25 @ 07:51)  Troponin I, High Sensitivity Result: 13.60 ng/L (05-07-25 @ 00:59)  Troponin I, High Sensitivity Result: 12.18 ng/L (05-06-25 @ 22:32)  Troponin I, High Sensitivity Result: 5.36 ng/L (03-02-23 @ 05:14)  Troponin I, High Sensitivity Result: <3.00 ng/L (04-19-22 @ 06:28)      ===============================  ===============================  EKG: NSR no ischemic changes.    < from: TTE W or WO Ultrasound Enhancing Agent (05.07.25 @ 07:15) >  CONCLUSIONS:      1. Technically difficult image quality.   2. Left ventricular cavity is normal in size. Left ventricular systolic function is normal with an ejection fraction of 55 % by Oseguera's method of disks.   3. Mild left ventricular hypertrophy.   4. Left atrium is normal in size.   5. Mild tricuspid regurgitation.   6. Estimated pulmonary artery systolic pressure is 23 mmHg, consistent with normal pulmonary artery pressure.    < end of copied text >     CHIEF COMPLAINT:    HPI:  85 y/o M w/ PMH of bladder cancer, BPH, basal skin cancer, lumbar spinal stenosis, OA, HTN, dyslipidemia, depression, herniated disc, DM2, kidney stones, p/w SOB. Patient states he has been having SOB for last 2 weeks when he exerts himself. No SOB at rest, and is currently comfortable resting in bed. Denies CP / wheezing. Denies cough, runny nose, sore throat, nausea, vomiting, abdominal pain, fever, chills     PSH: inguinal hernia repair, nephroureterectomy, back surgery, TURBT, tonsillectomy     Social Hx: Tobacco - daily cigar in the summer, etoh - 1 beer with dinner    Family Hx:  Father - heart disease, mother - DM  (07 May 2025 05:36)    consulted for dyspnea. reports dyspnea symptoms for 2 weeks.  mildly bothersome "my family kept telling me to go to ED"  no orthopnea, PND LE Edema.  Dad w/ MI in 50s.  no chest pain w/ exertion or at rest.    PAST MEDICAL AND SURGICAL HISTORY:  PAST MEDICAL & SURGICAL HISTORY:  Bladder cancer  2014      HTN (hypertension)      Hypercholesterolemia      CA skin, basal cell      Depression      OA (osteoarthritis)      Lumbar spinal stenosis      Herniated nucleus pulposus, L5-S1      Kidney stones  Left      BPH (benign prostatic hyperplasia)      Type 2 diabetes mellitus      Bladder tumor      Hard of hearing      Urothelial carcinoma of kidney      H/O hernia repair  left inguinal      History of bladder surgery  TURBT, cystoscopy--aprox 8 yrs ago, dx bladder cancer      S/P tonsillectomy      History of back surgery  TLIF      History of cystoscopy  Left kidney stones.  Left stent---4/20/22      Nephrostomy status      H/O nephroureterectomy  right nephrectomy          ALLERGIES:  Allergies    sulfADIAZINE (Rash)  penicillin (Hives)    Intolerances        SOCIAL HISTORY:  Social History:      FAMILY  HISTORY:  FAMILY HISTORY:  FH: diabetes mellitus (Mother)    FH: heart disease (Father)        MEDICATIONS:  OUTPATIENT:  Home Medications:  dilTIAZem 300 mg/24 hours oral capsule, extended release: 1 cap(s) orally once a day (06 May 2025 23:10)  escitalopram 20 mg oral tablet: 1 tab(s) orally once a day (06 May 2025 23:10)  finasteride 5 mg oral tablet: 1 tab(s) orally Monday, Wednesday, and Friday in the Evening (06 May 2025 23:08)  Multiple Vitamins oral tablet: 1 tab(s) orally once a day (06 May 2025 23:10)  pravastatin 40 mg oral tablet: 1 tab(s) orally Monday, Wednesday, and Friday in the Evening (06 May 2025 23:09)  tamsulosin 0.4 mg oral capsule: 1 cap(s) orally once a day (at bedtime) (06 May 2025 23:10)      INPATIENT:  MEDICATIONS  (STANDING):  atorvastatin 10 milliGRAM(s) Oral <User Schedule>  dextrose 5%. 1000 milliLiter(s) (50 mL/Hr) IV Continuous <Continuous>  dextrose 5%. 1000 milliLiter(s) (100 mL/Hr) IV Continuous <Continuous>  dextrose 50% Injectable 25 Gram(s) IV Push once  dextrose 50% Injectable 12.5 Gram(s) IV Push once  dextrose 50% Injectable 25 Gram(s) IV Push once  diltiazem    milliGRAM(s) Oral daily  escitalopram 20 milliGRAM(s) Oral daily  finasteride 5 milliGRAM(s) Oral daily  glucagon  Injectable 1 milliGRAM(s) IntraMuscular once  heparin   Injectable 5000 Unit(s) SubCutaneous every 12 hours  insulin lispro (ADMELOG) corrective regimen sliding scale   SubCutaneous three times a day before meals  insulin lispro (ADMELOG) corrective regimen sliding scale   SubCutaneous at bedtime  multivitamin 1 Tablet(s) Oral daily  tamsulosin 0.4 milliGRAM(s) Oral at bedtime    MEDICATIONS  (PRN):  dextrose Oral Gel 15 Gram(s) Oral once PRN Blood Glucose LESS THAN 70 milliGRAM(s)/deciliter    MEDICATIONS  (PRN):  dextrose Oral Gel 15 Gram(s) Oral once PRN Blood Glucose LESS THAN 70 milliGRAM(s)/deciliter      REVIEW OF SYSTEMS:  ===============================  ===============================      Vital Signs Last 24 Hrs  T(C): 36.5 (07 May 2025 08:15), Max: 37.3 (06 May 2025 20:29)  T(F): 97.7 (07 May 2025 08:15), Max: 99.1 (06 May 2025 20:29)  HR: 65 (07 May 2025 08:15) (62 - 79)  BP: 164/86 (07 May 2025 08:15) (154/108 - 173/91)  BP(mean): 111 (07 May 2025 04:20) (111 - 124)  RR: 20 (07 May 2025 08:15) (16 - 20)  SpO2: 97% (07 May 2025 08:15) (95% - 98%)    Parameters below as of 07 May 2025 08:15  Patient On (Oxygen Delivery Method): room air        I&O's Summary      I&O's Detail      PHYSICAL EXAM:    Constitutional: NAD, awake and alert, well-developed  HEENT: PERR, EOMI,  No oral cyananosis.  Neck:  supple,  No JVD  Respiratory: Breath sounds are clear bilaterally, No wheezing, rales or rhonchi  Cardiovascular: S1 and S2, regular rate and rhythm, no Murmurs, gallops or rubs  Gastrointestinal: Bowel Sounds present, soft, nontender.   Extremities: No peripheral edema. No clubbing or cyanosis.  Vascular: 2+ peripheral pulses  Neurological: A/O x 3, no focal deficits  Musculoskeletal: no calf tenderness.  Skin: No rashes.    ===============================  ===============================  LABS:                         12.0   5.07  )-----------( 165      ( 07 May 2025 07:51 )             39.6                         11.7   5.54  )-----------( 171      ( 06 May 2025 22:32 )             38.1     07 May 2025 07:51    139    |  108    |  20     ----------------------------<  122    3.7     |  25     |  1.77   06 May 2025 22:32    141    |  110    |  24     ----------------------------<  105    3.7     |  26     |  1.84     Ca    9.0        07 May 2025 07:51  Ca    9.1        06 May 2025 22:32  Mg     2.1       06 May 2025 22:32    TPro  6.8    /  Alb  3.5    /  TBili  0.8    /  DBili  x      /  AST  9      /  ALT  22     /  AlkPhos  88     07 May 2025 07:51  TPro  6.6    /  Alb  3.5    /  TBili  0.6    /  DBili  x      /  AST  11     /  ALT  26     /  AlkPhos  98     06 May 2025 22:32    PT/INR - ( 07 May 2025 07:51 )   PT: 11.4 sec;   INR: 0.99 ratio         PTT - ( 07 May 2025 07:51 )  PTT:26.9 sec    THYROID STUDIES:    ===============================  ===============================  CARDIAC BIOMARKERS:  -------  -BNP VALUES:  Pro-Brain Natriuretic Peptide: 255 pg/mL (05.06.25 @ 22:32) normal.    -------  -TROPONIN VALUES:   Troponin I, High Sensitivity Result: 12.77 ng/L (05-07-25 @ 07:51)  Troponin I, High Sensitivity Result: 13.60 ng/L (05-07-25 @ 00:59)  Troponin I, High Sensitivity Result: 12.18 ng/L (05-06-25 @ 22:32)  Troponin I, High Sensitivity Result: 5.36 ng/L (03-02-23 @ 05:14)  Troponin I, High Sensitivity Result: <3.00 ng/L (04-19-22 @ 06:28)      ===============================  ===============================  EKG: NSR no ischemic changes.    < from: TTE W or WO Ultrasound Enhancing Agent (05.07.25 @ 07:15) >  CONCLUSIONS:      1. Technically difficult image quality.   2. Left ventricular cavity is normal in size. Left ventricular systolic function is normal with an ejection fraction of 55 % by Oseguera's method of disks.   3. Mild left ventricular hypertrophy.   4. Left atrium is normal in size.   5. Mild tricuspid regurgitation.   6. Estimated pulmonary artery systolic pressure is 23 mmHg, consistent with normal pulmonary artery pressure.    < end of copied text >

## 2025-05-07 NOTE — H&P ADULT - ASSESSMENT
85 y/o M w/ PMH of bladder cancer, BPH, basal skin cancer, lumbar spinal stenosis, OA, HTN, dyslipidemia, depression, herniated disc, DM2, kidney stones, p/w SOB    *Dyspnea  *Dilated Pulm artery  *Pulm nodules   -CT Chest: Mild cardiomegaly, Subcentimeter bibasilar pulmonary nodules are stable compared to prior studies dating back to April 19, 2022. Dilated main pulmonary artery suggests pulmonary arterial hypertension.  -V/Q scan pending   -Troponin negative x 2   -ProBNP = 255  -EKG: Sinus 75 bpm, 1st deg AV block, +PVCs   -Echo  -Cardio consult  -Pulm consult     *CKD?  -Avoid nephrotoxic agents  -Trend Creatinine  -UA     *Microcytic anemia  -Trend H/H during hospitalization and if remains stable -> f/u outpatient for further management     *Thyriod nodule  -F/u outpatient thyroid U/S and Endo referral for further work up     *DM2  -Humalog ISS  -Diabetic diet    *H/o bladder cancer / BPH / basal skin cancer / lumbar stenosis / OA / HTN / dyslipidemia / depression / herniated disc  -C/w home meds and f/u outpatient for further management      *DVT ppx  -Heparin SubQ       >75 mins required for admission

## 2025-05-07 NOTE — CONSULT NOTE ADULT - ASSESSMENT
Impression:  83 y/o M w/ PMH of bladder cancer, BPH, basal skin cancer, lumbar spinal stenosis, OA, HTN, dyslipidemia, depression, herniated disc, DM2, kidney stones, Admitted with shortness of breath and desaturation.   Pulmonary consulted  Mild diastolic dysfunction on previous echo with normal pulmonary artery pressure.  No evidence of COPD or interstitial lung disease  VQ scan was ordered which was low probability  D-dimer, BNP, duplex lower extremity ultrasound is negative    Desaturation on supine position and during sleep likely related to sleep apnea  Microatelectasis/ Dependent and linear atelectasis in the lower lobes.   And mild volume overload.    Pulmonary nodules-stable    Recommendations:  Incentive spirometry.  Ambulate.  Head of bed elevation.  Continue oximetry monitoring.    Continue nocturnal oximetry.  Home oxygen evaluation prior to discharge.  Patient will need an outpatient pulmonary follow-up.  Final recommendations will follow.

## 2025-05-07 NOTE — CONSULT NOTE ADULT - SUBJECTIVE AND OBJECTIVE BOX
HPI:  85 y/o M w/ PMH of bladder cancer, BPH, basal skin cancer, lumbar spinal stenosis, OA, HTN, dyslipidemia, depression, herniated disc, DM2, kidney stones, p/w SOB. Patient states he has been having SOB for last 2 weeks when he exerts himself. No SOB at rest, and is currently comfortable resting in bed. Denies CP / wheezing. Denies cough, runny nose, sore throat, nausea, vomiting, abdominal pain, fever, chills   PSH: inguinal hernia repair, nephroureterectomy, back surgery, TURBT, tonsillectomy     sh: Tobacco - daily cigar in the summer, etoh - 1 beer with dinner    Family Hx:  Father - heart disease, mother - DM  (07 May 2025 05:36)  History was taken from patient, medical records, medical staff And family        REVIEW OF SYSTEMS:  Constitutional: No fevers or chills. No weight loss.   Eyes: No itching, red eyes  or discharge from the eyes  ENT:  No ear discharge. No nasal congestion. No post nasal drip, sore throat or oral trhush.   CV: No chest pain. No palpitations. No lightheadedness or dizziness, no recent cardiac procedure  Resp: per HPI  GI: No nausea. No vomiting. No diarrhea.  MSK: No joint pain or pain in any extremities  Integumentary: No skin lesions.   Neurological: No gross motor weakness. No sensory changes.  Rest of review of symptoms were unremarkable    PAST MEDICAL & SURGICAL HISTORY:  Bladder cancer  2014      HTN (hypertension)      Hypercholesterolemia      CA skin, basal cell      Depression      OA (osteoarthritis)      Lumbar spinal stenosis      Herniated nucleus pulposus, L5-S1      Kidney stones  Left      BPH (benign prostatic hyperplasia)      Type 2 diabetes mellitus      Bladder tumor      Hard of hearing      Urothelial carcinoma of kidney      H/O hernia repair  left inguinal      History of bladder surgery  TURBT, cystoscopy--aprox 8 yrs ago, dx bladder cancer      S/P tonsillectomy      History of back surgery  TLIF      History of cystoscopy  Left kidney stones.  Left stent---4/20/22      Nephrostomy status      H/O nephroureterectomy  right nephrectomy        FAMILY HISTORY:  FH: diabetes mellitus (Mother)    FH: heart disease (Father)      SOCIAL HISTORY:  Smoking: Per HPI  Exposures: denies  Recent Travel: denies  Allergies    sulfADIAZINE (Rash)  penicillin (Hives)    Intolerances        OBJECTIVE:  ICU Vital Signs Last 24 Hrs  T(C): 36.5 (07 May 2025 08:15), Max: 37.3 (06 May 2025 20:29)  T(F): 97.7 (07 May 2025 08:15), Max: 99.1 (06 May 2025 20:29)  HR: 65 (07 May 2025 08:15) (62 - 79)  BP: 164/86 (07 May 2025 08:15) (154/108 - 173/91)  BP(mean): 111 (07 May 2025 04:20) (111 - 124)  RR: 20 (07 May 2025 08:15) (16 - 20)  SpO2: 97% (07 May 2025 08:15) (95% - 98%)    O2 Parameters below as of 07 May 2025 08:15  Patient On (Oxygen Delivery Method): room air              PHYSICAL EXAM:  General: Awake, alert, oriented, not in respiratory distress.   HEENT: Atraumatic, normocephalic  Neck: No JVD. Trachea midline  Respiratory: Normal chest expansion, equal breath soudns, no rales, rhonchi, wheezes appreciated  Cardiovascular: S1 S2 normal. No murmurs, rubs or gallops appreciated  Abdomen: Soft, non-tender,    Extremities: Warm to touch.  No pedal edema.       LABS:                        12.0   5.07  )-----------( 165      ( 07 May 2025 07:51 )             39.6     05-07    139  |  108  |  20  ----------------------------<  122[H]  3.7   |  25  |  1.77[H]    Ca    9.0      07 May 2025 07:51  Mg     2.1     05-06    TPro  6.8  /  Alb  3.5  /  TBili  0.8  /  DBili  x   /  AST  9[L]  /  ALT  22  /  AlkPhos  88  05-07    PT/INR - ( 07 May 2025 07:51 )   PT: 11.4 sec;   INR: 0.99 ratio         PTT - ( 07 May 2025 07:51 )  PTT:26.9 sec        atorvastatin 10 milliGRAM(s) Oral <User Schedule>  dextrose 5%. 1000 milliLiter(s) IV Continuous <Continuous>  dextrose 5%. 1000 milliLiter(s) IV Continuous <Continuous>  dextrose 50% Injectable 25 Gram(s) IV Push once  dextrose 50% Injectable 12.5 Gram(s) IV Push once  dextrose 50% Injectable 25 Gram(s) IV Push once  dextrose Oral Gel 15 Gram(s) Oral once PRN  diltiazem    milliGRAM(s) Oral daily  escitalopram 20 milliGRAM(s) Oral daily  finasteride 5 milliGRAM(s) Oral daily  glucagon  Injectable 1 milliGRAM(s) IntraMuscular once  heparin   Injectable 5000 Unit(s) SubCutaneous every 12 hours  insulin lispro (ADMELOG) corrective regimen sliding scale   SubCutaneous three times a day before meals  insulin lispro (ADMELOG) corrective regimen sliding scale   SubCutaneous at bedtime  multivitamin 1 Tablet(s) Oral daily  tamsulosin 0.4 milliGRAM(s) Oral at bedtime    < from: TTE Echo Complete w/o Contrast w/ Doppler (03.04.23 @ 09:39) >  EA reversal of the mitral inflow consistent with reduced compliance of   the   left ventricle.   Stage 1 diastolic dysfunction is noted.   The aortic valve is well visualized, appears mildly sclerotic. Valve   opening seems to be normal.   Normal appearing tricuspid valve structure.   Mild (1+) tricuspid valve regurgitation is present.   Mild pulmonary hypertension.   Pulmonic valve not well seen.   The left atrium is mildly dilated.   The left ventricle is normal in size, wall thickness, wall motion and   contractility as seen in limited views.   Estimated left ventricular ejection fraction is 50 -55 %.   Normal appearing right atrium.   Normal appearing right ventricle structure and function.   The IVC is dilated with respiratory variation.   An interatrial septal aneurysm is noted.   No evidence of pericardial effusion.   No evidence of pleural effusion.    < end of copied text >  < from: CT Chest No Cont (05.06.25 @ 21:56) >  LUNGS AND LARGE AIRWAYS: Patent central airways. On 6:80, there is a 7 mm   pulmonary nodule at the right lung base. On 7:305, there is a 5 mm   pulmonary nodule at the left lung base. On coronal images, the nodules   are pleural-based and unchanged compared to prior studies dating back to   April 19, 2022.    PLEURA: No pleural effusion.  VESSELS: The main pulmonary artery is dilated, measuring 3.5 cm,   suggesting pulmonary arterial hypertension. Mild atherosclerotic   calcification of the thoracic aorta and coronary arteries.  HEART: Heart size is mildly enlarged. No pericardial effusion.  MEDIASTINUM AND SON: No lymphadenopathy.  CHEST WALL AND LOWER NECK: In the lower pole of the right lobe of the   thyroid gland, there is a 2.7 cm hypodense nodule. Bilateral asymmetric   gynecomastia.  VISUALIZED UPPER ABDOMEN: Within normal limits.  BONES: Within normal limits.    IMPRESSION:  No acute cardiopulmonary process.    Subcentimeter bibasilar pulmonary nodules are stable compared to prior   studies dating back to April19, 2022.    Dilated main pulmonary artery suggests pulmonary arterial hypertension.      < end of copied text >          Impression:  85 y/o M w/ PMH of bladder cancer, BPH, basal skin cancer, lumbar spinal stenosis, OA, HTN, dyslipidemia, depression, herniated disc, DM2, kidney stones, p/w SOB. Patient states he has been having SOB for last 2 weeks when he exerts himself. No SOB at rest, and is currently comfortable resting in bed. Denies CP / wheezing. Denies cough, runny nose, sore throat, nausea, vomiting, abdominal pain, fever, chills       Recommendations:   83 y/o M w/  bladder cancer, BPH, basal skin cancer, lumbar spinal stenosis, OA, HTN, dyslipidemia, depression, herniated disc, DM2, kidney stones, admitted with shortness of breath and desaturation. Patient states he has been having SOB for last 2 weeks when he exerts himself. No SOB at rest, and No PND Denies CP / wheezing. Denies cough, runny nose, sore throat, nausea, vomiting, abdominal pain, fever, chills   PSH: inguinal hernia repair, nephroureterectomy, back surgery, TURBT, tonsillectomy   sh: Tobacco - daily cigar in the summer, etoh - 1 beer with dinner  Family Hx:  Father - heart disease, mother - DM  (07 May 2025 05:36)  History was taken from patient, medical records, medical staff And family        REVIEW OF SYSTEMS:  Constitutional: No fevers or chills. No weight loss.   Eyes: No itching, red eyes  or discharge from the eyes  ENT:  No ear discharge. No nasal congestion. No post nasal drip, sore throat or oral trhush.   CV: No chest pain. No palpitations. No lightheadedness or dizziness, no recent cardiac procedure  Resp: per HPI  GI: No nausea. No vomiting. No diarrhea.  MSK: No joint pain or pain in any extremities  Integumentary: No skin lesions.   Neurological: No gross motor weakness. No sensory changes.  Rest of review of symptoms were unremarkable    PAST MEDICAL & SURGICAL HISTORY:  Bladder cancer  2014      HTN (hypertension)      Hypercholesterolemia      CA skin, basal cell      Depression      OA (osteoarthritis)      Lumbar spinal stenosis      Herniated nucleus pulposus, L5-S1      Kidney stones  Left      BPH (benign prostatic hyperplasia)      Type 2 diabetes mellitus      Bladder tumor      Hard of hearing      Urothelial carcinoma of kidney      H/O hernia repair  left inguinal      History of bladder surgery  TURBT, cystoscopy--aprox 8 yrs ago, dx bladder cancer      S/P tonsillectomy      History of back surgery  TLIF      History of cystoscopy  Left kidney stones.  Left stent---4/20/22      Nephrostomy status      H/O nephroureterectomy  right nephrectomy        FAMILY HISTORY:  FH: diabetes mellitus (Mother)    FH: heart disease (Father)      SOCIAL HISTORY:  Smoking: Per HPI  Exposures: denies  Recent Travel: denies  Allergies    sulfADIAZINE (Rash)  penicillin (Hives)    Intolerances        OBJECTIVE:  ICU Vital Signs Last 24 Hrs  T(C): 36.5 (07 May 2025 08:15), Max: 37.3 (06 May 2025 20:29)  T(F): 97.7 (07 May 2025 08:15), Max: 99.1 (06 May 2025 20:29)  HR: 65 (07 May 2025 08:15) (62 - 79)  BP: 164/86 (07 May 2025 08:15) (154/108 - 173/91)  BP(mean): 111 (07 May 2025 04:20) (111 - 124)  RR: 20 (07 May 2025 08:15) (16 - 20)  SpO2: 97% (07 May 2025 08:15) (95% - 98%)  O2 Parameters below as of 07 May 2025 08:15  Patient On (Oxygen Delivery Method): room air    PHYSICAL EXAM:  General: Awake, alert, oriented,  HEENT: Atraumatic, normocephalic  Neck: No JVD. Trachea midline  Respiratory: Normal chest expansion,  no rales, rhonchi, wheezes appreciated  Cardiovascular: S1 S2 .No  rubs or gallops appreciated  Abdomen: Soft, non-tender,    Extremities: Warm to touch.  No pedal edema.       LABS:                        12.0   5.07  )-----------( 165      ( 07 May 2025 07:51 )             39.6     05-07    139  |  108  |  20  ----------------------------<  122[H]  3.7   |  25  |  1.77[H]    Ca    9.0      07 May 2025 07:51  Mg     2.1     05-06    TPro  6.8  /  Alb  3.5  /  TBili  0.8  /  DBili  x   /  AST  9[L]  /  ALT  22  /  AlkPhos  88  05-07  PT/INR - ( 07 May 2025 07:51 )   PT: 11.4 sec;   INR: 0.99 ratio    PTT - ( 07 May 2025 07:51 )  PTT:26.9 sec    atorvastatin 10 milliGRAM(s) Oral <User Schedule>  dextrose 5%. 1000 milliLiter(s) IV Continuous <Continuous>  dextrose 5%. 1000 milliLiter(s) IV Continuous <Continuous>  dextrose 50% Injectable 25 Gram(s) IV Push once  dextrose 50% Injectable 12.5 Gram(s) IV Push once  dextrose 50% Injectable 25 Gram(s) IV Push once  dextrose Oral Gel 15 Gram(s) Oral once PRN  diltiazem    milliGRAM(s) Oral daily  escitalopram 20 milliGRAM(s) Oral daily  finasteride 5 milliGRAM(s) Oral daily  glucagon  Injectable 1 milliGRAM(s) IntraMuscular once  heparin   Injectable 5000 Unit(s) SubCutaneous every 12 hours  insulin lispro (ADMELOG) corrective regimen sliding scale   SubCutaneous three times a day before meals  insulin lispro (ADMELOG) corrective regimen sliding scale   SubCutaneous at bedtime  multivitamin 1 Tablet(s) Oral daily  tamsulosin 0.4 milliGRAM(s) Oral at bedtime    < from: TTE Echo Complete w/o Contrast w/ Doppler (03.04.23 @ 09:39) >  EA reversal of the mitral inflow consistent with reduced compliance of the  left ventricle.   Stage 1 diastolic dysfunction is noted.     < end of copied text >  < from: CT Chest No Cont (05.06.25 @ 21:56) >  LUNGS AND LARGE AIRWAYS: Patent central airways. On 6:80, there is a 7 mm pulmonary nodule at the right lung base. On 7:305, there is a 5 mm   pulmonary nodule at the left lung base. On coronal images, the nodules are pleural-based and unchanged compared to prior studies dating back to   April 19, 2022.    PLEURA: No pleural effusion.  VESSELS: The main pulmonary artery is dilated, measuring 3.5 cm, suggesting pulmonary arterial hypertension. Mild atherosclerotic calcification of the thoracic aorta and coronary arteries.  HEART: Heart size is mildly enlarged. No pericardial effusion.  MEDIASTINUM AND SON: No lymphadenopathy.  CHEST WALL AND LOWER NECK: In the lower pole of the right lobe of the  thyroid gland, there is a 2.7 cm hypodense nodule. Bilateral asymmetric   gynecomastia.  VISUALIZED UPPER ABDOMEN: Within normal limits.  BONES: Within normal limits.      Subcentimeter bibasilar pulmonary nodules are stable compared to prior studies dating back to April19, 2022.    Dilated main pulmonary artery suggests pulmonary arterial hypertension.

## 2025-05-07 NOTE — ED ADULT NURSE NOTE - OBJECTIVE STATEMENT
The pt is a 83 y/o male A&OX4 presenting to the ED c/o SOB x2 weeks. Pt was sent in from  for eval. Pt denies CP, N/V/D, fevers.

## 2025-05-07 NOTE — ED PROVIDER NOTE - PROGRESS NOTE DETAILS
Xin LEPE: Pt with hx of nephrectomy, only one kidney, CT non contrast of chest shows no PNA, pt with pulmonary hypertension, VQ scan placed. pt with persistent SOB, r/o PE, r/o ACS, will request medical admission.

## 2025-05-07 NOTE — PATIENT PROFILE ADULT - FALL HARM RISK - RISK INTERVENTIONS

## 2025-05-08 ENCOUNTER — TRANSCRIPTION ENCOUNTER (OUTPATIENT)
Age: 85
End: 2025-05-08

## 2025-05-08 ENCOUNTER — NON-APPOINTMENT (OUTPATIENT)
Age: 85
End: 2025-05-08

## 2025-05-08 VITALS — DIASTOLIC BLOOD PRESSURE: 90 MMHG | HEART RATE: 85 BPM | SYSTOLIC BLOOD PRESSURE: 160 MMHG

## 2025-05-08 DIAGNOSIS — R06.02 SHORTNESS OF BREATH: ICD-10-CM

## 2025-05-08 LAB
ANION GAP SERPL CALC-SCNC: 8 MMOL/L — SIGNIFICANT CHANGE UP (ref 5–17)
AUTO DIFF PNL BLD: ABNORMAL
BUN SERPL-MCNC: 24 MG/DL — HIGH (ref 7–23)
C-ANCA SER-ACNC: NEGATIVE — SIGNIFICANT CHANGE UP
CALCIUM SERPL-MCNC: 9.2 MG/DL — SIGNIFICANT CHANGE UP (ref 8.5–10.1)
CHLORIDE SERPL-SCNC: 104 MMOL/L — SIGNIFICANT CHANGE UP (ref 96–108)
CO2 SERPL-SCNC: 25 MMOL/L — SIGNIFICANT CHANGE UP (ref 22–31)
CREAT SERPL-MCNC: 1.64 MG/DL — HIGH (ref 0.5–1.3)
DSDNA AB SER-ACNC: 29 IU/ML — HIGH
EGFR: 42 ML/MIN/1.73M2 — LOW
EGFR: 42 ML/MIN/1.73M2 — LOW
GLUCOSE BLDC GLUCOMTR-MCNC: 135 MG/DL — HIGH (ref 70–99)
GLUCOSE SERPL-MCNC: 128 MG/DL — HIGH (ref 70–99)
P-ANCA SER-ACNC: NEGATIVE — SIGNIFICANT CHANGE UP
POTASSIUM SERPL-MCNC: 3.5 MMOL/L — SIGNIFICANT CHANGE UP (ref 3.5–5.3)
POTASSIUM SERPL-SCNC: 3.5 MMOL/L — SIGNIFICANT CHANGE UP (ref 3.5–5.3)
SODIUM SERPL-SCNC: 137 MMOL/L — SIGNIFICANT CHANGE UP (ref 135–145)

## 2025-05-08 PROCEDURE — 99239 HOSP IP/OBS DSCHRG MGMT >30: CPT

## 2025-05-08 PROCEDURE — 99232 SBSQ HOSP IP/OBS MODERATE 35: CPT

## 2025-05-08 RX ADMIN — DILTIAZEM HYDROCHLORIDE 300 MILLIGRAM(S): 120 CAPSULE, EXTENDED RELEASE ORAL at 09:57

## 2025-05-08 RX ADMIN — HEPARIN SODIUM 5000 UNIT(S): 1000 INJECTION INTRAVENOUS; SUBCUTANEOUS at 09:57

## 2025-05-08 RX ADMIN — ESCITALOPRAM OXALATE 20 MILLIGRAM(S): 20 TABLET ORAL at 09:57

## 2025-05-08 RX ADMIN — Medication 5 MILLIGRAM(S): at 06:02

## 2025-05-08 RX ADMIN — Medication 1 TABLET(S): at 09:57

## 2025-05-08 NOTE — DISCHARGE NOTE PROVIDER - NSDCCPCAREPLAN_GEN_ALL_CORE_FT
PRINCIPAL DISCHARGE DIAGNOSIS  Diagnosis: Shortness of breath  Assessment and Plan of Treatment: please follow up with pulmonary to schedule your sleep study and pu lmonary function testing  cardio follow up for nuclear stress test   thyroid nodule - please  see your primary for further workup  return to ER if shortness of breath worsens

## 2025-05-08 NOTE — PROGRESS NOTE ADULT - SUBJECTIVE AND OBJECTIVE BOX
History of Present Illness:  Reason for Admission: SOB  History of Present Illness:   83 y/o M w/ PMH of bladder cancer, BPH, basal skin cancer, lumbar spinal stenosis, OA, HTN, dyslipidemia, depression, herniated disc, DM2, kidney stones, p/w SOB. Patient states he has been having SOB for last 2 weeks when he exerts himself. No SOB at rest, and is currently comfortable resting in bed. Denies CP / wheezing. Denies cough, runny nose, sore throat, nausea, vomiting, abdominal pain, fever, chills     5/ - pt had workup with negative LE dopplers, VQ scan, ECHO unremarkable but noted to have desats 86% on RA when he is sleeping.  no cp, palpitations.      ROS:   All 10 systems reviewed and found to be negative with the exception of what has been described above.    Vital Signs Last 24 Hrs  T(C): 36.5 (07 May 2025 08:15), Max: 37.3 (06 May 2025 20:29)  T(F): 97.7 (07 May 2025 08:15), Max: 99.1 (06 May 2025 20:29)  HR: 65 (07 May 2025 08:15) (62 - 79)  BP: 164/86 (07 May 2025 08:15) (154/108 - 173/91)  BP(mean): 111 (07 May 2025 04:20) (111 - 124)  RR: 20 (07 May 2025 08:15) (16 - 20)  SpO2: 97% (07 May 2025 08:15) (95% - 98%)    Parameters below as of 07 May 2025 08:15  Patient On (Oxygen Delivery Method): room air    GEN: lying in bed, NAD  HEENT:   NC/AT, pupils equal and reactive, EOMI  CV:  +S1, +S2, RRR  RESP:   lungs clear to auscultation bilaterally, no wheeze, rales, rhonchi   BREAST:  not examined  GI:  abdomen soft, non-tender, non-distended, normoactive BS  RECTAL:  not examined  :  not examined  MSK:   normal muscle tone  EXT:  no edema  NEURO:  AAOX3, no focal neurological deficits  SKIN:  no rashes                              12.0   5.07  )-----------( 165      ( 07 May 2025 07:51 )             39.6         139  |  108  |  20  ----------------------------<  122[H]  3.7   |  25  |  1.77[H]    Ca    9.0      07 May 2025 07:51  Mg     2.1         TPro  6.8  /  Alb  3.5  /  TBili  0.8  /  DBili  x   /  AST  9[L]  /  ALT  22  /  AlkPhos  88          LIVER FUNCTIONS - ( 07 May 2025 07:51 )  Alb: 3.5 g/dL / Pro: 6.8 gm/dL / ALK PHOS: 88 U/L / ALT: 22 U/L / AST: 9 U/L / GGT: x           PT/INR - ( 07 May 2025 07:51 )   PT: 11.4 sec;   INR: 0.99 ratio         PTT - ( 07 May 2025 07:51 )  PTT:26.9 sec  Urinalysis Basic - ( 07 May 2025 09:02 )    Color: Yellow / Appearance: Clear / S.007 / pH: x  Gluc: x / Ketone: Negative mg/dL  / Bili: Negative / Urobili: 0.2 mg/dL   Blood: x / Protein: Negative mg/dL / Nitrite: Negative   Leuk Esterase: Trace / RBC: 3 /HPF / WBC 5 /HPF   Sq Epi: x / Non Sq Epi: 0 /HPF / Bacteria: Negative /HPF        Lactate, Blood: 1.2 mmol/L ( @ 23:41)        Radiology:   CT:    06-May-2025 21:55, CT Head No Cont  CT Head No Cont:   	ACC: [id number] EXAM:  CT BRAIN   ORDERED BY: SAUMYA EPSTEIN   	  	PROCEDURE DATE:  2025    	  	  	  	INTERPRETATION:  CLINICAL INFORMATION: HTN, HA  	  	COMPARISON: Head CT dated 3/2/2023  	  	CONTRAST:  	IV Contrast: NONE  	.  	  	TECHNIQUE:  Serial axial images were obtained from the skull base to the   	vertex using multi-slice helical technique. Sagittal and coronal   	reformats were obtained.  	  	FINDINGS:  	  	VENTRICLES AND SULCI: Age appropriate involutional changes.  	INTRA-AXIAL: No mass effect, acute hemorrhage, or midline shift.  There   	are periventricular and subcortical white matter hypodensities,   	consistent with microvascular type changes.  	EXTRA-AXIAL: No mass or fluid collection. Basal cisterns are normal in   	appearance.  	  	VISUALIZEDSINUSES:  Scattered mucosal thickening.  	TYMPANOMASTOID CAVITIES:  Clear.  	VISUALIZED ORBITS: Normal.  	CALVARIUM: Intact.  	  	MISCELLANEOUS: None.  	  	  	IMPRESSION:  	No acute intracranial hemorrhage, mass effect, or midline shift.  	  	  < from: NM Pulmonary Ventilation/Perfusion Scan (25 @ 11:55) >  IMPRESSION:    Very low probability of pulmonary embolus.        < end of copied text >    < from: US Duplex Venous Lower Ext Complete, Bilateral (25 @ 11:21) >    IMPRESSION:  No evidence of deep venous thrombosis in either lower extremity.      < end of copied text >  < from: CT Chest No Cont (25 @ 21:56) >  IMPRESSION:  No acute cardiopulmonary process.    Subcentimeter bibasilar pulmonary nodules are stable compared to prior   studies dating back to 2022.    Dilated main pulmonary artery suggests pulmonary arterial hypertension.        < end of copied text >  	        Assessment and Plan:   · VTE Risk Assessment	VTE Assessment already completed for this visit  · Completed VTE Risk Assessment(s)	Refer to the Assessment tab to view/cancel completed assessment.     Assessment:  · Assessment	  83 y/o M w/ PMH of bladder cancer, BPH, basal skin cancer, lumbar spinal stenosis, OA, HTN, dyslipidemia, depression, herniated disc, DM2, kidney stones, p/w SOB    *Dyspnea with acute hypoxic resp failure  *Dilated Pulm artery  *Pulm nodules   - case d/w cardio, pulm and patient  - etiology unclear as echo unremarkalbe, vq negative, le dopplers negative with unremarkable d-dimer and bnp.    - spoke with dr el and he recommends to admit patient and monitor o2 sat overnight   -CT Chest: Mild cardiomegaly, Subcentimeter bibasilar pulmonary nodules are stable compared to prior studies dating back to 2022. Dilated main pulmonary artery suggests pulmonary arterial hypertension.  -V/Q scan pending   -Troponin negative x 2   -ProBNP = 255  -EKG: Sinus 75 bpm, 1st deg AV block, +PVCs   -Echo  -Cardio consult appreciated  -Pulm consult appreciated    *CKD3  -Avoid nephrotoxic agents  -Trend Creatinine  -UA     *Microcytic anemia  -Trend H/H during hospitalization and if remains stable -> f/u outpatient for further management     *Thyriod nodule  -F/u outpatient thyroid U/S and Endo referral for further work up     *DM2  -Humalog ISS  -Diabetic diet    *H/o bladder cancer / BPH / basal skin cancer / lumbar stenosis / OA / HTN / dyslipidemia / depression / herniated disc  -C/w home meds and f/u outpatient for further management      *DVT ppx  -Heparin SubQ 
Interval/Overnight Events:    No acute distress off oxygen no productive cough hemoptysis.  Workup for PE is negative.  Cardiology workup is negative.      ICU Vital Signs Last 24 Hrs  T(C): 36.2 (08 May 2025 08:08), Max: 36.8 (08 May 2025 00:00)  T(F): 97.2 (08 May 2025 08:08), Max: 98.2 (08 May 2025 00:00)  HR: 85 (08 May 2025 09:58) (70 - 85)  BP: 160/90 (08 May 2025 09:58) (150/95 - 167/103)  BP(mean): 109 (08 May 2025 08:08) (109 - 109)    RR: 18 (08 May 2025 08:08) (18 - 18)  SpO2: 98% (08 May 2025 08:08) (90% - 98%)    O2 Parameters below as of 08 May 2025 08:08  Patient On (Oxygen Delivery Method): room air            CAPILLARY BLOOD GLUCOSE      POCT Blood Glucose.: 135 mg/dL (08 May 2025 07:52)      I&O's Summary    07 May 2025 07:01  -  08 May 2025 07:00  --------------------------------------------------------  IN: 0 mL / OUT: 1300 mL / NET: -1300 mL            MEDICATIONS  (STANDING):  atorvastatin 10 milliGRAM(s) Oral <User Schedule>  dextrose 5%. 1000 milliLiter(s) (50 mL/Hr) IV Continuous <Continuous>  dextrose 5%. 1000 milliLiter(s) (100 mL/Hr) IV Continuous <Continuous>  dextrose 50% Injectable 25 Gram(s) IV Push once  dextrose 50% Injectable 12.5 Gram(s) IV Push once  dextrose 50% Injectable 25 Gram(s) IV Push once  diltiazem    milliGRAM(s) Oral daily  escitalopram 20 milliGRAM(s) Oral daily  finasteride 5 milliGRAM(s) Oral daily  glucagon  Injectable 1 milliGRAM(s) IntraMuscular once  heparin   Injectable 5000 Unit(s) SubCutaneous every 12 hours  insulin lispro (ADMELOG) corrective regimen sliding scale   SubCutaneous three times a day before meals  insulin lispro (ADMELOG) corrective regimen sliding scale   SubCutaneous at bedtime  multivitamin 1 Tablet(s) Oral daily  tamsulosin 0.4 milliGRAM(s) Oral at bedtime      MEDICATIONS  (PRN):  dextrose Oral Gel 15 Gram(s) Oral once PRN Blood Glucose LESS THAN 70 milliGRAM(s)/deciliter  hydrALAZINE 50 milliGRAM(s) Oral every 12 hours PRN Systolic blood pressure > 160                            12.0   5.07  )-----------( 165      ( 07 May 2025 07:51 )             39.6     05-08    137  |  104  |  24[H]  ----------------------------<  128[H]  3.5   |  25  |  1.64[H]    Ca    9.2      08 May 2025 07:28    TPro  6.8  /  Alb  3.5  /  TBili  0.8  /  DBili  x   /  AST  9[L]  /  ALT  22  /  AlkPhos  88  05-07      PHYSICAL EXAM:  General:	In no acute distress  Respiratory:	Lungs clear to auscultation bilaterally. Good aeration. No rales,   .		rhonchi, retractions or wheezing. Effort even and unlabored.  CV:		Regular rate and rhythm. Normal S1/S2. No murmurs, rubs, or   .		gallop. Capillary refill < 2 seconds. Distal pulses 2+ and equal.  Abdomen:	Soft, non-distended. Bowel sounds present. No palpable   .		hepatosplenomegaly.  Skin:		No rash.  Extremities:	Warm and well perfused. No gross extremity deformities.  Neurologic:	Alert and oriented. No acute change from baseline exam.    IMAGING STUDIES:

## 2025-05-08 NOTE — DISCHARGE NOTE PROVIDER - HOSPITAL COURSE
85 y/o M w/ PMH of bladder cancer, BPH, basal skin cancer, lumbar spinal stenosis, OA, HTN, dyslipidemia, depression, herniated disc, DM2, kidney stones, p/w SOB.  pt had work up with negative le dopplers and negative vq scan.  seen by pulm and cardio with echo showing normal EF and normal pulm pressures.  pt aware he needs sleep study as he does desat when he sleeps [id number].  this d/w dr el and he will set up PFTs and sleep study as outpt.  spoke with dr nickerson and will need outpt stress test. pt aware of recs and agrees to f/u.      *Dyspnea with acute hypoxic resp failure - likely 2/2 log standing sleep apnea  *Dilated Pulm artery - no evidence of PAH  *Pulm nodules - stable   pt had work up with negative le dopplers and negative vq scan.  seen by pulm and cardio with echo showing normal EF and normal pulm pressures.  pt aware he needs sleep study as he does desat when he sleeps [id number].  this d/w dr el and he will set up PFTs and sleep study as outpt.  spoke with dr nickerson and will need outpt stress test. pt aware of recs and agrees to f/u.    -CT Chest: Mild cardiomegaly, Subcentimeter bibasilar pulmonary nodules are stable compared to prior studies dating back to April 19, 2022. Dilated main pulmonary artery suggests pulmonary arterial hypertension.  -V/Q scan pending   -Troponin negative x 2   -ProBNP = 255  -EKG: Sinus 75 bpm, 1st deg AV block, +PVCs   -Echo  -Cardio consult appreciated  -Pulm consult appreciated    * HTN - uncontrolled  - start hydralzine 50 mg bid upon dc. will likely need further titration as outpt   - continue cardizem    *CKD3 with solitary kidney  - cr stable. outpt f/u    *Microcytic anemia  -Trend H/H during hospitalization and if remains stable -> f/u outpatient for further management   - likely 2/2 underlying ckd    *Thyriod nodule  -F/u outpatient thyroid U/S and Endo referral for further work up     *DM2  -Humalog ISS  -Diabetic diet    *H/o bladder cancer / BPH / basal skin cancer / lumbar stenosis / OA / HTN / dyslipidemia / depression / herniated disc  -C/w home meds and f/u outpatient for further management      *DVT ppx  -Heparin SubQ         Radiology:   CT:    06-May-2025 21:55, CT Head No Cont  CT Head No Cont:   	ACC: [id number] EXAM:  CT BRAIN   ORDERED BY: SAUMYA EPSTEIN   	  	PROCEDURE DATE:  05/06/2025    	  	  	  	INTERPRETATION:  CLINICAL INFORMATION: HTN, HA  	  	COMPARISON: Head CT dated 3/2/2023  	  	IMPRESSION:  	No acute intracranial hemorrhage, mass effect, or midline shift.  	  	  < from: NM Pulmonary Ventilation/Perfusion Scan (05.07.25 @ 11:55) >  IMPRESSION:    Very low probability of pulmonary embolus.  < from: US Duplex Venous Lower Ext Complete, Bilateral (05.07.25 @ 11:21) >    IMPRESSION:  No evidence of deep venous thrombosis in either lower extremity.  < end of copied text >  < from: CT Chest No Cont (05.06.25 @ 21:56) >  IMPRESSION:  No acute cardiopulmonary process.  Subcentimeter bibasilar pulmonary nodules are stable compared to prior   studies dating back to April19, 2022.  Dilated main pulmonary artery suggests pulmonary arterial hypertension.    < from: TTE W or WO Ultrasound Enhancing Agent (05.07.25 @ 07:15) >    CONCLUSIONS:      1. Technically difficult image quality.   2. Left ventricular cavity is normal in size. Left ventricular systolic function is normal with an ejection fraction of 55 % by Oseguera's method of disks.   3. Mild left ventricular hypertrophy.   4. Left atrium is normal in size.   5. Mild tricuspid regurgitation.   6. Estimated pulmonary artery systolic pressure is 23 mmHg, consistent with normal pulmonary artery pressure.    _______    < end of copied text >

## 2025-05-08 NOTE — DISCHARGE NOTE NURSING/CASE MANAGEMENT/SOCIAL WORK - FINANCIAL ASSISTANCE
[facility] provides services at a reduced cost to those who are determined to be eligible through [facility]’s financial assistance program. Information regarding [employer] [facility]’s financial assistance program can be found by going to [email or web address] or by calling [phone].

## 2025-05-08 NOTE — PROGRESS NOTE ADULT - ASSESSMENT
Impression:  83 y/o M w/ PMH of bladder cancer, BPH, basal skin cancer, lumbar spinal stenosis, OA, HTN, dyslipidemia, depression, herniated disc, DM2, kidney stones, Admitted with shortness of breath and desaturation.   Pulmonary consulted  Mild diastolic dysfunction on previous echo with normal pulmonary artery pressure.  No evidence of COPD or interstitial lung disease  VQ scan was ordered which was low probability  D-dimer, BNP, duplex lower extremity ultrasound is negative    Desaturation on supine position and during sleep likely related to sleep apnea  Microatelectasis/ Dependent and linear atelectasis in the lower lobes.   And mild volume overload.    Pulmonary nodules-stable    Recommendations:  Workup for pulmonary embolism is negative cardiac workup is negative.  Home oxygen evaluation prior to discharge.  Likely obesity hypoventilation nonsupine associated sleep-related breathing disorder will need outpatient polysomnogram.  In the meantime I have told patient to continue incentive spirometry reduce weight sleep head of the bed elevated.  Information was given to him for follow-up as an outpatient  Patient will need an outpatient pulmonary follow-up -   To follow lung nodules

## 2025-05-08 NOTE — DISCHARGE NOTE PROVIDER - PROVIDER TOKENS
PROVIDER:[TOKEN:[[id number]]],PROVIDER:[TOKEN:[[id number]]],PROVIDER:[TOKEN:[[id number]]]

## 2025-05-08 NOTE — DISCHARGE NOTE PROVIDER - DISCHARGE SERVICE FOR PATIENT
on the discharge service for the patient. I have reviewed and made amendments to the documentation where necessary. Complex Repair And Single Advancement Flap Text: The defect edges were debeveled with a #15 scalpel blade.  The primary defect was closed partially with a complex linear closure.  Given the location of the remaining defect, shape of the defect and the proximity to free margins a single advancement flap was deemed most appropriate for complete closure of the defect.  Using a sterile surgical marker, an appropriate advancement flap was drawn incorporating the defect and placing the expected incisions within the relaxed skin tension lines where possible.    The area thus outlined was incised deep to adipose tissue with a #15 scalpel blade.  The skin margins were undermined to an appropriate distance in all directions utilizing iris scissors.

## 2025-05-08 NOTE — DISCHARGE NOTE PROVIDER - CARE PROVIDER_API CALL
Sergei Dorantes  Internal Medicine  [address] [place] [address]  Phone: [phone]  Fax: [phone]  Follow Up Time:     Rafal Cuba  Pulmonary Disease  [address] [place] [address]  Phone: [phone]  Fax: [phone]  Follow Up Time:     Dustin Harrell  Infectious Disease  [address] [place] [address]  Phone: [phone]  Fax: [phone]  Follow Up Time:

## 2025-05-08 NOTE — DISCHARGE NOTE PROVIDER - NSDCFUSCHEDAPPT_GEN_ALL_CORE_FT
[facility]  UROLOGY [address]  Scheduled Appointment: 06/11/2025    Jassi Mckinney  [facility]  UROLOGY [address]  Scheduled Appointment: 06/11/2025

## 2025-05-08 NOTE — DISCHARGE NOTE PROVIDER - NSDCMRMEDTOKEN_GEN_ALL_CORE_FT
dilTIAZem 300 mg/24 hours oral capsule, extended release: 1 cap(s) orally once a day  escitalopram 20 mg oral tablet: 1 tab(s) orally once a day  finasteride 5 mg oral tablet: 1 tab(s) orally Monday, Wednesday, and Friday in the Evening  hydrALAZINE 50 mg oral tablet: 1 tab(s) orally every 12 hours as needed for Systolic blood pressure &gt; 160  Multiple Vitamins oral tablet: 1 tab(s) orally once a day  pravastatin 40 mg oral tablet: 1 tab(s) orally Monday, Wednesday, and Friday in the Evening  tamsulosin 0.4 mg oral capsule: 1 cap(s) orally once a day (at bedtime)

## 2025-05-08 NOTE — DISCHARGE NOTE PROVIDER - CARE PROVIDERS DIRECT ADDRESSES
,[email or web address],DirectAddress_Unknown

## 2025-05-08 NOTE — DISCHARGE NOTE NURSING/CASE MANAGEMENT/SOCIAL WORK - PATIENT PORTAL LINK FT
You can access the FollowMyHealth Patient Portal offered by [facility] by registering at the following website: [email or web address]. By joining [employer]’s FollowMyHealth portal, you will also be able to view your health information using other applications (apps) compatible with our system.

## 2025-05-11 LAB
AUTO DIFF PNL BLD: ABNORMAL
C-ANCA SER-ACNC: NEGATIVE — SIGNIFICANT CHANGE UP
MPO AB + PR3 PNL SER: SIGNIFICANT CHANGE UP
P-ANCA SER-ACNC: NEGATIVE — SIGNIFICANT CHANGE UP

## 2025-05-15 DIAGNOSIS — J96.01 ACUTE RESPIRATORY FAILURE WITH HYPOXIA: ICD-10-CM

## 2025-05-15 DIAGNOSIS — D63.1 ANEMIA IN CHRONIC KIDNEY DISEASE: ICD-10-CM

## 2025-05-15 DIAGNOSIS — Z85.828 PERSONAL HISTORY OF OTHER MALIGNANT NEOPLASM OF SKIN: ICD-10-CM

## 2025-05-15 DIAGNOSIS — I12.9 HYPERTENSIVE CHRONIC KIDNEY DISEASE WITH STAGE 1 THROUGH STAGE 4 CHRONIC KIDNEY DISEASE, OR UNSPECIFIED CHRONIC KIDNEY DISEASE: ICD-10-CM

## 2025-05-15 DIAGNOSIS — F32.A DEPRESSION, UNSPECIFIED: ICD-10-CM

## 2025-05-15 DIAGNOSIS — Z85.51 PERSONAL HISTORY OF MALIGNANT NEOPLASM OF BLADDER: ICD-10-CM

## 2025-05-15 DIAGNOSIS — E04.1 NONTOXIC SINGLE THYROID NODULE: ICD-10-CM

## 2025-05-15 DIAGNOSIS — N40.0 BENIGN PROSTATIC HYPERPLASIA WITHOUT LOWER URINARY TRACT SYMPTOMS: ICD-10-CM

## 2025-05-15 DIAGNOSIS — E78.5 HYPERLIPIDEMIA, UNSPECIFIED: ICD-10-CM

## 2025-05-15 DIAGNOSIS — M48.061 SPINAL STENOSIS, LUMBAR REGION WITHOUT NEUROGENIC CLAUDICATION: ICD-10-CM

## 2025-05-15 DIAGNOSIS — M19.90 UNSPECIFIED OSTEOARTHRITIS, UNSPECIFIED SITE: ICD-10-CM

## 2025-05-15 DIAGNOSIS — R91.8 OTHER NONSPECIFIC ABNORMAL FINDING OF LUNG FIELD: ICD-10-CM

## 2025-05-15 DIAGNOSIS — I27.20 PULMONARY HYPERTENSION, UNSPECIFIED: ICD-10-CM

## 2025-05-15 DIAGNOSIS — G47.30 SLEEP APNEA, UNSPECIFIED: ICD-10-CM

## 2025-05-15 DIAGNOSIS — N18.30 CHRONIC KIDNEY DISEASE, STAGE 3 UNSPECIFIED: ICD-10-CM

## 2025-05-15 DIAGNOSIS — E11.22 TYPE 2 DIABETES MELLITUS WITH DIABETIC CHRONIC KIDNEY DISEASE: ICD-10-CM

## 2025-05-30 ENCOUNTER — RX RENEWAL (OUTPATIENT)
Age: 85
End: 2025-05-30

## 2025-06-11 ENCOUNTER — APPOINTMENT (OUTPATIENT)
Dept: UROLOGY | Facility: CLINIC | Age: 85
End: 2025-06-11
Payer: MEDICARE

## 2025-06-11 VITALS
DIASTOLIC BLOOD PRESSURE: 82 MMHG | HEIGHT: 73 IN | BODY MASS INDEX: 34.85 KG/M2 | WEIGHT: 263 LBS | SYSTOLIC BLOOD PRESSURE: 129 MMHG | OXYGEN SATURATION: 95 % | HEART RATE: 70 BPM

## 2025-06-11 PROCEDURE — 81003 URINALYSIS AUTO W/O SCOPE: CPT | Mod: QW

## 2025-06-11 PROCEDURE — 52000 CYSTOURETHROSCOPY: CPT

## 2025-06-12 LAB — URINE CYTOLOGY: NORMAL

## 2025-06-26 ENCOUNTER — RX RENEWAL (OUTPATIENT)
Age: 85
End: 2025-06-26

## 2025-07-11 ENCOUNTER — RX RENEWAL (OUTPATIENT)
Age: 85
End: 2025-07-11

## 2025-07-29 ENCOUNTER — APPOINTMENT (OUTPATIENT)
Dept: MRI IMAGING | Facility: CLINIC | Age: 85
End: 2025-07-29
Payer: MEDICARE

## 2025-07-29 ENCOUNTER — OUTPATIENT (OUTPATIENT)
Dept: OUTPATIENT SERVICES | Facility: HOSPITAL | Age: 85
LOS: 1 days | End: 2025-07-29
Payer: MEDICARE

## 2025-07-29 DIAGNOSIS — Z98.890 OTHER SPECIFIED POSTPROCEDURAL STATES: Chronic | ICD-10-CM

## 2025-07-29 DIAGNOSIS — C64.9 MALIGNANT NEOPLASM OF UNSPECIFIED KIDNEY, EXCEPT RENAL PELVIS: ICD-10-CM

## 2025-07-29 DIAGNOSIS — Z90.89 ACQUIRED ABSENCE OF OTHER ORGANS: Chronic | ICD-10-CM

## 2025-07-29 DIAGNOSIS — Z93.6 OTHER ARTIFICIAL OPENINGS OF URINARY TRACT STATUS: Chronic | ICD-10-CM

## 2025-07-29 DIAGNOSIS — Z90.5 ACQUIRED ABSENCE OF KIDNEY: Chronic | ICD-10-CM

## 2025-07-29 PROCEDURE — 72197 MRI PELVIS W/O & W/DYE: CPT | Mod: 26

## 2025-07-29 PROCEDURE — 72197 MRI PELVIS W/O & W/DYE: CPT

## 2025-07-29 PROCEDURE — A9585: CPT

## 2025-07-29 PROCEDURE — 74183 MRI ABD W/O CNTR FLWD CNTR: CPT | Mod: 26

## 2025-07-29 PROCEDURE — 74183 MRI ABD W/O CNTR FLWD CNTR: CPT

## 2025-08-05 ENCOUNTER — RX RENEWAL (OUTPATIENT)
Age: 85
End: 2025-08-05

## 2025-08-07 ENCOUNTER — NON-APPOINTMENT (OUTPATIENT)
Age: 85
End: 2025-08-07

## 2025-08-14 ENCOUNTER — NON-APPOINTMENT (OUTPATIENT)
Age: 85
End: 2025-08-14

## 2025-08-30 ENCOUNTER — RX RENEWAL (OUTPATIENT)
Age: 85
End: 2025-08-30